# Patient Record
Sex: FEMALE | Race: WHITE | NOT HISPANIC OR LATINO | Employment: OTHER | URBAN - METROPOLITAN AREA
[De-identification: names, ages, dates, MRNs, and addresses within clinical notes are randomized per-mention and may not be internally consistent; named-entity substitution may affect disease eponyms.]

---

## 2017-08-31 ASSESSMENT — MIFFLIN-ST. JEOR: SCORE: 1018.34

## 2017-09-07 ENCOUNTER — ANESTHESIA - HEALTHEAST (OUTPATIENT)
Dept: SURGERY | Facility: CLINIC | Age: 77
End: 2017-09-07

## 2017-09-07 ENCOUNTER — SURGERY - HEALTHEAST (OUTPATIENT)
Dept: SURGERY | Facility: CLINIC | Age: 77
End: 2017-09-07

## 2017-09-07 ASSESSMENT — MIFFLIN-ST. JEOR: SCORE: 1018.34

## 2018-03-22 ENCOUNTER — TRANSFERRED RECORDS (OUTPATIENT)
Dept: HEALTH INFORMATION MANAGEMENT | Facility: CLINIC | Age: 78
End: 2018-03-22

## 2018-03-28 ASSESSMENT — ENCOUNTER SYMPTOMS
SEIZURES: 0
NECK PAIN: 0
HEARTBURN: 1
TREMORS: 1
STIFFNESS: 1
JOINT SWELLING: 0
CONSTIPATION: 0
MEMORY LOSS: 0
DIZZINESS: 0
MUSCLE CRAMPS: 0
BLOATING: 0
BOWEL INCONTINENCE: 0
PARALYSIS: 0
NUMBNESS: 1
DIARRHEA: 1
MYALGIAS: 1
WEAKNESS: 1
BACK PAIN: 1
SPEECH CHANGE: 0
MUSCLE WEAKNESS: 0
ABDOMINAL PAIN: 0
BLOOD IN STOOL: 0
HEADACHES: 0
LOSS OF CONSCIOUSNESS: 0
DISTURBANCES IN COORDINATION: 0
JAUNDICE: 0
RECTAL PAIN: 0
TINGLING: 1
VOMITING: 0
NAUSEA: 0
ARTHRALGIAS: 1

## 2018-03-30 ENCOUNTER — CARE COORDINATION (OUTPATIENT)
Dept: NEUROSURGERY | Facility: CLINIC | Age: 78
End: 2018-03-30

## 2018-03-30 ENCOUNTER — OFFICE VISIT (OUTPATIENT)
Dept: NEUROLOGY | Facility: CLINIC | Age: 78
End: 2018-03-30
Payer: MEDICARE

## 2018-03-30 VITALS — SYSTOLIC BLOOD PRESSURE: 151 MMHG | HEIGHT: 63 IN | HEART RATE: 73 BPM | DIASTOLIC BLOOD PRESSURE: 79 MMHG

## 2018-03-30 DIAGNOSIS — I10 HYPERTENSION, UNSPECIFIED TYPE: ICD-10-CM

## 2018-03-30 DIAGNOSIS — M47.812 CERVICAL SPONDYLOSIS WITHOUT MYELOPATHY: ICD-10-CM

## 2018-03-30 DIAGNOSIS — G25.9 EXTRAPYRAMIDAL DISEASE: Primary | ICD-10-CM

## 2018-03-30 DIAGNOSIS — E78.2 MIXED HYPERLIPIDEMIA: ICD-10-CM

## 2018-03-30 DIAGNOSIS — M54.16 LUMBAR RADICULOPATHY: ICD-10-CM

## 2018-03-30 RX ORDER — PREDNISONE 10 MG/1
TABLET ORAL
Qty: 13 TABLET | Refills: 0 | Status: SHIPPED | OUTPATIENT
Start: 2018-03-30 | End: 2018-06-20

## 2018-03-30 ASSESSMENT — PAIN SCALES - GENERAL: PAINLEVEL: SEVERE PAIN (7)

## 2018-03-30 NOTE — MR AVS SNAPSHOT
After Visit Summary   3/30/2018    Catherine Mccollum    MRN: [id number]           Patient Information     Date Of Birth          1940        Visit Information        Provider Department      3/30/2018 11:00 AM Yonathan Ferguson MD [facility] Neurology        Today's Diagnoses     Extrapyramidal disease    -  1    Cervical spondylosis without myelopathy        Lumbar radiculopathy        Hypertension, unspecified type        Mixed hyperlipidemia          Care Instructions    Appointment on Monday April 2nd at 10:15 am.   [address]    Phone [phone]          Follow-ups after your visit        Additional Services     INTERNAL MEDICINE REFERRAL       Your provider has referred you to: [facility]: [facility] - [facility] [phone]   [email or web address]    Please be aware that coverage of these services is subject to the terms and limitations of your health insurance plan.  Call member services at your health plan with any benefit or coverage questions.      Please bring the following to your appointment:  >>   Any x-rays, CTs or MRIs which have been performed.  Contact the facility where they were done to arrange for  prior to your scheduled appointment.   >>   List of current medications   >>   This referral request   >>   Any documents/labs given to you for this referral            NEUROSURGERY REFERRAL       Your provider has referred you to: [facility]: [facility] - [place] [phone]   [email or web address]    Please be aware that coverage of these services is subject to the terms and limitations of your health insurance plan.  Call member services at your health plan with any benefit or coverage questions.      Please bring the following with you to your appointment:    (1) Any  "X-Rays, CTs or MRIs which have been performed.  Contact the facility where they were done to arrange for  prior to your scheduled appointment.   (2) List of current medications  (3) This referral request   (4) Any documents/labs given to you for this referral                  Follow-up notes from your care team     Return in 3 days (on 4/2/2018).      Your next 10 appointments already scheduled     Apr 02, 2018 10:15 AM CDT   (Arrive by 10:00 AM)   New Patient Visit with Rosalind Lewis PA-C   [facility] ([facility] [place])    [address] [place] [address]  [address] [facility] [address] [phone]              Who to contact     Please call your clinic at [phone] to:    Ask questions about your health    Make or cancel appointments    Discuss your medicines    Learn about your test results    Speak to your doctor            Additional Information About Your Visit        [employer] Information     [employer] gives you secure access to your electronic health record. If you see a primary care provider, you can also send messages to your care team and make appointments. If you have questions, please call your primary care clinic.  If you do not have a primary care provider, please call [phone] and they will assist you.      [employer] is an electronic gateway that provides easy, online access to your medical records. With [employer], you can request a clinic appointment, read your test results, renew a prescription or communicate with your care team.     To access your existing account, please contact your [facility] or call [phone] for assistance.        Care EveryWhere ID     This is your Care EveryWhere ID. This could be used by other organizations to access your [place] medical records  [id number]        Your Vitals Were     Pulse Height                73 1.6 m (5' 3\")           Blood Pressure from Last 3 Encounters:   03/30/18 151/79 "   10/18/16 130/67   07/06/16 128/81    Weight from Last 3 Encounters:   07/06/16 62.6 kg (138 lb)   05/11/16 61.8 kg (136 lb 3.2 oz)   12/08/15 61 kg (134 lb 6.4 oz)              We Performed the Following     INTERNAL MEDICINE REFERRAL     NEUROSURGERY REFERRAL          Today's Medication Changes          These changes are accurate as of 3/30/18  1:10 PM.  If you have any questions, ask your nurse or doctor.               Start taking these medicines.        Dose/Directions    predniSONE 10 MG tablet   Commonly known as:  DELTASONE   Used for:  Lumbar radiculopathy   Started by:  Yonathan Ferguson MD        Take 4 tablets (40 mg) once daily for 2 days, then 2 tablets once daily for 2 days, then 1 tablet once daily for 1 day.   Quantity:  13 tablet   Refills:  0            Where to get your medicines      These medications were sent to [id number] - [place], [place] - [address] AT [facility] [address]  [facility] [address], [place] [address]    Hours:  24-hours Phone:  [phone]     predniSONE 10 MG tablet                Primary Care Provider Office Phone # Fax #    Ayanna Muniz [phone]       [address] [address]        Equal Access to Services     HUMPHREY ANDERSON AH: Hadii charley ku hadasho Sokervin, waaxda luqadaha, qaybta kaalmada adeegyada, niya boyle. So [facility] [phone].    ATENCIÓN: Si habla español, tiene a franco disposición servicios gratuitos de asistencia lingüística. Miguelito al [phone].    We comply with applicable federal civil rights laws and [place] laws. We do not discriminate on the basis of race, color, national origin, age, disability, sex, sexual orientation, or gender identity.            Thank you!     Thank you for choosing [facility] NEUROLOGY  for your care. Our goal is always to provide you with excellent care. Hearing back from our patients is one way we can  continue to improve our services. Please take a few minutes to complete the written survey that you may receive in the mail after your visit with us. Thank you!             Your Updated Medication List - Protect others around you: Learn how to safely use, store and throw away your medicines at www.disposemymeds.org.          This list is accurate as of 3/30/18  1:10 PM.  Always use your most recent med list.                   Brand Name Dispense Instructions for use Diagnosis    amantadine 100 MG capsule    SYMMETREL    180 capsule    Take 1 capsule (100 mg) by mouth 2 times daily    Tremor       * carbidopa-levodopa  MG per tablet    SINEMET    180 tablet    Take 1 tablet by mouth 3 times daily    Paralysis agitans (H)       * carbidopa-levodopa  MG per tablet    SINEMET    21 tablet    Take 1 tablet by mouth 3 times daily    Paralysis agitans (H)       chlorthalidone 25 MG tablet    HYGROTON    45 tablet    Take 1/2 tab daily    Essential hypertension       ciprofloxacin 250 MG tablet    CIPRO    6 tablet    Take 1 tablet (250 mg) by mouth 2 times daily    Dysuria       CORAL CALCIUM      1,000 mg daily.        desoximetasone 0.25 % Oint ointment    TOPICORT     Apply topically as needed        diazepam 5 MG tablet    VALIUM    25 tablet    Take 1 tablet (5 mg) by mouth nightly as needed for sleep    Other insomnia       irbesartan 150 MG tablet    AVAPRO    90 tablet    Take 1 tablet (150 mg) by mouth daily    Essential hypertension       pravastatin 20 MG tablet    PRAVACHOL    90 tablet    Take 1 tablet (20 mg) by mouth daily    Hyperlipidemia, unspecified hyperlipidemia       predniSONE 10 MG tablet    DELTASONE    13 tablet    Take 4 tablets (40 mg) once daily for 2 days, then 2 tablets once daily for 2 days, then 1 tablet once daily for 1 day.    Lumbar radiculopathy       TUMS 500 MG chewable tablet   Generic drug:  calcium carbonate      Take 2 tablets by mouth daily as needed.        TYLENOL  EXTRA STRENGTH PO      Take 1 tablet by mouth as needed Muscle and joint pain        typhoid CR capsule    VIVOTIF    4 capsule    Take 1 capsule by mouth every other day    Encounter for immunization       vitamin D 1000 UNITS capsule      Take 1 capsule by mouth daily.        * Notice:  This list has 2 medication(s) that are the same as other medications prescribed for you. Read the directions carefully, and ask your doctor or other care provider to review them with you.

## 2018-03-30 NOTE — PATIENT INSTRUCTIONS
Appointment on Monday April 2nd at 10:15 am.   [facility]   [address], [place]    Phone [phone]

## 2018-03-30 NOTE — LETTER
3/30/2018       RE: Catherine Mccollum  [address]    [place] [address]     Dear Colleague,    Thank you for referring your patient, Catherine Mccollum, to the [facility]UROLOGY at [facility]. Please see a copy of my visit note below.    Service Date: 2018      RE: Catherine Mccollum   MRN: [id number]   : 1940      Dear Dr. Muniz:      Thank you for referring Catherine Mccollum for neurologic consultation on 2018.  The patient comes with a chief complaint of left leg radiculopathy as well as Parkinson disease.      The patient developed pain down the side and back of the left leg about 4-5 weeks ago.  She said she is miserable.  This goes into the foot and particularly is bad in the left instep.  She said that the pain is less when she is seated, but it is worse when she is at bed rest and also when she ambulates.  She had prior right leg symptoms.  I saw her for that a number of months ago.  Her last visit with me was 16 months ago.  She failed conservative care and ended up having lumbar laminectomy on the right at the L4-5 level through Dr. Cannon at [facility].  She said that she first had attempts at further conservative care, including an injection.  She said after the surgery she was much better but has had some continued pain at times down the right side of her leg.  This is nothing compared to the pain she had before surgery.  She has had no trouble passing her urine or stool.  She feels that since I last saw her tremor is worse.  She has reduced her use of Sinemet to 125/100 tablet most days.  She said that she thinks she has further acid reflux if she takes a higher dose medication.  She is off amantadine.  She continues on low dose lorazepam for sleep on a p.r.n. basis but continues on pravastatin, chlorthalidone, Avapro, calcium and vitamin D.  She has been on gabapentin 600 mg b.i.d., which helps some pain but has not given her much relief recently.   "She denied pain higher in her spine.  She did describe pins and needle sensation in the foot.  She has noted that if she bends her head forward that her left foot \"tingles.\"  She did note it is uncomfortable to place weight on the left foot.  She finds using a walker helps.  She also has now switched her shoe wear to flat shoes as walking on heels makes it worse.  She described a heavy sensation in her foot and she described a tingling and Novocain sensation in her sole.  The patient denied any new family history since I saw her or other medical problems.  I did review with the patient her known cervical stenosis.  Fortunately, she is not suffering from any neck pain.  She has not had radicular arm symptoms.        The patient did have MRI scan done.  Unfortunately, I was not able to get the images, but I was able to speak to the neuroradiologist concerning the report from 03/22/2018.  This was Dr. Oshea.  He said that at the L5-S1 level she had a new paracentral disk with extension to the left.  This had a lobulated appearance and did extend into the subarticular recess.  She had pressure with mass effect on the left S1 nerve root and the dorsal root of L5.  At L3-L4, she had a new foraminal disk herniation.  This showed some moderate narrowing in the left foramina and this did compress far laterally the left L3 dorsal root.  The patient did have a laminectomy on the right at L4-5 and she appear to have no residual disk there nor significant foraminal narrowing.  At L5-S1, she did have a previously found and then current shell right foraminal disk herniation.  She does have a spondylolisthesis L4-5 of 2-3 mm and at L5-S1 of 4 mm which has not changed.      Neurologic examination revealed a pleasant woman.  She did walk with an antalgic gait.  She had rest tremor, especially of the left arm compared to the right.  The tremor increased as she ambulated.  There is a mild postural component, but it was mainly rest " tremor.  There was just a hint of reduced rapid alternating motion rate of the left hand and slightly reduced movement of the left arm.  She did have intense paraspinal muscle spasm.  She had marked limitation in extension and flexion of her back and she had a list to the right.  Otherwise, she had normal voice with no significant dysarthria.  She had a positive glabellar tap sign and mildly reduced upward gaze.  The patient had a symmetric face.  Tongue and uvula are midline.  Strength testing was all normal.  Muscle stretch reflexes were quite brisk in the arms and at the knees as it had been in the past.  It was also present in internal hamstrings and reflexes were slightly reduced on the right internal hamstrings.  Ankle jerks were present.  Her toes were downward going to plantar stimulation.  The patient did have positive straight leg raising on the left at 90 degrees.  I could not auscultate cervical bruits.  She had a regular cardiac rhythm without gallops or murmurs.  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  This evidently has been evaluated and she was told to probably just leave it alone.      IMPRESSION:   1.  New left L5-S1 radiculopathy.   2.  Prior history of right-sided lumbar radiculopathy with fairly successful back surgery.   3.  Parkinson disease which is tremor predominant with prior history of essential tremor.      The patient unfortunately is quite miserable.  She would like to try further treatment before considering surgery.  She knows this is a possible option.  She is going to try a short course of oral prednisone.  I went over potential side effects with her including the rare possibility of bone infarction and pancreatitis and more common problems with glucose intolerance and diabetes.  She may be a candidate for an epidural steroid injection.  She would like referral to a [facility] neurosurgeon.  I will make arrangements for that today.   I went over with her restrictions.  I spent 45 minutes with the patient today.  Over 50% of the time involved counseling and coordination of care.         D: 2018   T: 2018   MT: herbert      Name:     GÉNESIS CA   MRN:      [id number]        Account:      [id number]   :      1940           Service Date: 2018      Document: [id number]       Again, thank you for allowing me to participate in the care of your patient.      Sincerely,    Yonathan Ferguson MD    CC:  Ayanna Muniz MD   [facility]    [address] [place]  [address]

## 2018-04-01 NOTE — PROGRESS NOTES
"Service Date: 2018      Ayanna Muniz MD   [facility]    [address] [place]  [address]      RE: Catherine Mccollum   MRN: [id number]   : 1940      Dear Dr. Muniz:      Thank you for referring Catherine Mccollum for neurologic consultation on 2018.  The patient comes with a chief complaint of left leg radiculopathy as well as Parkinson disease.      The patient developed pain down the side and back of the left leg about 4-5 weeks ago.  She said she is miserable.  This goes into the foot and particularly is bad in the left instep.  She said that the pain is less when she is seated, but it is worse when she is at bed rest and also when she ambulates.  She had prior right leg symptoms.  I saw her for that a number of months ago.  Her last visit with me was 16 months ago.  She failed conservative care and ended up having lumbar laminectomy on the right at the L4-5 level through Dr. Cannon at [facility].  She said that she first had attempts at further conservative care, including an injection.  She said after the surgery she was much better but has had some continued pain at times down the right side of her leg.  This is nothing compared to the pain she had before surgery.  She has had no trouble passing her urine or stool.  She feels that since I last saw her tremor is worse.  She has reduced her use of Sinemet to 125/100 tablet most days.  She said that she thinks she has further acid reflux if she takes a higher dose medication.  She is off amantadine.  She continues on low dose lorazepam for sleep on a p.r.n. basis but continues on pravastatin, chlorthalidone, Avapro, calcium and vitamin D.  She has been on gabapentin 600 mg b.i.d., which helps some pain but has not given her much relief recently.  She denied pain higher in her spine.  She did describe pins and needle sensation in the foot.  She has noted that if she bends her head forward that her left foot \"tingles.\"  She did note it is " uncomfortable to place weight on the left foot.  She finds using a walker helps.  She also has now switched her shoe wear to flat shoes as walking on heels makes it worse.  She described a heavy sensation in her foot and she described a tingling and Novocain sensation in her sole.  The patient denied any new family history since I saw her or other medical problems.  I did review with the patient her known cervical stenosis.  Fortunately, she is not suffering from any neck pain.  She has not had radicular arm symptoms.        The patient did have MRI scan done.  Unfortunately, I was not able to get the images, but I was able to speak to the neuroradiologist concerning the report from 03/22/2018.  This was Dr. Oshea.  He said that at the L5-S1 level she had a new paracentral disk with extension to the left.  This had a lobulated appearance and did extend into the subarticular recess.  She had pressure with mass effect on the left S1 nerve root and the dorsal root of L5.  At L3-L4, she had a new foraminal disk herniation.  This showed some moderate narrowing in the left foramina and this did compress far laterally the left L3 dorsal root.  The patient did have a laminectomy on the right at L4-5 and she appear to have no residual disk there nor significant foraminal narrowing.  At L5-S1, she did have a previously found and then current shell right foraminal disk herniation.  She does have a spondylolisthesis L4-5 of 2-3 mm and at L5-S1 of 4 mm which has not changed.      Neurologic examination revealed a pleasant woman.  She did walk with an antalgic gait.  She had rest tremor, especially of the left arm compared to the right.  The tremor increased as she ambulated.  There is a mild postural component, but it was mainly rest tremor.  There was just a hint of reduced rapid alternating motion rate of the left hand and slightly reduced movement of the left arm.  She did have intense paraspinal muscle spasm.  She had  marked limitation in extension and flexion of her back and she had a list to the right.  Otherwise, she had normal voice with no significant dysarthria.  She had a positive glabellar tap sign and mildly reduced upward gaze.  The patient had a symmetric face.  Tongue and uvula are midline.  Strength testing was all normal.  Muscle stretch reflexes were quite brisk in the arms and at the knees as it had been in the past.  It was also present in internal hamstrings and reflexes were slightly reduced on the right internal hamstrings.  Ankle jerks were present.  Her toes were downward going to plantar stimulation.  The patient did have positive straight leg raising on the left at 90 degrees.  I could not auscultate cervical bruits.  She had a regular cardiac rhythm without gallops or murmurs.  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  This evidently has been evaluated and she was told to probably just leave it alone.      IMPRESSION:   1.  New left L5-S1 radiculopathy.   2.  Prior history of right-sided lumbar radiculopathy with fairly successful back surgery.   3.  Parkinson disease which is tremor predominant with prior history of essential tremor.      The patient unfortunately is quite miserable.  She would like to try further treatment before considering surgery.  She knows this is a possible option.  She is going to try a short course of oral prednisone.  I went over potential side effects with her including the rare possibility of bone infarction and pancreatitis and more common problems with glucose intolerance and diabetes.  She may be a candidate for an epidural steroid injection.  She would like referral to a [facility] neurosurgeon.  I will make arrangements for that today.  I went over with her restrictions.  I spent 45 minutes with the patient today.  Over 50% of the time involved counseling and coordination of care.      Sincerely yours,      Yonathan HAMMOND  MD AXEL Ferguson MD             D: 2018   T: 2018   MT: herbert      Name:     GÉNESIS CA   MRN:      [id number]        Account:      [id number]   :      1940           Service Date: 2018      Document: [id number]

## 2018-04-02 ENCOUNTER — TELEPHONE (OUTPATIENT)
Dept: NEUROLOGY | Facility: CLINIC | Age: 78
End: 2018-04-02

## 2018-04-02 NOTE — TELEPHONE ENCOUNTER
[facility] Call Center    Phone Message    May a detailed message be left on voicemail: no    Reason for Call: Other: Courtesy Call on Pt's condition and talk about possible prednisone prescription     Action Taken: Message routed to:  [facility] ([facility] NEUROLOGY ADULT [facility]

## 2018-04-04 ENCOUNTER — TELEPHONE (OUTPATIENT)
Dept: NEUROLOGY | Facility: CLINIC | Age: 78
End: 2018-04-04

## 2018-04-04 DIAGNOSIS — M54.16 LUMBAR RADICULOPATHY: Primary | ICD-10-CM

## 2018-04-04 RX ORDER — PREDNISONE 10 MG/1
TABLET ORAL
Qty: 13 TABLET | Refills: 0 | Status: SHIPPED | OUTPATIENT
Start: 2018-04-04 | End: 2018-06-20

## 2018-04-04 NOTE — TELEPHONE ENCOUNTER
Patient had some relief from oral steroids; no side effects; reviewed again with her risks. She wants another course before seeing neurosurgeon

## 2018-04-08 ASSESSMENT — ENCOUNTER SYMPTOMS
STIFFNESS: 1
MYALGIAS: 1
BACK PAIN: 1
ARTHRALGIAS: 1

## 2018-04-11 ENCOUNTER — TELEPHONE (OUTPATIENT)
Dept: ANESTHESIOLOGY | Facility: CLINIC | Age: 78
End: 2018-04-11

## 2018-04-11 ENCOUNTER — TELEPHONE (OUTPATIENT)
Dept: NEUROSURGERY | Facility: CLINIC | Age: 78
End: 2018-04-11

## 2018-04-11 ENCOUNTER — OFFICE VISIT (OUTPATIENT)
Dept: NEUROSURGERY | Facility: CLINIC | Age: 78
End: 2018-04-11
Payer: MEDICARE

## 2018-04-11 VITALS
BODY MASS INDEX: 27.23 KG/M2 | WEIGHT: 144.2 LBS | SYSTOLIC BLOOD PRESSURE: 154 MMHG | DIASTOLIC BLOOD PRESSURE: 75 MMHG | HEIGHT: 61 IN | HEART RATE: 69 BPM

## 2018-04-11 DIAGNOSIS — M47.816 SPONDYLOSIS OF LUMBAR SPINE: Primary | ICD-10-CM

## 2018-04-11 DIAGNOSIS — M51.26 LUMBAR HERNIATED DISC: ICD-10-CM

## 2018-04-11 ASSESSMENT — PAIN SCALES - GENERAL: PAINLEVEL: SEVERE PAIN (7)

## 2018-04-11 NOTE — LETTER
4/11/2018       RE: Catherine Mccollum  [address]    [place] [address]     Dear Colleague,    Thank you for referring your patient, Catherine Mccollum, to the [facility] NEUROSURGERY at [facility]. Please see a copy of my visit note below.      Neurosurgery Clinic Consult  Date of Visit: 4/11/2018    Referring Provider:  Yonathan Ferguson MD  Dear Dr. Ferguson    We were happy to see Catherine Mccollum, a pleasant 78 year old year old female for left leg pain.    HPI:   As you recall this pleasant 78-year-old female has an underlying diagnosis of Parkinson's disease, and a long history of fairly well-controlled low back pain. She takes Tylenol for it and does well.  September of last year she developed right leg pain and ultimately had a right L4/5 lateral recess decompression and right L5-S1 synovial cyst removal at the hands of Dr. Jackson at [facility]. She did pretty well after that, had to have one more injection for pain, spine injection. And then has done well. 3 weeks ago she developed a new left leg pain.. It is worse than the right had been. It radiates down the back and side of the left leg, in an S1 distribution to the bottom of the foot with numbness and tingling in the calf and the bottom of the foot. It is worse when she is standing or walking, or if she lies down particularly on the right side.. She has no pain when she is sitting. She denies specific weakness but says it has always felt a little weak because of the Parkinson's. She has no recent bowel or bladder changes. Leg pain is rated 90% of her pain, back pain 10% of her pain.   She has had physical therapy for the right leg, but stopped when the left became painful.. She has had a couple of courses of oral steroids which were helpful while she is on them. She would like another. She has had an MRI finding a new disc herniation at left L5-S1 and she is referred for surgical  consideration. She did not return to Dr. Oakes's office because she was irritated with them as they would not give her MRI report over the phone.    Current Symptom List:   [facility] -  Patient Entered Questionnaire/Answers 4/8/2018   How would you describe the pain? burning, numbness, throbbing, pressure   Which of the following worsen your pain? lying down, standing, walking, exercise, bowel movements   Which of the following improve or reduce your pain?  sitting, relaxation   What number best describes your average pain for the past week:  0 = No pain  to  10 = Worst pain imaginable 6   What number best describes your LOWEST pain in past 24 hours:  0 = No pain  to  10 = Worst pain imaginable 3   What number best describes your WORST pain in past 24 hours:  0 = No pain  to  10 = Worst pain imaginable 8   When is your pain worst? AM, PM, Constant   What non-medicine treatments have you already had for your pain? physical therapy, spine injections (shots), surgery   Have you tried treating your pain with medication?  Yes   Are you currently taking medications for your pain? Yes       Current Outpatient Prescriptions:      GABAPENTIN PO, Take 300 mg by mouth, Disp: , Rfl:      LORazepam (ATIVAN PO), Take 0.5 mg by mouth nightly as needed for anxiety, Disp: , Rfl:      RaNITidine HCl (ZANTAC PO), Take 150 mg by mouth daily as needed for heartburn, Disp: , Rfl:      carbidopa-levodopa (SINEMET)  MG per tablet, Take 1 tablet by mouth 3 times daily (Patient taking differently: Take 1 tablet by mouth daily ), Disp: 180 tablet, Rfl: 3     carbidopa-levodopa (SINEMET)  MG per tablet, Take 1 tablet by mouth 3 times daily (Patient taking differently: Take 1 tablet by mouth daily ), Disp: 21 tablet, Rfl: 1     irbesartan (AVAPRO) 150 MG tablet, Take 1 tablet (150 mg) by mouth daily, Disp: 90 tablet, Rfl: 3     pravastatin (PRAVACHOL) 20 MG tablet, Take 1 tablet (20 mg) by mouth daily, Disp: 90 tablet, Rfl: 3      chlorthalidone (HYGROTON) 25 MG tablet, Take 1/2 tab daily, Disp: 45 tablet, Rfl: 3     Acetaminophen (TYLENOL EXTRA STRENGTH PO), Take 1 tablet by mouth as needed Muscle and joint pain, Disp: , Rfl:      CORAL CALCIUM, 1,000 mg daily. , Disp: , Rfl:      calcium carbonate (TUMS) 500 MG chewable tablet, Take 2 tablets by mouth daily as needed., Disp: , Rfl:      Cholecalciferol (VITAMIN D) 1000 UNITS capsule, Take 1 capsule by mouth daily., Disp: , Rfl:      predniSONE (DELTASONE) 10 MG tablet, Take 4 tablets (40 mg) once daily for 2 days, then 2 tablets once daily for 2 days, then 1 tablet once daily for 1 day. (Patient not taking: Reported on 4/11/2018), Disp: 13 tablet, Rfl: 0     predniSONE (DELTASONE) 10 MG tablet, Take 4 tablets (40 mg) once daily for 2 days, then 2 tablets once daily for 2 days, then 1 tablet once daily for 1 day. (Patient not taking: Reported on 4/11/2018), Disp: 13 tablet, Rfl: 0     typhoid (VIVOTIF) DR capsule, Take 1 capsule by mouth every other day (Patient not taking: Reported on 4/11/2018), Disp: 4 capsule, Rfl: 0     ciprofloxacin (CIPRO) 250 MG tablet, Take 1 tablet (250 mg) by mouth 2 times daily (Patient not taking: Reported on 4/11/2018), Disp: 6 tablet, Rfl: 0     diazepam (VALIUM) 5 MG tablet, Take 1 tablet (5 mg) by mouth nightly as needed for sleep (Patient not taking: Reported on 4/11/2018), Disp: 25 tablet, Rfl: 0     amantadine (SYMMETREL) 100 MG capsule, Take 1 capsule (100 mg) by mouth 2 times daily (Patient not taking: Reported on 4/11/2018), Disp: 180 capsule, Rfl: 4     desoximetasone (TOPICORT) 0.25 % OINT, Apply topically as needed, Disp: , Rfl:     Allergies   Allergen Reactions     Fosamax Nausea and GI Disturbance     Lisinopril Other (See Comments)     cough     Sulfa Drugs Hives       History reviewed. No pertinent past medical history.    Past Surgical History:   Procedure Laterality Date     BREAST SURGERY  Jan 1987    biopsy     CATARACT IOL, RT/LT       GYN  "SURGERY  1965;1967    [facility]     ORTHOPEDIC SURGERY      ankle surgery for trimalleolar frx       Family History   Problem Relation Age of Onset     C.A.D. Other      Hypertension Other      granddaughter      CANCER Other      DIABETES No family hx of        Social History     Social History     Marital status: Unknown     Spouse name: N/A     Number of children: N/A     Years of education: N/A     Occupational History     Not on file.     Social History Main Topics     Smoking status: Never Smoker     Smokeless tobacco: Never Used     Alcohol use No     Drug use: No     Sexual activity: Not Currently     Other Topics Concern     Not on file     Social History Narrative     Problem list and 13 point review of systems: is reviewed in Epic and is negative with the exception of those symptoms associated with HPI and PMH.      OBJECTIVE:   /75 (BP Location: Left arm, Patient Position: Chair, Cuff Size: Adult Regular)  Pulse 69  Ht 1.549 m (5' 1\")  Wt 65.4 kg (144 lb 3.2 oz)  BMI 27.25 kg/m2    Imaging:  These are the pertinent radiologist's findings from:  MRI lumbar WO @ [facility].   left L5-S1 lobular disc herniation, free fragment.  See the full report in EPIC/Media tab.  I personally reviewed the images with the patient.      Exam:  *   Well developed, well nourished female found seated comfortably in exam room chair.  Is able to sit and rise independently.    Unaccompanied.    *  Mental Status  A&O X3.  Bright, alert, affable, interactive. Language fluid, fund of knowledge intact. Good historian.  Mood and affect congruent and WNL, mildly irritable.  *  Cranial Nerves  II: Able to read printed forms, VF full to gross confrontation.  III: IV, VI:  PERRLA, EOMI, No nystagmus, no ptosis.  V: Sensation intact in bilateral V1, V2, and V3. Jaw clench symmetric.    VII:  Intact to voice bilaterally.  IX:  Pushes tongue against bilateral cheeks.  X:  Palate elevates, uvula midline, " phonation intact.  XI: Elevates shoulders, head turn intact.  XII: Tongue midline. No fasciculations.  *  Cervical Spine:  DTR's at Biceps, Triceps, and Brachioradialis 2/4 and symmetric.  Sensation intact.    No Garcia's. No Phalen's.  No Tinel's. No fasciculations.   Muscle bulk and tone WNL.  Upper Extremity Strength.              RIGHT                LEFT     Deltoid              5/5                   5/5       Biceps              5/5                   5/5        Triceps              5/5                   5/5       Wrist Extensor              5/5                   5/5                     5/5                    5/5       Interossei              5/5                   5/5       EPL              5/5                    5/5       Pinch              5/5                  5/5          *  Lumbar Spine:     DTR's Patellar and Achilles 1/4 and symmetric.   No fasciculations.  Muscle bulk and tone WNL.  No Clonus.  Sensation intact.    Lower Extremity Strength                   RIGHT                   LEFT     Iliopsoas                    5/5                      5/5       Quad                    5/5                        5/5       Hamstring                      5/5                        5/5         Gastrocs                    5/5                        5/5       Tib. Anterior                     5/5                        5/5       EHL                      5/5                        5/5       Babinski                 Down                                      Down                   *  Structural Exam  Inspection of the spine reveals no scoliosis, has increased thoracic kyphosis. Prior surgical scars are well healed. Lumbar ROM full.  . No spasming, no tenderness, no step offs. No SLR.  No SI tenderness. Mild trochanteric bursal tenderness right. Negative Sarah's.    Gait narrow, smooth, no scissoring. No antalgia. Tandem gait intact.  Negative Station and Romberg.   *  Sensory level intact.   *  Nel's 5/5 are  negative.   (Nel's are:   non anatomic tenderness;   pain with simulated exam, trunk rotation or axial load;    positive distraction tests e.g.. SLR;    regional disturbances, non anatomic pain, numbness, weakness;  overreaction to testing.)    ASSESSMENT/PLAN:  1. Spondylosis of lumbar spine    2. Lumbar herniated disc      This pleasant lady has developed new left leg pain in the past 3 weeks, in the setting of ongoing chronic but mild low back pain. She has a new left L5-S1 disc herniation and I believe is the cause of her trouble. She responded nicely to steroid orally, so I think will do very well with injection. I proposed a left L5-S1 transforaminal DENISSE, physical therapy focused on abdominal and back musculature. I've written both of those orders and I've asked her to return in 6 weeks to evaluate her progress. I will not recommend another pack of oral steroids since I don't want her to double up on those in the event she can get her injection fairly soon. I reassured her that in the absence of neurologic decline, or failure to respond to nonoperative care, surgery is generally not indicated.    I answered all of her questions, which indicated good understanding of the situation.  She is willing to move forward with the recommendations. I supplied her with business cards and telephone numbers for ease of contact.   It's been a pleasure participating in the care of this nice patient. We thank you for your confidence in the [facility], Department of Neurosurgery.      Total time: 60 minutes with more than 30 minutes spent in direct face to face contact reviewing films, providing education, counseling, non-operative therapies,and indications for surgery as well as further follow up.    This note was generated using voice recognition software. While edited for content some inaccurate phrasing may be found.    Again, thank you for allowing me to participate in the care of your patient.       Sincerely,    Rosalind Lewis PA-C

## 2018-04-11 NOTE — MR AVS SNAPSHOT
After Visit Summary   4/11/2018    Catherine Mccollum    MRN: [id number]           Patient Information     Date Of Birth          1940        Visit Information        Provider Department      4/11/2018 12:30 PM Rosalind Lewis PA-C [facility] Neuro[facility]        Today's Diagnoses     Spondylosis of lumbar spine    -  1    Lumbar herniated disc           Follow-ups after your visit        Additional Services     [facility]       Please call [phone] to make an appointment. Clinic is located: [facility] [address] [place] [address] [id number] [place] [address]      Please complete the following questions:    Procedure: procedure: left L5/S1 TF DENISSE   Pt not on blood thinners.    What is your diagnosis for the patient's pain? Disc herniation    What are your specific questions for the pain specialist? NA    Are there any red flags that may impact the assessment or management of the patient? No            PT Evaluation and Treatment (External Referral) [6022]       Your provider has referred you to: PHYSICAL THERAPY @ [facility]  external referral for back pain and left radiculopathy    Please be aware that coverage of these services is subject to the terms and limitations of your health insurance plan.  Call member services at your health plan with any benefit or coverage questions.      Treatment: Evaluation & Treatment EVAL AND TREAT  back pain and left radiculopathy   Special Instructions: None  Special Programs: None  Modalities: As indicated  Equipment: AS INDICATED  Duration and Frequency: Per Therapist Evaluation    Please bring the following to your appointment:  >>   Any x-rays, CTs or MRIs which have been performed.  Contact the facility where they were done to arrange for  prior to your scheduled appointment.  Any new CT, MRI or other procedures ordered by your specialist must be performed at a [facility] facility or coordinated by your  clinic's referral office.    >>   List of current medications   >>   This referral request   >>   Any documents/labs given to you for this referral      **Note to Provider** To refer patients to therapy outside of the Location list, change the order class to External Referral in the General section.                  Your next 10 appointments already scheduled     Apr 30, 2018 10:05 AM CDT   (Arrive by 9:50 AM)   New Patient Visit with Kar Alvarez MD   [facility] ([facility])    [address] [place] [id number] [phone]            May 23, 2018  1:30 PM CDT   (Arrive by 1:15 PM)   Return Visit with Rosalind Lewis PA-C   [facility] ([facility])    [address] [place] [id number] [phone]              Who to contact     Please call your clinic at [phone] to:    Ask questions about your health    Make or cancel appointments    Discuss your medicines    Learn about your test results    Speak to your doctor            Additional Information About Your Visit        [employer] Information     [facility] gives you secure access to your electronic health record. If you see a primary care provider, you can also send messages to your care team and make appointments. If you have questions, please call your primary care clinic.  If you do not have a primary care provider, please call [phone] and they will assist you.      [facility] is an electronic gateway that provides easy, online access to your medical records. With [facility] [facility], you can request a clinic appointment, read your test results, renew a prescription or communicate with your care team.     To access your existing account, please contact your [facility] or call [phone] for assistance.        Care EveryWhere ID     This is your Care EveryWhere ID. This could be used by other  "organizations to access your [facility] medical records  [id number]        Your Vitals Were     Pulse Height BMI (Body Mass Index)             69 1.549 m (5' 1\") 27.25 kg/m2          Blood Pressure from Last 3 Encounters:   04/11/18 154/75   03/30/18 151/79   10/18/16 130/67    Weight from Last 3 Encounters:   04/11/18 65.4 kg (144 lb 3.2 oz)   07/06/16 62.6 kg (138 lb)   05/11/16 61.8 kg (136 lb 3.2 oz)              We Performed the Following     [facility]     PT Evaluation and Treatment (External Referral) [9032]          Today's Medication Changes          These changes are accurate as of 4/11/18  1:33 PM.  If you have any questions, ask your nurse or doctor.               These medicines have changed or have updated prescriptions.        Dose/Directions    * carbidopa-levodopa  MG per tablet   Commonly known as:  SINEMET   This may have changed:  when to take this   Used for:  Paralysis agitans (H)        Dose:  1 tablet   Take 1 tablet by mouth 3 times daily   Quantity:  180 tablet   Refills:  3       * carbidopa-levodopa  MG per tablet   Commonly known as:  SINEMET   This may have changed:  when to take this   Used for:  Paralysis agitans (H)        Dose:  1 tablet   Take 1 tablet by mouth 3 times daily   Quantity:  21 tablet   Refills:  1       * Notice:  This list has 2 medication(s) that are the same as other medications prescribed for you. Read the directions carefully, and ask your doctor or other care provider to review them with you.             Primary Care Provider Office Phone # Fax #    Kar Alvarez [phone] [address] [address]        Equal Access to Services     WALLY ANDERSON : Nadia Black, bharati aceves, kelly quickalniya castillo. So [facility] [phone].    ATENCIÓN: Si habla español, tiene a franco disposición servicios gratuitos de asistencia " lingüística. Miguelito al [phone].    We comply with applicable federal civil rights laws and [place] laws. We do not discriminate on the basis of race, color, national origin, age, disability, sex, sexual orientation, or gender identity.            Thank you!     Thank you for choosing [facility]  for your care. Our goal is always to provide you with excellent care. Hearing back from our patients is one way we can continue to improve our services. Please take a few minutes to complete the written survey that you may receive in the mail after your visit with us. Thank you!             Your Updated Medication List - Protect others around you: Learn how to safely use, store and throw away your medicines at www.disposemymeds.org.          This list is accurate as of 4/11/18  1:33 PM.  Always use your most recent med list.                   Brand Name Dispense Instructions for use Diagnosis    amantadine 100 MG capsule    SYMMETREL    180 capsule    Take 1 capsule (100 mg) by mouth 2 times daily    Tremor       ATIVAN PO      Take 0.5 mg by mouth nightly as needed for anxiety        * carbidopa-levodopa  MG per tablet    SINEMET    180 tablet    Take 1 tablet by mouth 3 times daily    Paralysis agitans (H)       * carbidopa-levodopa  MG per tablet    SINEMET    21 tablet    Take 1 tablet by mouth 3 times daily    Paralysis agitans (H)       chlorthalidone 25 MG tablet    HYGROTON    45 tablet    Take 1/2 tab daily    Essential hypertension       ciprofloxacin 250 MG tablet    CIPRO    6 tablet    Take 1 tablet (250 mg) by mouth 2 times daily    Dysuria       CORAL CALCIUM      1,000 mg daily.        desoximetasone 0.25 % Oint ointment    TOPICORT     Apply topically as needed        diazepam 5 MG tablet    VALIUM    25 tablet    Take 1 tablet (5 mg) by mouth nightly as needed for sleep    Other insomnia       GABAPENTIN PO      Take 300 mg by mouth        irbesartan 150 MG tablet    AVAPRO     90 tablet    Take 1 tablet (150 mg) by mouth daily    Essential hypertension       pravastatin 20 MG tablet    PRAVACHOL    90 tablet    Take 1 tablet (20 mg) by mouth daily    Hyperlipidemia, unspecified hyperlipidemia       * predniSONE 10 MG tablet    DELTASONE    13 tablet    Take 4 tablets (40 mg) once daily for 2 days, then 2 tablets once daily for 2 days, then 1 tablet once daily for 1 day.    Lumbar radiculopathy       * predniSONE 10 MG tablet    DELTASONE    13 tablet    Take 4 tablets (40 mg) once daily for 2 days, then 2 tablets once daily for 2 days, then 1 tablet once daily for 1 day.    Lumbar radiculopathy       TUMS 500 MG chewable tablet   Generic drug:  calcium carbonate      Take 2 tablets by mouth daily as needed.        TYLENOL EXTRA STRENGTH PO      Take 1 tablet by mouth as needed Muscle and joint pain        typhoid CR capsule    VIVOTIF    4 capsule    Take 1 capsule by mouth every other day    Encounter for immunization       vitamin D 1000 units capsule      Take 1 capsule by mouth daily.        ZANTAC PO      Take 150 mg by mouth daily as needed for heartburn        * Notice:  This list has 4 medication(s) that are the same as other medications prescribed for you. Read the directions carefully, and ask your doctor or other care provider to review them with you.

## 2018-04-11 NOTE — TELEPHONE ENCOUNTER
----- Message from Terri Cates, RN sent at 4/11/2018  2:05 PM CDT -----  Regarding: Procedure only referral   Hi April,     Please call this pt to assist with scheduling with any provider:    Left lumbar TFESI    Let us know which provider for orders.     Thanks,  Terri

## 2018-04-11 NOTE — TELEPHONE ENCOUNTER
I called Ctaherine today to get her procedure scheduled. The next available date was 5/18/2018. She has been added to the wait list.     In the meantime, she is scheduled for 5/18/18. I let her know that she would get a call from a nurse to go over the details of the procedure.

## 2018-04-11 NOTE — TELEPHONE ENCOUNTER
Harsha calling from neurosurgery with a patient they are referring for an injection, patient can be reached at the cell number for scheduling.

## 2018-04-11 NOTE — PROGRESS NOTES
Neurosurgery Clinic Consult  Date of Visit: 4/11/2018    Referring Provider:  Yonathan Ferguson MD  Dear Dr. Ferguson    We were happy to see Catherine Mccollum, a pleasant 78 year old year old female for left leg pain.    HPI:   As you recall this pleasant 78-year-old female has an underlying diagnosis of Parkinson's disease, and a long history of fairly well-controlled low back pain. She takes Tylenol for it and does well.  September of last year she developed right leg pain and ultimately had a right L4/5 lateral recess decompression and right L5-S1 synovial cyst removal at the hands of Dr. Jackson at [facility]. She did pretty well after that, had to have one more injection for pain, spine injection. And then has done well. 3 weeks ago she developed a new left leg pain.. It is worse than the right had been. It radiates down the back and side of the left leg, in an S1 distribution to the bottom of the foot with numbness and tingling in the calf and the bottom of the foot. It is worse when she is standing or walking, or if she lies down particularly on the right side.. She has no pain when she is sitting. She denies specific weakness but says it has always felt a little weak because of the Parkinson's. She has no recent bowel or bladder changes. Leg pain is rated 90% of her pain, back pain 10% of her pain.   She has had physical therapy for the right leg, but stopped when the left became painful.. She has had a couple of courses of oral steroids which were helpful while she is on them. She would like another. She has had an MRI finding a new disc herniation at left L5-S1 and she is referred for surgical consideration. She did not return to Dr. Oakes's office because she was irritated with them as they would not give her MRI report over the phone.    Current Symptom List:   [facility] -  Patient Entered Questionnaire/Answers 4/8/2018   How would you describe the pain? burning, numbness,  throbbing, pressure   Which of the following worsen your pain? lying down, standing, walking, exercise, bowel movements   Which of the following improve or reduce your pain?  sitting, relaxation   What number best describes your average pain for the past week:  0 = No pain  to  10 = Worst pain imaginable 6   What number best describes your LOWEST pain in past 24 hours:  0 = No pain  to  10 = Worst pain imaginable 3   What number best describes your WORST pain in past 24 hours:  0 = No pain  to  10 = Worst pain imaginable 8   When is your pain worst? AM, PM, Constant   What non-medicine treatments have you already had for your pain? physical therapy, spine injections (shots), surgery   Have you tried treating your pain with medication?  Yes   Are you currently taking medications for your pain? Yes       Current Outpatient Prescriptions:      GABAPENTIN PO, Take 300 mg by mouth, Disp: , Rfl:      LORazepam (ATIVAN PO), Take 0.5 mg by mouth nightly as needed for anxiety, Disp: , Rfl:      RaNITidine HCl (ZANTAC PO), Take 150 mg by mouth daily as needed for heartburn, Disp: , Rfl:      carbidopa-levodopa (SINEMET)  MG per tablet, Take 1 tablet by mouth 3 times daily (Patient taking differently: Take 1 tablet by mouth daily ), Disp: 180 tablet, Rfl: 3     carbidopa-levodopa (SINEMET)  MG per tablet, Take 1 tablet by mouth 3 times daily (Patient taking differently: Take 1 tablet by mouth daily ), Disp: 21 tablet, Rfl: 1     irbesartan (AVAPRO) 150 MG tablet, Take 1 tablet (150 mg) by mouth daily, Disp: 90 tablet, Rfl: 3     pravastatin (PRAVACHOL) 20 MG tablet, Take 1 tablet (20 mg) by mouth daily, Disp: 90 tablet, Rfl: 3     chlorthalidone (HYGROTON) 25 MG tablet, Take 1/2 tab daily, Disp: 45 tablet, Rfl: 3     Acetaminophen (TYLENOL EXTRA STRENGTH PO), Take 1 tablet by mouth as needed Muscle and joint pain, Disp: , Rfl:      CORAL CALCIUM, 1,000 mg daily. , Disp: , Rfl:      calcium carbonate (TUMS) 500 MG  chewable tablet, Take 2 tablets by mouth daily as needed., Disp: , Rfl:      Cholecalciferol (VITAMIN D) 1000 UNITS capsule, Take 1 capsule by mouth daily., Disp: , Rfl:      predniSONE (DELTASONE) 10 MG tablet, Take 4 tablets (40 mg) once daily for 2 days, then 2 tablets once daily for 2 days, then 1 tablet once daily for 1 day. (Patient not taking: Reported on 4/11/2018), Disp: 13 tablet, Rfl: 0     predniSONE (DELTASONE) 10 MG tablet, Take 4 tablets (40 mg) once daily for 2 days, then 2 tablets once daily for 2 days, then 1 tablet once daily for 1 day. (Patient not taking: Reported on 4/11/2018), Disp: 13 tablet, Rfl: 0     typhoid (VIVOTIF) DR capsule, Take 1 capsule by mouth every other day (Patient not taking: Reported on 4/11/2018), Disp: 4 capsule, Rfl: 0     ciprofloxacin (CIPRO) 250 MG tablet, Take 1 tablet (250 mg) by mouth 2 times daily (Patient not taking: Reported on 4/11/2018), Disp: 6 tablet, Rfl: 0     diazepam (VALIUM) 5 MG tablet, Take 1 tablet (5 mg) by mouth nightly as needed for sleep (Patient not taking: Reported on 4/11/2018), Disp: 25 tablet, Rfl: 0     amantadine (SYMMETREL) 100 MG capsule, Take 1 capsule (100 mg) by mouth 2 times daily (Patient not taking: Reported on 4/11/2018), Disp: 180 capsule, Rfl: 4     desoximetasone (TOPICORT) 0.25 % OINT, Apply topically as needed, Disp: , Rfl:     Allergies   Allergen Reactions     Fosamax Nausea and GI Disturbance     Lisinopril Other (See Comments)     cough     Sulfa Drugs Hives       History reviewed. No pertinent past medical history.    Past Surgical History:   Procedure Laterality Date     BREAST SURGERY  Jan 1987    biopsy     CATARACT IOL, RT/LT       [facility]     ORTHOPEDIC SURGERY      ankle surgery for trimalleolar frx       Family History   Problem Relation Age of Onset     C.A.D. Other      Hypertension Other      granddaughter      CANCER Other      DIABETES No family hx of        Social  "History     Social History     Marital status: Unknown     Spouse name: N/A     Number of children: N/A     Years of education: N/A     Occupational History     Not on file.     Social History Main Topics     Smoking status: Never Smoker     Smokeless tobacco: Never Used     Alcohol use No     Drug use: No     Sexual activity: Not Currently     Other Topics Concern     Not on file     Social History Narrative     Problem list and 13 point review of systems: is reviewed in Epic and is negative with the exception of those symptoms associated with HPI and PMH.      OBJECTIVE:   /75 (BP Location: Left arm, Patient Position: Chair, Cuff Size: Adult Regular)  Pulse 69  Ht 1.549 m (5' 1\")  Wt 65.4 kg (144 lb 3.2 oz)  BMI 27.25 kg/m2    Imaging:  These are the pertinent radiologist's findings from:  MRI lumbar WO @ [facility].   left L5-S1 lobular disc herniation, free fragment.  See the full report in [facility]/Media tab.  I personally reviewed the images with the patient.      Exam:  *   Well developed, well nourished female found seated comfortably in exam room chair.  Is able to sit and rise independently.    Unaccompanied.    *  Mental Status  A&O X3.  Bright, alert, affable, interactive. Language fluid, fund of knowledge intact. Good historian.  Mood and affect congruent and WNL, mildly irritable.  *  Cranial Nerves  II: Able to read printed forms, VF full to gross confrontation.  III: IV, VI:  PERRLA, EOMI, No nystagmus, no ptosis.  V: Sensation intact in bilateral V1, V2, and V3. Jaw clench symmetric.    VII:  Intact to voice bilaterally.  IX:  Pushes tongue against bilateral cheeks.  X:  Palate elevates, uvula midline, phonation intact.  XI: Elevates shoulders, head turn intact.  XII: Tongue midline. No fasciculations.  *  Cervical Spine:  DTR's at Biceps, Triceps, and Brachioradialis 2/4 and symmetric.  Sensation intact.    No Garcia's. No Phalen's.  No Tinel's. No fasciculations.   Muscle bulk and " tone WNL.  Upper Extremity Strength.              RIGHT                LEFT     Deltoid              5/5                   5/5       Biceps              5/5                   5/5        Triceps              5/5                   5/5       Wrist Extensor              5/5                   5/5                     5/5                    5/5       Interossei              5/5                   5/5       EPL              5/5                    5/5       Pinch              5/5                  5/5          *  Lumbar Spine:     DTR's Patellar and Achilles 1/4 and symmetric.   No fasciculations.  Muscle bulk and tone WNL.  No Clonus.  Sensation intact.    Lower Extremity Strength                   RIGHT                   LEFT     Iliopsoas                    5/5                      5/5       Quad                    5/5                        5/5       Hamstring                      5/5                        5/5         Gastrocs                    5/5                        5/5       Tib. Anterior                     5/5                        5/5       EHL                      5/5                        5/5       Babinski                 Down                                      Down                   *  Structural Exam  Inspection of the spine reveals no scoliosis, has increased thoracic kyphosis. Prior surgical scars are well healed. Lumbar ROM full.  . No spasming, no tenderness, no step offs. No SLR.  No SI tenderness. Mild trochanteric bursal tenderness right. Negative Sarah's.    Gait narrow, smooth, no scissoring. No antalgia. Tandem gait intact.  Negative Station and Romberg.   *  Sensory level intact.   *  Nel's 5/5 are negative.   (Nel's are:   non anatomic tenderness;   pain with simulated exam, trunk rotation or axial load;    positive distraction tests e.g.. SLR;    regional disturbances, non anatomic pain, numbness, weakness;  overreaction to testing.)    ASSESSMENT/PLAN:  1. Spondylosis of lumbar  spine    2. Lumbar herniated disc      This pleasant lady has developed new left leg pain in the past 3 weeks, in the setting of ongoing chronic but mild low back pain. She has a new left L5-S1 disc herniation and I believe is the cause of her trouble. She responded nicely to steroid orally, so I think will do very well with injection. I proposed a left L5-S1 transforaminal DENISSE, physical therapy focused on abdominal and back musculature. I've written both of those orders and I've asked her to return in 6 weeks to evaluate her progress. I will not recommend another pack of oral steroids since I don't want her to double up on those in the event she can get her injection fairly soon. I reassured her that in the absence of neurologic decline, or failure to respond to nonoperative care, surgery is generally not indicated.    I answered all of her questions, which indicated good understanding of the situation.  She is willing to move forward with the recommendations. I supplied her with business cards and telephone numbers for ease of contact.   It's been a pleasure participating in the care of this nice patient. We thank you for your confidence in the [facility], Department of Neurosurgery.      Best Regards,    Rosalind Lewis PA-C  [facility]  Department of Neurosurgery  Phone: [phone]  Fax: [phone]        Total time: 60 minutes with more than 30 minutes spent in direct face to face contact reviewing films, providing education, counseling, non-operative therapies,and indications for surgery as well as further follow up.    This note was generated using voice recognition software. While edited for content some inaccurate phrasing may be found.

## 2018-04-12 DIAGNOSIS — M47.816 SPONDYLOSIS OF LUMBAR SPINE: Primary | ICD-10-CM

## 2018-04-12 DIAGNOSIS — M51.26 LUMBAR HERNIATED DISC: ICD-10-CM

## 2018-04-17 ENCOUNTER — TRANSFERRED RECORDS (OUTPATIENT)
Dept: HEALTH INFORMATION MANAGEMENT | Facility: CLINIC | Age: 78
End: 2018-04-17

## 2018-04-18 ENCOUNTER — TELEPHONE (OUTPATIENT)
Dept: ANESTHESIOLOGY | Facility: CLINIC | Age: 78
End: 2018-04-18

## 2018-04-18 NOTE — TELEPHONE ENCOUNTER
I called Catherine and offered her a sooner procedure appointment on 5/4/2018, that date works for her.     She has been moved to 5/4.

## 2018-04-23 ASSESSMENT — ACTIVITIES OF DAILY LIVING (ADL)
IN_THE_PAST_7_DAYS,_DID_YOU_NEED_HELP_FROM_OTHERS_TO_TAKE_CARE_OF_THINGS_SUCH_AS_LAUNDRY_AND_HOUSEKEEPING,_BANKING,_SHOPPING,_USING_THE_TELEPHONE,_FOOD_PREPARATION,_TRANSPORTATION,_OR_TAKING_YOUR_OWN_MEDICATIONS?: Y
IN_THE_PAST_7_DAYS,_DID_YOU_NEED_HELP_FROM_OTHERS_TO_PERFORM_EVERYDAY_ACTIVITIES_SUCH_AS_EATING,_GETTING_DRESSED,_GROOMING,_BATHING,_WALKING,_OR_USING_THE_TOILET: N

## 2018-04-23 ASSESSMENT — ENCOUNTER SYMPTOMS
CONSTIPATION: 1
ARTHRALGIAS: 1
ABDOMINAL PAIN: 0
NAUSEA: 0
BLOATING: 0
BOWEL INCONTINENCE: 0
STIFFNESS: 1
MUSCLE CRAMPS: 1
VOMITING: 0
DIARRHEA: 0
MUSCLE WEAKNESS: 0
JOINT SWELLING: 0
BLOOD IN STOOL: 0
JAUNDICE: 0
HEARTBURN: 1
NECK PAIN: 1
MYALGIAS: 0
BACK PAIN: 1
RECTAL PAIN: 0

## 2018-04-30 ENCOUNTER — OFFICE VISIT (OUTPATIENT)
Dept: INTERNAL MEDICINE | Facility: CLINIC | Age: 78
End: 2018-04-30
Payer: MEDICARE

## 2018-04-30 VITALS
BODY MASS INDEX: 27.21 KG/M2 | OXYGEN SATURATION: 97 % | WEIGHT: 144 LBS | RESPIRATION RATE: 16 BRPM | DIASTOLIC BLOOD PRESSURE: 97 MMHG | SYSTOLIC BLOOD PRESSURE: 169 MMHG | HEART RATE: 66 BPM

## 2018-04-30 DIAGNOSIS — I10 BENIGN ESSENTIAL HYPERTENSION: ICD-10-CM

## 2018-04-30 DIAGNOSIS — M54.89 OTHER ACUTE BACK PAIN: Primary | ICD-10-CM

## 2018-04-30 ASSESSMENT — PAIN SCALES - GENERAL: PAINLEVEL: SEVERE PAIN (6)

## 2018-04-30 NOTE — MR AVS SNAPSHOT
After Visit Summary   4/30/2018    Catherine Mccollum    MRN: [id number]           Patient Information     Date Of Birth          1940        Visit Information        Provider Department      4/30/2018 10:05 AM Kar Alvarez MD [facility]        Today's Diagnoses     Other acute back pain    -  1    Benign essential hypertension           Follow-ups after your visit        Your next 10 appointments already scheduled     May 04, 2018   Procedure with Mary Calabrese MD   [facility] ([facility])    [address] [place] [address]  5th [facility] [id number] [phone]           Located in the Clinics and Surgery Center at [address] [place] [address].   parking is very convenient and highly recommended.  is a $6 flat rate fee.  Both  and self parkers should enter the main arrival plaza from [facility]; parking attendants will direct you based on your parking preference.            May 23, 2018  8:35 AM CDT   (Arrive by 8:20 AM)   Return Visit with Kar Alvarez MD   [facility] ([facility])    [address] [place] [address]  4th [facility] [id number] [phone]            May 23, 2018  1:30 PM CDT   (Arrive by 1:15 PM)   Return Visit with Rosalind Leiws PA-C   [facility] ([facility])    [address] [place] [address]  3rd Floor  [facility] [id number] [phone]              Future tests that were ordered for you today     Open Future Orders        Priority Expected Expires Ordered    Comprehensive metabolic panel Routine 4/30/2018 5/14/2018 4/30/2018    CBC with platelets differential Routine 4/30/2018 5/14/2018 4/30/2018    CK total Routine 4/30/2018 4/30/2019 4/30/2018            Who to contact     Please call your clinic at [phone] to:    Ask questions about your health    Make or cancel  appointments    Discuss your medicines    Learn about your test results    Speak to your doctor            Additional Information About Your Visit        [employer] Information     [employer] gives you secure access to your electronic health record. If you see a primary care provider, you can also send messages to your care team and make appointments. If you have questions, please call your primary care clinic.  If you do not have a primary care provider, please call [phone] and they will assist you.      [employer] is an electronic gateway that provides easy, online access to your medical records. With [employer], you can request a clinic appointment, read your test results, renew a prescription or communicate with your care team.     To access your existing account, please contact your [facility] or call [phone] for assistance.        Care EveryWhere ID     This is your Care EveryWhere ID. This could be used by other organizations to access your [place] medical records  [id number]        Your Vitals Were     Pulse Respirations Pulse Oximetry Breastfeeding? BMI (Body Mass Index)       66 16 97% No 27.21 kg/m2        Blood Pressure from Last 3 Encounters:   04/30/18 (!) 169/97   04/11/18 154/75   03/30/18 151/79    Weight from Last 3 Encounters:   04/30/18 65.3 kg (144 lb)   04/11/18 65.4 kg (144 lb 3.2 oz)   07/06/16 62.6 kg (138 lb)                 Today's Medication Changes          These changes are accurate as of 4/30/18 10:51 AM.  If you have any questions, ask your nurse or doctor.               These medicines have changed or have updated prescriptions.        Dose/Directions    * carbidopa-levodopa  MG per tablet   Commonly known as:  SINEMET   This may have changed:  when to take this   Used for:  Paralysis agitans (H)        Dose:  1 tablet   Take 1 tablet by mouth 3 times daily   Quantity:  180 tablet   Refills:  3       * carbidopa-levodopa  MG per tablet    Commonly known as:  SINEMET   This may have changed:  when to take this   Used for:  Paralysis agitans (H)        Dose:  1 tablet   Take 1 tablet by mouth 3 times daily   Quantity:  21 tablet   Refills:  1       * Notice:  This list has 2 medication(s) that are the same as other medications prescribed for you. Read the directions carefully, and ask your doctor or other care provider to review them with you.      Stop taking these medicines if you haven't already. Please contact your care team if you have questions.     amantadine 100 MG capsule   Commonly known as:  SYMMETREL   Stopped by:  Kar Alvarez MD           ciprofloxacin 250 MG tablet   Commonly known as:  CIPRO   Stopped by:  Kar Alvarez MD           diazepam 5 MG tablet   Commonly known as:  VALIUM   Stopped by:  Kar Alvarez MD           typhoid CR capsule   Commonly known as:  VIVOTIF   Stopped by:  Kar Alvarez MD                    Primary Care Provider Office Phone # Fax #    Kar Alvarez [phone]       [address] [address]        Equal Access to Services     [facility]: Hadii aad ku hadasho Soomaali, waaxda luqadaha, qaybta kaalmada adeegyada, waxay idiin hayaan susan martin . So [facility] [phone].    ATENCIÓN: Si habla español, tiene a franco disposición servicios gratuitos de asistencia lingüística. Llame al [phone].    We comply with applicable federal civil rights laws and [place] laws. We do not discriminate on the basis of race, color, national origin, age, disability, sex, sexual orientation, or gender identity.            Thank you!     Thank you for choosing [facility]  for your care. Our goal is always to provide you with excellent care. Hearing back from our patients is one way we can continue to improve our services. Please take a few minutes to complete the written survey that you may receive in the mail after your visit with  us. Thank you!             Your Updated Medication List - Protect others around you: Learn how to safely use, store and throw away your medicines at www.disposemymeds.org.          This list is accurate as of 4/30/18 10:51 AM.  Always use your most recent med list.                   Brand Name Dispense Instructions for use Diagnosis    ATIVAN PO      Take 0.5 mg by mouth nightly as needed for anxiety        * carbidopa-levodopa  MG per tablet    SINEMET    180 tablet    Take 1 tablet by mouth 3 times daily    Paralysis agitans (H)       * carbidopa-levodopa  MG per tablet    SINEMET    21 tablet    Take 1 tablet by mouth 3 times daily    Paralysis agitans (H)       chlorthalidone 25 MG tablet    HYGROTON    45 tablet    Take 1/2 tab daily    Essential hypertension       CORAL CALCIUM      1,000 mg daily.        desoximetasone 0.25 % Oint ointment    TOPICORT     Apply topically as needed        GABAPENTIN PO      Take 300 mg by mouth        irbesartan 150 MG tablet    AVAPRO    90 tablet    Take 1 tablet (150 mg) by mouth daily    Essential hypertension       pravastatin 20 MG tablet    PRAVACHOL    90 tablet    Take 1 tablet (20 mg) by mouth daily    Hyperlipidemia, unspecified hyperlipidemia       * predniSONE 10 MG tablet    DELTASONE    13 tablet    Take 4 tablets (40 mg) once daily for 2 days, then 2 tablets once daily for 2 days, then 1 tablet once daily for 1 day.    Lumbar radiculopathy       * predniSONE 10 MG tablet    DELTASONE    13 tablet    Take 4 tablets (40 mg) once daily for 2 days, then 2 tablets once daily for 2 days, then 1 tablet once daily for 1 day.    Lumbar radiculopathy       TUMS 500 MG chewable tablet   Generic drug:  calcium carbonate      Take 2 tablets by mouth daily as needed.        TYLENOL EXTRA STRENGTH PO      Take 1 tablet by mouth as needed Muscle and joint pain        vitamin D 1000 units capsule      Take 1 capsule by mouth daily.        ZANTAC PO      Take 150 mg  by mouth daily as needed for heartburn        * Notice:  This list has 4 medication(s) that are the same as other medications prescribed for you. Read the directions carefully, and ask your doctor or other care provider to review them with you.

## 2018-04-30 NOTE — NURSING NOTE
Chief Complaint   Patient presents with     Establish Care     Patient is here to establish a new PCP     Claire Doyle CMA 10:23 AM on 4/30/2018.

## 2018-05-04 ENCOUNTER — SURGERY (OUTPATIENT)
Age: 78
End: 2018-05-04

## 2018-05-04 ENCOUNTER — HOSPITAL ENCOUNTER (OUTPATIENT)
Facility: AMBULATORY SURGERY CENTER | Age: 78
End: 2018-05-04
Payer: MEDICARE

## 2018-05-04 VITALS
RESPIRATION RATE: 14 BRPM | HEIGHT: 61 IN | BODY MASS INDEX: 27.19 KG/M2 | SYSTOLIC BLOOD PRESSURE: 203 MMHG | TEMPERATURE: 97.5 F | WEIGHT: 144 LBS | OXYGEN SATURATION: 98 % | HEART RATE: 70 BPM | DIASTOLIC BLOOD PRESSURE: 98 MMHG

## 2018-05-07 ENCOUNTER — MYC MEDICAL ADVICE (OUTPATIENT)
Dept: INTERNAL MEDICINE | Facility: CLINIC | Age: 78
End: 2018-05-07

## 2018-05-08 ENCOUNTER — TELEPHONE (OUTPATIENT)
Dept: NEUROSURGERY | Facility: CLINIC | Age: 78
End: 2018-05-08

## 2018-05-08 NOTE — OP NOTE
Procedure cancelled secondary to hypertensive urgency with SBP in 220s when patient was brought into the procedure room.  Patient was monitored for approximately 30 minutes after being brought back to recovery area where her BP returned to the 160s systolic.  She denied any signs of end organ damage including headache, blurry vision, chest pain, shortness of breath, and was A&O x 4 with normal personality per her daughter who accompanied her.  She was instructed to see her PCP ASAP or go to the ED if she developed any of the above signs or anything concerning to her or her daughter.      Mary Calabrese MD

## 2018-05-08 NOTE — TELEPHONE ENCOUNTER
Writer Faxed to [facility] Puentes Plan of care for Catherine Mccollum, singed by provider per [facility] request.

## 2018-05-08 NOTE — TELEPHONE ENCOUNTER
Can Catherine wait until 5/23/2018 when she sees me as well as neurosurgery on the same day?    JUAN MANUEL Alvarez    Pt requesting to be seen in pain clinic until her appt with neurology and Dr Alvarez. Pt can be seen in PCP regarding her pain.  Appt with Dr Hodges at 11:25am on 05/10/2018.  Haven White RN 9:49 AM on 5/9/2018.

## 2018-05-09 ENCOUNTER — TELEPHONE (OUTPATIENT)
Dept: NEUROLOGY | Facility: CLINIC | Age: 78
End: 2018-05-09

## 2018-05-10 ENCOUNTER — OFFICE VISIT (OUTPATIENT)
Dept: INTERNAL MEDICINE | Facility: CLINIC | Age: 78
End: 2018-05-10
Payer: MEDICARE

## 2018-05-10 ENCOUNTER — PRE VISIT (OUTPATIENT)
Dept: NEUROSURGERY | Facility: CLINIC | Age: 78
End: 2018-05-10

## 2018-05-10 VITALS
HEART RATE: 73 BPM | BODY MASS INDEX: 26.98 KG/M2 | SYSTOLIC BLOOD PRESSURE: 134 MMHG | DIASTOLIC BLOOD PRESSURE: 84 MMHG | WEIGHT: 142.8 LBS

## 2018-05-10 DIAGNOSIS — G47.00 INSOMNIA, UNSPECIFIED TYPE: Primary | ICD-10-CM

## 2018-05-10 DIAGNOSIS — M54.16 LUMBAR RADICULOPATHY: ICD-10-CM

## 2018-05-10 DIAGNOSIS — I10 BENIGN ESSENTIAL HYPERTENSION: ICD-10-CM

## 2018-05-10 RX ORDER — LORAZEPAM 0.5 MG/1
0.5 TABLET ORAL
Qty: 5 TABLET | Refills: 0 | Status: ON HOLD | OUTPATIENT
Start: 2018-05-10 | End: 2018-06-26

## 2018-05-10 ASSESSMENT — PAIN SCALES - GENERAL: PAINLEVEL: MODERATE PAIN (5)

## 2018-05-10 NOTE — PATIENT INSTRUCTIONS
[facility]: [phone]     [facility] Medication Refill Request Information:  * Please contact your pharmacy regarding ANY request for medication refills.  ** [facility] Prescription Fax = [phone]  * Please allow 3 business days for routine medication refills.  * Please allow 5 business days for controlled substance medication refills.     [facility] Test Result notification information:  *You will be notified with in 7-10 days of your appointment day regarding the results of your test.  If you are on MyChart you will be notified as soon as the provider has reviewed the results and signed off on them.

## 2018-05-10 NOTE — PROGRESS NOTES
[facility]  Primary Care Center   Coty Hodges MD  05/10/2018      Chief Complaint:   Hypertension and Back Pain    History of Present Illness:   Catherine Mccollum is a 78 year old female with a history of hypertension, hyperlipidemia, and cervical spondylosis who presents alone for evaluation of hypertension and back pain. The patient was supposed to have an epidural transforaminal lumbar injection six days ago, but had intense pain when laying on her stomach and reports she became very hypertensive. She notes that the pain radiates down her left leg and is the worst in her calf and foot, with associated numbness and tingling. She reports that the pain in her lower back is minor compared to her leg. She experiences mild pain when sitting, but it worsens when she is walking and she has to sit down after making breakfast. For her procedure, she tried using Tylenol, but it had worn off by the time she was ready. If she tried this again, she would like medication to control her pain so she does not become hypertensive. Instead of surgery, she is looking for alternative options and is scheduled in the pain clinic. At home, she uses Tylenol and gabapentin, two in the morning and three at night. At times, she uses lorazepam to aid in sleep and would like a refill.    Review of Systems:   Pertinent items are noted in HPI, remainder of complete ROS is negative.      Active Medications:      Acetaminophen (TYLENOL EXTRA STRENGTH PO), Take 1 tablet by mouth as needed Muscle and joint pain, Disp: , Rfl:      calcium carbonate (TUMS) 500 MG chewable tablet, Take 2 tablets by mouth daily as needed., Disp: , Rfl:      carbidopa-levodopa (SINEMET)  MG per tablet, Take 1 tablet by mouth 3 times daily (Patient taking differently: Take 1 tablet by mouth daily ), Disp: 180 tablet, Rfl: 3     carbidopa-levodopa (SINEMET)  MG per tablet, Take 1 tablet by mouth 3 times daily (Patient taking differently: Take 1 tablet by mouth  daily ), Disp: 21 tablet, Rfl: 1     chlorthalidone (HYGROTON) 25 MG tablet, Take 1/2 tab daily, Disp: 45 tablet, Rfl: 3     Cholecalciferol (VITAMIN D) 1000 UNITS capsule, Take 1 capsule by mouth daily., Disp: , Rfl:      CORAL CALCIUM, 1,000 mg daily. , Disp: , Rfl:      desoximetasone (TOPICORT) 0.25 % OINT, Apply topically as needed, Disp: , Rfl:      GABAPENTIN PO, Take 300 mg by mouth, Disp: , Rfl:      irbesartan (AVAPRO) 150 MG tablet, Take 1 tablet (150 mg) by mouth daily, Disp: 90 tablet, Rfl: 3     LORazepam (ATIVAN PO), Take 0.5 mg by mouth nightly as needed for anxiety, Disp: , Rfl:      pravastatin (PRAVACHOL) 20 MG tablet, Take 1 tablet (20 mg) by mouth daily, Disp: 90 tablet, Rfl: 3     predniSONE (DELTASONE) 10 MG tablet, Take 4 tablets (40 mg) once daily for 2 days, then 2 tablets once daily for 2 days, then 1 tablet once daily for 1 day., Disp: 13 tablet, Rfl: 0     predniSONE (DELTASONE) 10 MG tablet, Take 4 tablets (40 mg) once daily for 2 days, then 2 tablets once daily for 2 days, then 1 tablet once daily for 1 day., Disp: 13 tablet, Rfl: 0     Ranitidine HCl (ZANTAC PO), Take 150 mg by mouth daily as needed for heartburn, Disp: , Rfl:       Allergies:   Fosamax; Lisinopril; and Sulfa drugs      Past Medical History:  Essential hypertension, benign  Cervical spondylosis without myelopathy  Glossodynia  Hyperlipidemia  Urinary frequency  Essential and other specified forms of tremor  Health care home  Insomnia - infrequent  Ankle fracture, left, trimalleolar, 2002  Diverticulitis of colon  Extrapyramidal disease     Past Surgical History:  Breast biopsy  Cataract IOL, RT/LT   section x2  Ankle surgery    Family History:   CAD  Hypertension  Cancer       Social History:   The patient is a nonsmoker and does not consume alcohol.     Physical Exam:   /84  Pulse 73  Wt 64.8 kg (142 lb 12.8 oz)  BMI 26.98 kg/m2   Constitutional: Alert, oriented, pleasant, no acute distress  Head:  Normocephalic, atraumatic  Eyes: Extra-ocular movements intact, no scleral icterus  Cardiovascular: Regular rate and rhythm, no murmurs, rubs or gallops, peripheral pulses full/symmetric  Musculoskeletal: No edema, normal muscle tone, antalgic gait, mild tenderness to palpation over the left paraspinal lumbar muscles. Straight leg raise test was positive on the left.  Neurologic: Alert and oriented, cranial nerves 2-12 intact.  Psychiatric: normal mentation, affect and mood    Assessment and Plan:  Insomnia, unspecified type  - LORazepam (ATIVAN) 0.5 MG tablet  Dispense: 5 tablet; Refill: 0    Lumbar radiculopathy  She has MRI evidence of L3-L4 and L5-S1 disc herniation causing a left lumbar radiculopathy. She attempted epidural injection, but this was aborted due to pain. We discussed further options for management including rescheduling the injection with some sort of pre medication. She is also interested in discussing alternative options with her neurologist. I told her that another course of steroids might be helpful if she is not planning on doing the injection soon. She has follow-up with neurology and the pain clinic within the next week.  - LORazepam (ATIVAN) 0.5 MG tablet  Dispense: 5 tablet; Refill: 0    Benign essential hypertension  She has had some elevated blood pressures, but these are likely related to pain. I advise that I think the best strategy would be to better control her pain prior to increasing her blood pressure medications due to risk of adverse effects.     Follow-up: Follow-up with neurology and pain clinic within the next week.     Scribe Disclosure:   I, Sena Webb, am serving as a scribe to document services personally performed by Coty Hodges MD at this visit, based upon the provider's statements to me. All documentation has been reviewed by the aforementioned provider prior to being entered into the official medical record.     Portions of this medical record were  completed by a scribe. UPON MY REVIEW AND AUTHENTICATION BY ELECTRONIC SIGNATURE, this confirms (a) I performed the applicable clinical services, and (b) the record is accurate.   Coty Hodges MD  Internal Medicine    25 min spent face to face, of which >50% time spent on counseling/coordinating care exclusive of any procedure time

## 2018-05-10 NOTE — MR AVS SNAPSHOT
After Visit Summary   5/10/2018    Catherine Mccollum    MRN: [id number]           Patient Information     Date Of Birth          1940        Visit Information        Provider Department      5/10/2018 11:25 AM Coty Hodges MD [facility]        Today's Diagnoses     Insomnia, unspecified type    -  1    Lumbar radiculopathy        Benign essential hypertension          Care Instructions    Primary Care Center: [phone]     Primary Care Center Medication Refill Request Information:  * Please contact your pharmacy regarding ANY request for medication refills.  ** [facility] Prescription Fax = [phone]  * Please allow 3 business days for routine medication refills.  * Please allow 5 business days for controlled substance medication refills.     Primary Care Center Test Result notification information:  *You will be notified with in 7-10 days of your appointment day regarding the results of your test.  If you are on MyChart you will be notified as soon as the provider has reviewed the results and signed off on them.            Follow-ups after your visit        Your next 10 appointments already scheduled     May 11, 2018 10:30 AM CDT   (Arrive by 10:15 AM)   Return Visit with Yonathan Ferguson MD   [facility] ([facility])    [address] [place] [id number] [phone]            May 15, 2018  8:00 AM CDT   (Arrive by 7:45 AM)   New Patient Visit with Mary Calabrese MD   [facility] ([facility])    [address] [place] [address]  4th [facility] [id number] [phone]            May 23, 2018  8:35 AM CDT   (Arrive by 8:20 AM)   Return Visit with Kar Alvarez MD   [facility] ([facility])    [address] [place] [id number] [phone]            May 23, 2018  1:30 PM  CDT   (Arrive by 1:15 PM)   Return Visit with Rosalind Lewis PA-C   [facility] ([facility])    [address] [facility]  3rd [facility] [address] [phone]              Who to contact     Please call your clinic at [phone] to:    Ask questions about your health    Make or cancel appointments    Discuss your medicines    Learn about your test results    Speak to your doctor            Additional Information About Your Visit        [employer] Information     [employer] gives you secure access to your electronic health record. If you see a primary care provider, you can also send messages to your care team and make appointments. If you have questions, please call your primary care clinic.  If you do not have a primary care provider, please call [phone] and they will assist you.      [employer] is an electronic gateway that provides easy, online access to your medical records. With [employer], you can request a clinic appointment, read your test results, renew a prescription or communicate with your care team.     To access your existing account, please contact your [facility] or call [phone] for assistance.        Care EveryWhere ID     This is your Care EveryWhere ID. This could be used by other organizations to access your [place] medical records  [id number]        Your Vitals Were     Pulse BMI (Body Mass Index)                73 26.98 kg/m2           Blood Pressure from Last 3 Encounters:   05/10/18 134/84   05/04/18 (!) 203/98   04/30/18 (!) 169/97    Weight from Last 3 Encounters:   05/10/18 64.8 kg (142 lb 12.8 oz)   05/04/18 65.3 kg (144 lb)   04/30/18 65.3 kg (144 lb)              Today, you had the following     No orders found for display         Today's Medication Changes          These changes are accurate as of 5/10/18 12:00 PM.  If you have any questions, ask your nurse or doctor.               These medicines have  changed or have updated prescriptions.        Dose/Directions    * carbidopa-levodopa  MG per tablet   Commonly known as:  SINEMET   This may have changed:  when to take this   Used for:  Paralysis agitans (H)        Dose:  1 tablet   Take 1 tablet by mouth 3 times daily   Quantity:  180 tablet   Refills:  3       * carbidopa-levodopa  MG per tablet   Commonly known as:  SINEMET   This may have changed:  when to take this   Used for:  Paralysis agitans (H)        Dose:  1 tablet   Take 1 tablet by mouth 3 times daily   Quantity:  21 tablet   Refills:  1       LORazepam 0.5 MG tablet   Commonly known as:  ATIVAN   This may have changed:    - medication strength  - reasons to take this   Used for:  Insomnia, unspecified type, Lumbar radiculopathy   Changed by:  Coty Hodges MD        Dose:  0.5 mg   Take 1 tablet (0.5 mg) by mouth nightly as needed for anxiety or other (sleep)   Quantity:  5 tablet   Refills:  0       * Notice:  This list has 2 medication(s) that are the same as other medications prescribed for you. Read the directions carefully, and ask your doctor or other care provider to review them with you.         Where to get your medicines      Some of these will need a paper prescription and others can be bought over the counter.  Ask your nurse if you have questions.     Bring a paper prescription for each of these medications     LORazepam 0.5 MG tablet                Primary Care Provider Office Phone # Fax #    Kar Alvarez [phone] [address] [address]        Equal Access to Services     WALLY ANDERSON : Nadia nietoo Sok[place]in, waaxda luqadaha, qaybta kaalmada niya brian. So [facility] [phone].    ATENCIÓN: Si habla español, tiene a franco disposición servicios gratuitos de asistencia lingüística. Llame al [phone].    We comply with applicable federal civil rights laws and  [place] laws. We do not discriminate on the basis of race, color, national origin, age, disability, sex, sexual orientation, or gender identity.            Thank you!     Thank you for choosing [facility]  for your care. Our goal is always to provide you with excellent care. Hearing back from our patients is one way we can continue to improve our services. Please take a few minutes to complete the written survey that you may receive in the mail after your visit with us. Thank you!             Your Updated Medication List - Protect others around you: Learn how to safely use, store and throw away your medicines at www.disposemymeds.org.          This list is accurate as of 5/10/18 12:00 PM.  Always use your most recent med list.                   Brand Name Dispense Instructions for use Diagnosis    * carbidopa-levodopa  MG per tablet    SINEMET    180 tablet    Take 1 tablet by mouth 3 times daily    Paralysis agitans (H)       * carbidopa-levodopa  MG per tablet    SINEMET    21 tablet    Take 1 tablet by mouth 3 times daily    Paralysis agitans (H)       chlorthalidone 25 MG tablet    HYGROTON    45 tablet    Take 1/2 tab daily    Essential hypertension       CORAL CALCIUM      1,000 mg daily.        desoximetasone 0.25 % Oint ointment    TOPICORT     Apply topically as needed        GABAPENTIN PO      Take 300 mg by mouth        irbesartan 150 MG tablet    AVAPRO    90 tablet    Take 1 tablet (150 mg) by mouth daily    Essential hypertension       LORazepam 0.5 MG tablet    ATIVAN    5 tablet    Take 1 tablet (0.5 mg) by mouth nightly as needed for anxiety or other (sleep)    Insomnia, unspecified type, Lumbar radiculopathy       pravastatin 20 MG tablet    PRAVACHOL    90 tablet    Take 1 tablet (20 mg) by mouth daily    Hyperlipidemia, unspecified hyperlipidemia       * predniSONE 10 MG tablet    DELTASONE    13 tablet    Take 4 tablets (40 mg) once daily for 2 days, then 2  tablets once daily for 2 days, then 1 tablet once daily for 1 day.    Lumbar radiculopathy       * predniSONE 10 MG tablet    DELTASONE    13 tablet    Take 4 tablets (40 mg) once daily for 2 days, then 2 tablets once daily for 2 days, then 1 tablet once daily for 1 day.    Lumbar radiculopathy       TUMS 500 MG chewable tablet   Generic drug:  calcium carbonate      Take 2 tablets by mouth daily as needed.        TYLENOL EXTRA STRENGTH PO      Take 1 tablet by mouth as needed Muscle and joint pain        vitamin D 1000 units capsule      Take 1 capsule by mouth daily.        ZANTAC PO      Take 150 mg by mouth daily as needed for heartburn        * Notice:  This list has 4 medication(s) that are the same as other medications prescribed for you. Read the directions carefully, and ask your doctor or other care provider to review them with you.

## 2018-05-10 NOTE — TELEPHONE ENCOUNTER
FUTURE VISIT INFORMATION      FUTURE VISIT INFORMATION:    Date: 5/15/18    Time:     [facility]  REFERRAL INFORMATION:    Referring provider:  Rosalind Lewis    Referring providers clinic:   Neurosurgery    Reason for visit/diagnosis  Spondylosis of lumbar spine  Lumbar herniated disc    RECORDS REQUESTED FROM:       Clinic name Comments Records Status Imaging Status    Rosalind Lewis referring internal internal                                   RECORDS STATUS

## 2018-05-10 NOTE — PROGRESS NOTES
Rooming Note  Health Maintenance   Health Maintenance Due   Topic Date Due     ADVANCE DIRECTIVE PLANNING Q5 YRS  04/04/1993     BMP Q1 YR  05/11/2017    All health maintenance items UP TO DATE  Blood Pressure   BP Readings from Last 1 Encounters:   05/10/18 143/83    Single BP recheck started, 11:23 AM (4 minutes)

## 2018-05-10 NOTE — NURSING NOTE
Chief Complaint   Patient presents with     Hypertension     pt here for high blood pressure     Back Pain     pt states she is having back pain     Stormy Bravo CMA at 11:21 AM on 5/10/2018.

## 2018-05-11 ENCOUNTER — OFFICE VISIT (OUTPATIENT)
Dept: NEUROLOGY | Facility: CLINIC | Age: 78
End: 2018-05-11
Payer: MEDICARE

## 2018-05-11 ENCOUNTER — CARE COORDINATION (OUTPATIENT)
Dept: NEUROLOGY | Facility: CLINIC | Age: 78
End: 2018-05-11

## 2018-05-11 VITALS
BODY MASS INDEX: 26.81 KG/M2 | DIASTOLIC BLOOD PRESSURE: 76 MMHG | HEART RATE: 66 BPM | HEIGHT: 61 IN | SYSTOLIC BLOOD PRESSURE: 140 MMHG | WEIGHT: 142 LBS

## 2018-05-11 DIAGNOSIS — M51.16 LUMBAR DISC DISEASE WITH RADICULOPATHY: Primary | ICD-10-CM

## 2018-05-11 ASSESSMENT — ENCOUNTER SYMPTOMS
NECK PAIN: 0
MYALGIAS: 0
STIFFNESS: 1
BACK PAIN: 1
MUSCLE CRAMPS: 1
STIFFNESS: 1
NECK PAIN: 0
MUSCLE WEAKNESS: 0
BACK PAIN: 1
ARTHRALGIAS: 1
MUSCLE CRAMPS: 1
ARTHRALGIAS: 1
MUSCLE WEAKNESS: 0
MYALGIAS: 0

## 2018-05-11 ASSESSMENT — PAIN SCALES - GENERAL: PAINLEVEL: MODERATE PAIN (5)

## 2018-05-11 ASSESSMENT — ANXIETY QUESTIONNAIRES
4. TROUBLE RELAXING: SEVERAL DAYS
7. FEELING AFRAID AS IF SOMETHING AWFUL MIGHT HAPPEN: NOT AT ALL
6. BECOMING EASILY ANNOYED OR IRRITABLE: NOT AT ALL
2. NOT BEING ABLE TO STOP OR CONTROL WORRYING: NOT AT ALL
7. FEELING AFRAID AS IF SOMETHING AWFUL MIGHT HAPPEN: NOT AT ALL
GAD7 TOTAL SCORE: 3
GAD7 TOTAL SCORE: 3
1. FEELING NERVOUS, ANXIOUS, OR ON EDGE: SEVERAL DAYS
5. BEING SO RESTLESS THAT IT IS HARD TO SIT STILL: SEVERAL DAYS
3. WORRYING TOO MUCH ABOUT DIFFERENT THINGS: NOT AT ALL

## 2018-05-11 NOTE — PROGRESS NOTES
"Glenda Bashir Angela, RN; Charley Conway LPN                   Hi Dr. Phyllis Mosquera placed an external referral for this patient to schedule an injection at [facility]. Can you fax orders over?     Per Dr. Ferguson: \"[facility] Please do epidural steroid injection left L5 S1\"     Glenda Guzman         5/11/18: Orders faxed to [facility] at [phone]  "

## 2018-05-11 NOTE — LETTER
2018       RE: Catherine Mccollum  [address]    [place] [address]     Dear Colleague,    Thank you for referring your patient, Catherine Mccollum, to the [facility] NEUROLOGY at [facility]. Please see a copy of my visit note below.    Service Date: 2018      Coty Hodges MD   [facility]    [address]   [place] [address]      RE: Catherine Mccollum   MRN: [id number]   : 1940      Dear Dr. Hodges:      This is in regard to followup on Catherine Mccollum.  The patient returned today on 2018.  She was seen urgently today with a chief complaint of continued left leg radiculopathy.      Unfortunately, the patient had to wait a few weeks to get an epidural steroid injection.  She had to wait for a long time and she was in an uncomfortable position.  Her blood pressure was up and the epidural was canceled.  She wondered if I could help her in anyway today.  She does not have a neurosurgical appointment now for a number of weeks.  She did tell me the pain has been so severe that oxycodone has not been effective.  She has just been treating it with Tylenol as it seems to give her about as much relief.  She has severe pain down the side and back of her left leg.  The patient has not noticed that her Parkinsonian syndrome is worse.  She continues to have tremor but although it is left-sided it does not seem to interfere with anything.  Prior to the onset of Parkinson disease, she did have a longstanding essential tremor.  She tried some physical therapy, but it actually made her worse.  The patient did tell oral steroids I gave her before she was seen in the Neurosurgical Department a few weeks ago did help her.  She had no side effects from it.  She is aware she cannot take repeated doses of oral prednisone because of potential steroid side effect including the possibility of bone loss and pancreatitis.  I reviewed with her again her prior MRI scan  that was done on 04/02/2018 and it did show at the L5-S1 level there was a new left paracentral disk extrusion extending along the posterior margin of L5.  There was evidence of associated mass effect on the traversing left S1 nerve roots and potential mass effect on the left L5 dorsal root ganglion.  At L3-L4, she had a new small left foraminal disk protrusion superimposed and more generalized disk bulging.  This contributes to moderate left foraminal stenosis resulting in some mass effect on the left L3 dorsal root ganglion.  I earlier had reviewed this study with the [facility] radiologist, Dr. Lugo, on her prior visit.  Her children did have questions about the possibility of trying other treatment including acupuncture.  I told them I was not opposed to this.  The patient has been off Sinemet for some time and she really does not notice any real difference.  She said she had only minimal change in her tremor when she took the medication.      Neurologic examination revealed a pleasant woman.  Her blood pressure is 140/76 with a pulse of 66.  She had a left hand rest tremor and did have mild essential tremor of both hands that was postural.  She had mild reduced rapid alternating motion of the left hand and left foot.  The patient had mild rigidity of the left arm.  She had a minimal glabellar tap sign today but otherwise good facial expression and good extraocular movements.  Strength testing was normal.  The patient did have intense paraspinal muscle spasm, the right side greater than the left, and marked reduction in extension of her back.  She had brisk arm reflexes again as well as at the knees.  This hyperreflexia has been there for as long as I have known her.  She had an absent right internal hamstring reflex as well as ankle jerks.  Her toes are downgoing to plantar stimulation.  The patient had a regular cardiac rhythm without gallops or murmurs.      IMPRESSION:   1.  New left L5-S1 radiculopathy.    2.  Prior surgical treatment of a right L5 radiculopathy.   3.  Parkinson disease in the setting of longstanding essential tremor.      The patient would like to consider now an epidural steroid injection, but through a different location.  She had good luck in the past at [facility].  I am going to refer her to that facility in [place].  I went over side effects of the treatment with her.  She is going to keep in touch with me and if not better, I have talked with her about considering neurosurgical treatment.  I told them I was not opposed to a trial of acupuncture.      Thank you for allowing me to see this patient.      Sincerely yours,      Yonathan Ferguson MD      I spent 30 minutes with the patient today.  Over 50% of the time this involved counseling and coordination of care.     D: 2018   T: 2018   MT: AKA      Name:     GÉNESIS CA   MRN:      [id number]        Account:      [id number]   :      1940           Service Date: 2018      Document: [id number]

## 2018-05-11 NOTE — MR AVS SNAPSHOT
After Visit Summary   5/11/2018    Catherine Mccollum    MRN: [id number]           Patient Information     Date Of Birth          1940        Visit Information        Provider Department      5/11/2018 10:30 AM Yonathan Ferguson MD [facility] Neurology        Today's Diagnoses     Lumbar disc disease with radiculopathy    -  1       Follow-ups after your visit        Additional Services     Other Specialty Referral       [facility] Please do epidural steroid injection left L5 S1                  Your next 10 appointments already scheduled     May 15, 2018  8:00 AM CDT   (Arrive by 7:45 AM)   New Patient Visit with Mary Calabrese MD   [facility] ([facility])    [address] [place] [id number] [phone]            May 23, 2018  8:35 AM CDT   (Arrive by 8:20 AM)   Return Visit with Kar Alvarez MD   [facility] ([facility])    [address] [place] [id number] [phone]              Who to contact     Please call your clinic at [phone] to:    Ask questions about your health    Make or cancel appointments    Discuss your medicines    Learn about your test results    Speak to your doctor            Additional Information About Your Visit        [employer] Information     Curse gives you secure access to your electronic health record. If you see a primary care provider, you can also send messages to your care team and make appointments. If you have questions, please call your primary care clinic.  If you do not have a primary care provider, please call [phone] and they will assist you.      Curse is an electronic gateway that provides easy, online access to your medical records. With Curse, you can request a clinic appointment, read your test results, renew a prescription or communicate with your care  "team.     To access your existing account, please contact your [facility] or call [phone] for assistance.        Care EveryWhere ID     This is your Care EveryWhere ID. This could be used by other organizations to access your [place] medical records  [id number]        Your Vitals Were     Pulse Height BMI (Body Mass Index)             66 1.549 m (5' 1\") 26.83 kg/m2          Blood Pressure from Last 3 Encounters:   05/11/18 140/76   05/10/18 134/84   05/04/18 (!) 203/98    Weight from Last 3 Encounters:   05/11/18 64.4 kg (142 lb)   05/10/18 64.8 kg (142 lb 12.8 oz)   05/04/18 65.3 kg (144 lb)              We Performed the Following     Other Specialty Referral          Today's Medication Changes          These changes are accurate as of 5/11/18 11:41 AM.  If you have any questions, ask your nurse or doctor.               These medicines have changed or have updated prescriptions.        Dose/Directions    * carbidopa-levodopa  MG per tablet   Commonly known as:  SINEMET   This may have changed:  when to take this   Used for:  Paralysis agitans (H)        Dose:  1 tablet   Take 1 tablet by mouth 3 times daily   Quantity:  180 tablet   Refills:  3       * carbidopa-levodopa  MG per tablet   Commonly known as:  SINEMET   This may have changed:  when to take this   Used for:  Paralysis agitans (H)        Dose:  1 tablet   Take 1 tablet by mouth 3 times daily   Quantity:  21 tablet   Refills:  1       * Notice:  This list has 2 medication(s) that are the same as other medications prescribed for you. Read the directions carefully, and ask your doctor or other care provider to review them with you.             Primary Care Provider Office Phone # Fax #    Kar Alvarez [phone]       [address] [address]        Equal Access to Services     HUMPHREY ANDERSON AH: Nadia Black, bharati aceves, kelly schneider " niya briansana efrennandini smithaan ah. So [facility] [phone].    ATENCIÓN: Si dion kolb, tiene a franco disposición servicios gratuitos de asistencia lingüística. Miguelito al [phone].    We comply with applicable federal civil rights laws and [place] laws. We do not discriminate on the basis of race, color, national origin, age, disability, sex, sexual orientation, or gender identity.            Thank you!     Thank you for choosing [facility]  for your care. Our goal is always to provide you with excellent care. Hearing back from our patients is one way we can continue to improve our services. Please take a few minutes to complete the written survey that you may receive in the mail after your visit with us. Thank you!             Your Updated Medication List - Protect others around you: Learn how to safely use, store and throw away your medicines at www.disposemymeds.org.          This list is accurate as of 5/11/18 11:41 AM.  Always use your most recent med list.                   Brand Name Dispense Instructions for use Diagnosis    * carbidopa-levodopa  MG per tablet    SINEMET    180 tablet    Take 1 tablet by mouth 3 times daily    Paralysis agitans (H)       * carbidopa-levodopa  MG per tablet    SINEMET    21 tablet    Take 1 tablet by mouth 3 times daily    Paralysis agitans (H)       chlorthalidone 25 MG tablet    HYGROTON    45 tablet    Take 1/2 tab daily    Essential hypertension       CORAL CALCIUM      1,000 mg daily.        desoximetasone 0.25 % Oint ointment    TOPICORT     Apply topically as needed        GABAPENTIN PO      Take 300 mg by mouth        irbesartan 150 MG tablet    AVAPRO    90 tablet    Take 1 tablet (150 mg) by mouth daily    Essential hypertension       LORazepam 0.5 MG tablet    ATIVAN    5 tablet    Take 1 tablet (0.5 mg) by mouth nightly as needed for anxiety or other (sleep)    Insomnia, unspecified type, Lumbar radiculopathy        pravastatin 20 MG tablet    PRAVACHOL    90 tablet    Take 1 tablet (20 mg) by mouth daily    Hyperlipidemia, unspecified hyperlipidemia       * predniSONE 10 MG tablet    DELTASONE    13 tablet    Take 4 tablets (40 mg) once daily for 2 days, then 2 tablets once daily for 2 days, then 1 tablet once daily for 1 day.    Lumbar radiculopathy       * predniSONE 10 MG tablet    DELTASONE    13 tablet    Take 4 tablets (40 mg) once daily for 2 days, then 2 tablets once daily for 2 days, then 1 tablet once daily for 1 day.    Lumbar radiculopathy       TUMS 500 MG chewable tablet   Generic drug:  calcium carbonate      Take 2 tablets by mouth daily as needed.        TYLENOL EXTRA STRENGTH PO      Take 1 tablet by mouth as needed Muscle and joint pain        vitamin D 1000 units capsule      Take 1 capsule by mouth daily.        ZANTAC PO      Take 150 mg by mouth daily as needed for heartburn        * Notice:  This list has 4 medication(s) that are the same as other medications prescribed for you. Read the directions carefully, and ask your doctor or other care provider to review them with you.

## 2018-05-12 ASSESSMENT — ANXIETY QUESTIONNAIRES: GAD7 TOTAL SCORE: 3

## 2018-05-15 ENCOUNTER — PRE VISIT (OUTPATIENT)
Dept: ANESTHESIOLOGY | Facility: CLINIC | Age: 78
End: 2018-05-15

## 2018-05-15 ENCOUNTER — OFFICE VISIT (OUTPATIENT)
Dept: ANESTHESIOLOGY | Facility: CLINIC | Age: 78
End: 2018-05-15
Payer: MEDICARE

## 2018-05-15 VITALS
SYSTOLIC BLOOD PRESSURE: 155 MMHG | RESPIRATION RATE: 16 BRPM | BODY MASS INDEX: 26.81 KG/M2 | HEART RATE: 68 BPM | HEIGHT: 61 IN | DIASTOLIC BLOOD PRESSURE: 80 MMHG | WEIGHT: 142 LBS

## 2018-05-15 DIAGNOSIS — M47.27 OSTEOARTHRITIS OF SPINE WITH RADICULOPATHY, LUMBOSACRAL REGION: Primary | ICD-10-CM

## 2018-05-15 ASSESSMENT — PAIN SCALES - GENERAL: PAINLEVEL: MODERATE PAIN (4)

## 2018-05-15 NOTE — LETTER
5/15/2018       RE: Catherine Mccollum  [address]    [place] [address]     Dear Colleague,    Thank you for referring your patient, Catherine Mccollum, to the [facility] at [facility]. Please see a copy of my visit note below.    [facility] Adult Chronic Pain Service Outpatient Consultation    REASON FOR PAIN CONSULTATION:  Catherine Mccollum is a 78 year old female I am seeing in consultation at the request of GUILLE Lewis in [facility] for evaluation and recommendations for her chronic back pain with left radicular symptoms.     PAIN HISTORY:   Ms. Mccollum is a pleasant 78 year-old female who was originally referred for a procedure only visit last week, but upon start of the procedure her blood pressure was found to be in the range of hypertensive urgency at 220s systolic.  After repositioning and multiple re- measurements she continued to have SBP in 220s, therefore it was not responsible to continue with the procedure and risk further increasing BP secondary to pain with the injection.  She denied any end-organ dysfunction symptoms at that time, so she was sent home and followed up with her PCP on Monday, at which time her BP was normal and the decision was made not to start an antihypertensive given the normal BP at baseline.  It probably is likely an increase secondary to pain, and given that she has scheduled a repeat injection (lumbar epidural steroid injection) elsewhere.    She comes in today interested in alternative, non-medication and non-procedural recommendations.   Her pain is in the low back (10%) and in the left leg (90%).  It radiates from her low back down her left leg in an S1 pattern. It is shooting, numb, and tingling.  She denies overt weakness and denies bowel and/or bladder dysfunction.    She previously had a L4/5 lateral recess decompression and right L5-S1 synovial cyst removal at [facility].  The right leg pain resolved after this  but about 2 months ago the aforementioned left leg pain started.   A recent MRI showed a new disc herniation at left L5-S1.  She was evaluated by [facility] since this and it was determined that in the absence of neurological symptoms, surgery was not indicated.     CURRENT MEDICATIONS:   Current Outpatient Prescriptions Ordered in [facility]   Medication Sig Dispense Refill     Acetaminophen (TYLENOL EXTRA STRENGTH PO) Take 1 tablet by mouth as needed Muscle and joint pain       calcium carbonate (TUMS) 500 MG chewable tablet Take 2 tablets by mouth daily as needed.       carbidopa-levodopa (SINEMET)  MG per tablet Take 1 tablet by mouth 3 times daily (Patient taking differently: Take 1 tablet by mouth daily ) 180 tablet 3     carbidopa-levodopa (SINEMET)  MG per tablet Take 1 tablet by mouth 3 times daily (Patient taking differently: Take 1 tablet by mouth daily ) 21 tablet 1     chlorthalidone (HYGROTON) 25 MG tablet Take 1/2 tab daily 45 tablet 3     Cholecalciferol (VITAMIN D) 1000 UNITS capsule Take 1 capsule by mouth daily.       CORAL CALCIUM 1,000 mg daily.        desoximetasone (TOPICORT) 0.25 % OINT Apply topically as needed       GABAPENTIN PO Take 300 mg by mouth       irbesartan (AVAPRO) 150 MG tablet Take 1 tablet (150 mg) by mouth daily 90 tablet 3     LORazepam (ATIVAN) 0.5 MG tablet Take 1 tablet (0.5 mg) by mouth nightly as needed for anxiety or other (sleep) 5 tablet 0     order for DME Equipment being ordered: rolling walker 1 Device 0     pravastatin (PRAVACHOL) 20 MG tablet Take 1 tablet (20 mg) by mouth daily 90 tablet 3     predniSONE (DELTASONE) 10 MG tablet Take 4 tablets (40 mg) once daily for 2 days, then 2 tablets once daily for 2 days, then 1 tablet once daily for 1 day. 13 tablet 0     predniSONE (DELTASONE) 10 MG tablet Take 4 tablets (40 mg) once daily for 2 days, then 2 tablets once daily for 2 days, then 1 tablet once daily for 1 day. 13 tablet 0     RaNITidine HCl (ZANTAC PO) Take  "150 mg by mouth daily as needed for heartburn       No current Epic-ordered facility-administered medications on file.      ALLERGIES:   Allergies   Allergen Reactions     Fosamax Nausea and GI Disturbance     Lisinopril Other (See Comments)     cough     Sulfa Drugs Hives     PAST MEDICAL AND PSYCHIATRIC HISTORY:  has no past medical history on file.    PAST SURGICAL HISTORY:  has a past surgical history that includes Breast surgery (Jan 1987); orthopedic surgery; cataract iol, rt/lt; and GYN surgery (1965;1967).    SOCIAL HISTORY:Please see the separate psychosocial evaluation by the health psychologist for additional information.    REVIEW OF SYSTEMS: Please refer to the patient's health questionnaire which I reviewed in detail with her.  This review covered 13 bodily systems.     PHYSICAL EXAMINATION:  VITAL SIGNS:  Blood pressure 155/80, pulse 68, resp. rate 16, height 1.549 m (5' 1\"), weight 64.4 kg (142 lb), not currently breastfeeding.  CONSTITUTIONAL/GENERAL APPEARANCE/:    In no apparent distress   No pain behaviors  EYES:   Extra ocular movements intact   No scleral icterus  ENT/NECK:   Neck range of motion full and normal for extension, flexion, bilateral rotation, and bilateral tilt. Some increase in perception of tightness with retraction and sub-occipital motion  Neck supple  RESPIRATORY:   No respiratory distress  CARDIOVASCULAR:   Regular rate, rhythm  MUSCULOSKELETAL/BACK/SPINE/EXTREMITIES:   Neck range of motion full and normal for extension, flexion, bilateral rotation, and bilateral tilt.   Lumbar range of motion full and normal for extension, flexion, bilateral rotation, and bilateral tilt.   Some tenderness to palpation in bilateral thoracic paraspinal muscles  Non-tender in lumbar back  Positive SLR on left, negative on right  Negative Sarah's maneuver bilaterally   Full range of motion of all 4 extremities, no swelling or erythema of joints   NEURO:    AAOx3  Cranial nerves II-XII grossly " intact   Strength 5/5 for bilateral grasp, finger abduction, wrist extension, elbow flexion, elbow extension, shoulder abduction.   Strength 5/5 for bilateral dorsiflexion, plantarflexion, great toe extension, knee extension, hip flexion  SKIN/VASCULAR/AUTONOMIC EXAM:   1. Rashes: none noted  2. Lesions: none  3. Skin temperature asymmetry: none  4. Skin color asymmetry: none  PSYCHIATRIC/BEHAVIORAL/OBSERVATIONS:    Judgment/Insight good   Orientation good   Memory intact, and is a decent historian     Mood and affect appear appropriate    ASSESSMENT:   L5-S1 disc bulge with left radicular symptoms  H/O L4/5 lateral recess decompression and synovial cyst removal  Episodic hypertensive urgency     TREATMENT RECOMMENDATIONS/PLAN:  1. Medications: She is not interested in medication changes today.  She currently takes APAP and gabapentin.   2. Procedures: She has scheduled a lumbar DENISSE at another facility   3. Images/tests: I have reviewed pertinent MRI imaging with her today  4. Referrals: referral to acupuncture at [facility]  5. PT/OT, exercise program: She is currently seeing a PT and has a TENS unit but not using it.  I suggested she takes the TENS unit to the PT for instructions on use and begins using it daily PRN.  I have also given an order for DME rolling walker to be obtained at a medical supply store to allow for walks outside  5. Follow up: PRN    Again, thank you for allowing me to participate in the care of your patient.      Sincerely,    Mary Calabrese MD

## 2018-05-15 NOTE — PATIENT INSTRUCTIONS
1. Referral for acupuncture    2. Take your TENS Unit to your physical therapy location and they can show you how to use the device    3. Prescription for rolling walker      Follow up:    3 months, we will call you to make this appointment    To speak with a nurse, schedule/reschedule/cancel a clinic appointment, or request a medication refill call: [phone]     You can also reach us by SocialGuide: [email or web address]    For refills, please call on Monday, 1 week before your medication runs out. The doctors are not always in clinic, so this gives us time to get your prescriptions ready.  Please let us know the name of the medication you are requesting a refill of.

## 2018-05-15 NOTE — PROGRESS NOTES
[facility] Adult Chronic Pain Service Outpatient Consultation    REASON FOR PAIN CONSULTATION:  Catherine Mccollum is a 78 year old female I am seeing in consultation at the request of GUILLE Lewis in [facility] for evaluation and recommendations for her chronic back pain with left radicular symptoms.     PAIN HISTORY:   Ms. Mccollum is a pleasant 78 year-old female who was originally referred for a procedure only visit last week, but upon start of the procedure her blood pressure was found to be in the range of hypertensive urgency at 220s systolic.  After repositioning and multiple re- measurements she continued to have SBP in 220s, therefore it was not responsible to continue with the procedure and risk further increasing BP secondary to pain with the injection.  She denied any end-organ dysfunction symptoms at that time, so she was sent home and followed up with her PCP on Monday, at which time her BP was normal and the decision was made not to start an antihypertensive given the normal BP at baseline.  It probably is likely an increase secondary to pain, and given that she has scheduled a repeat injection (lumbar epidural steroid injection) elsewhere.    She comes in today interested in alternative, non-medication and non-procedural recommendations.   Her pain is in the low back (10%) and in the left leg (90%).  It radiates from her low back down her left leg in an S1 pattern. It is shooting, numb, and tingling.  She denies overt weakness and denies bowel and/or bladder dysfunction.    She previously had a L4/5 lateral recess decompression and right L5-S1 synovial cyst removal at [facility].  The right leg pain resolved after this but about 2 months ago the aforementioned left leg pain started.   A recent MRI showed a new disc herniation at left L5-S1.  She was evaluated by [facility] since this and it was determined that in the absence of neurological symptoms, surgery was not indicated.     CURRENT MEDICATIONS:   Current Outpatient  Prescriptions Ordered in Epic   Medication Sig Dispense Refill     Acetaminophen (TYLENOL EXTRA STRENGTH PO) Take 1 tablet by mouth as needed Muscle and joint pain       calcium carbonate (TUMS) 500 MG chewable tablet Take 2 tablets by mouth daily as needed.       carbidopa-levodopa (SINEMET)  MG per tablet Take 1 tablet by mouth 3 times daily (Patient taking differently: Take 1 tablet by mouth daily ) 180 tablet 3     carbidopa-levodopa (SINEMET)  MG per tablet Take 1 tablet by mouth 3 times daily (Patient taking differently: Take 1 tablet by mouth daily ) 21 tablet 1     chlorthalidone (HYGROTON) 25 MG tablet Take 1/2 tab daily 45 tablet 3     Cholecalciferol (VITAMIN D) 1000 UNITS capsule Take 1 capsule by mouth daily.       CORAL CALCIUM 1,000 mg daily.        desoximetasone (TOPICORT) 0.25 % OINT Apply topically as needed       GABAPENTIN PO Take 300 mg by mouth       irbesartan (AVAPRO) 150 MG tablet Take 1 tablet (150 mg) by mouth daily 90 tablet 3     LORazepam (ATIVAN) 0.5 MG tablet Take 1 tablet (0.5 mg) by mouth nightly as needed for anxiety or other (sleep) 5 tablet 0     order for DME Equipment being ordered: rolling walker 1 Device 0     pravastatin (PRAVACHOL) 20 MG tablet Take 1 tablet (20 mg) by mouth daily 90 tablet 3     predniSONE (DELTASONE) 10 MG tablet Take 4 tablets (40 mg) once daily for 2 days, then 2 tablets once daily for 2 days, then 1 tablet once daily for 1 day. 13 tablet 0     predniSONE (DELTASONE) 10 MG tablet Take 4 tablets (40 mg) once daily for 2 days, then 2 tablets once daily for 2 days, then 1 tablet once daily for 1 day. 13 tablet 0     RaNITidine HCl (ZANTAC PO) Take 150 mg by mouth daily as needed for heartburn       No current [facility]-ordered facility-administered medications on file.        ALLERGIES:   Allergies   Allergen Reactions     Fosamax Nausea and GI Disturbance     Lisinopril Other (See Comments)     cough     Sulfa Drugs Hives         PAST MEDICAL AND  "PSYCHIATRIC HISTORY:  has no past medical history on file.    PAST SURGICAL HISTORY:  has a past surgical history that includes Breast surgery (Jan 1987); orthopedic surgery; cataract iol, rt/lt; and GYN surgery (1965;1967).      SOCIAL HISTORY:Please see the separate psychosocial evaluation by the health psychologist for additional information.    REVIEW OF SYSTEMS: Please refer to the patient's health questionnaire which I reviewed in detail with her.  This review covered 13 bodily systems.     PHYSICAL EXAMINATION:  VITAL SIGNS:  Blood pressure 155/80, pulse 68, resp. rate 16, height 1.549 m (5' 1\"), weight 64.4 kg (142 lb), not currently breastfeeding.  CONSTITUTIONAL/GENERAL APPEARANCE/:    In no apparent distress   No pain behaviors  EYES:   Extra ocular movements intact   No scleral icterus  ENT/NECK:   Neck range of motion full and normal for extension, flexion, bilateral rotation, and bilateral tilt. Some increase in perception of tightness with retraction and sub-occipital motion  Neck supple  RESPIRATORY:   No respiratory distress  CARDIOVASCULAR:   Regular rate, rhythm  MUSCULOSKELETAL/BACK/SPINE/EXTREMITIES:   Neck range of motion full and normal for extension, flexion, bilateral rotation, and bilateral tilt.   Lumbar range of motion full and normal for extension, flexion, bilateral rotation, and bilateral tilt.   Some tenderness to palpation in bilateral thoracic paraspinal muscles  Non-tender in lumbar back  Positive SLR on left, negative on right  Negative Sarah's maneuver bilaterally   Full range of motion of all 4 extremities, no swelling or erythema of joints   NEURO:    AAOx3  Cranial nerves II-XII grossly intact   Strength 5/5 for bilateral grasp, finger abduction, wrist extension, elbow flexion, elbow extension, shoulder abduction.   Strength 5/5 for bilateral dorsiflexion, plantarflexion, great toe extension, knee extension, hip flexion  SKIN/VASCULAR/AUTONOMIC EXAM:   1. Rashes: none " noted  2. Lesions: none  3. Skin temperature asymmetry: none  4. Skin color asymmetry: none  PSYCHIATRIC/BEHAVIORAL/OBSERVATIONS:    Judgment/Insight good   Orientation good   Memory intact, and is a decent historian     Mood and affect appear appropriate    ASSESSMENT:   L5-S1 disc bulge with left radicular symptoms  H/O L4/5 lateral recess decompression and synovial cyst removal  Episodic hypertensive urgency       TREATMENT RECOMMENDATIONS/PLAN:  1. Medications: She is not interested in medication changes today.  She currently takes APAP and gabapentin.   2. Procedures: She has scheduled a lumbar DENISSE at another facility   3. Images/tests: I have reviewed pertinent MRI imaging with her today  4. Referrals: referral to acupuncture at [facility]  5. PT/OT, exercise program: She is currently seeing a PT and has a TENS unit but not using it.  I suggested she takes the TENS unit to the PT for instructions on use and begins using it daily PRN.  I have also given an order for DME rolling walker to be obtained at a medical supply store to allow for walks outside  5. Follow up: PRN    Thank you for the opportunity to care for this patient today,    Mary Calabrese MD    Answers for HPI/ROS submitted by the patient on 5/11/2018   ALBARO 7 TOTAL SCORE: 3  PHQ-2 Score: 1  General Symptoms: No  Skin Symptoms: No  HENT Symptoms: No  EYE SYMPTOMS: No  HEART SYMPTOMS: No  LUNG SYMPTOMS: No  INTESTINAL SYMPTOMS: No  URINARY SYMPTOMS: No  GYNECOLOGIC SYMPTOMS: No  BREAST SYMPTOMS: No  SKELETAL SYMPTOMS: Yes  BLOOD SYMPTOMS: No  NERVOUS SYSTEM SYMPTOMS: No  MENTAL HEALTH SYMPTOMS: No  Back pain: Yes  Muscle aches: No  Neck pain: No  Joint pain: Yes  Bone pain: No  Muscle cramps: Yes  Muscle weakness: No  Joint stiffness: Yes  Bone fracture: No

## 2018-05-15 NOTE — MR AVS SNAPSHOT
After Visit Summary   5/15/2018    Catherine Mccollum    MRN: [id number]           Patient Information     Date Of Birth          1940        Visit Information        Provider Department      5/15/2018 8:00 AM Mary Calabrese MD [facility]        Today's Diagnoses     Osteoarthritis of spine with radiculopathy, lumbosacral region    -  1      Care Instructions    1. Referral for acupuncture    2. Take your TENS Unit to your physical therapy location and they can show you how to use the device    3. Prescription for rolling walker      Follow up:    3 months, we will call you to make this appointment    To speak with a nurse, schedule/reschedule/cancel a clinic appointment, or request a medication refill call: [phone]     You can also reach us by DecoSnap: [email or web address]    For refills, please call on Monday, 1 week before your medication runs out. The doctors are not always in clinic, so this gives us time to get your prescriptions ready.  Please let us know the name of the medication you are requesting a refill of.                                     Follow-ups after your visit        Additional Services     ACUPUNCTURE REFERRAL       Your provider [facility] referred you [facility] [phone]    Please be aware that coverage of these services is subject to the terms and limitations of your health insurance plan.  Call member services at your health plan with any benefit or coverage questions.      Please bring the following with you to your appointment:    (1) Any X-Rays, CTs or MRIs which have been performed.  Contact the facility where they were done to arrange for  prior to your scheduled appointment.  (2) List of current medications   (3) This referral request   (4) Any documents/labs given to you for this referral  Services Requested: INTERVENTIONAL: Evaluations for  "Blocks/Injections    Please answer the following questions:    1. How long have you been treating this patient for this pain issue?      2 week(s)    2. Have there been any of the following problematic behaviors?      Medication Management Issues: NO      Chemical Dependency: NO      Psychiatric History or Current Psych/Social Issues: NO    3. Has the patient been to any other pain clinics and/or programs?      NO                  Who to contact     Please call your clinic at [phone] to:    Ask questions about your health    Make or cancel appointments    Discuss your medicines    Learn about your test results    Speak to your doctor            Additional Information About Your Visit        [employer] Community Information     [email or web address] gives you secure access to your electronic health record. If you see a primary care provider, you can also send messages to your care team and make appointments. If you have questions, please call your primary care clinic.  If you do not have a primary care provider, please call [phone] and they will assist you.      [email or web address] is an electronic gateway that provides easy, online access to your medical records. With [email or web address], you can request a clinic appointment, read your test results, renew a prescription or communicate with your care team.     To access your existing account, please contact your [facility] or call [phone] for assistance.        Care EveryWhere ID     This is your Care EveryWhere ID. This could be used by other organizations to access your [place] medical records  [id number]        Your Vitals Were     Pulse Respirations Height BMI (Body Mass Index)          68 16 1.549 m (5' 1\") 26.83 kg/m2         Blood Pressure from Last 3 Encounters:   05/15/18 155/80   05/11/18 140/76   05/10/18 134/84    Weight from Last 3 Encounters:   05/15/18 64.4 kg (142 lb)   05/11/18 64.4 kg (142 lb)   05/10/18 64.8 kg (142 lb 12.8 oz)              We " Performed the Following     ACUPUNCTURE REFERRAL          Today's Medication Changes          These changes are accurate as of 5/15/18  8:25 AM.  If you have any questions, ask your nurse or doctor.               Start taking these medicines.        Dose/Directions    order for DME   Used for:  Osteoarthritis of spine with radiculopathy, lumbosacral region   Started by:  Mary Calabrese MD        Equipment being ordered: rolling walker   Quantity:  1 Device   Refills:  0         These medicines have changed or have updated prescriptions.        Dose/Directions    * carbidopa-levodopa  MG per tablet   Commonly known as:  SINEMET   This may have changed:  when to take this   Used for:  Paralysis agitans (H)        Dose:  1 tablet   Take 1 tablet by mouth 3 times daily   Quantity:  180 tablet   Refills:  3       * carbidopa-levodopa  MG per tablet   Commonly known as:  SINEMET   This may have changed:  when to take this   Used for:  Paralysis agitans (H)        Dose:  1 tablet   Take 1 tablet by mouth 3 times daily   Quantity:  21 tablet   Refills:  1       * Notice:  This list has 2 medication(s) that are the same as other medications prescribed for you. Read the directions carefully, and ask your doctor or other care provider to review them with you.         Where to get your medicines      Some of these will need a paper prescription and others can be bought over the counter.  Ask your nurse if you have questions.     Bring a paper prescription for each of these medications     order for DME                Primary Care Provider Office Phone # Fax #    Kar Alvarez [phone]       [address] [address]        Equal Access to Services     [facility] AH: Nadia carmona Sokervin, waaxda lulinda, qaybta kaalmaniya parkinson . So [facility] [phone].    ATENCIÓN: Si quinton berger a franco disposición  servicios gratuitos de asistencia lingüística. Miguelito sylvester [phone].    We comply with applicable federal civil rights laws and [place] laws. We do not discriminate on the basis of race, color, national origin, age, disability, sex, sexual orientation, or gender identity.            Thank you!     Thank you for choosing [facility]  for your care. Our goal is always to provide you with excellent care. Hearing back from our patients is one way we can continue to improve our services. Please take a few minutes to complete the written survey that you may receive in the mail after your visit with us. Thank you!             Your Updated Medication List - Protect others around you: Learn how to safely use, store and throw away your medicines at www.disposemymeds.org.          This list is accurate as of 5/15/18  8:25 AM.  Always use your most recent med list.                   Brand Name Dispense Instructions for use Diagnosis    * carbidopa-levodopa  MG per tablet    SINEMET    180 tablet    Take 1 tablet by mouth 3 times daily    Paralysis agitans (H)       * carbidopa-levodopa  MG per tablet    SINEMET    21 tablet    Take 1 tablet by mouth 3 times daily    Paralysis agitans (H)       chlorthalidone 25 MG tablet    HYGROTON    45 tablet    Take 1/2 tab daily    Essential hypertension       CORAL CALCIUM      1,000 mg daily.        desoximetasone 0.25 % Oint ointment    TOPICORT     Apply topically as needed        GABAPENTIN PO      Take 300 mg by mouth        irbesartan 150 MG tablet    AVAPRO    90 tablet    Take 1 tablet (150 mg) by mouth daily    Essential hypertension       LORazepam 0.5 MG tablet    ATIVAN    5 tablet    Take 1 tablet (0.5 mg) by mouth nightly as needed for anxiety or other (sleep)    Insomnia, unspecified type, Lumbar radiculopathy       order for DME     1 Device    Equipment being ordered: rolling walker    Osteoarthritis of spine with  radiculopathy, lumbosacral region       pravastatin 20 MG tablet    PRAVACHOL    90 tablet    Take 1 tablet (20 mg) by mouth daily    Hyperlipidemia, unspecified hyperlipidemia       * predniSONE 10 MG tablet    DELTASONE    13 tablet    Take 4 tablets (40 mg) once daily for 2 days, then 2 tablets once daily for 2 days, then 1 tablet once daily for 1 day.    Lumbar radiculopathy       * predniSONE 10 MG tablet    DELTASONE    13 tablet    Take 4 tablets (40 mg) once daily for 2 days, then 2 tablets once daily for 2 days, then 1 tablet once daily for 1 day.    Lumbar radiculopathy       TUMS 500 MG chewable tablet   Generic drug:  calcium carbonate      Take 2 tablets by mouth daily as needed.        TYLENOL EXTRA STRENGTH PO      Take 1 tablet by mouth as needed Muscle and joint pain        vitamin D 1000 units capsule      Take 1 capsule by mouth daily.        ZANTAC PO      Take 150 mg by mouth daily as needed for heartburn        * Notice:  This list has 4 medication(s) that are the same as other medications prescribed for you. Read the directions carefully, and ask your doctor or other care provider to review them with you.

## 2018-05-17 ENCOUNTER — CARE COORDINATION (OUTPATIENT)
Dept: NEUROLOGY | Facility: CLINIC | Age: 78
End: 2018-05-17

## 2018-05-17 ENCOUNTER — TRANSFERRED RECORDS (OUTPATIENT)
Dept: HEALTH INFORMATION MANAGEMENT | Facility: CLINIC | Age: 78
End: 2018-05-17

## 2018-05-17 NOTE — PROGRESS NOTES
"Yonathan Ferguson MD McAllister, Angela, SACHI                   Please ask if her leg and back are better; did she get epidural at [facility]       5/17/18: called patient. She is currently at [facility] waiting for her injection. Her back/leg pain is still \"bad\". Update sent to Dr Ferguson.   "

## 2018-05-17 NOTE — PROGRESS NOTES
Service Date: 2018      Coty Hodges MD   [address]   [place] [address]      RE: Catherine Mccollum   MRN: [id number]   : 1940      Dear Dr. Hodges:      This is in regard to followup on Catherine Mccollum.  The patient returned today on 2018.  She was seen urgently today with a chief complaint of continued left leg radiculopathy.      Unfortunately, the patient had to wait a few weeks to get an epidural steroid injection.  She had to wait for a long time and she was in an uncomfortable position.  Her blood pressure was up and the epidural was canceled.  She wondered if I could help her in anyway today.  She does not have a neurosurgical appointment now for a number of weeks.  She did tell me the pain has been so severe that oxycodone has not been effective.  She has just been treating it with Tylenol as it seems to give her about as much relief.  She has severe pain down the side and back of her left leg.  The patient has not noticed that her Parkinsonian syndrome is worse.  She continues to have tremor but although it is left-sided it does not seem to interfere with anything.  Prior to the onset of Parkinson disease, she did have a longstanding essential tremor.  She tried some physical therapy, but it actually made her worse.  The patient did tell oral steroids I gave her before she was seen in the Neurosurgical Department a few weeks ago did help her.  She had no side effects from it.  She is aware she cannot take repeated doses of oral prednisone because of potential steroid side effect including the possibility of bone loss and pancreatitis.  I reviewed with her again her prior MRI scan that was done on 2018 and it did show at the L5-S1 level there was a new left paracentral disk extrusion extending along the posterior margin of L5.  There was evidence of associated mass effect on the traversing left S1 nerve roots and potential mass effect on the left  L5 dorsal root ganglion.  At L3-L4, she had a new small left foraminal disk protrusion superimposed and more generalized disk bulging.  This contributes to moderate left foraminal stenosis resulting in some mass effect on the left L3 dorsal root ganglion.  I earlier had reviewed this study with the [facility] radiologist, Dr. Lugo, on her prior visit.  Her children did have questions about the possibility of trying other treatment including acupuncture.  I told them I was not opposed to this.  The patient has been off Sinemet for some time and she really does not notice any real difference.  She said she had only minimal change in her tremor when she took the medication.      Neurologic examination revealed a pleasant woman.  Her blood pressure is 140/76 with a pulse of 66.  She had a left hand rest tremor and did have mild essential tremor of both hands that was postural.  She had mild reduced rapid alternating motion of the left hand and left foot.  The patient had mild rigidity of the left arm.  She had a minimal glabellar tap sign today but otherwise good facial expression and good extraocular movements.  Strength testing was normal.  The patient did have intense paraspinal muscle spasm, the right side greater than the left, and marked reduction in extension of her back.  She had brisk arm reflexes again as well as at the knees.  This hyperreflexia has been there for as long as I have known her.  She had an absent right internal hamstring reflex as well as ankle jerks.  Her toes are downgoing to plantar stimulation.  The patient had a regular cardiac rhythm without gallops or murmurs.      IMPRESSION:   1.  New left L5-S1 radiculopathy.   2.  Prior surgical treatment of a right L5 radiculopathy.   3.  Parkinson disease in the setting of longstanding essential tremor.      The patient would like to consider now an epidural steroid injection, but through a different location.  She had good luck in the past at  [facility].  I am going to refer her to that facility in [place].  I went over side effects of the treatment with her.  She is going to keep in touch with me and if not better, I have talked with her about considering neurosurgical treatment.  I told them I was not opposed to a trial of acupuncture.      Thank you for allowing me to see this patient.      Sincerely yours,      Axel Ferguson MD      I spent 30 minutes with the patient today.  Over 50% of the time this involved counseling and coordination of care.         AXEL FERGUSON MD             D: 2018   T: 2018   MT: AKA      Name:     GÉNESIS CA   MRN:      [id number]        Account:      [id number]   :      1940           Service Date: 2018      Document: [id number]

## 2018-05-22 ENCOUNTER — TELEPHONE (OUTPATIENT)
Dept: OTHER | Facility: CLINIC | Age: 78
End: 2018-05-22

## 2018-05-22 NOTE — TELEPHONE ENCOUNTER
Acupuncture benefits- MEDICARE DOES NOT COVER ACPUNCTURE AT ALL- HER SUPPLEMENT PLAN IS AARP- PER AARP ACUPUNCTURE IS NOT COVERED BY THEM EITHER. I SPOKE TO THE PATIENT AND ADVISED HER OF THE LAC K OF COVERAGE. SHE STATED SHE WOULD LIKE ME TO CANCEL THE APPT. SPOKE TO [employer] AND CANCELLED.

## 2018-05-24 ENCOUNTER — TELEPHONE (OUTPATIENT)
Dept: NEUROSURGERY | Facility: CLINIC | Age: 78
End: 2018-05-24

## 2018-05-24 DIAGNOSIS — M50.20 DISPLACEMENT OF CERVICAL INTERVERTEBRAL DISC WITHOUT MYELOPATHY: Primary | ICD-10-CM

## 2018-05-24 NOTE — TELEPHONE ENCOUNTER
Writer called patient and offered her an appointment today at noon.  Pt declined and accepted an appointment for next Thursday May 31st at 7am.  She will need to have x-rays prior to the appointment.

## 2018-05-30 ASSESSMENT — ENCOUNTER SYMPTOMS
MUSCLE WEAKNESS: 0
MUSCLE CRAMPS: 1
MYALGIAS: 1
STIFFNESS: 1
JOINT SWELLING: 0
BACK PAIN: 1
ARTHRALGIAS: 0
NECK PAIN: 0

## 2018-05-31 ENCOUNTER — OFFICE VISIT (OUTPATIENT)
Dept: NEUROSURGERY | Facility: CLINIC | Age: 78
End: 2018-05-31
Payer: MEDICARE

## 2018-05-31 ENCOUNTER — RADIANT APPOINTMENT (OUTPATIENT)
Dept: GENERAL RADIOLOGY | Facility: CLINIC | Age: 78
End: 2018-05-31
Attending: NEUROLOGICAL SURGERY
Payer: MEDICARE

## 2018-05-31 VITALS
BODY MASS INDEX: 27.23 KG/M2 | DIASTOLIC BLOOD PRESSURE: 76 MMHG | HEART RATE: 69 BPM | SYSTOLIC BLOOD PRESSURE: 151 MMHG | WEIGHT: 144.2 LBS | HEIGHT: 61 IN

## 2018-05-31 DIAGNOSIS — M50.20 DISPLACEMENT OF CERVICAL INTERVERTEBRAL DISC WITHOUT MYELOPATHY: ICD-10-CM

## 2018-05-31 DIAGNOSIS — M54.16 LEFT LUMBAR RADICULOPATHY: Primary | ICD-10-CM

## 2018-05-31 DIAGNOSIS — M43.16 SPONDYLOLISTHESIS OF LUMBAR REGION: ICD-10-CM

## 2018-05-31 ASSESSMENT — PAIN SCALES - GENERAL: PAINLEVEL: SEVERE PAIN (6)

## 2018-05-31 NOTE — LETTER
5/31/2018     RE: Catherine Mccollum  [address]  [address]  [place] [address]     Dear Colleague,    Thank you for referring your patient, Catherine Mccollum, to the [facility] NEUROSURGERY at [facility]. Please see a copy of my visit note below.        Neurosurgery Clinic Note    Chief Complaint: Leg pain    History of Present Illness:  It was a pleasure to evaluate Catherine Mccollum in clinic today at the kind referral of Yonathan Ferguson MD  [address]  [place] [address].    Catherine Mccollum is a 78 year old female with history of Parkinson's disease. She has long standing history of well controlled low back pain and takes Tylenol for it. In September 2017, she developed right leg pain and was evaluated by Dr. Jackson and underwent right L4-5 lateral recess decompression and right L5-S1 synovial cyst removal. She did well following the surgery. Three weeks ago, she developed a new left leg pain. The pain radiates along the lateral and posterior aspect of her thigh, calf, and bottom of the foot. It is associated with numbness and tingling. Pain is worse in standing or walking, and is relieved by sitting. No change in bowel or bladder function. She was seen by Dr. Ferguson and had an MRI of the lumbar spine. She was then referred to neurosurgery for further evaluation. She previously had physical therapy for her right leg but stopped doing that when her left leg pain worsened. She was also tried on several courses of oral prednisone with some relief. She was scheduled for an injection but this was canceled because of elevated blood pressure.    Review of Systems   Answers for HPI/ROS submitted by the patient on 5/30/2018   General Symptoms: No  Skin Symptoms: No  HENT Symptoms: No  EYE SYMPTOMS: No  HEART SYMPTOMS: No  LUNG SYMPTOMS: No  INTESTINAL SYMPTOMS: No  URINARY SYMPTOMS: Yes  GYNECOLOGIC SYMPTOMS: No  BREAST SYMPTOMS: No  SKELETAL SYMPTOMS: Yes  BLOOD SYMPTOMS:  "No  NERVOUS SYSTEM SYMPTOMS: No  MENTAL HEALTH SYMPTOMS: No  Back pain: Yes  Muscle aches: Yes  Neck pain: No  Swollen joints: No  Joint pain: No  Bone pain: No  Muscle cramps: Yes  Muscle weakness: No  Joint stiffness: Yes  Bone fracture: No      Past Medical History:  Parkinson's disease  Hypertension    Past Surgical History:  C sections  Right ankle surgery    Social History     Social History     Marital status: Unknown     Spouse name: N/A     Number of children: N/A     Years of education: N/A     Social History Main Topics     Smoking status: Never Smoker     Smokeless tobacco: Never Used     Alcohol use No     Drug use: No     Sexual activity: Not Currently     Other Topics Concern     Not on file     Social History Narrative       Family History:  Hypertension, cancer      IMAGING per my own measurement and interpretation:  MRI lumbar spine (3/22/18) and flex-ext lumbar xrays reveals grade 1 spondylolisthesis at L45 and L5-S1 without evidence of pars defects. No instability from flexion to extension. There is significant facet hypertrophy and disk herniation at L5-S1 on the left with left lateral recess stenosis affecting left S1 traversing nerve root.  Pelvic incidence 60, lumbar lordosis 40 degrees; 20 degrees PI-LL mismatch, no scoliosis        Nutritional Status:  Estimated body mass index is 26.83 kg/(m^2) as calculated from the following:    Height as of 5/15/18: 1.549 m (5' 1\").    Weight as of 5/15/18: 64.4 kg (142 lb).    Lab Results   Component Value Date    ALBUMIN 4.2 11/25/2014       Diabetes Screening:  Lab Results   Component Value Date    A1C 4.9 11/14/2013       Nicotine Usage:  No, never      Physical Exam   /76  Pulse 69  Ht 1.549 m (5' 1\")  Wt 65.4 kg (144 lb 3.2 oz)  BMI 27.25 kg/m2  Constitutional: Oriented to person, place, and time. Appears well-developed and well-nourished. Cooperative. No distress.   HENT:   Head: Normocephalic and atraumatic.   Eyes: Conjunctivae are " normal.  Neck: Normal range of motion. Neck supple. No spinous process tenderness and no muscular tenderness present. No tracheal deviation present.  Cardiovascular: Normal rate and regular rhythm.    Pulmonary/Chest: Effort normal and breath sounds normal.  Abdominal: Soft. Bowel sounds are normal. Exhibits no distension. There is no tenderness.   Neurological: alert and oriented to person, place, and time.   No cranial nerve deficit   No sensory deficit  Gait helped with walker    Reflex Scores:       Tricep reflexes are 2+ on the right side and 2+ on the left side.       Bicep reflexes are 2+ on the right side and 2+ on the left side.       Brachioradialis reflexes are 2+ on the right side and 2+ on the left side.       Patellar reflexes are 2+ on the right side and 2+ on the left side.       Achilles reflexes are 2+ on the right side and 2+ on the left side.    STRENGTH LEFT RIGHT   Deltoid 5 5   Bicep 5 5   Wrist Extensor 5 5   Tricep 5 5   Finger flexion 5 5   Finger abduction 5 5    5 5       Hip Flexion     5     5   Knee Extension 5 5   Ankle Dorsiflexion 5 5   Extensor Hallucis Longus 5 5   Plantar Flexion 4+ 5   Foot eversion 5 5   Foot inversion 5 5     No Ender's   No ankle clonus  Could not perform left calf raise due to pain  Skin: Skin is warm, dry and intact.   Psychiatric: Normal mood and affect. Speech is normal and behavior is normal.    ASSESSMENT:  Catherine Mccollum is a 78 year old female with left S1 radiculopathy; L4-5, L5-S1 spondylolisthesis, prior midline L4-5 and L5-S1 approach for decompression for contralateral right leg symptoms by Dr. Jackson in September 2017.    PLAN:  - Discussed surgical options including left L5-S1 microdiskectomy which is lower risk than L5-S1 TLIF but has potential for symptom recurrence or worsening instability. Patient states that she does not want to undergo fusion surgery. I think it is reasonable to proceed with left L5-S1 microdiskectomy, but symptoms  may recur due to spondylolisthesis and PI-LL mismatch, in which case her only surgical option would be a fusion.  She also has some lateral recess narrowing at L4-5, but no L5 radicular symptoms and therefore her left S1 radiculopathy is likely coming from the lateral recess stenosis at L5-S1 with severe left S1 nerve root compression visualized.    I discussed surgical risk for microdiskectomy including bleeding, infection, nerve root damage, failure to heal, CSF leak, weakness/paralysis, numbness, worsening pain or failure to improve including neuropathic pain, recurrence of problem, and potential for development of instability and need for further procedures or surgeries.         - Patient would like to talk with her children about her options and will get back to us with her decision. She will let us know if she requests Dr. Young to call her son Sascha to discuss surgery, he lives out of town.    -She has a recent midline scar from her surgery with Dr. Jackson, therefore if she wants to proceed with surgery, it would need to be midline approach for left open L5-S1 hemilaminectomy and microdiskectomy; would be performed at hospital because needs overnight observation for medical frailty/Parkinsons, not a candidate for ASC    -----------------------------------  Daniel Foster MD, MS  Neurosurgery PGY-1      I saw, evaluated, and examined the patient with the resident and personally reviewed and interpreted all imaging and labs and formulated the plan.      Rosemarie Young MD    [facility] Department of Neurosurgery  Complex Spinal Deformity, Scoliosis, and Minimally Invasive Spine Surgery Specialist  Office: [phone]      I spent 45 minutes in patient care with greater than 50% spent in counseling and/or coordination of care.      ADDENDUM-  Patient called and stated she wanted to proceed with surgery; I attempted to call her son Sascha at [phone]; there was no answer; I  left voicemail.  Transitional segment anatomy noted on xray, however, to keep with convention of prior surgery that patient had at outside facility, we will call the lowest mobile segment of her spine L5-S1 instead of what the xray states as L4-5.    Again, thank you for allowing me to participate in the care of your patient.      Sincerely,    Rosemarie Young MD

## 2018-05-31 NOTE — PROGRESS NOTES
Neurosurgery Clinic Note    Chief Complaint: Leg pain    History of Present Illness:  It was a pleasure to evaluate Catherine Mccollum in clinic today at the kind referral of Yonathan Ferguson MD  [address] [address].    Catherine Mccollum is a 78 year old female with history of Parkinson's disease. She has long standing history of well controlled low back pain and takes Tylenol for it. In September 2017, she developed right leg pain and was evaluated by Dr. Jackson and underwent right L4-5 lateral recess decompression and right L5-S1 synovial cyst removal. She did well following the surgery. Three weeks ago, she developed a new left leg pain. The pain radiates along the lateral and posterior aspect of her thigh, calf, and bottom of the foot. It is associated with numbness and tingling. Pain is worse in standing or walking, and is relieved by sitting. No change in bowel or bladder function. She was seen by Dr. Ferguson and had an MRI of the lumbar spine. She was then referred to neurosurgery for further evaluation. She previously had physical therapy for her right leg but stopped doing that when her left leg pain worsened. She was also tried on several courses of oral prednisone with some relief. She was scheduled for an injection but this was canceled because of elevated blood pressure.    Review of Systems   Answers for HPI/ROS submitted by the patient on 5/30/2018   General Symptoms: No  Skin Symptoms: No  HENT Symptoms: No  EYE SYMPTOMS: No  HEART SYMPTOMS: No  LUNG SYMPTOMS: No  INTESTINAL SYMPTOMS: No  URINARY SYMPTOMS: Yes  GYNECOLOGIC SYMPTOMS: No  BREAST SYMPTOMS: No  SKELETAL SYMPTOMS: Yes  BLOOD SYMPTOMS: No  NERVOUS SYSTEM SYMPTOMS: No  MENTAL HEALTH SYMPTOMS: No  Back pain: Yes  Muscle aches: Yes  Neck pain: No  Swollen joints: No  Joint pain: No  Bone pain: No  Muscle cramps: Yes  Muscle weakness: No  Joint stiffness: Yes  Bone fracture: No      Past Medical History:  Parkinson's  "disease  Hypertension    Past Surgical History:  C sections  Right ankle surgery    Social History     Social History     Marital status: Unknown     Spouse name: N/A     Number of children: N/A     Years of education: N/A     Social History Main Topics     Smoking status: Never Smoker     Smokeless tobacco: Never Used     Alcohol use No     Drug use: No     Sexual activity: Not Currently     Other Topics Concern     Not on file     Social History Narrative       Family History:  Hypertension, cancer      IMAGING per my own measurement and interpretation:  MRI lumbar spine (3/22/18) and flex-ext lumbar xrays reveals grade 1 spondylolisthesis at L45 and L5-S1 without evidence of pars defects. No instability from flexion to extension. There is significant facet hypertrophy and disk herniation at L5-S1 on the left with left lateral recess stenosis affecting left S1 traversing nerve root.  Pelvic incidence 60, lumbar lordosis 40 degrees; 20 degrees PI-LL mismatch, no scoliosis        Nutritional Status:  Estimated body mass index is 26.83 kg/(m^2) as calculated from the following:    Height as of 5/15/18: 1.549 m (5' 1\").    Weight as of 5/15/18: 64.4 kg (142 lb).    Lab Results   Component Value Date    ALBUMIN 4.2 11/25/2014       Diabetes Screening:  Lab Results   Component Value Date    A1C 4.9 11/14/2013       Nicotine Usage:  No, never      Physical Exam   /76  Pulse 69  Ht 1.549 m (5' 1\")  Wt 65.4 kg (144 lb 3.2 oz)  BMI 27.25 kg/m2  Constitutional: Oriented to person, place, and time. Appears well-developed and well-nourished. Cooperative. No distress.   HENT:   Head: Normocephalic and atraumatic.   Eyes: Conjunctivae are normal.  Neck: Normal range of motion. Neck supple. No spinous process tenderness and no muscular tenderness present. No tracheal deviation present.  Cardiovascular: Normal rate and regular rhythm.    Pulmonary/Chest: Effort normal and breath sounds normal.  Abdominal: Soft. Bowel " sounds are normal. Exhibits no distension. There is no tenderness.   Neurological: alert and oriented to person, place, and time.   No cranial nerve deficit   No sensory deficit  Gait helped with walker    Reflex Scores:       Tricep reflexes are 2+ on the right side and 2+ on the left side.       Bicep reflexes are 2+ on the right side and 2+ on the left side.       Brachioradialis reflexes are 2+ on the right side and 2+ on the left side.       Patellar reflexes are 2+ on the right side and 2+ on the left side.       Achilles reflexes are 2+ on the right side and 2+ on the left side.    STRENGTH LEFT RIGHT   Deltoid 5 5   Bicep 5 5   Wrist Extensor 5 5   Tricep 5 5   Finger flexion 5 5   Finger abduction 5 5    5 5       Hip Flexion     5     5   Knee Extension 5 5   Ankle Dorsiflexion 5 5   Extensor Hallucis Longus 5 5   Plantar Flexion 4+ 5   Foot eversion 5 5   Foot inversion 5 5     No Ender's   No ankle clonus  Could not perform left calf raise due to pain  Skin: Skin is warm, dry and intact.   Psychiatric: Normal mood and affect. Speech is normal and behavior is normal.    ASSESSMENT:  Catherine Mccollum is a 78 year old female with left S1 radiculopathy; L4-5, L5-S1 spondylolisthesis, prior midline L4-5 and L5-S1 approach for decompression for contralateral right leg symptoms by Dr. Jackson in September 2017.    PLAN:  - Discussed surgical options including left L5-S1 microdiskectomy which is lower risk than L5-S1 TLIF but has potential for symptom recurrence or worsening instability. Patient states that she does not want to undergo fusion surgery. I think it is reasonable to proceed with left L5-S1 microdiskectomy, but symptoms may recur due to spondylolisthesis and PI-LL mismatch, in which case her only surgical option would be a fusion.  She also has some lateral recess narrowing at L4-5, but no L5 radicular symptoms and therefore her left S1 radiculopathy is likely coming from the lateral recess stenosis  at L5-S1 with severe left S1 nerve root compression visualized.    I discussed surgical risk for microdiskectomy including bleeding, infection, nerve root damage, failure to heal, CSF leak, weakness/paralysis, numbness, worsening pain or failure to improve including neuropathic pain, recurrence of problem, and potential for development of instability and need for further procedures or surgeries.         - Patient would like to talk with her children about her options and will get back to us with her decision. She will let us know if she requests Dr. Young to call her son Sascha to discuss surgery, he lives out of town.    -She has a recent midline scar from her surgery with Dr. Jackson, therefore if she wants to proceed with surgery, it would need to be midline approach for left open L5-S1 hemilaminectomy and microdiskectomy; would be performed at hospital because needs overnight observation for medical frailty/Parkinsons, not a candidate for ASC    -----------------------------------  Daniel Foster MD, MS  Neurosurgery PGY-1      I saw, evaluated, and examined the patient with the resident and personally reviewed and interpreted all imaging and labs and formulated the plan.      Rosemarie Young MD    [facility] Department of Neurosurgery  Complex Spinal Deformity, Scoliosis, and Minimally Invasive Spine Surgery Specialist  Office: [phone]      I spent 45 minutes in patient care with greater than 50% spent in counseling and/or coordination of care.      ADDENDUM-  Patient called and stated she wanted to proceed with surgery; I attempted to call her son Sascha at [phone]; there was no answer; I left voicemail.  Transitional segment anatomy noted on xray, however, to keep with convention of prior surgery that patient had at outside facility, we will call the lowest mobile segment of her spine L5-S1 instead of what the xray states as L4-5.

## 2018-05-31 NOTE — PATIENT INSTRUCTIONS
Call Dr. Young' nurse of you would like to move forward with surgery.  Her name is Stewart and can be reached at [phone].

## 2018-05-31 NOTE — NURSING NOTE
Chief Complaint   Patient presents with     Consult     New UMP-Displacement of Cervical Intervertebral disc     Ofe Dexter

## 2018-05-31 NOTE — MR AVS SNAPSHOT
After Visit Summary   5/31/2018    Catherine Mccollum    MRN: [id number]           Patient Information     Date Of Birth          1940        Visit Information        Provider Department      5/31/2018 8:45 AM Rosemarie Young MD [facility] Neurosurgery        Care Instructions    Call Dr. Young' nurse of you would like to move forward with surgery.  Her name is Stewart and can be reached at [phone].          Follow-ups after your visit        Your next 10 appointments already scheduled     Jun 29, 2018  8:05 AM CDT   (Arrive by 7:50 AM)   Return Visit with Kar Alvarez MD   [facility] ([facility])    [address]  [address] [facility] [address] [phone]              Who to contact     Please call your clinic at [phone] to:    Ask questions about your health    Make or cancel appointments    Discuss your medicines    Learn about your test results    Speak to your doctor            Additional Information About Your Visit        My[employer]Doblet Information     Wishdates gives you secure access to your electronic health record. If you see a primary care provider, you can also send messages to your care team and make appointments. If you have questions, please call your primary care clinic.  If you do not have a primary care provider, please call [phone] and they will assist you.      Wishdates is an electronic gateway that provides easy, online access to your medical records. With Wishdates, you can request a clinic appointment, read your test results, renew a prescription or communicate with your care team.     To access your existing account, please contact your [facility] or call [phone] for assistance.        Care EveryWhere ID     This is your Care EveryWhere ID. This could be used by other organizations to access your [place] medical records  [id number]        Your Vitals Were     Pulse  "Height BMI (Body Mass Index)             69 1.549 m (5' 1\") 27.25 kg/m2          Blood Pressure from Last 3 Encounters:   05/31/18 151/76   05/15/18 155/80   05/11/18 140/76    Weight from Last 3 Encounters:   05/31/18 65.4 kg (144 lb 3.2 oz)   05/15/18 64.4 kg (142 lb)   05/11/18 64.4 kg (142 lb)              Today, you had the following     No orders found for display         Today's Medication Changes          These changes are accurate as of 5/31/18  9:25 AM.  If you have any questions, ask your nurse or doctor.               These medicines have changed or have updated prescriptions.        Dose/Directions    * carbidopa-levodopa  MG per tablet   Commonly known as:  SINEMET   This may have changed:  when to take this   Used for:  Paralysis agitans (H)        Dose:  1 tablet   Take 1 tablet by mouth 3 times daily   Quantity:  180 tablet   Refills:  3       * carbidopa-levodopa  MG per tablet   Commonly known as:  SINEMET   This may have changed:  when to take this   Used for:  Paralysis agitans (H)        Dose:  1 tablet   Take 1 tablet by mouth 3 times daily   Quantity:  21 tablet   Refills:  1       * Notice:  This list has 2 medication(s) that are the same as other medications prescribed for you. Read the directions carefully, and ask your doctor or other care provider to review them with you.             Primary Care Provider Office Phone # Fax #    Kar Alvarez [phone]       [address] [address]        Equal Access to Services     WALLY ANDERSON : Hadsudha nietoo Sokervin, waaxda luqadaha, qaybta kaalmada roc, waxay idiarian boyle. So [facility] [phone].    ATENCIÓN: Si habla español, tiene a franco disposición servicios gratuitos de asistencia lingüística. Llame al [phone].    We comply with applicable federal civil rights laws and [place] laws. We do not discriminate on the basis of race, color, national origin, " age, disability, sex, sexual orientation, or gender identity.            Thank you!     Thank you for choosing [facility]  for your care. Our goal is always to provide you with excellent care. Hearing back from our patients is one way we can continue to improve our services. Please take a few minutes to complete the written survey that you may receive in the mail after your visit with us. Thank you!             Your Updated Medication List - Protect others around you: Learn how to safely use, store and throw away your medicines at www.disposemymeds.org.          This list is accurate as of 5/31/18  9:25 AM.  Always use your most recent med list.                   Brand Name Dispense Instructions for use Diagnosis    * carbidopa-levodopa  MG per tablet    SINEMET    180 tablet    Take 1 tablet by mouth 3 times daily    Paralysis agitans (H)       * carbidopa-levodopa  MG per tablet    SINEMET    21 tablet    Take 1 tablet by mouth 3 times daily    Paralysis agitans (H)       chlorthalidone 25 MG tablet    HYGROTON    45 tablet    Take 1/2 tab daily    Essential hypertension       CORAL CALCIUM      1,000 mg daily.        desoximetasone 0.25 % Oint ointment    TOPICORT     Apply topically as needed        GABAPENTIN PO      Take 300 mg by mouth        irbesartan 150 MG tablet    AVAPRO    90 tablet    Take 1 tablet (150 mg) by mouth daily    Essential hypertension       LORazepam 0.5 MG tablet    ATIVAN    5 tablet    Take 1 tablet (0.5 mg) by mouth nightly as needed for anxiety or other (sleep)    Insomnia, unspecified type, Lumbar radiculopathy       order for DME     1 Device    Equipment being ordered: rolling walker    Osteoarthritis of spine with radiculopathy, lumbosacral region       pravastatin 20 MG tablet    PRAVACHOL    90 tablet    Take 1 tablet (20 mg) by mouth daily    Hyperlipidemia, unspecified hyperlipidemia       * predniSONE 10 MG tablet    DELTASONE    13 tablet    Take 4  tablets (40 mg) once daily for 2 days, then 2 tablets once daily for 2 days, then 1 tablet once daily for 1 day.    Lumbar radiculopathy       * predniSONE 10 MG tablet    DELTASONE    13 tablet    Take 4 tablets (40 mg) once daily for 2 days, then 2 tablets once daily for 2 days, then 1 tablet once daily for 1 day.    Lumbar radiculopathy       TUMS 500 MG chewable tablet   Generic drug:  calcium carbonate      Take 2 tablets by mouth daily as needed.        TYLENOL EXTRA STRENGTH PO      Take 1 tablet by mouth as needed Muscle and joint pain        vitamin D 1000 units capsule      Take 1 capsule by mouth daily.        ZANTAC PO      Take 150 mg by mouth daily as needed for heartburn        * Notice:  This list has 4 medication(s) that are the same as other medications prescribed for you. Read the directions carefully, and ask your doctor or other care provider to review them with you.

## 2018-06-05 DIAGNOSIS — M54.50 LOWER BACK PAIN: Primary | ICD-10-CM

## 2018-06-05 DIAGNOSIS — R79.1 COAGULATION TEST ABNORMALITY: ICD-10-CM

## 2018-06-14 ENCOUNTER — TELEPHONE (OUTPATIENT)
Dept: CARE COORDINATION | Facility: CLINIC | Age: 78
End: 2018-06-14

## 2018-06-14 NOTE — TELEPHONE ENCOUNTER
"Social Work Intervention  [facility]    Data/Intervention:    Patient Name:  Catherine Mccollum  /Age:  1940 (78 year old)    Visit Type: telephone  Referral Source: Stewart Gonzales  Reason for Referral:  Contact Pt's son Josiah re post op care resources    Collaborated With:    -Catherine    Patient Concerns/Issues:   Contacted Pt request to contact her son Josiah as we don't have an MARY on file. We discussed her current situations and plans for care after her upcoming surgery. She also indicated that I could contact her son to discuss also.  Pt lives in independent living at [facility]. There are assisted living services available in the [facility]. She does eat dinner in the restaurant every day and makes her own lunch and breakfast. Meals can be delivered to her apt if needed. Housekeeping services are provided. Pt used [employer], [employer] and [employer] for transportation. There is an emergency response button in her apt to call a nurse.  She has a meeting planned with the \"head nurse\" of the assisted living to discuss care planning options. She may hire assistance at home and has the funds to do so.      Intervention/Education/Resources Provided:  Pt is independent is her own cares and managing her affairs. She indicated she expects a 1 night hospital LOS. Her goal is to return home with some hired help if needed but also is aware that if things don't go as expected, she is aware that TCU is an option and would want to go to [facility]. She is also aware that one of the medicare criteria for coverage is a 3 nite hospital stay. We discussed PT/OT/Skilled RN/HHA visits are also available under Medicare if needed and the inpt staff will help get that set up.     Assessment/Plan:  Anticipate that Pt will be able to DC home with services in her bldg and home care if needed. I have left a message with her son Josiah and discuss what is available with him. Inpt SW/CC staff will f/u as needed.     Provided " patient/family with contact information and availability.    CUBA Barker, [facility]    [facility]  [phone]

## 2018-06-18 ENCOUNTER — TELEPHONE (OUTPATIENT)
Dept: NEUROSURGERY | Facility: CLINIC | Age: 78
End: 2018-06-18

## 2018-06-18 DIAGNOSIS — M54.16 LUMBAR RADICULOPATHY: Primary | ICD-10-CM

## 2018-06-18 NOTE — TELEPHONE ENCOUNTER
Writer got a message that the pt has diarrhea and wonder if her surgery should be moved up.    Writer left a message that there wasn't a need to change her surgery date as of now.  Writer would like to speak with the pt regarding her symptoms and go over the surgery booklet.

## 2018-06-19 ENCOUNTER — TELEPHONE (OUTPATIENT)
Dept: NEUROSURGERY | Facility: CLINIC | Age: 78
End: 2018-06-19

## 2018-06-20 ENCOUNTER — OFFICE VISIT (OUTPATIENT)
Dept: SURGERY | Facility: CLINIC | Age: 78
End: 2018-06-20
Payer: MEDICARE

## 2018-06-20 ENCOUNTER — ANESTHESIA EVENT (OUTPATIENT)
Dept: SURGERY | Facility: CLINIC | Age: 78
End: 2018-06-20
Payer: MEDICARE

## 2018-06-20 ENCOUNTER — DOCUMENTATION ONLY (OUTPATIENT)
Dept: NEUROSURGERY | Facility: CLINIC | Age: 78
End: 2018-06-20

## 2018-06-20 ENCOUNTER — RADIANT APPOINTMENT (OUTPATIENT)
Dept: GENERAL RADIOLOGY | Facility: CLINIC | Age: 78
End: 2018-06-20
Attending: NEUROLOGICAL SURGERY
Payer: MEDICARE

## 2018-06-20 ENCOUNTER — ALLIED HEALTH/NURSE VISIT (OUTPATIENT)
Dept: SURGERY | Facility: CLINIC | Age: 78
End: 2018-06-20
Payer: MEDICARE

## 2018-06-20 VITALS
DIASTOLIC BLOOD PRESSURE: 73 MMHG | SYSTOLIC BLOOD PRESSURE: 146 MMHG | RESPIRATION RATE: 16 BRPM | HEART RATE: 66 BPM | HEIGHT: 61 IN | WEIGHT: 144.5 LBS | OXYGEN SATURATION: 97 % | TEMPERATURE: 97.5 F | BODY MASS INDEX: 27.28 KG/M2

## 2018-06-20 DIAGNOSIS — R79.1 COAGULATION TEST ABNORMALITY: ICD-10-CM

## 2018-06-20 DIAGNOSIS — M54.50 LOWER BACK PAIN: ICD-10-CM

## 2018-06-20 DIAGNOSIS — Z01.818 PREOP GENERAL PHYSICAL EXAM: Primary | ICD-10-CM

## 2018-06-20 LAB
ALBUMIN UR-MCNC: NEGATIVE MG/DL
ANION GAP SERPL CALCULATED.3IONS-SCNC: 8 MMOL/L (ref 3–14)
APPEARANCE UR: CLEAR
APTT PPP: 29 SEC (ref 22–37)
BACTERIA #/AREA URNS HPF: ABNORMAL /HPF
BASOPHILS # BLD AUTO: 0.1 10E9/L (ref 0–0.2)
BASOPHILS NFR BLD AUTO: 0.5 %
BILIRUB UR QL STRIP: NEGATIVE
BUN SERPL-MCNC: 11 MG/DL (ref 7–30)
CALCIUM SERPL-MCNC: 9 MG/DL (ref 8.5–10.1)
CHLORIDE SERPL-SCNC: 96 MMOL/L (ref 94–109)
CO2 SERPL-SCNC: 29 MMOL/L (ref 20–32)
COLOR UR AUTO: YELLOW
CREAT SERPL-MCNC: 0.72 MG/DL (ref 0.52–1.04)
DIFFERENTIAL METHOD BLD: ABNORMAL
EOSINOPHIL # BLD AUTO: 0.2 10E9/L (ref 0–0.7)
EOSINOPHIL NFR BLD AUTO: 1.5 %
ERYTHROCYTE [DISTWIDTH] IN BLOOD BY AUTOMATED COUNT: 12.6 % (ref 10–15)
GFR SERPL CREATININE-BSD FRML MDRD: 78 ML/MIN/1.7M2
GLUCOSE SERPL-MCNC: 103 MG/DL (ref 70–99)
GLUCOSE UR STRIP-MCNC: NEGATIVE MG/DL
HCT VFR BLD AUTO: 42.1 % (ref 35–47)
HGB BLD-MCNC: 15.1 G/DL (ref 11.7–15.7)
HGB UR QL STRIP: NEGATIVE
HYALINE CASTS #/AREA URNS LPF: 7 /LPF (ref 0–2)
IMM GRANULOCYTES # BLD: 0 10E9/L (ref 0–0.4)
IMM GRANULOCYTES NFR BLD: 0.3 %
INR PPP: 0.95 (ref 0.86–1.14)
KETONES UR STRIP-MCNC: NEGATIVE MG/DL
LEUKOCYTE ESTERASE UR QL STRIP: ABNORMAL
LYMPHOCYTES # BLD AUTO: 3.2 10E9/L (ref 0.8–5.3)
LYMPHOCYTES NFR BLD AUTO: 29.6 %
MCH RBC QN AUTO: 30.9 PG (ref 26.5–33)
MCHC RBC AUTO-ENTMCNC: 35.9 G/DL (ref 31.5–36.5)
MCV RBC AUTO: 86 FL (ref 78–100)
MONOCYTES # BLD AUTO: 0.6 10E9/L (ref 0–1.3)
MONOCYTES NFR BLD AUTO: 5.7 %
MUCOUS THREADS #/AREA URNS LPF: PRESENT /LPF
NEUTROPHILS # BLD AUTO: 6.6 10E9/L (ref 1.6–8.3)
NEUTROPHILS NFR BLD AUTO: 62.4 %
NITRATE UR QL: NEGATIVE
NRBC # BLD AUTO: 0 10*3/UL
NRBC BLD AUTO-RTO: 0 /100
PH UR STRIP: 7 PH (ref 5–7)
PLATELET # BLD AUTO: 476 10E9/L (ref 150–450)
POTASSIUM SERPL-SCNC: 3.3 MMOL/L (ref 3.4–5.3)
RBC # BLD AUTO: 4.89 10E12/L (ref 3.8–5.2)
RBC #/AREA URNS AUTO: 1 /HPF (ref 0–2)
SODIUM SERPL-SCNC: 134 MMOL/L (ref 133–144)
SOURCE: ABNORMAL
SP GR UR STRIP: 1.01 (ref 1–1.03)
SQUAMOUS #/AREA URNS AUTO: <1 /HPF (ref 0–1)
UROBILINOGEN UR STRIP-MCNC: 0 MG/DL (ref 0–2)
WBC # BLD AUTO: 10.6 10E9/L (ref 4–11)
WBC #/AREA URNS AUTO: 48 /HPF (ref 0–5)

## 2018-06-20 NOTE — MR AVS SNAPSHOT
After Visit Summary   2018    Catherine Mccollum    MRN: [id number]           Patient Information     Date Of Birth          1940        Visit Information        Provider Department      2018 12:00 PM Rn, [facility]        Care Instructions    Preparing for Your Surgery      Name:  Catherine Mccollum   MRN:  [id number]   :  1940   Today's Date:  2018     Arriving for surgery:  Surgery date:  18  Arrival time:  10:10 am    Please come to:   [facility] [address]  [address] [place]  [address]    -   parking is available in front of the hospital from 5:15 am to 8:00 pm    -  Stop at the Information Desk in the lobby    -   Inform the information person that you are here for surgery. An escort to 3C will be provided. If you would not like an escort, please proceed to 3C on the 3rd floor. [phone]     -  Bring your ID and insurance card.    What can I eat or drink?  -  You may have solid food or milk products until 8 hours prior to your surgery. (Until 4:10 am )  -  You may have water, apple juice, clear BLACK coffee (NO creamer or nondairy creamer), or 7up/Sprite until 2 hours prior to your surgery. (Until 10:10 am )    Which medicines can I take?       -  Do not bring your own medications to the hospital.        -  Follow Neurosurgery Clinic instructions regarding Ibuprofen. If no instructions given, NO Ibuprofen the day prior to surgery.     -  Do NOT take these medications in the morning, the day of surgery:  Chlorthalidone (Hygroton), Irbesartan (Avapro), Calcium, Vitamin D, Probiotic    -  Please take these medications the morning of surgery:  Gabapentin, Acetaminophen (Tylenol)      Ranitidine if needed    How do I prepare myself?  -  Take two showers: one the night before surgery; and one the morning of surgery.         Use Scrubcare or Hibiclens to wash from neck down.  You may use your own  shampoo and conditioner. No other hair products.   -  Do NOT use lotion, powder, colognes, deodorant, or antiperspirant the day of your surgery.  -  Do NOT wear any makeup, fingernail polish or jewelry.    Questions or Concerns:  If you have questions or concerns prior to your surgery, call [phone]. (Mon - Fri   8 am- 5:30 pm)  Questions after surgery, contact your Surgeons office.      AFTER YOUR SURGERY  Breathing exercises   Breathing exercises help you recover faster. Take deep breaths and let the air out slowly. This will:     Help you wake up after surgery.    Help prevent complications like pneumonia.  Preventing complications will help you go home sooner.   We may give you a breathing device (incentive spirometer) to encourage you to breathe deeply.   Nausea and vomiting   You may feel sick to your stomach after surgery; if so, let your nurse know.    Pain control:  After surgery, you may have pain. Our goal is to help you manage your pain. Pain medicine will help you feel comfortable enough to do activities that will help you heal.  These activities may include breathing exercises, walking and physical therapy.   To help your health care team treat your pain we will ask: 1) If you have pain  2) where it is located 3) describe your pain in your words  Methods of pain control include medications given by mouth, vein or by nerve block for some surgeries.  Sequential Compression Device (SCD) or Pneumo Boots:  You may need to wear SCD S on your legs or feet. These are wraps connected to a machine that pumps in air and releases it. The repeated pumping helps prevent blood clots from forming.                     Follow-ups after your visit        Your next 10 appointments already scheduled     Jun 20, 2018 12:30 PM CDT   (Arrive by 12:15 PM)   PAC Anesthesia Consult with  Pac Anesthesiologist   [facility] ([facility])    [address] [place] [address]  [facility] [address] [phone]            Jun 25, 2018   Procedure with Rosemarie Young MD   [facility] [place], Same Day Surgery (--)    [address]  [facility] [address]   [phone]            Jun 29, 2018  8:05 AM CDT   (Arrive by 7:50 AM)   Return Visit with Kar Alvarez MD   [facility] ([facility] [place])    [address] [place] [address]  [address] [facility] [address] [phone]            Jul 09, 2018  1:00 PM CDT   (Arrive by 12:45 PM)   Return Visit with Rosalind Lewis PA-C   [facility] Neurosurgery ([facility])    [address] [place] [address]  [address]rd [facility] [address] [phone]              Future tests that were ordered for you today     Open Future Orders        Priority Expected Expires Ordered    EKG 12-lead complete w/read - Clinics Routine 6/20/2018 7/20/2018 6/20/2018            Who to contact     Please call your clinic at [phone] to:    Ask questions about your health    Make or cancel appointments    Discuss your medicines    Learn about your test results    Speak to your doctor            Additional Information About Your Visit        [employer] Information     [employer] gives you secure access to your electronic health record. If you see a primary care provider, you can also send messages to your care team and make appointments. If you have questions, please call your primary care clinic.  If you do not have a primary care provider, please call [phone] and they will assist you.      [employer] is an electronic gateway that provides easy, online access to your medical records. With [employer], you can request a clinic appointment, read your test results, renew a prescription or communicate with your care team.     To access your existing account, please contact your [facility] or call [phone] for assistance.        Care EveryWhere ID     This is your Care  EveryWhere ID. This could be used by other organizations to access your [place] medical records  [id number]         Blood Pressure from Last 3 Encounters:   06/20/18 146/73   05/31/18 151/76   05/15/18 155/80    Weight from Last 3 Encounters:   06/20/18 65.5 kg (144 lb 8 oz)   05/31/18 65.4 kg (144 lb 3.2 oz)   05/15/18 64.4 kg (142 lb)              Today, you had the following     No orders found for display         Today's Medication Changes          These changes are accurate as of 6/20/18 12:14 PM.  If you have any questions, ask your nurse or doctor.               These medicines have changed or have updated prescriptions.        Dose/Directions    chlorthalidone 25 MG tablet   Commonly known as:  HYGROTON   This may have changed:    - how much to take  - how to take this  - when to take this  - additional instructions   Used for:  Essential hypertension        Take 1/2 tab daily   Quantity:  45 tablet   Refills:  3       irbesartan 150 MG tablet   Commonly known as:  AVAPRO   This may have changed:  when to take this   Used for:  Essential hypertension        Dose:  150 mg   Take 1 tablet (150 mg) by mouth daily   Quantity:  90 tablet   Refills:  3       pravastatin 20 MG tablet   Commonly known as:  PRAVACHOL   This may have changed:  when to take this   Used for:  Hyperlipidemia, unspecified hyperlipidemia        Dose:  20 mg   Take 1 tablet (20 mg) by mouth daily   Quantity:  90 tablet   Refills:  3                Primary Care Provider Office Phone # Fax #    Kar Alvarez [phone]       [address] [address]        Equal Access to Services     WALLY ANDERSON AH: Nadia Black, waaxda luqadaha, qaybta kaalmada niya brian. So [facility] [phone].    ATENCIÓN: Si habla español, tiene a franco disposición servicios gratuitos de asistencia lingüística. Llame al [phone].    We comply with applicable federal  civil rights laws and [place] laws. We do not discriminate on the basis of race, color, national origin, age, disability, sex, sexual orientation, or gender identity.            Thank you!     Thank you for choosing [facility]  for your care. Our goal is always to provide you with excellent care. Hearing back from our patients is one way we can continue to improve our services. Please take a few minutes to complete the written survey that you may receive in the mail after your visit with us. Thank you!             Your Updated Medication List - Protect others around you: Learn how to safely use, store and throw away your medicines at www.disposemymeds.org.          This list is accurate as of 6/20/18 12:14 PM.  Always use your most recent med list.                   Brand Name Dispense Instructions for use Diagnosis    CALCIUM PO      Take 1 tablet by mouth every morning        chlorthalidone 25 MG tablet    HYGROTON    45 tablet    Take 1/2 tab daily    Essential hypertension       GABAPENTIN PO      Take 600 mg by mouth 2 times daily        irbesartan 150 MG tablet    AVAPRO    90 tablet    Take 1 tablet (150 mg) by mouth daily    Essential hypertension       LORazepam 0.5 MG tablet    ATIVAN    5 tablet    Take 1 tablet (0.5 mg) by mouth nightly as needed for anxiety or other (sleep)    Insomnia, unspecified type, Lumbar radiculopathy       order for DME     1 Device    Equipment being ordered: rolling walker    Osteoarthritis of spine with radiculopathy, lumbosacral region       pravastatin 20 MG tablet    PRAVACHOL    90 tablet    Take 1 tablet (20 mg) by mouth daily    Hyperlipidemia, unspecified hyperlipidemia       PROBIOTIC PO      Take by mouth twice a week        TYLENOL EXTRA STRENGTH PO      Take 1,000 mg by mouth 3 times daily Muscle and joint pain        vitamin D 1000 units capsule      Take 2,000 Units by mouth every morning        ZANTAC PO      Take 150 mg by mouth  daily as needed for heartburn

## 2018-06-20 NOTE — ANESTHESIA PREPROCEDURE EVALUATION
Anesthesia Evaluation     . Pt has had prior anesthetic. Type: General and MAC           ROS/MED HX    ENT/Pulmonary:  - neg pulmonary ROS     Neurologic:     (+)other neuro Parkinsonian features with left side tremor    Cardiovascular:     (+) hypertension----. : . . . :. . Previous cardiac testing date:results:date: results:ECG reviewed date:6/20/2018 results:NSR, left axis deviation date: results:          METS/Exercise Tolerance:  3 - Able to walk 1-2 blocks without stopping   Hematologic:  - neg hematologic  ROS       Musculoskeletal:   (+) , , other musculoskeletal- Left leg pain      GI/Hepatic:     (+) GERD Asymptomatic on medication,       Renal/Genitourinary:  - ROS Renal section negative       Endo:     (+) Obesity, .      Psychiatric:  - neg psychiatric ROS       Infectious Disease:  - neg infectious disease ROS       Malignancy:      - no malignancy   Other:    (+) No chance of pregnancy C-spine cleared: N/A, no H/O Chronic Pain,no other significant disability   - neg other ROS                 Physical Exam  Normal systems: cardiovascular, pulmonary and dental    Airway   Mallampati: II  TM distance: >3 FB  Neck ROM: full    Dental     Cardiovascular   Rhythm and rate: regular and normal      Pulmonary    breath sounds clear to auscultation    Other findings:   Results for GÉNESIS CA (MRN [id number]) as of 6/20/2018 14:16    6/20/2018 09:59  Sodium: 134  Potassium: 3.3 (L)  Chloride: 96  Carbon Dioxide: 29  Urea Nitrogen: 11  Creatinine: 0.72  GFR Estimate: 78  GFR Estimate If Black: >90  Calcium: 9.0  Anion Gap: 8  Glucose: 103 (H)  WBC: 10.6  Hemoglobin: 15.1  Hematocrit: 42.1  Platelet Count: 476 (H)  RBC Count: 4.89  MCV: 86  MCH: 30.9  MCHC: 35.9  RDW: 12.6  Diff Method: Automated Method  % Neutrophils: 62.4  % Lymphocytes: 29.6  % Monocytes: 5.7  % Eosinophils: 1.5  % Basophils: 0.5  % Immature Granulocytes: 0.3  Nucleated RBCs: 0  Absolute Neutrophil: 6.6  Absolute Lymphocytes: 3.2  Absolute  Monocytes: 0.6  Absolute Eosinophils: 0.2  Absolute Basophils: 0.1  Abs Immature Granulocytes: 0.0  Absolute Nucleated RBC: 0.0  INR: 0.95  PTT: 29  ABO: O  RH(D): Pos  Antibody Screen: Neg  Test Valid Only At: [facility]  Specimen Expires: 06/23/2018  Blood Bank Comment: Preadmit [facility] rec...    6/20/2018 10:16  Color Urine: Yellow  Appearance Urine: Clear  Glucose Urine: Negative  Bilirubin Urine: Negative  Ketones Urine: Negative  Specific Gravity Urine: 1.012  pH Urine: 7.0  Protein Albumin Urine: Negative  Urobilinogen mg/dL: 0.0  Nitrite Urine: Negative  Blood Urine: Negative  Leukocyte Esterase Urine: Moderate (A)  Source: Midstream Urine  WBC Urine: 48 (H)  RBC Urine: 1  Bacteria Urine: Few (A)  Squamous Epithelial /HPF Urine: <1  Mucous Urine: Present (A)  Hyaline Casts: 7 (H)           PAC Discussion and Assessment    ASA Classification: 3  Case is suitable for: [place]  Anesthetic techniques and relevant risks discussed: GA  Invasive monitoring and risk discussed:   Types:   Possibility and Risk of blood transfusion discussed:   NPO instructions given:   Additional anesthetic preparation and risks discussed:   Needs early admission to pre-op area:   Other:     PAC Resident/NP Anesthesia Assessment:  Catherine Mccollum is a 77 yo female scheduled for Open Left Lumbar 5-Sacral 1 Hemilaminectomy, Medial Facetectomy, And Microdiscectomy Attention Transitional Segment Anatomy on 6/25/2018 by Dr. Young in treatment of left leg pain      Previous anesthesia without complications    1) Cardiac: HTN, well managed. EKG today SR with left axis deviation. Denies chest pain, PND and orthopnea.  2) Pulmonary: Never smoked, denies pulmonary symptoms  3) GI: GERD, well managed with zantac  4) Neuro: Parkinsonian syndrome, she has left upper extremity tremors. She was trialed on sinemet and could not tolerate it.  5) Endo: BMI 28  6) low back pain, which is manageable with tylenol. However, she has  persistent left leg pain and medication has not been helpful with pain relief. Pain has affected her her activity and her sleep                Reviewed and Signed by PAC Mid-Level Provider/Resident  Mid-Level Provider/Resident: KAY Chau  Date: 6/20/2018  Time: 1150    Attending Anesthesiologist Anesthesia Assessment:      Reviewed and Signed by PAC Anesthesiologist  Anesthesiologist: abe  Date: 6.25.18  Time:   Pass/Fail:   Disposition:     PAC Pharmacist Assessment:        Pharmacist:   Date:   Time:      Anesthesia Plan      History & Physical Review      ASA Status:  3 .    NPO Status:  > 8 hours    Plan for General and ETT with Intravenous induction. Maintenance will be Balanced.    PONV prophylaxis:  Ondansetron (or other 5HT-3) and Dexamethasone or Solumedrol       Postoperative Care  Postoperative pain management:  IV analgesics, Oral pain medications and Multi-modal analgesia.      Consents  Anesthetic plan, risks, benefits and alternatives discussed with:  Patient..                          .

## 2018-06-20 NOTE — H&P
Pre-Operative H & P     CC:  Preoperative exam to assess for increased cardiopulmonary risk while undergoing surgery and anesthesia.    Date of Encounter: 6/20/2018  Primary Care Physician:  Kar Alvarez    Reason for visit:  Preop general physical exam  Left leg pain      HPI  Catherine Mccollum is a 78 year old female who presents for pre-operative H & P in preparation for Open Left Lumbar 5-Sacral 1 Hemilaminectomy, Medial Facetectomy, And Microdiscectomy Attention Transitional Segment Anatomy on 6/25/2018 by Dr. Young in treatment of left leg pain  at [facility].     History is obtained from the patient.   Low back pain, which is manageable with tylenol. However, she has persistent left leg pain and medication has not been helpful with pain relief. Pain has affected her her activity and her sleep.      Past Medical History  Past Medical History:   Diagnosis Date     GERD (gastroesophageal reflux disease)      HTN (hypertension)      Parkinsonian syndrome (H)     left side tremor       Past Surgical History  Past Surgical History:   Procedure Laterality Date     BREAST SURGERY  Jan 1987    biopsy     CATARACT IOL, RT/LT       GYN SURGERY  1965;1967    2 ceasarean sections     ORTHOPEDIC SURGERY      ankle surgery for trimalleolar frx       Hx of Blood transfusions/reactions: none     Hx of abnormal bleeding or anti-platelet use: none    Menstrual history: No LMP recorded. Patient is postmenopausal.:     Steroid use in the last year: Medrol pack 3/2018    Personal or FH with difficulty with Anesthesia:  None        Prior to Admission Medications  Current Outpatient Prescriptions   Medication Sig Dispense Refill     Acetaminophen (TYLENOL EXTRA STRENGTH PO) Take 1,000 mg by mouth 3 times daily Muscle and joint pain       CALCIUM PO Take 1 tablet by mouth every morning       chlorthalidone (HYGROTON) 25 MG tablet Take 1/2 tab daily (Patient taking differently: Take 12.5 mg  by mouth every morning ) 45 tablet 3     Cholecalciferol (VITAMIN D) 1000 UNITS capsule Take 2,000 Units by mouth every morning        GABAPENTIN PO Take 600 mg by mouth 2 times daily        irbesartan (AVAPRO) 150 MG tablet Take 1 tablet (150 mg) by mouth daily (Patient taking differently: Take 150 mg by mouth every morning ) 90 tablet 3     LORazepam (ATIVAN) 0.5 MG tablet Take 1 tablet (0.5 mg) by mouth nightly as needed for anxiety or other (sleep) 5 tablet 0     pravastatin (PRAVACHOL) 20 MG tablet Take 1 tablet (20 mg) by mouth daily (Patient taking differently: Take 20 mg by mouth At Bedtime ) 90 tablet 3     Probiotic Product (PROBIOTIC PO) Take by mouth twice a week       RaNITidine HCl (ZANTAC PO) Take 150 mg by mouth daily as needed for heartburn       order for DME Equipment being ordered: rolling walker 1 Device 0       Allergies  Allergies   Allergen Reactions     Fosamax Nausea and GI Disturbance     Lisinopril Other (See Comments)     cough     Sulfa Drugs Hives       Social History  Social History     Social History     Marital status: Unknown     Spouse name: N/A     Number of children: N/A     Years of education: N/A     Occupational History     Not on file.     Social History Main Topics     Smoking status: Never Smoker     Smokeless tobacco: Never Used     Alcohol use No     Drug use: No     Sexual activity: Not Currently     Other Topics Concern     Not on file     Social History Narrative       Family History  Family History   Problem Relation Age of Onset     C.A.D. Other      Hypertension Other      granddaughter      Cancer Other      Diabetes No family hx of        Anesthesia Evaluation     . Pt has had prior anesthetic. Type: General and MAC           ROS/MED HX    ENT/Pulmonary:  - neg pulmonary ROS     Neurologic:     (+)other neuro Parkinsonian features with left side tremor    Cardiovascular:     (+) hypertension----. : . . . :. . Previous cardiac testing date:results:date:  "results:ECG reviewed date:6/20/2018 results: date: results:          METS/Exercise Tolerance:  3 - Able to walk 1-2 blocks without stopping   Hematologic:  - neg hematologic  ROS       Musculoskeletal:   (+) , , other musculoskeletal- Left leg pain      GI/Hepatic:     (+) GERD       Renal/Genitourinary:  - ROS Renal section negative       Endo:     (+) Obesity, .      Psychiatric:  - neg psychiatric ROS       Infectious Disease:  - neg infectious disease ROS       Malignancy:      - no malignancy   Other:    (+) No chance of pregnancy C-spine cleared: N/A, no H/O Chronic Pain,no other significant disability   - neg other ROS           Physical Exam  Normal systems: cardiovascular, pulmonary and dental    Airway   Mallampati: II  TM distance: >3 FB  Neck ROM: full    Dental   Normal    Cardiovascular   Rhythm and rate: regular and normal      Pulmonary    breath sounds clear to auscultation            The complete review of systems is negative other than noted in the HPI or here.   Temp: 97.5  F (36.4  C) Temp src: Oral BP: 146/73 Pulse: 66   Resp: 16 SpO2: 97 %         144 lbs 8 oz  5' 1\"   Body mass index is 27.3 kg/(m^2).       Physical Exam  Constitutional: Awake, alert, cooperative, no apparent distress, and appears stated age.  Eyes: Pupils equal, round and reactive to light, extra ocular muscles intact, sclera clear, conjunctiva normal.  HENT: Normocephalic, oral pharynx with moist mucus membranes, good dentition. No goiter appreciated.   Respiratory: Clear to auscultation bilaterally, no crackles or wheezing.  Cardiovascular: Regular rate and rhythm, normal S1 and S2, and no murmur noted.  Carotids +2, no bruits. No edema. Palpable pulses to radial  DP and PT arteries.   GI: Normal bowel sounds, soft, non-distended, non-tender, no masses palpated, no hepatosplenomegaly.    Lymph/Hematologic: No cervical lymphadenopathy and no supraclavicular lymphadenopathy.  Genitourinary:  deferred  Skin: Warm and dry.  No " rashes at anticipated surgical site.   Musculoskeletal: Full ROM of neck. There is no redness, warmth, or swelling of the joints. Left leg pain, uses rolling walker. Tremor left arm  Neurologic: Awake, alert, oriented to name, place and time. Cranial nerves II-XII are grossly intact.  Neuropsychiatric: Calm, cooperative. Normal affect.     Labs: (personally reviewed)  Results for GÉNESIS CA (MRN [id number]) as of 6/20/2018 14:16   Ref. Range 6/20/2018 09:59 6/20/2018 10:16   Sodium Latest Ref Range: 133 - 144 mmol/L 134    Potassium Latest Ref Range: 3.4 - 5.3 mmol/L 3.3 (L)    Chloride Latest Ref Range: 94 - 109 mmol/L 96    Carbon Dioxide Latest Ref Range: 20 - 32 mmol/L 29    Urea Nitrogen Latest Ref Range: 7 - 30 mg/dL 11    Creatinine Latest Ref Range: 0.52 - 1.04 mg/dL 0.72    GFR Estimate Latest Ref Range: >60 mL/min/1.7m2 78    GFR Estimate If Black Latest Ref Range: >60 mL/min/1.7m2 >90    Calcium Latest Ref Range: 8.5 - 10.1 mg/dL 9.0    Anion Gap Latest Ref Range: 3 - 14 mmol/L 8    Glucose Latest Ref Range: 70 - 99 mg/dL 103 (H)    WBC Latest Ref Range: 4.0 - 11.0 10e9/L 10.6    Hemoglobin Latest Ref Range: 11.7 - 15.7 g/dL 15.1    Hematocrit Latest Ref Range: 35.0 - 47.0 % 42.1    Platelet Count Latest Ref Range: 150 - 450 10e9/L 476 (H)    RBC Count Latest Ref Range: 3.8 - 5.2 10e12/L 4.89    MCV Latest Ref Range: 78 - 100 fl 86    MCH Latest Ref Range: 26.5 - 33.0 pg 30.9    MCHC Latest Ref Range: 31.5 - 36.5 g/dL 35.9    RDW Latest Ref Range: 10.0 - 15.0 % 12.6    Diff Method Unknown Automated Method    % Neutrophils Latest Units: % 62.4    % Lymphocytes Latest Units: % 29.6    % Monocytes Latest Units: % 5.7    % Eosinophils Latest Units: % 1.5    % Basophils Latest Units: % 0.5    % Immature Granulocytes Latest Units: % 0.3    Nucleated RBCs Latest Ref Range: 0 /100 0    Absolute Neutrophil Latest Ref Range: 1.6 - 8.3 10e9/L 6.6    Absolute Lymphocytes Latest Ref Range: 0.8 - 5.3 10e9/L 3.2     Absolute Monocytes Latest Ref Range: 0.0 - 1.3 10e9/L 0.6    Absolute Eosinophils Latest Ref Range: 0.0 - 0.7 10e9/L 0.2    Absolute Basophils Latest Ref Range: 0.0 - 0.2 10e9/L 0.1    Abs Immature Granulocytes Latest Ref Range: 0 - 0.4 10e9/L 0.0    Absolute Nucleated RBC Unknown 0.0    INR Latest Ref Range: 0.86 - 1.14  0.95    PTT Latest Ref Range: 22 - 37 sec 29    ABO Unknown O    RH(D) Unknown Pos    Antibody Screen Unknown Neg    Test Valid Only At Latest Units:     [facility]..    Specimen Expires Unknown 2018    Blood Bank Comment Unknown [facility]readmit [facility] rec...    Color Urine Unknown  Yellow   Appearance Urine Unknown  Clear   Glucose Urine Latest Ref Range: NEG^Negative mg/dL  Negative   Bilirubin Urine Latest Ref Range: NEG^Negative   Negative   Ketones Urine Latest Ref Range: NEG^Negative mg/dL  Negative   Specific Gravity Urine Latest Ref Range: 1.003 - 1.035   1.012   pH Urine Latest Ref Range: 5.0 - 7.0 pH  7.0   Protein Albumin Urine Latest Ref Range: NEG^Negative mg/dL  Negative   Urobilinogen mg/dL Latest Ref Range: 0.0 - 2.0 mg/dL  0.0   Nitrite Urine Latest Ref Range: NEG^Negative   Negative   Blood Urine Latest Ref Range: NEG^Negative   Negative   Leukocyte Esterase Urine Latest Ref Range: NEG^Negative   Moderate (A)   Source Unknown  Midstream Urine   WBC Urine Latest Ref Range: 0 - 5 /HPF  48 (H)   RBC Urine Latest Ref Range: 0 - 2 /HPF  1   Bacteria Urine Latest Ref Range: NEG^Negative /HPF  Few (A)   Squamous Epithelial /HPF Urine Latest Ref Range: 0 - 1 /HPF  <1   Mucous Urine Latest Ref Range: NEG^Negative /LPF  Present (A)   Hyaline Casts Latest Ref Range: 0 - 2 /LPF  7 (H)      I have given her a few days of K supplement      EKG: Personally reviewed but formal cardiology read pendin2018        Outside records reviewed from:     ASSESSMENT and PLAN  Catherine Mccollum is a 78 year old female scheduled to undergo Open Left Lumbar 5-Sacral 1 Hemilaminectomy, Medial  Facetectomy, And Microdiscectomy Attention Transitional Segment Anatomy on 6/25/2018 by Dr. Young in treatment of left leg pain. She has the following specific operative considerations:   - RCRI : Low serious cardiac risks.  0.4% risk of major adverse cardiac event.   - Anesthesia considerations:  Refer to PAC assessment in anesthesia records  - VTE risk: 1.8 %  - WALLY # of risks 2/8 = low risk  - Post-op delirium risk: high risk due to age  - Risk of PONV score = 2.  If > 2, anti-emetic intervention recommended.       Previous anesthesia without complications  1) Cardiac: HTN, well managed. EKG today SR with left axis deviation. Denies chest pain, PND and orthopnea.  2) Pulmonary: Never smoked, denies pulmonary symptoms  3) GI: GERD, well managed with zantac  4) Neuro: Parkinsonian syndrome, she has left upper extremity tremors. She was trialed on sinemet and could not tolerate it.  5) Endo: BMI 28  6) MSK: low back pain, which is manageable with tylenol. However, she has persistent left leg pain and medication has not been helpful with pain relief. Pain has affected her her activity and her sleep.        I spent 30 minutes with patient, greater than 50% educating on preop meds, counseling on anesthesia and coordinating care for left leg pain  Pt optimized for surgery. AVS with information on surgery time/arrival time, meds and NPO status given by nursing staff       Patient was discussed with Dr Ivey.    KAY Phelan CNS  Preoperative Assessment Center  [facility]  [facility]  Phone: [phone]  Fax: [phone]

## 2018-06-20 NOTE — PROGRESS NOTES
HPI:    Pt. Comes in to establish care today. She was seen by Dr. Ferguson, Neurology 3/31/2018 with h/o Parkinson's and new L sided radicular sxs. She was subsequently seen by Ms. Lewis, Neurosurgery 4/11/2018 for radicular sxs.  She was seen [facility] with MRI scan 3/22/2018 with some minor findings. She was given Steroid dose pack 3/20/2018 and had another one as well (from Dr. Ferguson). These did reduce her sxs. She was given Tramdol from Dr. Jackson as well and this does reduce her sxs. She still has some left. She did have low back surgery right L4/5 decompression and right L5/S1 synovial cysts with Dr. Jackson, [facility] 9/2017. She has h/o HTN on Avapro and chlorthalidone. She has h/o increased cholesterol on Pravastatin. No other HEENT, cardiopulmonary, abdominal, , GYN, neurological, systemic, psychiatric complaints. She has two sons (one in [place], and one in [place]). She does not smoke nor abuse EtOH.       Past Surgical History:   Procedure Laterality Date     BREAST SURGERY  Jan 1987    biopsy     CATARACT IOL, RT/LT       GYN SURGERY  1965;1967    2 ceasarean sections     ORTHOPEDIC SURGERY      ankle surgery for trimalleolar frx     PE:    Vitals noted gen nad cooperative alert, HEENT, oropharynx clear no exudate, neck supple nl rom no, LCTA, B good air movement, RRR, S1, S2, no MRG, abdomen, nd, nt, resting arm and leg tremors, some mild tenderness L lower back    A/P:    1. HTN; high today she remains on same medication; ordered future labs  2. Parkinson's; she follows with Dr. Ferguson, Neurology  3. Back pain and L sided sxs. She has injection scheduled with Dr. Calabrese this week 5/4/2018. She has follow up 5/23/2018 with Ms. Lewis, Neurosurgery  4. She follows with outside Dermatology  5. She will get future mammograms  6. Immunizations; future new Zoster Vaccination  7. Increased cholesterol on Pravastatin; will check future labs.     Total time spent 30  minutes.   More than 50% of the time spent with Ms. Mccollum on counseling / coordinating her care           Inadequate oral intake

## 2018-06-20 NOTE — PATIENT INSTRUCTIONS
Preparing for Your Surgery      Name:  Catherine Mccollum   MRN:  [id number]   :  1940   Today's Date:  2018     Arriving for surgery:  Surgery date:  18  Arrival time:  10:10 am    Please come to:   [facility] [address]  [address] [place]  [address]    -   parking is available in front of the hospital from 5:15 am to 8:00 pm    -  Stop at the Information Desk in the lobby    -   Inform the information person that you are here for surgery. An escort to 3C will be provided. If you would not like an escort, please proceed to 3C on the 3rd floor. [phone]     -  Bring your ID and insurance card.    What can I eat or drink?  -  You may have solid food or milk products until 8 hours prior to your surgery. (Until 4:10 am )  -  You may have water, apple juice, clear BLACK coffee (NO creamer or nondairy creamer), or 7up/Sprite until 2 hours prior to your surgery. (Until 10:10 am )    Which medicines can I take?       -  Do not bring your own medications to the hospital.        -  Follow Neurosurgery Clinic instructions regarding Ibuprofen. If no instructions given, NO Ibuprofen the day prior to surgery.     -  Do NOT take these medications in the morning, the day of surgery:  Chlorthalidone (Hygroton), Irbesartan (Avapro), Calcium, Vitamin D, Probiotic    -  Please take these medications the morning of surgery:  Gabapentin, Acetaminophen (Tylenol)      Ranitidine if needed    How do I prepare myself?  -  Take two showers: one the night before surgery; and one the morning of surgery.         Use Scrubcare or Hibiclens to wash from neck down.  You may use your own shampoo and conditioner. No other hair products.   -  Do NOT use lotion, powder, colognes, deodorant, or antiperspirant the day of your surgery.  -  Do NOT wear any makeup, fingernail polish or jewelry.    Questions or Concerns:  If you have questions or concerns prior to your surgery, call [phone]. (Mon - Fri   8 am- 5:30 pm)  Questions after surgery, contact your Surgeons office.      AFTER YOUR SURGERY  Breathing exercises   Breathing exercises help you recover faster. Take deep breaths and let the air out slowly. This will:     Help you wake up after surgery.    Help prevent complications like pneumonia.  Preventing complications will help you go home sooner.   We may give you a breathing device (incentive spirometer) to encourage you to breathe deeply.   Nausea and vomiting   You may feel sick to your stomach after surgery; if so, let your nurse know.    Pain control:  After surgery, you may have pain. Our goal is to help you manage your pain. Pain medicine will help you feel comfortable enough to do activities that will help you heal.  These activities may include breathing exercises, walking and physical therapy.   To help your health care team treat your pain we will ask: 1) If you have pain  2) where it is located 3) describe your pain in your words  Methods of pain control include medications given by mouth, vein or by nerve block for some surgeries.  Sequential Compression Device (SCD) or Pneumo Boots:  You may need to wear SCD S on your legs or feet. These are wraps connected to a machine that pumps in air and releases it. The repeated pumping helps prevent blood clots from forming.

## 2018-06-22 ENCOUNTER — TELEPHONE (OUTPATIENT)
Dept: NEUROSURGERY | Facility: CLINIC | Age: 78
End: 2018-06-22

## 2018-06-22 ENCOUNTER — TELEPHONE (OUTPATIENT)
Dept: INTERNAL MEDICINE | Facility: CLINIC | Age: 78
End: 2018-06-22

## 2018-06-22 DIAGNOSIS — N39.0 UTI (URINARY TRACT INFECTION): Primary | ICD-10-CM

## 2018-06-22 DIAGNOSIS — N39.0 UTI (URINARY TRACT INFECTION): ICD-10-CM

## 2018-06-22 DIAGNOSIS — R30.0 DYSURIA: Primary | ICD-10-CM

## 2018-06-22 LAB
ALBUMIN UR-MCNC: NEGATIVE MG/DL
APPEARANCE UR: CLEAR
BILIRUB UR QL STRIP: NEGATIVE
COLOR UR AUTO: ABNORMAL
GLUCOSE UR STRIP-MCNC: NEGATIVE MG/DL
HGB UR QL STRIP: ABNORMAL
KETONES UR STRIP-MCNC: NEGATIVE MG/DL
LEUKOCYTE ESTERASE UR QL STRIP: ABNORMAL
NITRATE UR QL: NEGATIVE
PH UR STRIP: 6 PH (ref 5–7)
RBC #/AREA URNS AUTO: 1 /HPF (ref 0–2)
SOURCE: ABNORMAL
SP GR UR STRIP: 1.01 (ref 1–1.03)
UROBILINOGEN UR STRIP-MCNC: 0 MG/DL (ref 0–2)
WBC #/AREA URNS AUTO: 5 /HPF (ref 0–5)

## 2018-06-22 RX ORDER — CIPROFLOXACIN 250 MG/1
250 TABLET, FILM COATED ORAL 2 TIMES DAILY
Qty: 6 TABLET | Refills: 0 | Status: ON HOLD | OUTPATIENT
Start: 2018-06-22 | End: 2018-06-26

## 2018-06-22 NOTE — TELEPHONE ENCOUNTER
Pt stated that during her PAC appointment she had bacteria in her urine and it would be cultured.  She has not heard back from anyone and wondered if she should be taking antibiotics.    Writer reviewed labs and sent a note to the PAC staff member who did the H&P.  Writer will follow up with pt.

## 2018-06-22 NOTE — OR NURSING
Pt has spine surgery on 6/25. She has a possible UTI  Received: Today       Loretta Garcia RN Macomber, David W, MD; Haven White RN       Cc: Rosemarie Young MD                     Hi Dr. Alvarez,     Pt called the Preadmissions office this am.This pt was seen in PAC on 6/20. She had an abnormal UA. I don't see a UC. She is having Spine surgery on Monday and she is concerned that she may have a UTI. She has pressure after she urinates. She denies fevers, chills, burning with urination or back pain.   She wants to know if she should be on an antibiotic. She is very worried about the potential of her surgery being cancelled because she paid $1000 to fly her son here to help her. Please F/U with pt ASAP today and respond back to me as well.     Thanks so much,     Loretta Garcia RN, BSN   [facility] Preadmissions   [phone]              The above inbasket was sent. I also left phone message for Haven White RN in P[facility] to F/U with pt  ASAP and call me too regarding pt's concerns about a UTI and surgery on Monday.

## 2018-06-22 NOTE — TELEPHONE ENCOUNTER
Stewart (RN (neurosurgery?)) came to [facility] requesting abx for patient based on UA from 6/20, ordered by surgeon (not PAC as was my previous understanding).  Stewart reported that provider requests PCP to prescribe antibiotics, though UA not ordered by PCP. PCP is not in clinic. Covering provider reviewed and agreed to antibiotic.    Patient was updated regarding antibiotic. Requested she does not take calcium while receiving antibiotic. She voiced understanding.    Aysha Shay RN

## 2018-06-22 NOTE — TELEPHONE ENCOUNTER
MD covering for PCP wrote an order for antibiotics and sent the prescription to the pt's pharmacy.    Writer spoke with pt.  She has picked up the prescription and has started to take the antibiotics.

## 2018-06-22 NOTE — OR NURSING
Left phone message for Roderick Velazquez, PCC Manager, asking if he can help with pt's concerns about a possible UTI and surgery on Monday. I asked him to consult with Dr Alvarez or another PCC provider ASAP( if Dr Gonsalez is not there) .

## 2018-06-22 NOTE — TELEPHONE ENCOUNTER
[facility] Call Center    Phone Message    May a detailed message be left on voicemail: no    Reason for Call: Order(s): Other:   Reason for requested: Pt needs to be put on Antibiotics today  Date needed: ASAP/Today  Provider name: Dr. Alvarez      Action Taken: Message routed to:  [facility] ([facility]): Primary

## 2018-06-22 NOTE — OR NURSING
Pt called [facility] to say she has been in and had a UC and that they are calling her in a prescription.

## 2018-06-25 ENCOUNTER — ANESTHESIA (OUTPATIENT)
Dept: SURGERY | Facility: CLINIC | Age: 78
End: 2018-06-25
Payer: MEDICARE

## 2018-06-25 ENCOUNTER — HOSPITAL ENCOUNTER (OUTPATIENT)
Facility: CLINIC | Age: 78
Setting detail: OBSERVATION
Discharge: HOME | End: 2018-06-26
Attending: NEUROLOGICAL SURGERY | Admitting: NEUROLOGICAL SURGERY
Payer: MEDICARE

## 2018-06-25 ENCOUNTER — APPOINTMENT (OUTPATIENT)
Dept: GENERAL RADIOLOGY | Facility: CLINIC | Age: 78
End: 2018-06-25
Attending: NEUROLOGICAL SURGERY
Payer: MEDICARE

## 2018-06-25 DIAGNOSIS — M54.17 LUMBOSACRAL RADICULOPATHY AT S1: Primary | ICD-10-CM

## 2018-06-25 LAB
ABO + RH BLD: NORMAL
ABO + RH BLD: NORMAL
BLD GP AB SCN SERPL QL: NORMAL
BLOOD BANK CMNT PATIENT-IMP: NORMAL
BLOOD BANK CMNT PATIENT-IMP: NORMAL
GLUCOSE BLDC GLUCOMTR-MCNC: 99 MG/DL (ref 70–99)
POTASSIUM SERPL-SCNC: 3.6 MMOL/L (ref 3.4–5.3)
SPECIMEN EXP DATE BLD: NORMAL

## 2018-06-25 PROCEDURE — 25000125 ZZHC RX 250: Performed by: NEUROLOGICAL SURGERY

## 2018-06-25 PROCEDURE — 25000128 H RX IP 250 OP 636: Performed by: ANESTHESIOLOGY

## 2018-06-25 PROCEDURE — 27210995 ZZH RX 272: Performed by: NEUROLOGICAL SURGERY

## 2018-06-25 PROCEDURE — 25000128 H RX IP 250 OP 636: Performed by: NEUROLOGICAL SURGERY

## 2018-06-25 PROCEDURE — 37000009 ZZH ANESTHESIA TECHNICAL FEE, EACH ADDTL 15 MIN: Performed by: NEUROLOGICAL SURGERY

## 2018-06-25 PROCEDURE — 71000014 ZZH RECOVERY PHASE 1 LEVEL 2 FIRST HR: Performed by: NEUROLOGICAL SURGERY

## 2018-06-25 PROCEDURE — 96374 THER/PROPH/DIAG INJ IV PUSH: CPT

## 2018-06-25 PROCEDURE — C9399 UNCLASSIFIED DRUGS OR BIOLOG: HCPCS | Performed by: NURSE ANESTHETIST, CERTIFIED REGISTERED

## 2018-06-25 PROCEDURE — 36415 COLL VENOUS BLD VENIPUNCTURE: CPT | Performed by: ANESTHESIOLOGY

## 2018-06-25 PROCEDURE — 36000064 ZZH SURGERY LEVEL 4 EA 15 ADDTL MIN - UMMC: Performed by: NEUROLOGICAL SURGERY

## 2018-06-25 PROCEDURE — 25000125 ZZHC RX 250: Performed by: NURSE ANESTHETIST, CERTIFIED REGISTERED

## 2018-06-25 PROCEDURE — G0378 HOSPITAL OBSERVATION PER HR: HCPCS

## 2018-06-25 PROCEDURE — 84132 ASSAY OF SERUM POTASSIUM: CPT | Performed by: ANESTHESIOLOGY

## 2018-06-25 PROCEDURE — 40000277 XR SURGERY CARM FLUORO LESS THAN 5 MIN W STILLS: Mod: TC

## 2018-06-25 PROCEDURE — 36000066 ZZH SURGERY LEVEL 4 W FLUORO 1ST 30 MIN - UMMC: Performed by: NEUROLOGICAL SURGERY

## 2018-06-25 PROCEDURE — 25000566 ZZH SEVOFLURANE, EA 15 MIN: Performed by: NEUROLOGICAL SURGERY

## 2018-06-25 PROCEDURE — 27210794 ZZH OR GENERAL SUPPLY STERILE: Performed by: NEUROLOGICAL SURGERY

## 2018-06-25 PROCEDURE — 25000128 H RX IP 250 OP 636: Performed by: NURSE ANESTHETIST, CERTIFIED REGISTERED

## 2018-06-25 PROCEDURE — 00000146 ZZHCL STATISTIC GLUCOSE BY METER IP

## 2018-06-25 PROCEDURE — 71000015 ZZH RECOVERY PHASE 1 LEVEL 2 EA ADDTL HR: Performed by: NEUROLOGICAL SURGERY

## 2018-06-25 PROCEDURE — 25000128 H RX IP 250 OP 636

## 2018-06-25 PROCEDURE — 37000008 ZZH ANESTHESIA TECHNICAL FEE, 1ST 30 MIN: Performed by: NEUROLOGICAL SURGERY

## 2018-06-25 PROCEDURE — 40000170 ZZH STATISTIC PRE-PROCEDURE ASSESSMENT II: Performed by: NEUROLOGICAL SURGERY

## 2018-06-25 RX ORDER — SODIUM CHLORIDE 9 MG/ML
INJECTION, SOLUTION INTRAVENOUS CONTINUOUS
Status: DISCONTINUED | OUTPATIENT
Start: 2018-06-25 | End: 2018-06-26

## 2018-06-25 RX ORDER — LIDOCAINE HYDROCHLORIDE 20 MG/ML
INJECTION, SOLUTION INFILTRATION; PERINEURAL PRN
Status: DISCONTINUED | OUTPATIENT
Start: 2018-06-25 | End: 2018-06-25

## 2018-06-25 RX ORDER — PRAVASTATIN SODIUM 20 MG
20 TABLET ORAL AT BEDTIME
Status: DISCONTINUED | OUTPATIENT
Start: 2018-06-26 | End: 2018-06-26 | Stop reason: HOSPADM

## 2018-06-25 RX ORDER — ACETAMINOPHEN 325 MG/1
975 TABLET ORAL ONCE
Status: DISCONTINUED | OUTPATIENT
Start: 2018-06-25 | End: 2018-06-25 | Stop reason: HOSPADM

## 2018-06-25 RX ORDER — ACETAMINOPHEN 325 MG/1
650 TABLET ORAL EVERY 6 HOURS PRN
Status: DISCONTINUED | OUTPATIENT
Start: 2018-06-25 | End: 2018-06-26 | Stop reason: HOSPADM

## 2018-06-25 RX ORDER — HYDROMORPHONE HYDROCHLORIDE 1 MG/ML
.3-.5 INJECTION, SOLUTION INTRAMUSCULAR; INTRAVENOUS; SUBCUTANEOUS
Status: DISCONTINUED | OUTPATIENT
Start: 2018-06-25 | End: 2018-06-25 | Stop reason: HOSPADM

## 2018-06-25 RX ORDER — HYDRALAZINE HYDROCHLORIDE 20 MG/ML
10 INJECTION INTRAMUSCULAR; INTRAVENOUS EVERY 30 MIN PRN
Status: DISCONTINUED | OUTPATIENT
Start: 2018-06-25 | End: 2018-06-26

## 2018-06-25 RX ORDER — FENTANYL CITRATE 50 UG/ML
25-50 INJECTION, SOLUTION INTRAMUSCULAR; INTRAVENOUS EVERY 5 MIN PRN
Status: DISCONTINUED | OUTPATIENT
Start: 2018-06-25 | End: 2018-06-25 | Stop reason: HOSPADM

## 2018-06-25 RX ORDER — EPHEDRINE SULFATE 50 MG/ML
INJECTION, SOLUTION INTRAMUSCULAR; INTRAVENOUS; SUBCUTANEOUS PRN
Status: DISCONTINUED | OUTPATIENT
Start: 2018-06-25 | End: 2018-06-25

## 2018-06-25 RX ORDER — ONDANSETRON 2 MG/ML
INJECTION INTRAMUSCULAR; INTRAVENOUS PRN
Status: DISCONTINUED | OUTPATIENT
Start: 2018-06-25 | End: 2018-06-25

## 2018-06-25 RX ORDER — GABAPENTIN 600 MG/1
600 TABLET ORAL 2 TIMES DAILY
Status: DISCONTINUED | OUTPATIENT
Start: 2018-06-26 | End: 2018-06-26 | Stop reason: HOSPADM

## 2018-06-25 RX ORDER — GLYCOPYRROLATE 0.2 MG/ML
INJECTION, SOLUTION INTRAMUSCULAR; INTRAVENOUS PRN
Status: DISCONTINUED | OUTPATIENT
Start: 2018-06-25 | End: 2018-06-25

## 2018-06-25 RX ORDER — ONDANSETRON 2 MG/ML
4-8 INJECTION INTRAMUSCULAR; INTRAVENOUS EVERY 6 HOURS PRN
Status: DISCONTINUED | OUTPATIENT
Start: 2018-06-25 | End: 2018-06-26 | Stop reason: HOSPADM

## 2018-06-25 RX ORDER — PROPOFOL 10 MG/ML
INJECTION, EMULSION INTRAVENOUS PRN
Status: DISCONTINUED | OUTPATIENT
Start: 2018-06-25 | End: 2018-06-25

## 2018-06-25 RX ORDER — SODIUM CHLORIDE, SODIUM LACTATE, POTASSIUM CHLORIDE, CALCIUM CHLORIDE 600; 310; 30; 20 MG/100ML; MG/100ML; MG/100ML; MG/100ML
INJECTION, SOLUTION INTRAVENOUS CONTINUOUS
Status: DISCONTINUED | OUTPATIENT
Start: 2018-06-25 | End: 2018-06-25 | Stop reason: HOSPADM

## 2018-06-25 RX ORDER — HYDRALAZINE HYDROCHLORIDE 20 MG/ML
2.5-5 INJECTION INTRAMUSCULAR; INTRAVENOUS EVERY 10 MIN PRN
Status: DISCONTINUED | OUTPATIENT
Start: 2018-06-25 | End: 2018-06-25 | Stop reason: HOSPADM

## 2018-06-25 RX ORDER — OXYCODONE HYDROCHLORIDE 5 MG/1
5 TABLET ORAL EVERY 4 HOURS PRN
Status: DISCONTINUED | OUTPATIENT
Start: 2018-06-25 | End: 2018-06-26 | Stop reason: HOSPADM

## 2018-06-25 RX ORDER — ESMOLOL HYDROCHLORIDE 10 MG/ML
INJECTION INTRAVENOUS PRN
Status: DISCONTINUED | OUTPATIENT
Start: 2018-06-25 | End: 2018-06-25

## 2018-06-25 RX ORDER — CEFAZOLIN SODIUM 2 G/100ML
2 INJECTION, SOLUTION INTRAVENOUS
Status: COMPLETED | OUTPATIENT
Start: 2018-06-25 | End: 2018-06-25

## 2018-06-25 RX ORDER — VANCOMYCIN HYDROCHLORIDE 1 G/20ML
INJECTION, POWDER, LYOPHILIZED, FOR SOLUTION INTRAVENOUS PRN
Status: DISCONTINUED | OUTPATIENT
Start: 2018-06-25 | End: 2018-06-25 | Stop reason: HOSPADM

## 2018-06-25 RX ORDER — ONDANSETRON 2 MG/ML
4 INJECTION INTRAMUSCULAR; INTRAVENOUS EVERY 30 MIN PRN
Status: DISCONTINUED | OUTPATIENT
Start: 2018-06-25 | End: 2018-06-25 | Stop reason: HOSPADM

## 2018-06-25 RX ORDER — LIDOCAINE 40 MG/G
CREAM TOPICAL
Status: DISCONTINUED | OUTPATIENT
Start: 2018-06-25 | End: 2018-06-25 | Stop reason: HOSPADM

## 2018-06-25 RX ORDER — LIDOCAINE 40 MG/G
CREAM TOPICAL
Status: DISCONTINUED | OUTPATIENT
Start: 2018-06-25 | End: 2018-06-26 | Stop reason: HOSPADM

## 2018-06-25 RX ORDER — DEXAMETHASONE SODIUM PHOSPHATE 4 MG/ML
INJECTION, SOLUTION INTRA-ARTICULAR; INTRALESIONAL; INTRAMUSCULAR; INTRAVENOUS; SOFT TISSUE PRN
Status: DISCONTINUED | OUTPATIENT
Start: 2018-06-25 | End: 2018-06-25

## 2018-06-25 RX ORDER — NALOXONE HYDROCHLORIDE 0.4 MG/ML
.1-.4 INJECTION, SOLUTION INTRAMUSCULAR; INTRAVENOUS; SUBCUTANEOUS
Status: DISCONTINUED | OUTPATIENT
Start: 2018-06-25 | End: 2018-06-26

## 2018-06-25 RX ORDER — ONDANSETRON 4 MG/1
4 TABLET, ORALLY DISINTEGRATING ORAL EVERY 6 HOURS PRN
Status: DISCONTINUED | OUTPATIENT
Start: 2018-06-25 | End: 2018-06-26 | Stop reason: HOSPADM

## 2018-06-25 RX ORDER — NALOXONE HYDROCHLORIDE 0.4 MG/ML
.1-.4 INJECTION, SOLUTION INTRAMUSCULAR; INTRAVENOUS; SUBCUTANEOUS
Status: DISCONTINUED | OUTPATIENT
Start: 2018-06-25 | End: 2018-06-26 | Stop reason: HOSPADM

## 2018-06-25 RX ORDER — TRAMADOL HYDROCHLORIDE 50 MG/1
50-100 TABLET ORAL EVERY 6 HOURS PRN
Status: CANCELLED | OUTPATIENT
Start: 2018-06-25

## 2018-06-25 RX ORDER — BUPIVACAINE HYDROCHLORIDE AND EPINEPHRINE 5; 5 MG/ML; UG/ML
INJECTION, SOLUTION PERINEURAL PRN
Status: DISCONTINUED | OUTPATIENT
Start: 2018-06-25 | End: 2018-06-25 | Stop reason: HOSPADM

## 2018-06-25 RX ORDER — FENTANYL CITRATE 50 UG/ML
INJECTION, SOLUTION INTRAMUSCULAR; INTRAVENOUS PRN
Status: DISCONTINUED | OUTPATIENT
Start: 2018-06-25 | End: 2018-06-25

## 2018-06-25 RX ORDER — ONDANSETRON 4 MG/1
4 TABLET, ORALLY DISINTEGRATING ORAL EVERY 30 MIN PRN
Status: DISCONTINUED | OUTPATIENT
Start: 2018-06-25 | End: 2018-06-25 | Stop reason: HOSPADM

## 2018-06-25 RX ORDER — IRBESARTAN 150 MG/1
150 TABLET ORAL EVERY MORNING
Status: DISCONTINUED | OUTPATIENT
Start: 2018-06-26 | End: 2018-06-26 | Stop reason: HOSPADM

## 2018-06-25 RX ORDER — LABETALOL HYDROCHLORIDE 5 MG/ML
5 INJECTION, SOLUTION INTRAVENOUS
Status: DISCONTINUED | OUTPATIENT
Start: 2018-06-25 | End: 2018-06-25 | Stop reason: HOSPADM

## 2018-06-25 RX ORDER — CEFAZOLIN SODIUM 1 G/3ML
1 INJECTION, POWDER, FOR SOLUTION INTRAMUSCULAR; INTRAVENOUS SEE ADMIN INSTRUCTIONS
Status: DISCONTINUED | OUTPATIENT
Start: 2018-06-25 | End: 2018-06-25 | Stop reason: HOSPADM

## 2018-06-25 RX ORDER — HYDROMORPHONE HCL/0.9% NACL/PF 0.2MG/0.2
.2-.4 SYRINGE (ML) INTRAVENOUS
Status: DISCONTINUED | OUTPATIENT
Start: 2018-06-25 | End: 2018-06-26

## 2018-06-25 RX ORDER — HYDROMORPHONE HYDROCHLORIDE 1 MG/ML
INJECTION, SOLUTION INTRAMUSCULAR; INTRAVENOUS; SUBCUTANEOUS
Status: COMPLETED
Start: 2018-06-25 | End: 2018-06-25

## 2018-06-25 RX ORDER — SODIUM CHLORIDE, SODIUM LACTATE, POTASSIUM CHLORIDE, CALCIUM CHLORIDE 600; 310; 30; 20 MG/100ML; MG/100ML; MG/100ML; MG/100ML
INJECTION, SOLUTION INTRAVENOUS CONTINUOUS
Status: DISCONTINUED | OUTPATIENT
Start: 2018-06-25 | End: 2018-06-25

## 2018-06-25 RX ADMIN — ONDANSETRON 4 MG: 2 INJECTION INTRAMUSCULAR; INTRAVENOUS at 17:43

## 2018-06-25 RX ADMIN — FENTANYL CITRATE 200 MCG: 50 INJECTION, SOLUTION INTRAMUSCULAR; INTRAVENOUS at 16:13

## 2018-06-25 RX ADMIN — SODIUM CHLORIDE, POTASSIUM CHLORIDE, SODIUM LACTATE AND CALCIUM CHLORIDE: 600; 310; 30; 20 INJECTION, SOLUTION INTRAVENOUS at 15:59

## 2018-06-25 RX ADMIN — GLYCOPYRROLATE 0.1 MG: 0.2 INJECTION, SOLUTION INTRAMUSCULAR; INTRAVENOUS at 16:58

## 2018-06-25 RX ADMIN — FENTANYL CITRATE 25 MCG: 50 INJECTION INTRAMUSCULAR; INTRAVENOUS at 18:55

## 2018-06-25 RX ADMIN — Medication 5 MG: at 17:11

## 2018-06-25 RX ADMIN — SUGAMMADEX 120 MG: 100 INJECTION, SOLUTION INTRAVENOUS at 17:52

## 2018-06-25 RX ADMIN — FENTANYL CITRATE 25 MCG: 50 INJECTION INTRAMUSCULAR; INTRAVENOUS at 18:45

## 2018-06-25 RX ADMIN — SODIUM CHLORIDE: 9 INJECTION, SOLUTION INTRAVENOUS at 19:49

## 2018-06-25 RX ADMIN — ROCURONIUM BROMIDE 50 MG: 10 INJECTION INTRAVENOUS at 16:18

## 2018-06-25 RX ADMIN — LIDOCAINE HYDROCHLORIDE 100 MG: 20 INJECTION, SOLUTION INFILTRATION; PERINEURAL at 16:15

## 2018-06-25 RX ADMIN — DEXAMETHASONE SODIUM PHOSPHATE 4 MG: 4 INJECTION, SOLUTION INTRA-ARTICULAR; INTRALESIONAL; INTRAMUSCULAR; INTRAVENOUS; SOFT TISSUE at 17:00

## 2018-06-25 RX ADMIN — CEFAZOLIN SODIUM 2 G: 2 INJECTION, SOLUTION INTRAVENOUS at 16:24

## 2018-06-25 RX ADMIN — PROPOFOL 60 MG: 10 INJECTION, EMULSION INTRAVENOUS at 16:16

## 2018-06-25 RX ADMIN — ESMOLOL HYDROCHLORIDE 10 MG: 10 INJECTION, SOLUTION INTRAVENOUS at 17:14

## 2018-06-25 RX ADMIN — GLYCOPYRROLATE 0.1 MG: 0.2 INJECTION, SOLUTION INTRAMUSCULAR; INTRAVENOUS at 16:17

## 2018-06-25 RX ADMIN — PHENYLEPHRINE HYDROCHLORIDE 50 MCG: 10 INJECTION, SOLUTION INTRAMUSCULAR; INTRAVENOUS; SUBCUTANEOUS at 17:30

## 2018-06-25 RX ADMIN — Medication 0.5 MG: at 23:32

## 2018-06-25 NOTE — OR NURSING
Dr Young notified that patient does not have ride, Dr Young at bedside, will make patient observation post op

## 2018-06-25 NOTE — IP AVS SNAPSHOT
MRN:[id number]                      After Visit Summary   6/25/2018    Catherine Mccollum    MRN: [id number]           Thank you!     Thank you for choosing [facility] for your care. Our goal is always to provide you with excellent care. Hearing back from our patients is one way we can continue to improve our services. Please take a few minutes to complete the written survey that you may receive in the mail after you visit with us. Thank you!        Patient Information     Date Of Birth          1940        Designated Caregiver       Most Recent Value    Caregiver    Will someone help with your care after discharge? no      About your hospital stay     You were admitted on:  June 25, 2018 You last received care in the:  Unit 6D Observation [facility]    You were discharged on:  June 26, 2018        Reason for your hospital stay       You underwent surgery on your  lumbar spine for decompression by Dr. Young.     - You will have follow up with your Primary Care Provider in 2 weeks for a wound check.  If your sutures are not absorbable they will removed at this date.     - You will see your surgeon or their nurse practitioner at 6 weeks and your surgeon at 3 months.     - You may take medications like Advil, Motrin, Ibuprofen, Celebrex, Aleve or aspirin to help with your pain control if needed.     - If you were given a brace, please wear it as directed until you are seen in follow up in the Neurosurgery Clinic.  Often times these need to be worn for up to 3 months.     -If you have not heard from our clinic about your follow up visit by 3-4 days following your discharge, please call our clinic at [phone] to schedule an appointment with Dr. Young      After discharge, your activity restrictions are:   - We encourage short frequent walks, increasing as tolerated.  - Avoid housework, vacuuming, laundry, leaf raking, lawn mowing and snow removal.   - No driving until you are seen in clinic and  cleared by your neurosurgeon. You should not drive while on narcotic pain medication.   -If you have a brace, please wear it as directed until you are seen in follow up.  Often times these need to be worn for up to 3 months.    - No strenuous activity.  - No lifting more than 10 pounds until you are seen in clinic (a gallon of milk weighs approximately 8 pounds)  - You are ok to shower, but do not soak your incisions. Pat them dry if they get damp.   - Avoid coloring your hair or permanent styling until cleared by your surgeon  - No baths, hot tubs or pools for 4-6 weeks after surgery.     Wound cares after surgery  - You are ok to shower, but do not soak your incisions. Pat them dry if they get damp.   - Avoid coloring your hair or permanent styling until cleared by your surgeon  - No baths, hot tubs or pools for 4-6 weeks after surgery.       Call if you have any of the followin. Temperature greater than 101.5 F.   2. Any redness, swelling or discharge from the wound.   3. Any new weakness, numbness or altered mental status.  4. Worsening pain that is not improving with the pain medications you were prescribed.     Call [phone] or after 5:00 pm or on weekends call [phone] and ask for the neurosurgery resident on call. Thank You.                  Who to Call     For medical emergencies, please call [phone].  For non-urgent questions about your medical care, please call your primary care provider or clinic, [phone]  For questions related to your surgery, please call your surgery clinic        Attending Provider     Provider Specialty    Rosemarie Young MD Neurosurgery       Primary Care Provider Office Phone # Fax #    Kar Alvarez [phone]      After Care Instructions     Activity       Your activity upon discharge: activity as tolerated, no lifting more than 10 pounds until you are seen in clinic (a gallon of milk weighs approximately 8 pounds            Diet     "   Follow this diet upon discharge: Orders Placed This Encounter      Regular Diet Adult            Discharge Instructions       Please see section \"Reason for your hospital stay\" section for further discharge instructions                  Follow-up Appointments     Adult [facility] Follow-up and recommended labs and tests       See \"Reason for your Hospital Stay\" section for further follow up instructions    Appointments on [place] and/or [facility] (with [facility] or [facility] provider or service). Call [phone] if you haven't heard regarding these appointments within 7 days of discharge.                  Your next 10 appointments already scheduled     Jul 09, 2018  1:00 PM CDT   (Arrive by 12:45 PM)   Return Visit with oRsalind Lewis PA-C   [facility] ([facility])    [address] [place] [address]  3rd [facility] [address]   [phone]            Jul 23, 2018  8:05 AM CDT   (Arrive by 7:50 AM)   Return Visit with Kar Alvarez MD   [facility] ([facility])    [address] [place] [address]  4th [facility] [address]   [phone]            Aug 15, 2018 10:00 AM CDT   (Arrive by 9:45 AM)   Return Visit with Mary Calabrese MD   [facility] ([facility])    [address] [place] [id number] [phone]              Pending Results     No orders found for last 3 day(s).            Statement of Approval     Ordered          06/26/18 1011  I have reviewed and agree with all the recommendations and orders detailed in this document.  EFFECTIVE NOW     Approved and electronically signed by:  Riana Purvis APRN CNP             Admission Information     Date & Time Provider Department Dept. Phone    6/25/2018 Rosemarie Young MD Unit 6D Observation [facility] [place] [phone]      Your Vitals Were     Blood " "Pressure Pulse Temperature Respirations Height Weight    134/70 (BP Location: Right arm) 69 97.8  F (36.6  C) (Oral) 16 1.549 m (5' 1\") 64.5 kg (142 lb 3.2 oz)    Pulse Oximetry BMI (Body Mass Index)                92% 26.87 kg/m2          HouseTab Information     HouseTab gives you secure access to your electronic health record. If you see a primary care provider, you can also send messages to your care team and make appointments. If you have questions, please call your primary care clinic.  If you do not have a primary care provider, please call [phone] and they will assist you.        Care EveryWhere ID     This is your Care EveryWhere ID. This could be used by other organizations to access your [facility] medical records  [id number]        Equal Access to Services     HUMPHREY ANDERSON : Nadia Black, bharati aceves, kelly brian, niya boyle. So [facility] [phone].    ATENCIÓN: Si habla español, tiene a franco disposición servicios gratuitos de asistencia lingüística. Llame al [phone].    We comply with applicable federal civil rights laws and [place] laws. We do not discriminate on the basis of race, color, national origin, age, disability, sex, sexual orientation, or gender identity.               Review of your medicines      START taking        Dose / Directions    diazepam 2 MG tablet   Commonly known as:  VALIUM        Dose:  2.5 mg   Take 1.5 tablets (3 mg) by mouth every 6 hours as needed for muscle spasms (spasm)   Quantity:  100 tablet   Refills:  0       oxyCODONE IR 5 MG tablet   Commonly known as:  ROXICODONE        Dose:  5 mg   Take 1 tablet (5 mg) by mouth every 4 hours as needed for moderate to severe pain   Quantity:  100 tablet   Refills:  0         CONTINUE these medicines which may have CHANGED, or have new prescriptions. If we are uncertain of the size of tablets/capsules you have at home, strength may be listed as something that " might have changed.        Dose / Directions    chlorthalidone 25 MG tablet   Commonly known as:  HYGROTON   This may have changed:    - how much to take  - how to take this  - when to take this  - additional instructions   Used for:  Essential hypertension        Take 1/2 tab daily   Quantity:  45 tablet   Refills:  3       irbesartan 150 MG tablet   Commonly known as:  AVAPRO   This may have changed:  when to take this   Used for:  Essential hypertension        Dose:  150 mg   Take 1 tablet (150 mg) by mouth daily   Quantity:  90 tablet   Refills:  3       pravastatin 20 MG tablet   Commonly known as:  PRAVACHOL   This may have changed:  when to take this   Used for:  Hyperlipidemia, unspecified hyperlipidemia        Dose:  20 mg   Take 1 tablet (20 mg) by mouth daily   Quantity:  90 tablet   Refills:  3         CONTINUE these medicines which have NOT CHANGED        Dose / Directions    CALCIUM PO        Dose:  1 tablet   Take 1 tablet by mouth every morning   Refills:  0       GABAPENTIN PO   Indication:  taking 2qam and 3 qhs        Dose:  600 mg   Take 600 mg by mouth 2 times daily   Refills:  0       order for DME   Used for:  Osteoarthritis of spine with radiculopathy, lumbosacral region        Equipment being ordered: rolling walker   Quantity:  1 Device   Refills:  0       PROBIOTIC PO        Take by mouth twice a week   Refills:  0       TYLENOL EXTRA STRENGTH PO        Dose:  1000 mg   Take 1,000 mg by mouth 3 times daily Muscle and joint pain   Refills:  0       vitamin D 1000 units capsule        Dose:  2000 Units   Take 2,000 Units by mouth every morning   Refills:  0       ZANTAC PO        Dose:  150 mg   Take 150 mg by mouth daily as needed for heartburn   Refills:  0         STOP taking     ciprofloxacin 250 MG tablet   Commonly known as:  CIPRO           LORazepam 0.5 MG tablet   Commonly known as:  ATIVAN                Where to get your medicines      Some of these will need a paper prescription  and others can be bought over the counter. Ask your nurse if you have questions.     Bring a paper prescription for each of these medications     diazepam 2 MG tablet    oxyCODONE IR 5 MG tablet                Protect others around you: Learn how to safely use, store and throw away your medicines at www.disposemymeds.org.        Information about OPIOIDS     PRESCRIPTION OPIOIDS: WHAT YOU NEED TO KNOW   We gave you an opioid (narcotic) pain medicine. It is important to manage your pain, but opioids are not always the best choice. You should first try all the other options your care team gave you. Take this medicine for as short a time (and as few doses) as possible.     These medicines have risks:    DO NOT drive when on new or higher doses of pain medicine. These medicines can affect your alertness and reaction times, and you could be arrested for driving under the influence (DUI). If you need to use opioids long-term, talk to your care team about driving.    DO NOT operate heave machinery    DO NOT do any other dangerous activities while taking these medicines.     DO NOT drink any alcohol while taking these medicines.      If the opioid prescribed includes acetaminophen, DO NOT take with any other medicines that contain acetaminophen. Read all labels carefully. Look for the word  acetaminophen  or  Tylenol.  Ask your pharmacist if you have questions or are unsure.    You can get addicted to pain medicines, especially if you have a history of addiction (chemical, alcohol or substance dependence). Talk to your care team about ways to reduce this risk.    Store your pills in a secure place, locked if possible. We will not replace any lost or stolen medicine. If you don t finish your medicine, please throw away (dispose) as directed by your pharmacist. The [place] [employer]ution [employer] has more information about safe disposal: https://www.pca.state.[place]/living-green/managing-unwanted-medications.     All  opioids tend to cause constipation. Drink plenty of water and eat foods that have a lot of fiber, such as fruits, vegetables, prune juice, apple juice and high-fiber cereal. Take a laxative (Miralax, milk of magnesia, Colace, Senna) if you don t move your bowels at least every other day.              Medication List: This is a list of all your medications and when to take them. Check marks below indicate your daily home schedule. Keep this list as a reference.      Medications           Morning Afternoon Evening Bedtime As Needed    CALCIUM PO   Take 1 tablet by mouth every morning                                chlorthalidone 25 MG tablet   Commonly known as:  HYGROTON   Take 1/2 tab daily   Last time this was given:  12.5 mg on 6/26/2018  9:13 AM                                diazepam 2 MG tablet   Commonly known as:  VALIUM   Take 1.5 tablets (3 mg) by mouth every 6 hours as needed for muscle spasms (spasm)   Last time this was given:  5 mg on 6/26/2018  1:29 AM                                GABAPENTIN PO   Take 600 mg by mouth 2 times daily   Last time this was given:  600 mg on 6/26/2018  9:13 AM                                irbesartan 150 MG tablet   Commonly known as:  AVAPRO   Take 1 tablet (150 mg) by mouth daily   Last time this was given:  150 mg on 6/26/2018  9:13 AM                                order for DME   Equipment being ordered: rolling walker                                oxyCODONE IR 5 MG tablet   Commonly known as:  ROXICODONE   Take 1 tablet (5 mg) by mouth every 4 hours as needed for moderate to severe pain   Last time this was given:  5 mg on 6/26/2018  5:47 AM                                pravastatin 20 MG tablet   Commonly known as:  PRAVACHOL   Take 1 tablet (20 mg) by mouth daily   Last time this was given:  20 mg on 6/26/2018  1:30 AM                                PROBIOTIC PO   Take by mouth twice a week                                TYLENOL EXTRA STRENGTH PO   Take 1,000 mg  by mouth 3 times daily Muscle and joint pain                                vitamin D 1000 units capsule   Take 2,000 Units by mouth every morning                                ZANTAC PO   Take 150 mg by mouth daily as needed for heartburn

## 2018-06-25 NOTE — IP AVS SNAPSHOT
Unit 6D Observation [address] [address]    Phone:  [phone]    Fax:  [phone]                                       After Visit Summary   6/25/2018    Catherine Mccollum    MRN: [id number]           After Visit Summary Signature Page     I have received my discharge instructions, and my questions have been answered. I have discussed any challenges I see with this plan with the nurse or doctor.    ..........................................................................................................................................  Patient/Patient Representative Signature      ..........................................................................................................................................  Patient Representative Print Name and Relationship to Patient    ..................................................               ................................................  Date                                            Time    ..........................................................................................................................................  Reviewed by Signature/Title    ...................................................              ..............................................  Date                                                            Time

## 2018-06-25 NOTE — ANESTHESIA CARE TRANSFER NOTE
Patient: Catherine Mccollum    Procedure(s):  Open Left Lumbar 5-Sacral 1 Hemilaminectomy, Medial Facetectomy, And Microdiscectomy Attention Transitional Segment Anatomy - Wound Class: I-Clean    Diagnosis: Left Lumbar Radiculopathy  Diagnosis Additional Information: No value filed.    Anesthesia Type:   No value filed.     Note:  Airway :Face Mask  Patient transferred to:PACU  Comments:   Spontaneous respirations, oral suctioned, bilateral eye opening and hand grasps.  Extubated to FM O2 6lpm.  VSS to PACU.      Vitals: (Last set prior to Anesthesia Care Transfer)    CRNA VITALS  6/25/2018 1734 - 6/25/2018 1808      6/25/2018             Pulse: 98    Ht Rate: 100    SpO2: 100 %    Resp Rate (observed): 14                Electronically Signed By: KAY Llamas CRNA  June 25, 2018  6:08 PM

## 2018-06-25 NOTE — ANESTHESIA CARE TRANSFER NOTE
Patient: Catherine Mccollum    Procedure(s):  Open Left Lumbar 5-Sacral 1 Hemilaminectomy, Medial Facetectomy, And Microdiscectomy Attention Transitional Segment Anatomy - Wound Class: I-Clean    Diagnosis: Left Lumbar Radiculopathy  Diagnosis Additional Information: No value filed.    Anesthesia Type:   No value filed.     Note:  Airway :Face Mask  Patient transferred to:PACU  Comments: Patient awake and denies painHandoff Report: Identifed the Patient, Identified the Reponsible Provider, Reviewed the pertinent medical history, Discussed the surgical course, Reviewed Intra-OP anesthesia mangement and issues during anesthesia, Set expectations for post-procedure period and Allowed opportunity for questions and acknowledgement of understanding      Vitals: (Last set prior to Anesthesia Care Transfer)    CRNA VITALS  6/25/2018 1734 - 6/25/2018 1820      6/25/2018             Resp Rate (observed): 16    EKG: Sinus rhythm                Electronically Signed By: KAY Llamas CRNA  June 25, 2018  6:20 PM

## 2018-06-26 VITALS
BODY MASS INDEX: 26.85 KG/M2 | HEART RATE: 69 BPM | RESPIRATION RATE: 16 BRPM | TEMPERATURE: 97.8 F | HEIGHT: 61 IN | WEIGHT: 142.2 LBS | DIASTOLIC BLOOD PRESSURE: 70 MMHG | OXYGEN SATURATION: 92 % | SYSTOLIC BLOOD PRESSURE: 134 MMHG

## 2018-06-26 PROCEDURE — 25000132 ZZH RX MED GY IP 250 OP 250 PS 637: Mod: GY | Performed by: NEUROLOGICAL SURGERY

## 2018-06-26 PROCEDURE — G0378 HOSPITAL OBSERVATION PER HR: HCPCS

## 2018-06-26 PROCEDURE — A9270 NON-COVERED ITEM OR SERVICE: HCPCS | Mod: GY | Performed by: NEUROLOGICAL SURGERY

## 2018-06-26 PROCEDURE — 25000132 ZZH RX MED GY IP 250 OP 250 PS 637: Mod: GY | Performed by: STUDENT IN AN ORGANIZED HEALTH CARE EDUCATION/TRAINING PROGRAM

## 2018-06-26 PROCEDURE — 25000128 H RX IP 250 OP 636: Performed by: STUDENT IN AN ORGANIZED HEALTH CARE EDUCATION/TRAINING PROGRAM

## 2018-06-26 PROCEDURE — A9270 NON-COVERED ITEM OR SERVICE: HCPCS | Mod: GY | Performed by: STUDENT IN AN ORGANIZED HEALTH CARE EDUCATION/TRAINING PROGRAM

## 2018-06-26 RX ORDER — SODIUM CHLORIDE 9 MG/ML
INJECTION, SOLUTION INTRAVENOUS CONTINUOUS
Status: DISCONTINUED | OUTPATIENT
Start: 2018-06-26 | End: 2018-06-26

## 2018-06-26 RX ORDER — DIAZEPAM 2 MG
2.5 TABLET ORAL EVERY 6 HOURS PRN
Qty: 100 TABLET | Refills: 0 | Status: SHIPPED | OUTPATIENT
Start: 2018-06-26 | End: 2019-07-23

## 2018-06-26 RX ORDER — OXYCODONE HYDROCHLORIDE 5 MG/1
5 TABLET ORAL EVERY 4 HOURS PRN
Qty: 100 TABLET | Refills: 0 | Status: SHIPPED | OUTPATIENT
Start: 2018-06-26 | End: 2018-07-23

## 2018-06-26 RX ADMIN — CHLORTHALIDONE 12.5 MG: 25 TABLET ORAL at 09:13

## 2018-06-26 RX ADMIN — DIAZEPAM 5 MG: 5 TABLET ORAL at 01:29

## 2018-06-26 RX ADMIN — PRAVASTATIN SODIUM 20 MG: 20 TABLET ORAL at 01:30

## 2018-06-26 RX ADMIN — OXYCODONE HYDROCHLORIDE 5 MG: 5 TABLET ORAL at 05:47

## 2018-06-26 RX ADMIN — OXYCODONE HYDROCHLORIDE 5 MG: 5 TABLET ORAL at 10:33

## 2018-06-26 RX ADMIN — OXYCODONE HYDROCHLORIDE 5 MG: 5 TABLET ORAL at 01:30

## 2018-06-26 RX ADMIN — IRBESARTAN 150 MG: 150 TABLET ORAL at 09:13

## 2018-06-26 RX ADMIN — SODIUM CHLORIDE: 9 INJECTION, SOLUTION INTRAVENOUS at 06:03

## 2018-06-26 RX ADMIN — GABAPENTIN 600 MG: 600 TABLET, FILM COATED ORAL at 01:29

## 2018-06-26 RX ADMIN — GABAPENTIN 600 MG: 600 TABLET, FILM COATED ORAL at 09:13

## 2018-06-26 NOTE — OP NOTE
Procedure Date: 06/26/2018      DATE OF SERVICE: 06/26/2018.      PREOPERATIVE DIAGNOSES:     1.  Left S1 radiculopathy.   2.  L5-S1 spondylolisthesis.      POSTOPERATIVE DIAGNOSES:     1.  Left S1 radiculopathy.   2.  L5-S1 spondylolisthesis.      PROCEDURE PERFORMED:     1.  Left L5-S1 hemilaminectomy, microdiskectomy, partial medial facetectomy.   2.  Intraoperative use of microscope.      STAFF SURGEON:  Rosemarie Young MD      ASSISTANT:  Sam Vasquez MD      ESTIMATED BLOOD LOSS:  Minimal, less than 10ml.      INDICATION FOR PROCEDURE:  Mrs. Mccollum is an 78-year-old female with history of Parkinson's disease, who developed right leg symptoms in 2017, and underwent a right L4-L5 lateral recess decompression as well as a L5-S1 synovial cyst removal on the right at an outside hospital.  She did well after the surgery; however, approximately a month ago she started developing left S1 radiculopathy.  MRI was performed and there was some left facet hypertrophy as well as a small disk herniation at L5-S1.  Thus conservative measures had failed to improve her symptoms.  Therefore, we had recommended a hemilaminectomy at that level, see Dr. Young' extensive preop notes for more detail.  The risks and benefits of the procedure were explained to the patient and she wished to proceed.      DESCRIPTION OF OPERATION:  After consent was obtained, the patient was brought to the operating room.  General endotracheal anesthesia was induced.  A Soler was placed. The patient was placed prone on the Pablo table with a Arturo frame.  Pressure points were properly padded.  She was prepped and draped in normal sterile fashion.  We then obtained a preincision x-ray confirming L5-S1 to allow us to make the smallest possible skin incision.      After proper timeout, a 10 blade was used to make the skin incision in a portion of the patient's prior midline skin incision. We used about half of the incision length.  We used monopolar  cautery to dissect down on the left side at the L5 spinous process.   We placed Santoro retractors and performed an intraoperative x-ray  spinal timeout confirming the left L5-S1 disk space.  We brought the microscope into the field in sterile fashion. We then proceeded with drilling the left L5 lamina to perform a left hemilaminectomy and approaching the ligamentum flavum.  We used upgoing curets and Kerrisons to remove the ligament exposing the thecal sac. We gently retracted the thecal sac medially using a D'Errico retractor.  At that point, we visualized the disk bulge for which we used a #15 blade to make an incision within the disk.  We used micro pituitaries and Harriet curettes to remove the disk fragment.  We used nerve hooks to establish that the thecal sac and the left traversing S1 nerve root were decompressed well.   We did perform another intraoperative x-ray to ensure the diskectomy corresponded to the preoperative MRI.  We were confirmed to be in the exact location.  At this point, we irrigated copiously, placed 0.5 grams of vancomycin powder and proceeded with closure using 0 Vicryl interrupteds to close the fascia along with the UR 6-0 Vicryl.  Then closed the dermal layer with 2-0 Vicryl and skin with 4-0 Monocryl.  The incision was cleaned with wet dry ChloraPrep and Dermabond was placed. The patient woke up from general anesthesia without any complications.  She was transported to PACU.  Dr. Young was present from the start of the case to skin closure, for which she was immediately available.  Sponge and needle counts were correct x2 at the end the procedure.         MANJEET YOUNG MD       As dictated by MARY ANN LUDWIG MD       Attestation statement:   I was scrubbed for the entire procedure from start of procedure to closure of the skin layer, for which I was immediately available. I performed all critical portions of the case.  Manjeet Young MD    [place]   [place] [facility]         D: 2018   T: 2018   MT: MARLENA      Name:     GÉNESIS CA   MRN:      [id number]        Account:        [id number]   :      1940           Procedure Date: 2018      Document: [id number]

## 2018-06-26 NOTE — DISCHARGE SUMMARY
[facility] Discharge Summary and Instructions    Catherine Mccollum MRN# [id number]   Age: 78 year old YOB: 1940     Date of Admission:  6/25/2018  Date of Discharge::  6/26/2018  Admitting Physician:  Rosemarie Young MD  Discharge Physician:  Rosemarie Young MD          Admission Diagnoses:   Left Lumbar Radiculopathy  Lumbosacral radiculopathy at S1          Discharge Diagnosis:   Left Lumbar Radiculopathy  Lumbosacral radiculopathy at S1          Procedures:   Open Left Lumbar 5-Sacral 1 Hemilaminectomy, Medial Facetectomy, And Microdiscectomy Attention Transitional Segment Anatomy (6/25/18)           Brief History of Illness:   Ms. Mccollum is an 78 year old female with hx Parkinson's disease who presented to clinic with a long history of well-controlled low back pain. In September 2017, she developed right leg pain and ultimately underwent right L4-5 lateral recess decompression and right L5-S1 synovial cyst removal. She did well after this surgery. Recently, she began developing new left leg pain which radiated along the lateral and posterior aspect of her thigh, calf and bottom of her foot with associated numbness and tingling. Imaging did demonstrated significant facet hypertrophy and disk herniation at L5-S1 on the left with left lateral recess stenosis affected left S1 traversing nerve root. After risks, benefits, and alternatives were discussed, patient elected to proceed with above named surgery.            Hospital Course:   Patient underwent above-mentioned procedure on 6/26/18. The operation was uncomplicated and she was sent to the floor for routine post operative cares. On post operative day 1 she was doing well and transferred to the floor. Patient stated that pre operative leg pain had improved, noted mild incisional pain.  On post operative day 1, she was ambulating, voiding without a sue, eating a regular diet, pain was well controlled and therefore she was  discharged home in a stable condition.   Patient will follow up in 2 weeks in Neurosurgery clinic.  Discharge instructions discussed with patient who stated understanding.           Discharge Medications:     Current Discharge Medication List      START taking these medications    Details   diazepam (VALIUM) 2 MG tablet Take 1.5 tablets (3 mg) by mouth every 6 hours as needed for muscle spasms (spasm)  Qty: 100 tablet, Refills: 0    Associated Diagnoses: Lumbosacral radiculopathy at S1      oxyCODONE IR (ROXICODONE) 5 MG tablet Take 1 tablet (5 mg) by mouth every 4 hours as needed for moderate to severe pain  Qty: 100 tablet, Refills: 0    Associated Diagnoses: Lumbosacral radiculopathy at S1         CONTINUE these medications which have NOT CHANGED    Details   Acetaminophen (TYLENOL EXTRA STRENGTH PO) Take 1,000 mg by mouth 3 times daily Muscle and joint pain      CALCIUM PO Take 1 tablet by mouth every morning      chlorthalidone (HYGROTON) 25 MG tablet Take 1/2 tab daily  Qty: 45 tablet, Refills: 3    Associated Diagnoses: Essential hypertension      Cholecalciferol (VITAMIN D) 1000 UNITS capsule Take 2,000 Units by mouth every morning       GABAPENTIN PO Take 600 mg by mouth 2 times daily       irbesartan (AVAPRO) 150 MG tablet Take 1 tablet (150 mg) by mouth daily  Qty: 90 tablet, Refills: 3    Associated Diagnoses: Essential hypertension      pravastatin (PRAVACHOL) 20 MG tablet Take 1 tablet (20 mg) by mouth daily  Qty: 90 tablet, Refills: 3    Associated Diagnoses: Hyperlipidemia, unspecified hyperlipidemia      Probiotic Product (PROBIOTIC PO) Take by mouth twice a week      RaNITidine HCl (ZANTAC PO) Take 150 mg by mouth daily as needed for heartburn      order for DME Equipment being ordered: rolling walker  Qty: 1 Device, Refills: 0    Associated Diagnoses: Osteoarthritis of spine with radiculopathy, lumbosacral region         STOP taking these medications       ciprofloxacin (CIPRO) 250 MG tablet  "Comments:   Reason for Stopping:         LORazepam (ATIVAN) 0.5 MG tablet Comments:   Reason for Stopping:              Exam:   Physical Exam  /70 (BP Location: Right arm)  Pulse 69  Temp 97.8  F (36.6  C) (Oral)  Resp 16  Ht 1.549 m (5' 1\")  Wt 64.5 kg (142 lb 3.2 oz)  SpO2 92%  BMI 26.87 kg/m2  General: Appears comfortable, NAD  Wound: Incision, clean, dry, intact without strikethrough  Neurologic Exam:  - AOx3.  - Follows commands.  - Speech fluent, spontaneous. No aphasia or dysarthria.  - No gaze preference. No apparent hemineglect.  - PERRL, EOMI.  - Face symmetric with sensation intact to light touch.  - Palate elevates symmetrically, uvula midline, tongue protrudes midline.  - Trapezii and sternocleidomastoid muscles 5/5 bilaterally.  - No pronator drift.  Motor: Normal bulk/tone; no tremor, rigidity, or bradykinesia.   Right:  Deltoid 5/5, tricep 5/5, bicep 5/5, wrist flexor 5/5, wrist extensor 5/5, finger intrinsic 5/5  Left:  Deltoid 5/5, tricep 5/5, bicep 5/5, wrist flexor 5/5, wrist extensor 5/5, finger intrinsic 5/5  Right: Iliopsoas 5/5, quadricep 5/5, hamstring 5/5, tibialis anterior 5/5, gastrocnemius 5/5, EHL 5/5  Left:  Iliopsoas 5/5, quadricep 5/5, hamstring 5/5, tibialis anterior 5/5, gastrocnemius 5/5, EHL 5/5    Sensation intact in bilateral L4-S1 dermatones                  Discharge Instructions and Follow-Up:   Discharge diet: Regular   Discharge activity: You may advance activity as tolerated. No strenuous exercise or heay lifting greater than 10 lbs for 4 weeks or until seen and cleared in clinic.   Discharge follow-up: Follow-up with Rosalind Lewis PA-C in 2 weeks   Wound care: Ok to shower,however no scrubbing of the wound and no soaking of the wound, meaning no bathtubs or swimming pools. Pat dry only. Leave wound open to air.  Sutures are not absorbable and need to be removed in 2 weeks. If patient still at rehab by this time, the sutures may be removed by the rehab " physician if he or she considers that the wound has healed completely.     Please call if you have:  1. increased pain, redness, drainage, swelling at your incision  2. fevers > 101.5 F degrees  3. with any questions or concerns.  You may reach the Neurosurgery clinic at [phone] during regular work hours. ER at [phone].    and ask for the Neurosurgery Resident on call at [phone], during off hours or weekends.         Discharge Disposition:   Discharged to home

## 2018-06-26 NOTE — PROGRESS NOTES
"Discharge instructions reviewed.  Patient verbalized understanding. PIV removed, patient ambulated to the main lobby accompanied by son. Patient's son picked up medications from Pharmacy. Patient given PRN oxycodone for pain per her request right before discharge. Patient discharged in a stable condition with all her belongings. Blood pressure 134/70, pulse 69, temperature 97.8  F (36.6  C), temperature source Oral, resp. rate 16, height 1.549 m (5' 1\"), weight 64.5 kg (142 lb 3.2 oz), SpO2 92 %, not currently breastfeeding.      "

## 2018-06-26 NOTE — PLAN OF CARE
Problem: Patient Care Overview  Goal: Plan of Care/Patient Progress Review  - Pain controlled: Yes   - Eating food: Yes   - Ambulating: Yes   - Voiding: Yes

## 2018-06-26 NOTE — PLAN OF CARE
Problem: Patient Care Overview  Goal: Plan of Care/Patient Progress Review  - Pain controlled: No   - Eating food: Yes   - Ambulating: Yes   - Voiding: Yes

## 2018-06-26 NOTE — PROGRESS NOTES
S: mild incisional pain and generalized aches and pains. Slept poorly overnight.  No paresthesias or pain in left lower extremity.    O:  Exam:  General: Awake;  Alert, In No Acute Distress  Pulm: Breathing Comfortably on room air   Mental status: Oriented x 4  Cranial Nerves: Cranial Nerves II-XII Intact Bilaterally  Strength:      Del Tr Bi WE WF Gr  R 5 5 5 5 5 5  L 5 5 5 5 5 5     HF KE KF DF PF EHL  R 5 5 5 5 5 5  L 5 5 5 5 5 5    Pronator Drift: Absent  Sensory: Intact to Light Touch, paresthesias in left lower extremity resolved.  INCISION: clean, dry, intact     A: Doing well post-operatively.     P:  Operation: Status post open L5-S1 laminectomy, medial facetectomy and microdiscectomy; Sutures out: absorbable   Pain: pain controlled on oral medication   Blood pressure goals: SBP < 160   Diet: advance diet as tolerated   Hematological goals: Platelets > 100k, INR < 1.5, Hemoglobin > 7   PT/OT: not indicated due to outpatient procedure   DVT prophylaxis: Sequential compression devices  Ulcer prophylaxis: none indicated  DISPO:  Anticipate D/C Home 6/26  Barriers: none      Contact the neurosurgery resident on call with questions.    Dial * * * [phone]: Enter job code [id number] when prompted.    Maurice Sharma MD  Neurosurgery PGY2

## 2018-06-26 NOTE — ANESTHESIA POSTPROCEDURE EVALUATION
Patient: Catherine Mccollum    Procedure(s):  Open Left Lumbar 5-Sacral 1 Hemilaminectomy, Medial Facetectomy, And Microdiscectomy Attention Transitional Segment Anatomy - Wound Class: I-Clean    Diagnosis:Left Lumbar Radiculopathy  Diagnosis Additional Information: No value filed.    Anesthesia Type:  No value filed.    Note:  Anesthesia Post Evaluation    Patient location during evaluation: PACU  Patient participation: Able to fully participate in evaluation  Level of consciousness: awake and alert  Pain management: satisfactory to patient  Airway patency: patent  Cardiovascular status: acceptable and stable  Respiratory status: acceptable and spontaneous ventilation  Hydration status: euvolemic  PONV: controlled     Anesthetic complications: None          Last vitals:  Vitals:    06/25/18 2058 06/25/18 2100 06/25/18 2130   BP:  134/66    Pulse:      Resp:  14    Temp:  37.6  C (99.7  F)    SpO2: 93% 96% 96%         Electronically Signed By: Celio Nelson MD  June 25, 2018  10:10 PM

## 2018-06-26 NOTE — OR NURSING
Report given but bed is not ready on 6D.  Will report off to charge RN and floor to call PACU when room ready.  Son at bedside.  Patient reports pain is tolerable.

## 2018-06-26 NOTE — OR NURSING
Patient comfortable sips of ginger ale.  Called for bed on 6D but none available at this time.  Will attempt to call report later.  Patient states comfortable.

## 2018-06-26 NOTE — OR NURSING
Dr Vasquez to bedside.  Updated him on low grade fever.  Patient feeling warm and noted flushed.  Recent UTI has cleared per .  Patient reports Tramadol doesn't work for her pain so Dr Vasquez to change order.  Up to bathroom with assist of one and walker.

## 2018-07-02 ENCOUNTER — TELEPHONE (OUTPATIENT)
Dept: NEUROSURGERY | Facility: CLINIC | Age: 78
End: 2018-07-02

## 2018-07-02 NOTE — TELEPHONE ENCOUNTER
Neurosurgery Discharge Follow-Up      Responsible Attending Physician:   Date of Discharge:    Discharge to:  Home    Current Status:  Pt is a 77 y/o female s/p Open Left Lumbar 5-Sacral 1 Hemilaminectomy, Medial Facetectomy, And Microdiscectomy Attention Transitional Segment Anatomy on 6/25.  Reports pre-op symptoms improved.   Operative  pain is decreasing.  Ambulating without assistance.  Pain well controlled with current meds, ample supply.  Reports incision CDI without signs of infection.  Denies redness, swelling, increased tenderness, or elevated temp.  Denies current bowel or bladder issues.  No visible sutures/staples in place.      Discharge instructions and medication use were reviewed.  RN triage/on call number given:  [phone] or after hours and w/e - [phone].  Patient verbalized understanding and agreement with current plan.    Follow up appointments/imaging/tests needed:

## 2018-07-09 ENCOUNTER — OFFICE VISIT (OUTPATIENT)
Dept: NEUROSURGERY | Facility: CLINIC | Age: 78
End: 2018-07-09
Payer: MEDICARE

## 2018-07-09 VITALS
TEMPERATURE: 96.4 F | OXYGEN SATURATION: 99 % | HEIGHT: 62 IN | RESPIRATION RATE: 17 BRPM | BODY MASS INDEX: 26.7 KG/M2 | DIASTOLIC BLOOD PRESSURE: 77 MMHG | HEART RATE: 65 BPM | SYSTOLIC BLOOD PRESSURE: 150 MMHG | WEIGHT: 145.1 LBS

## 2018-07-09 DIAGNOSIS — M54.16 LUMBAR RADICULOPATHY: Primary | ICD-10-CM

## 2018-07-09 DIAGNOSIS — Z98.890 POST-OPERATIVE STATE: ICD-10-CM

## 2018-07-09 PROBLEM — M48.061 SPINAL STENOSIS, LUMBAR: Status: ACTIVE | Noted: 2017-09-07

## 2018-07-09 PROBLEM — M77.40 METATARSALGIA: Status: ACTIVE | Noted: 2018-07-09

## 2018-07-09 PROBLEM — Z79.899 CONTROLLED SUBSTANCE AGREEMENT SIGNED: Status: ACTIVE | Noted: 2018-03-09

## 2018-07-09 PROBLEM — M48.00 SPINAL STENOSIS: Status: ACTIVE | Noted: 2017-03-29

## 2018-07-09 PROBLEM — M79.673 FOOT PAIN: Status: ACTIVE | Noted: 2018-07-09

## 2018-07-09 ASSESSMENT — PAIN SCALES - GENERAL: PAINLEVEL: MILD PAIN (2)

## 2018-07-09 NOTE — PROGRESS NOTES
"  NEUROSURGERY WOUND CHECK  July 9, 2018    PROCEDURE:  06/26/2018. Pedro Young   DIAGNOSES:     1.  Left S1 radiculopathy.   2.  L5-S1 spondylolisthesis.       PROCEDURE PERFORMED:     1.  Left L5-S1 hemilaminectomy, microdiskectomy, partial medial facetectomy.     INDICATION FOR PROCEDURE:  Mrs. Mccollum is an 78-year-old female with history of Parkinson's disease, who developed right leg symptoms in 2017, and underwent a right L4-L5 lateral recess decompression as well as a L5-S1 synovial cyst removal on the right at an outside hospital.  She did well after the surgery; however, approximately a month ago she started developing left S1 radiculopathy.  MRI was performed and there was some left facet hypertrophy as well as a small disk herniation at L5-S1.  Thus conservative measures had failed to improve her symptoms.  Therefore, we had recommended a hemilaminectomy at that level, see Dr. Young' extensive preop notes for more detail.  The risks and benefits of the procedure were explained to the patient and she wished to proceed.   HPI:  Catherine Mccollum is a pleasant 78 year old  handed female now 2 weeks status post the above procedure.  The procedure itself was without incident.  She recovered well and was discharged home in good condition.  Since then she is doing very well, her leg pain is completely resolved.  She still has chronic low back pain which she has had for years.  She takes Tylenol, gabapentin, but would like to come off of the gabapentin.  She presents for routine post op visit    EXAM:  /77  Pulse 65  Temp 96.4  F (35.8  C) (Oral)  Resp 17  Ht 1.572 m (5' 1.9\")  Wt 65.8 kg (145 lb 1.6 oz)  SpO2 99%  Breastfeeding? No  BMI 26.62 kg/m2  Well developed well nourished female found seated comfortably in exam chair.  No apparent distress. She is unaccompanied.  A&O X3.  Mood and affect WNL. Language and fund of knowledge intact.  Is able to sit and rise independently.   She has a nicely healing " incision.    Right: Iliopsoas 5/5, quadricep 5/5, hamstring 5/5, tibialis anterior 5/5, gastrocnemius 5/5, EHL 5/5  Left:  Iliopsoas 5/5, quadricep 5/5, hamstring 5/5, tibialis anterior 5/5, gastrocnemius 5/5, EHL 5/5    ASSESSMENT/PLAN  1. Lumbar radiculopathy    2. Post-operative state      Doing well now 2 weeks sp lumbar decompression.    Wound looks great.  May swim or immerse wound when all scabbing has disappeared.  Continue activity restrictions until 6 weeks post op.  Follow up NS IN 4 weeks with me no imaging needed.  OK to wean the gabapentin.   Discussed and instructed how to do that.  Offered written instructions, pt declines written instructions.  It has been a pleasure looking after this very nice patient. We appreciate your confidence in the [facility], Department of Neurosurgery.    Rosalind Lewis PA-C  [facility]  Department of Neurosurgery  Phone: [phone]  Fax: [phone]      This note was generated using voice recognition software. While edited for content some inaccurate phrasing may be found.

## 2018-07-09 NOTE — NURSING NOTE
Chief Complaint   Patient presents with     RECHECK     [facility] RETURN 2 WEEK POST OP     Dusty Bliss, EMT

## 2018-07-09 NOTE — LETTER
"7/9/2018       RE: Catherine Mccollum  [address]  [place] [address]     Dear Colleague,    Thank you for referring your patient, Catherine Mccollum, to the [facility] NEUROSURGERY at [facility]. Please see a copy of my visit note below.      NEUROSURGERY WOUND CHECK  July 9, 2018    PROCEDURE:  06/26/2018. Pedro Young   DIAGNOSES:     1.  Left S1 radiculopathy.   2.  L5-S1 spondylolisthesis.       PROCEDURE PERFORMED:     1.  Left L5-S1 hemilaminectomy, microdiskectomy, partial medial facetectomy.     INDICATION FOR PROCEDURE:  Mrs. Mccollum is an 78-year-old female with history of Parkinson's disease, who developed right leg symptoms in 2017, and underwent a right L4-L5 lateral recess decompression as well as a L5-S1 synovial cyst removal on the right at an outside hospital.  She did well after the surgery; however, approximately a month ago she started developing left S1 radiculopathy.  MRI was performed and there was some left facet hypertrophy as well as a small disk herniation at L5-S1.  Thus conservative measures had failed to improve her symptoms.  Therefore, we had recommended a hemilaminectomy at that level, see Dr. Young' extensive preop notes for more detail.  The risks and benefits of the procedure were explained to the patient and she wished to proceed.   HPI:  Catherine Mccollum is a pleasant 78 year old  handed female now 2 weeks status post the above procedure.  The procedure itself was without incident.  She recovered well and was discharged home in good condition.  Since then she is doing very well, her leg pain is completely resolved.  She still has chronic low back pain which she has had for years.  She takes Tylenol, gabapentin, but would like to come off of the gabapentin.  She presents for routine post op visit    EXAM:  /77  Pulse 65  Temp 96.4  F (35.8  C) (Oral)  Resp 17  Ht 1.572 m (5' 1.9\")  Wt 65.8 kg (145 lb 1.6 oz)  SpO2 99%  " Breastfeeding? No  BMI 26.62 kg/m2  Well developed well nourished female found seated comfortably in exam chair.  No apparent distress. She is unaccompanied.  A&O X3.  Mood and affect WNL. Language and fund of knowledge intact.  Is able to sit and rise independently.   She has a nicely healing incision.    Right: Iliopsoas 5/5, quadricep 5/5, hamstring 5/5, tibialis anterior 5/5, gastrocnemius 5/5, EHL 5/5  Left:  Iliopsoas 5/5, quadricep 5/5, hamstring 5/5, tibialis anterior 5/5, gastrocnemius 5/5, EHL 5/5    ASSESSMENT/PLAN  1. Lumbar radiculopathy    2. Post-operative state      Doing well now 2 weeks sp lumbar decompression.    Wound looks great.  May swim or immerse wound when all scabbing has disappeared.  Continue activity restrictions until 6 weeks post op.  Follow up NS IN 4 weeks with me no imaging needed.  OK to wean the gabapentin.   Discussed and instructed how to do that.  Offered written instructions, pt declines written instructions.  It has been a pleasure looking after this very nice patient. We appreciate your confidence in the [facility], Department of Neurosurgery.    Rosalind Lewis PA-C  [facility]  Department of Neurosurgery  Phone: [phone]  Fax: [phone]      This note was generated using voice recognition software. While edited for content some inaccurate phrasing may be found.

## 2018-07-09 NOTE — MR AVS SNAPSHOT
After Visit Summary   7/9/2018    Catherine Mccollum    MRN: [id number]           Patient Information     Date Of Birth          1940        Visit Information        Provider Department      7/9/2018 10:00 AM Rosalind Lewis PA-C [facility]        Today's Diagnoses     Lumbar radiculopathy    -  1    Post-operative state           Follow-ups after your visit        Additional Services     PT Evaluation and Treatment (External Referral) [5502]       Your provider has referred you to: PHYSICAL THERAPY [place]    Please be aware that coverage of these services is subject to the terms and limitations of your health insurance plan.  Call member services at your health plan with any benefit or coverage questions.      Treatment: Evaluation & Treatment    Lumbar radiculopathy, now post op.  Special Instructions: Special Instructions: No lifting over 15 pounds  Special Programs: None  Modalities: As indicated  Equipment: as needed  Duration and Frequency: Per Therapist Evaluation    Please bring the following to your appointment:  >>   Any x-rays, CTs or MRIs which have been performed.  Contact the facility where they were done to arrange for  prior to your scheduled appointment.  Any new CT, MRI or other procedures ordered by your specialist must be performed at a [facility] facility or coordinated by your clinic's referral office.    >>   List of current medications   >>   This referral request   >>   Any documents/labs given to you for this referral      **Note to Provider** To refer patients to therapy outside of the Location list, change the order class to External Referral in the General section.                  Your next 10 appointments already scheduled     Jul 23, 2018  8:05 AM CDT   (Arrive by 7:50 AM)   Return Visit with Kar Alvarez MD   [facility] ([facility])    [address] [place] [address] [phone]  "           Aug 06, 2018  1:30 PM CDT   (Arrive by 1:15 PM)   Return Visit with Rosalind Lewis PA-C   [facility] ([facility])    [address]  3rd Floor  [facility] [address] [phone]            Aug 15, 2018 10:00 AM CDT   (Arrive by 9:45 AM)   Return Visit with Mary Calabrese MD   [facility] ([facility])    [address]  [address]th [facility] [id number] [phone]              Who to contact     Please call your clinic at [phone] to:    Ask questions about your health    Make or cancel appointments    Discuss your medicines    Learn about your test results    Speak to your doctor            Additional Information About Your Visit        [email or web address] Information     [facility] gives you secure access to your electronic health record. If you see a primary care provider, you can also send messages to your care team and make appointments. If you have questions, please call your primary care clinic.  If you do not have a primary care provider, please call [phone] and they will assist you.      [facility] is an electronic gateway that provides easy, online access to your medical records. With [facility], you can request a clinic appointment, read your test results, renew a prescription or communicate with your care team.     To access your existing account, please contact your [facility] or call [phone] for assistance.        Care EveryWhere ID     This is your Care EveryWhere ID. This could be used by other organizations to access your [place] medical records  [id number]        Your Vitals Were     Pulse Temperature Respirations Height Pulse Oximetry Breastfeeding?    65 96.4  F (35.8  C) (Oral) 17 1.572 m (5' 1.9\") 99% No    BMI (Body Mass Index)                   26.62 kg/m2            Blood Pressure from Last 3 Encounters:   07/09/18 150/77 "   06/26/18 134/70   06/20/18 146/73    Weight from Last 3 Encounters:   07/09/18 65.8 kg (145 lb 1.6 oz)   06/25/18 64.5 kg (142 lb 3.2 oz)   06/20/18 65.5 kg (144 lb 8 oz)              We Performed the Following     PT Evaluation and Treatment (External Referral) [9032]          Today's Medication Changes          These changes are accurate as of 7/9/18 12:42 PM.  If you have any questions, ask your nurse or doctor.               These medicines have changed or have updated prescriptions.        Dose/Directions    chlorthalidone 25 MG tablet   Commonly known as:  HYGROTON   This may have changed:    - how much to take  - how to take this  - when to take this  - additional instructions   Used for:  Essential hypertension        Take 1/2 tab daily   Quantity:  45 tablet   Refills:  3       irbesartan 150 MG tablet   Commonly known as:  AVAPRO   This may have changed:  when to take this   Used for:  Essential hypertension        Dose:  150 mg   Take 1 tablet (150 mg) by mouth daily   Quantity:  90 tablet   Refills:  3       pravastatin 20 MG tablet   Commonly known as:  PRAVACHOL   This may have changed:  when to take this   Used for:  Hyperlipidemia, unspecified hyperlipidemia        Dose:  20 mg   Take 1 tablet (20 mg) by mouth daily   Quantity:  90 tablet   Refills:  3                Primary Care Provider Office Phone # Fax #    Kar Alvarez [phone] [address] [address]        Equal Access to Services     WALLY ANDERSON AH: Hadii charley nietoo Sokervin, waaxda luqadaha, qaybta kaalmada adeegyada, waxay yann boyle. So [facility] [phone].    ATENCIÓN: Si habla español, tiene a franco disposición servicios gratuitos de asistencia lingüística. Llame al [phone].    We comply with applicable federal civil rights laws and [place] laws. We do not discriminate on the basis of race, color, national origin, age, disability, sex, sexual  orientation, or gender identity.            Thank you!     Thank you for choosing [facility]UROS[facility]  for your care. Our goal is always to provide you with excellent care. Hearing back from our patients is one way we can continue to improve our services. Please take a few minutes to complete the written survey that you may receive in the mail after your visit with us. Thank you!             Your Updated Medication List - Protect others around you: Learn how to safely use, store and throw away your medicines at www.disposemymeds.org.          This list is accurate as of 7/9/18 12:42 PM.  Always use your most recent med list.                   Brand Name Dispense Instructions for use Diagnosis    CALCIUM PO      Take 1 tablet by mouth every morning        chlorthalidone 25 MG tablet    HYGROTON    45 tablet    Take 1/2 tab daily    Essential hypertension       diazepam 2 MG tablet    VALIUM    100 tablet    Take 1.5 tablets (3 mg) by mouth every 6 hours as needed for muscle spasms (spasm)    Lumbosacral radiculopathy at S1       GABAPENTIN PO      Take 600 mg by mouth 2 times daily        irbesartan 150 MG tablet    AVAPRO    90 tablet    Take 1 tablet (150 mg) by mouth daily    Essential hypertension       order for DME     1 Device    Equipment being ordered: rolling walker    Osteoarthritis of spine with radiculopathy, lumbosacral region       oxyCODONE IR 5 MG tablet    ROXICODONE    100 tablet    Take 1 tablet (5 mg) by mouth every 4 hours as needed for moderate to severe pain    Lumbosacral radiculopathy at S1       pravastatin 20 MG tablet    PRAVACHOL    90 tablet    Take 1 tablet (20 mg) by mouth daily    Hyperlipidemia, unspecified hyperlipidemia       PROBIOTIC PO      Take by mouth twice a week        TYLENOL EXTRA STRENGTH PO      Take 1,000 mg by mouth 3 times daily Muscle and joint pain        vitamin D 1000 units capsule      Take 2,000 Units by mouth every morning        ZANTAC PO      Take 150 mg  by mouth daily as needed for heartburn

## 2018-07-20 ENCOUNTER — TRANSFERRED RECORDS (OUTPATIENT)
Dept: HEALTH INFORMATION MANAGEMENT | Facility: CLINIC | Age: 78
End: 2018-07-20

## 2018-07-23 ENCOUNTER — RADIANT APPOINTMENT (OUTPATIENT)
Dept: GENERAL RADIOLOGY | Facility: CLINIC | Age: 78
End: 2018-07-23
Payer: MEDICARE

## 2018-07-23 ENCOUNTER — OFFICE VISIT (OUTPATIENT)
Dept: INTERNAL MEDICINE | Facility: CLINIC | Age: 78
End: 2018-07-23
Payer: MEDICARE

## 2018-07-23 VITALS
WEIGHT: 142 LBS | HEART RATE: 72 BPM | DIASTOLIC BLOOD PRESSURE: 84 MMHG | RESPIRATION RATE: 16 BRPM | BODY MASS INDEX: 26.06 KG/M2 | SYSTOLIC BLOOD PRESSURE: 138 MMHG

## 2018-07-23 DIAGNOSIS — I10 BENIGN ESSENTIAL HYPERTENSION: ICD-10-CM

## 2018-07-23 DIAGNOSIS — R93.89 CHEST X-RAY ABNORMALITY: ICD-10-CM

## 2018-07-23 DIAGNOSIS — E78.00 HIGH CHOLESTEROL: Primary | ICD-10-CM

## 2018-07-23 DIAGNOSIS — E78.00 HIGH CHOLESTEROL: ICD-10-CM

## 2018-07-23 DIAGNOSIS — R30.0 DYSURIA: ICD-10-CM

## 2018-07-23 DIAGNOSIS — Z12.31 ENCOUNTER FOR SCREENING MAMMOGRAM FOR BREAST CANCER: ICD-10-CM

## 2018-07-23 LAB
ALBUMIN SERPL-MCNC: 4 G/DL (ref 3.4–5)
ALBUMIN UR-MCNC: NEGATIVE MG/DL
ALP SERPL-CCNC: 61 U/L (ref 40–150)
ALT SERPL W P-5'-P-CCNC: 30 U/L (ref 0–50)
ANION GAP SERPL CALCULATED.3IONS-SCNC: 6 MMOL/L (ref 3–14)
APPEARANCE UR: CLEAR
AST SERPL W P-5'-P-CCNC: 24 U/L (ref 0–45)
BASOPHILS # BLD AUTO: 0 10E9/L (ref 0–0.2)
BASOPHILS NFR BLD AUTO: 0.4 %
BILIRUB SERPL-MCNC: 0.8 MG/DL (ref 0.2–1.3)
BILIRUB UR QL STRIP: NEGATIVE
BUN SERPL-MCNC: 13 MG/DL (ref 7–30)
CALCIUM SERPL-MCNC: 9.5 MG/DL (ref 8.5–10.1)
CHLORIDE SERPL-SCNC: 101 MMOL/L (ref 94–109)
CHOLEST SERPL-MCNC: 177 MG/DL
CO2 SERPL-SCNC: 30 MMOL/L (ref 20–32)
COLOR UR AUTO: YELLOW
CREAT SERPL-MCNC: 0.67 MG/DL (ref 0.52–1.04)
DIFFERENTIAL METHOD BLD: ABNORMAL
EOSINOPHIL # BLD AUTO: 0.2 10E9/L (ref 0–0.7)
EOSINOPHIL NFR BLD AUTO: 2.4 %
ERYTHROCYTE [DISTWIDTH] IN BLOOD BY AUTOMATED COUNT: 12.8 % (ref 10–15)
GFR SERPL CREATININE-BSD FRML MDRD: 85 ML/MIN/1.7M2
GLUCOSE SERPL-MCNC: 119 MG/DL (ref 70–99)
GLUCOSE UR STRIP-MCNC: NEGATIVE MG/DL
HCT VFR BLD AUTO: 41.5 % (ref 35–47)
HDLC SERPL-MCNC: 40 MG/DL
HGB BLD-MCNC: 14.6 G/DL (ref 11.7–15.7)
HGB UR QL STRIP: NEGATIVE
IMM GRANULOCYTES # BLD: 0 10E9/L (ref 0–0.4)
IMM GRANULOCYTES NFR BLD: 0.3 %
KETONES UR STRIP-MCNC: NEGATIVE MG/DL
LDLC SERPL CALC-MCNC: 97 MG/DL
LEUKOCYTE ESTERASE UR QL STRIP: NEGATIVE
LYMPHOCYTES # BLD AUTO: 2.2 10E9/L (ref 0.8–5.3)
LYMPHOCYTES NFR BLD AUTO: 24.4 %
MCH RBC QN AUTO: 30.6 PG (ref 26.5–33)
MCHC RBC AUTO-ENTMCNC: 35.2 G/DL (ref 31.5–36.5)
MCV RBC AUTO: 87 FL (ref 78–100)
MONOCYTES # BLD AUTO: 0.6 10E9/L (ref 0–1.3)
MONOCYTES NFR BLD AUTO: 6.7 %
MUCOUS THREADS #/AREA URNS LPF: PRESENT /LPF
NEUTROPHILS # BLD AUTO: 5.9 10E9/L (ref 1.6–8.3)
NEUTROPHILS NFR BLD AUTO: 65.8 %
NITRATE UR QL: NEGATIVE
NONHDLC SERPL-MCNC: 137 MG/DL
NRBC # BLD AUTO: 0 10*3/UL
NRBC BLD AUTO-RTO: 0 /100
PH UR STRIP: 6 PH (ref 5–7)
PLATELET # BLD AUTO: 451 10E9/L (ref 150–450)
POTASSIUM SERPL-SCNC: 3.4 MMOL/L (ref 3.4–5.3)
PROT SERPL-MCNC: 7.2 G/DL (ref 6.8–8.8)
RBC # BLD AUTO: 4.77 10E12/L (ref 3.8–5.2)
RBC #/AREA URNS AUTO: 1 /HPF (ref 0–2)
SODIUM SERPL-SCNC: 137 MMOL/L (ref 133–144)
SOURCE: ABNORMAL
SP GR UR STRIP: 1.01 (ref 1–1.03)
SQUAMOUS #/AREA URNS AUTO: <1 /HPF (ref 0–1)
TRIGL SERPL-MCNC: 200 MG/DL
UROBILINOGEN UR STRIP-MCNC: 0 MG/DL (ref 0–2)
WBC # BLD AUTO: 9.1 10E9/L (ref 4–11)
WBC #/AREA URNS AUTO: 1 /HPF (ref 0–5)

## 2018-07-23 ASSESSMENT — PAIN SCALES - GENERAL: PAINLEVEL: MILD PAIN (2)

## 2018-07-23 NOTE — NURSING NOTE
Chief Complaint   Patient presents with     Surgical Followup     Patient is here to follow up from surgery     Claire Doyle CMA 7:46 AM on 7/23/2018.

## 2018-07-23 NOTE — MR AVS SNAPSHOT
After Visit Summary   7/23/2018    Catherine Mccollum    MRN: [id number]           Patient Information     Date Of Birth          1940        Visit Information        Provider Department      7/23/2018 8:05 AM Kar Alvarez MD [facility]        Today's Diagnoses     High cholesterol    -  1    Benign essential hypertension        Encounter for screening mammogram for breast cancer        Dysuria           Follow-ups after your visit        Your next 10 appointments already scheduled     Aug 06, 2018  1:30 PM CDT   (Arrive by 1:15 PM)   Return Visit with Rosalind Lewis PA-C   [facility] ([facility])    [address]  3rd Floor  [facility] [id number] [phone]            Aug 15, 2018 10:00 AM CDT   (Arrive by 9:45 AM)   Return Visit with Mary Calabrese MD   [facility] ([facility])    [address] [place] [address]  4th Floor  [facility] [id number] [phone]              Future tests that were ordered for you today     Open Future Orders        Priority Expected Expires Ordered    UA with Micro reflex to Culture Routine 7/23/2018 7/23/2019 7/23/2018    Mammogram, routine screening Routine  7/23/2019 7/23/2018    CBC with platelets differential Routine 7/23/2018 8/6/2018 7/23/2018    Comprehensive metabolic panel Routine 7/23/2018 8/6/2018 7/23/2018    Lipid panel reflex to direct LDL Fasting Routine  7/23/2019 7/23/2018            Who to contact     Please call your clinic at [phone] to:    Ask questions about your health    Make or cancel appointments    Discuss your medicines    Learn about your test results    Speak to your doctor            Additional Information About Your Visit        MyChart Information     [employer] gives you secure access to your electronic health record. If you see a primary care provider, you can also send messages to your care  team and make appointments. If you have questions, please call your primary care clinic.  If you do not have a primary care provider, please call [phone] and they will assist you.      [employer] is an electronic gateway that provides easy, online access to your medical records. With [employer], you can request a clinic appointment, read your test results, renew a prescription or communicate with your care team.     To access your existing account, please contact your [facility] or call [phone] for assistance.        Care EveryWhere ID     This is your Care EveryWhere ID. This could be used by other organizations to access your [place] medical records  [id number]        Your Vitals Were     Pulse Respirations Breastfeeding? BMI (Body Mass Index)          72 16 No 26.06 kg/m2         Blood Pressure from Last 3 Encounters:   07/23/18 138/84   07/09/18 150/77   06/26/18 134/70    Weight from Last 3 Encounters:   07/23/18 64.4 kg (142 lb)   07/09/18 65.8 kg (145 lb 1.6 oz)   06/25/18 64.5 kg (142 lb 3.2 oz)                 Today's Medication Changes          These changes are accurate as of 7/23/18  8:05 AM.  If you have any questions, ask your nurse or doctor.               These medicines have changed or have updated prescriptions.        Dose/Directions    chlorthalidone 25 MG tablet   Commonly known as:  HYGROTON   This may have changed:    - how much to take  - how to take this  - when to take this  - additional instructions   Used for:  Essential hypertension        Take 1/2 tab daily   Quantity:  45 tablet   Refills:  3       irbesartan 150 MG tablet   Commonly known as:  AVAPRO   This may have changed:  when to take this   Used for:  Essential hypertension        Dose:  150 mg   Take 1 tablet (150 mg) by mouth daily   Quantity:  90 tablet   Refills:  3       pravastatin 20 MG tablet   Commonly known as:  PRAVACHOL   This may have changed:  when to take this   Used for:   Hyperlipidemia, unspecified hyperlipidemia        Dose:  20 mg   Take 1 tablet (20 mg) by mouth daily   Quantity:  90 tablet   Refills:  3         Stop taking these medicines if you haven't already. Please contact your care team if you have questions.     oxyCODONE IR 5 MG tablet   Commonly known as:  ROXICODONE   Stopped by:  Kar Alvarez MD                    Primary Care Provider Office Phone # Fax #    Kar Alvarez [phone] [address] [address]        Equal Access to Services     [facility]: Hadii aad ku hadasho Soomaali, waaxda luqadaha, qaybta kaalmada adeegyada, waxay mindiin hayildan susan martin . So [facility] [phone].    ATENCIÓN: Si habla español, tiene a franco disposición servicios gratuitos de asistencia lingüística. LlRegency [facility] [phone].    We comply with applicable federal civil rights laws and [place] laws. We do not discriminate on the basis of race, color, national origin, age, disability, sex, sexual orientation, or gender identity.            Thank you!     Thank you for choosing [facility]  for your care. Our goal is always to provide you with excellent care. Hearing back from our patients is one way we can continue to improve our services. Please take a few minutes to complete the written survey that you may receive in the mail after your visit with us. Thank you!             Your Updated Medication List - Protect others around you: Learn how to safely use, store and throw away your medicines at www.disposemymeds.org.          This list is accurate as of 7/23/18  8:05 AM.  Always use your most recent med list.                   Brand Name Dispense Instructions for use Diagnosis    CALCIUM PO      Take 1 tablet by mouth every morning        chlorthalidone 25 MG tablet    HYGROTON    45 tablet    Take 1/2 tab daily    Essential hypertension       diazepam 2 MG tablet    VALIUM    100 tablet    Take 1.5 tablets  (3 mg) by mouth every 6 hours as needed for muscle spasms (spasm)    Lumbosacral radiculopathy at S1       GABAPENTIN PO      Take 200 mg by mouth 2 times daily        irbesartan 150 MG tablet    AVAPRO    90 tablet    Take 1 tablet (150 mg) by mouth daily    Essential hypertension       order for DME     1 Device    Equipment being ordered: rolling walker    Osteoarthritis of spine with radiculopathy, lumbosacral region       pravastatin 20 MG tablet    PRAVACHOL    90 tablet    Take 1 tablet (20 mg) by mouth daily    Hyperlipidemia, unspecified hyperlipidemia       PROBIOTIC PO      Take by mouth twice a week        TYLENOL EXTRA STRENGTH PO      Take 1,000 mg by mouth 3 times daily Muscle and joint pain        vitamin D 1000 units capsule      Take 2,000 Units by mouth every morning        ZANTAC PO      Take 150 mg by mouth daily as needed for heartburn

## 2018-07-23 NOTE — PROGRESS NOTES
HPI:    Pt. Comes in to establish care today. She was seen by Dr. Ferguson, Neurology 3/31/2018 with h/o Parkinson's and  L sided radicular sxs. She was subsequently seen by Ms. Lewis, Neurosurgery 4/11/2018 for radicular sxs.  She was seen [facility] with MRI scan 3/22/2018 with some minor findings. She was given Steroid dose pack 3/20/2018 and had another one as well (from Dr. Ferguson). These did reduce her sxs. She was given Tramdol from Dr. Jackson as well and this does reduce her sxs. She had L-sided  L5/S1 surgery 6/25/2018 and she is doing better. She has h/o HTN on Avapro and chlorthalidone. She has h/o increased cholesterol on Pravastatin. No other HEENT, cardiopulmonary, abdominal, , GYN, neurological, systemic, psychiatric complaints. She has two sons (one in [place], and one in [place]). She does not smoke nor abuse EtOH.       Past Surgical History:   Procedure Laterality Date     BREAST SURGERY  Jan 1987    biopsy     CATARACT IOL, RT/LT       GYN SURGERY  1965;1967    2 ceasarean sections     HEMILAMINECTOMY, DISCECTOMY LUMBAR ONE LEVEL, COMBINED Left 6/25/2018    Procedure: COMBINED HEMILAMINECTOMY, DISCECTOMY LUMBAR ONE LEVEL;  Open Left Lumbar 5-Sacral 1 Hemilaminectomy, Medial Facetectomy, And Microdiscectomy Attention Transitional Segment Anatomy;  Surgeon: Rosemarie Young MD;  Location: [facility] OR     ORTHOPEDIC SURGERY      ankle surgery for trimalleolar frx     PE:    Vitals noted gen nad cooperative alert, HEENT, oropharynx clear no exudate, neck supple nl rom no, LCTA, B good air movement, RRR, S1, S2, no MRG, abdomen, nd, nt, resting arm and leg tremors.     A/P:    1. HTN;  she remains on same medication; ordered future labs  2. Parkinson's; she follows with Dr. Ferguson, Neurology  3. Back pain and L sided sxs. She has injection scheduled with Dr. Calabrese  5/4/2018. She has follow up 5/23/2018 with Ms. Lewis, Neurosurgery. She had surgery 6/25/2018 L5/S1 and saw Ms.  Debbie Neurosurgery 7/9/2018. She has Pain Clinic follow up with Dr. Calabrese 8/15/2018.  4. She follows with outside Dermatology  5. She will get future mammograms. Placed order today  6. Immunizations; future new Zoster Vaccination and check with insurance   7. Increased cholesterol on Pravastatin; will check future labs.   8. Check [facility] today  9. CXR today for subtle L sided finding on 6/20/2018    I will see her back in about 3 months    Total time spent 25  minutes.  More than 50% of the time spent with Ms. Mccollum on counseling / coordinating her care

## 2018-07-26 DIAGNOSIS — I10 ESSENTIAL HYPERTENSION: ICD-10-CM

## 2018-07-26 DIAGNOSIS — E78.00 HIGH CHOLESTEROL: Primary | ICD-10-CM

## 2018-07-26 RX ORDER — IRBESARTAN 150 MG/1
150 TABLET ORAL EVERY MORNING
Qty: 90 TABLET | Refills: 3 | Status: SHIPPED | OUTPATIENT
Start: 2018-07-26 | End: 2019-07-23

## 2018-07-26 RX ORDER — PRAVASTATIN SODIUM 20 MG
20 TABLET ORAL AT BEDTIME
Qty: 90 TABLET | Refills: 3 | Status: SHIPPED | OUTPATIENT
Start: 2018-07-26 | End: 2019-07-23

## 2018-08-06 ENCOUNTER — OFFICE VISIT (OUTPATIENT)
Dept: NEUROSURGERY | Facility: CLINIC | Age: 78
End: 2018-08-06
Payer: MEDICARE

## 2018-08-06 VITALS
DIASTOLIC BLOOD PRESSURE: 60 MMHG | HEART RATE: 73 BPM | SYSTOLIC BLOOD PRESSURE: 166 MMHG | WEIGHT: 142 LBS | BODY MASS INDEX: 26.13 KG/M2 | HEIGHT: 62 IN

## 2018-08-06 DIAGNOSIS — M54.16 LUMBAR RADICULOPATHY: Primary | ICD-10-CM

## 2018-08-06 DIAGNOSIS — Z98.890 POST-OPERATIVE STATE: ICD-10-CM

## 2018-08-06 ASSESSMENT — PAIN SCALES - GENERAL: PAINLEVEL: MILD PAIN (2)

## 2018-08-06 NOTE — PROGRESS NOTES
"  NEUROSURGERY   AUG 6, 2018    PROCEDURE:  06/26/2018. Pedro Young   DIAGNOSES:     1.  Left S1 radiculopathy.   2.  L5-S1 spondylolisthesis.       PROCEDURE PERFORMED:     1.  Left L5-S1 hemilaminectomy, microdiskectomy, partial medial facetectomy.     INDICATION FOR PROCEDURE:  Mrs. Mccollum is an 78-year-old female with history of Parkinson's disease, who developed right leg symptoms in 2017, and underwent a right L4-L5 lateral recess decompression as well as a L5-S1 synovial cyst removal on the right at an outside hospital.  She did well after the surgery; however, approximately a month ago she started developing left S1 radiculopathy.  MRI was performed and there was some left facet hypertrophy as well as a small disk herniation at L5-S1.  Thus conservative measures had failed to improve her symptoms.  Therefore, we had recommended a hemilaminectomy at that level, see Dr. Young' extensive preop notes for more detail.  The risks and benefits of the procedure were explained to the patient and she wished to proceed.   HPI:  Catherine Mccollum is a pleasant 78 year old  handed female now 5 weeks status post the above procedure.  The procedure itself was without incident.  She recovered well and was discharged home in good condition.  Since then she is doing very well, her leg pain is completely resolved unless she walks more than 1 mile.  She still has chronic low back pain which she has had for years.  She takes Tylenol, is thinking of weaning herself off the gabapentin, we discussed this at last visit.  She reports she is still taking it.  She is in physical therapy but feels she has plateaued and would like to consider stopping and just doing her home exercise program.  She presents for routine post op visit    EXAM:  /60 (BP Location: Right arm, Patient Position: Sitting)  Pulse 73  Ht 1.572 m (5' 1.9\")  Wt 64.4 kg (142 lb)  BMI 26.06 kg/m2  Well developed well nourished female found seated comfortably in " exam chair.  No apparent distress. She is unaccompanied.  A&O X3.  Mood and affect WNL. Language and fund of knowledge intact.  Is able to sit and rise independently.   She has a nicely healed incision.    Right: Iliopsoas 5/5, quadricep 5/5, hamstring 5/5, tibialis anterior 5/5, gastrocnemius 5/5, EHL 5/5  Left:  Iliopsoas 5/5, quadricep 5/5, hamstring 5/5, tibialis anterior 5/5, gastrocnemius 5/5, EHL 5/5    ASSESSMENT/PLAN  1. Lumbar radiculopathy    2. Post-operative state      Doing well now 6 weeks sp lumbar decompression.  Her leg pain is resolved, it only bothers her if she walks for more than 1 mile.  She has some ongoing back pain, this is chronic and she is aware she will probably never be rid of it.    Wound is well-healed, she may swim or immerse at any time.      She may loosen activity restrictions and resume normal activities.  She continues in physical therapy but finds that her progress has plateaued, she is thinking of basically just stopping now and continuing on with her home exercise program.  That is certainly a reasonable option.    She also is feeling well enough she would like to cancel her appointment with Dr. Calabrese next week, we can help her with that.      And finally she will need to see Dr. Young, her surgeon, in about 6 weeks for a final check and visit.  No imaging will be required for that visit.  It has been a pleasure looking after this very nice patient. We appreciate your confidence in the [facility], Department of Neurosurgery.    Rosalind Lewis PA-C  [facility]  Department of Neurosurgery  Phone: [phone]  Fax: [phone]      This note was generated using voice recognition software. While edited for content some inaccurate phrasing may be found.

## 2018-08-06 NOTE — LETTER
8/6/2018       RE: Catherine Mccollum  [address]  [place] [address]     Dear Colleague,    Thank you for referring your patient, Catherine Mccollum, to the [facility] NEUROSURGERY at [facility]. Please see a copy of my visit note below.      NEUROSURGERY   AUG 6, 2018    PROCEDURE:  06/26/2018. Pedro Young   DIAGNOSES:     1.  Left S1 radiculopathy.   2.  L5-S1 spondylolisthesis.       PROCEDURE PERFORMED:     1.  Left L5-S1 hemilaminectomy, microdiskectomy, partial medial facetectomy.     INDICATION FOR PROCEDURE:  Mrs. Mccollum is an 78-year-old female with history of Parkinson's disease, who developed right leg symptoms in 2017, and underwent a right L4-L5 lateral recess decompression as well as a L5-S1 synovial cyst removal on the right at an outside hospital.  She did well after the surgery; however, approximately a month ago she started developing left S1 radiculopathy.  MRI was performed and there was some left facet hypertrophy as well as a small disk herniation at L5-S1.  Thus conservative measures had failed to improve her symptoms.  Therefore, we had recommended a hemilaminectomy at that level, see Dr. Young' extensive preop notes for more detail.  The risks and benefits of the procedure were explained to the patient and she wished to proceed.   HPI:  Catherine Mccollum is a pleasant 78 year old  handed female now 5 weeks status post the above procedure.  The procedure itself was without incident.  She recovered well and was discharged home in good condition.  Since then she is doing very well, her leg pain is completely resolved unless she walks more than 1 mile.  She still has chronic low back pain which she has had for years.  She takes Tylenol, is thinking of weaning herself off the gabapentin, we discussed this at last visit.  She reports she is still taking it.  She is in physical therapy but feels she has plateaued and would like to consider stopping and just  "doing her home exercise program.  She presents for routine post op visit    EXAM:  /60 (BP Location: Right arm, Patient Position: Sitting)  Pulse 73  Ht 1.572 m (5' 1.9\")  Wt 64.4 kg (142 lb)  BMI 26.06 kg/m2  Well developed well nourished female found seated comfortably in exam chair.  No apparent distress. She is unaccompanied.  A&O X3.  Mood and affect WNL. Language and fund of knowledge intact.  Is able to sit and rise independently.   She has a nicely healed incision.    Right: Iliopsoas 5/5, quadricep 5/5, hamstring 5/5, tibialis anterior 5/5, gastrocnemius 5/5, EHL 5/5  Left:  Iliopsoas 5/5, quadricep 5/5, hamstring 5/5, tibialis anterior 5/5, gastrocnemius 5/5, EHL 5/5    ASSESSMENT/PLAN  1. Lumbar radiculopathy    2. Post-operative state      Doing well now 6 weeks sp lumbar decompression.  Her leg pain is resolved, it only bothers her if she walks for more than 1 mile.  She has some ongoing back pain, this is chronic and she is aware she will probably never be rid of it.    Wound is well-healed, she may swim or immerse at any time.      She may loosen activity restrictions and resume normal activities.  She continues in physical therapy but finds that her progress has plateaued, she is thinking of basically just stopping now and continuing on with her home exercise program.  That is certainly a reasonable option.    She also is feeling well enough she would like to cancel her appointment with Dr. Calabrese next week, we can help her with that.      And finally she will need to see Dr. Young, her surgeon, in about 6 weeks for a final check and visit.  No imaging will be required for that visit.  It has been a pleasure looking after this very nice patient. We appreciate your confidence in the [facility], Department of Neurosurgery.    Rosalind Lewis PA-C  [facility]  Department of Neurosurgery  Phone: [phone]  Fax: [phone]      This note was generated using voice " recognition software. While edited for content some inaccurate phrasing may be found.

## 2018-08-06 NOTE — MR AVS SNAPSHOT
After Visit Summary   8/6/2018    Catherine Mccollum    MRN: [id number]           Patient Information     Date Of Birth          1940        Visit Information        Provider Department      8/6/2018 1:30 PM Rosalind Lewis PA-C [facility] Neurosurgery        Today's Diagnoses     Lumbar radiculopathy    -  1    Post-operative state           Follow-ups after your visit        Your next 10 appointments already scheduled     Aug 15, 2018 10:00 AM CDT   (Arrive by 9:45 AM)   Return Visit with Mary aClabrese MD   [facility] ([facility])    [address] [place] [address]  4th [facility] [address]   [phone]            Sep 27, 2018  8:00 AM CDT   (Arrive by 7:45 AM)   Return Visit with Rosemarie Young MD   [facility] Neurosurgery ([facility])    [address] [place] [address]  3rd [facility] [id number] [phone]            Oct 23, 2018 11:05 AM CDT   (Arrive by 10:50 AM)   Return Visit with Kar Alvarez MD   [facility] ([facility])    [address] [place] [id number] [phone]              Who to contact     Please call your clinic at [phone] to:    Ask questions about your health    Make or cancel appointments    Discuss your medicines    Learn about your test results    Speak to your doctor            Additional Information About Your Visit        ViFl[employer] Information     [employer] [place] [facility] [employer] gives you secure access to your electronic health record. If you see a primary care provider, you can also send messages to your care team and make appointments. If you have questions, please call your primary care clinic.  If you do not have a primary care provider, please call [phone] and they will assist you.      [facility] [place] [facility] [employer] is an electronic gateway that provides easy, online access to your medical records. With [facility] [place] [facility], you can  "request a clinic appointment, read your test results, renew a prescription or communicate with your care team.     To access your existing account, please contact your [facility] or call [phone] for assistance.        Care EveryWhere ID     This is your Care EveryWhere ID. This could be used by other organizations to access your [place] medical records  [id number]        Your Vitals Were     Pulse Height BMI (Body Mass Index)             73 1.572 m (5' 1.9\") 26.06 kg/m2          Blood Pressure from Last 3 Encounters:   08/06/18 166/60   07/23/18 138/84   07/09/18 150/77    Weight from Last 3 Encounters:   08/06/18 64.4 kg (142 lb)   07/23/18 64.4 kg (142 lb)   07/09/18 65.8 kg (145 lb 1.6 oz)              Today, you had the following     No orders found for display         Today's Medication Changes          These changes are accurate as of 8/6/18  2:13 PM.  If you have any questions, ask your nurse or doctor.               These medicines have changed or have updated prescriptions.        Dose/Directions    chlorthalidone 25 MG tablet   Commonly known as:  HYGROTON   This may have changed:    - how much to take  - how to take this  - when to take this  - additional instructions   Used for:  Essential hypertension        Take 1/2 tab daily   Quantity:  45 tablet   Refills:  3                Primary Care Provider Office Phone # Fax #    Kar Alvarez [phone]       [address] [address]        Equal Access to Services     HUMPHRE[facility] AND[facility]: Hadii charley nietoo Socornelioali, waaxda luqadaha, qaybta kaalmada adeegyalashonda, waxay yann boyle. So [facility] [phone].    ATENCIÓN: Si habla español, tiene a franco disposición servicios gratuitos de asistencia lingüística. Llame al [phone].    We comply with applicable federal civil rights laws and [place] laws. We do not discriminate on the basis of race, color, national " origin, age, disability, sex, sexual orientation, or gender identity.            Thank you!     Thank you for choosing [facility] NEUROSURGERY  for your care. Our goal is always to provide you with excellent care. Hearing back from our patients is one way we can continue to improve our services. Please take a few minutes to complete the written survey that you may receive in the mail after your visit with us. Thank you!             Your Updated Medication List - Protect others around you: Learn how to safely use, store and throw away your medicines at www.disposemymeds.org.          This list is accurate as of 8/6/18  2:13 PM.  Always use your most recent med list.                   Brand Name Dispense Instructions for use Diagnosis    CALCIUM PO      Take 1 tablet by mouth every morning        chlorthalidone 25 MG tablet    HYGROTON    45 tablet    Take 1/2 tab daily    Essential hypertension       diazepam 2 MG tablet    VALIUM    100 tablet    Take 1.5 tablets (3 mg) by mouth every 6 hours as needed for muscle spasms (spasm)    Lumbosacral radiculopathy at S1       GABAPENTIN PO      Take 200 mg by mouth 2 times daily        irbesartan 150 MG tablet    AVAPRO    90 tablet    Take 1 tablet (150 mg) by mouth every morning    Essential hypertension       order for DME     1 Device    Equipment being ordered: rolling walker    Osteoarthritis of spine with radiculopathy, lumbosacral region       pravastatin 20 MG tablet    PRAVACHOL    90 tablet    Take 1 tablet (20 mg) by mouth At Bedtime    High cholesterol       PROBIOTIC PO      Take by mouth twice a week        TYLENOL EXTRA STRENGTH PO      Take 1,000 mg by mouth 3 times daily Muscle and joint pain        vitamin D 1000 units capsule      Take 2,000 Units by mouth every morning        ZANTAC PO      Take 150 mg by mouth daily as needed for heartburn

## 2018-09-23 ASSESSMENT — ENCOUNTER SYMPTOMS
MUSCLE WEAKNESS: 0
HEARTBURN: 1
HEADACHES: 0
DIZZINESS: 0
SEIZURES: 0
PARALYSIS: 0
VOMITING: 0
CONSTIPATION: 1
DIARRHEA: 1
ARTHRALGIAS: 1
STIFFNESS: 0
BACK PAIN: 1
SPEECH CHANGE: 0
JAUNDICE: 0
TREMORS: 1
RECTAL PAIN: 0
NAUSEA: 0
BLOATING: 1
BOWEL INCONTINENCE: 0
ABDOMINAL PAIN: 0
NUMBNESS: 0
WEAKNESS: 1
BLOOD IN STOOL: 0
LOSS OF CONSCIOUSNESS: 0
MEMORY LOSS: 0
JOINT SWELLING: 0
TINGLING: 0
NECK PAIN: 0
MUSCLE CRAMPS: 0
DISTURBANCES IN COORDINATION: 0
MYALGIAS: 1

## 2018-09-27 ENCOUNTER — OFFICE VISIT (OUTPATIENT)
Dept: NEUROSURGERY | Facility: CLINIC | Age: 78
End: 2018-09-27
Payer: MEDICARE

## 2018-09-27 VITALS
DIASTOLIC BLOOD PRESSURE: 74 MMHG | SYSTOLIC BLOOD PRESSURE: 157 MMHG | HEIGHT: 61 IN | WEIGHT: 142.6 LBS | HEART RATE: 71 BPM | BODY MASS INDEX: 26.92 KG/M2 | OXYGEN SATURATION: 95 %

## 2018-09-27 DIAGNOSIS — M54.17 LUMBOSACRAL RADICULOPATHY AT S1: Primary | ICD-10-CM

## 2018-09-27 ASSESSMENT — PAIN SCALES - GENERAL: PAINLEVEL: MILD PAIN (2)

## 2018-09-27 NOTE — NURSING NOTE
Chief Complaint   Patient presents with     RECHECK     [facility] RETURN - 3 MONTH POST OP/ DOS 6/25/18 LEFT L5-S1 HEMILAMINECTOMY, MICRODISKECTOMY, PARTIAL MEDIAL FACETECTOMY       Mykel Marks, EMT

## 2018-09-27 NOTE — PROGRESS NOTES
Catherine Mccollum presents in scheduled 3 month postop followup from surgery 6/25/18 for:  Open Left Lumbar 5-Sacral 1 Hemilaminectomy, Medial Facetectomy, And Microdiscectomy Attention Transitional Segment Anatomy     performed for left leg pain; preoperative symptoms were: left leg pain radiates along the lateral and posterior aspect of her thigh, calf, and bottom of the foot. It is associated with numbness and tingling. Pain is worse in standing or walking, and is relieved by sitting. Plantar flexion weakness preop of 4/5    Now patient states that shooting pain in left leg is completely resolved since surgery. She has dull ache sometimes, relieved with Tylenol. She has pain in lateral thigh on either leg if she lays on left in bed after about one hour of laying- she has TTP over trochanteric bursa bilaterally, we discussed position modification for sleep to relieve this.    On exam, strength 5/5 BLE, incision well-healed.    Doing well, return to clinic prn new symptoms      Rosemarie Young MD    [facility] Department of Neurosurgery  Office: [phone]    9/27/2018  8:35 AM            Answers for HPI/ROS submitted by the patient on 9/23/2018   General Symptoms: No  Skin Symptoms: No  HENT Symptoms: No  EYE SYMPTOMS: No  HEART SYMPTOMS: No  LUNG SYMPTOMS: No  INTESTINAL SYMPTOMS: Yes  URINARY SYMPTOMS: No  GYNECOLOGIC SYMPTOMS: No  BREAST SYMPTOMS: No  SKELETAL SYMPTOMS: Yes  BLOOD SYMPTOMS: No  NERVOUS SYSTEM SYMPTOMS: Yes  MENTAL HEALTH SYMPTOMS: No  Heart burn or indigestion: Yes  Nausea: No  Vomiting: No  Abdominal pain: No  Bloating: Yes  Constipation: Yes  Diarrhea: Yes  Blood in stool: No  Black stools: No  Rectal or Anal pain: No  Fecal incontinence: No  Yellowing of skin or eyes: No  Vomit with blood: No  Change in stools: No  Back pain: Yes  Muscle aches: Yes  Neck pain: No  Swollen joints: No  Joint pain: Yes  Bone pain: No  Muscle cramps: No  Muscle weakness: No  Joint  stiffness: No  Bone fracture: No  Trouble with coordination: No  Dizziness or trouble with balance: No  Fainting or black-out spells: No  Memory loss: No  Headache: No  Seizures: No  Speech problems: No  Tingling: No  Tremor: Yes  Weakness: Yes  Difficulty walking: No  Paralysis: No  Numbness: No

## 2018-09-27 NOTE — LETTER
9/27/2018       RE: Catherine Mccollum  [address]  [place] [address]     Dear Colleague,    Thank you for referring your patient, Catherine Mccollum, to the [facility] NEUROSURGERY at [facility]. Please see a copy of my visit note below.    Catherine Mccollum presents in scheduled 3 month postop followup from surgery 6/25/18 for:  Open Left Lumbar 5-Sacral 1 Hemilaminectomy, Medial Facetectomy, And Microdiscectomy Attention Transitional Segment Anatomy     performed for left leg pain; preoperative symptoms were: left leg pain radiates along the lateral and posterior aspect of her thigh, calf, and bottom of the foot. It is associated with numbness and tingling. Pain is worse in standing or walking, and is relieved by sitting. Plantar flexion weakness preop of 4/5    Now patient states that shooting pain in left leg is completely resolved since surgery. She has dull ache sometimes, relieved with Tylenol. She has pain in lateral thigh on either leg if she lays on left in bed after about one hour of laying- she has TTP over trochanteric bursa bilaterally, we discussed position modification for sleep to relieve this.    On exam, strength 5/5 BLE, incision well-healed.    Doing well, return to clinic prn new symptoms      Again, thank you for allowing me to participate in the care of your patient.      Sincerely,    Rosemarie Young MD

## 2018-09-27 NOTE — MR AVS SNAPSHOT
"              After Visit Summary   9/27/2018    Catherine Mccollum    MRN: [id number]           Patient Information     Date Of Birth          1940        Visit Information        Provider Department      9/27/2018 8:30 AM Rosemarie Young MD [facility] Neurosurgery        Today's Diagnoses     Lumbosacral radiculopathy at S1    -  1       Follow-ups after your visit        Your next 10 appointments already scheduled     Oct 23, 2018 11:05 AM CDT   (Arrive by 10:50 AM)   Return Visit with Kar Alvarez MD   [facility] ([facility])    [address] [place] [id number] [phone]              Who to contact     Please call your clinic at [phone] to:    Ask questions about your health    Make or cancel appointments    Discuss your medicines    Learn about your test results    Speak to your doctor            Additional Information About Your Visit        MyChart Information     [employer] gives you secure access to your electronic health record. If you see a primary care provider, you can also send messages to your care team and make appointments. If you have questions, please call your primary care clinic.  If you do not have a primary care provider, please call [phone] and they will assist you.      [employer] is an electronic gateway that provides easy, online access to your medical records. With [employer], you can request a clinic appointment, read your test results, renew a prescription or communicate with your care team.     To access your existing account, please contact your [facility] or call [phone] for assistance.        Care EveryWhere ID     This is your Care EveryWhere ID. This could be used by other organizations to access your [place] medical records  [id number]        Your Vitals Were     Pulse Height Pulse Oximetry BMI (Body Mass Index)          71 1.549 m (5' 1\") 95% 26.94 kg/m2   "       Blood Pressure from Last 3 Encounters:   09/27/18 157/74   08/06/18 166/60   07/23/18 138/84    Weight from Last 3 Encounters:   09/27/18 64.7 kg (142 lb 9.6 oz)   08/06/18 64.4 kg (142 lb)   07/23/18 64.4 kg (142 lb)              Today, you had the following     No orders found for display         Today's Medication Changes          These changes are accurate as of 9/27/18  8:35 AM.  If you have any questions, ask your nurse or doctor.               These medicines have changed or have updated prescriptions.        Dose/Directions    chlorthalidone 25 MG tablet   Commonly known as:  HYGROTON   This may have changed:    - how much to take  - how to take this  - when to take this  - additional instructions   Used for:  Essential hypertension        Take 1/2 tab daily   Quantity:  45 tablet   Refills:  3                Primary Care Provider Office Phone # Fax #    Kar Alvarez [phone]       [address] [address]        Equal Access to Services     [facility]: Hadii charley browning hadasho Soomaali, waaxda luqadaha, qaybta kaalmada adeegyalashonda, niya martin . So [facility] [phone].    ATENCIÓN: Si habla español, tiene a franco disposición servicios gratuitos de asistencia lingüística. Llame al [phone].    We comply with applicable federal civil rights laws and [place] laws. We do not discriminate on the basis of race, color, national origin, age, disability, sex, sexual orientation, or gender identity.            Thank you!     Thank you for choosing [facility]  for your care. Our goal is always to provide you with excellent care. Hearing back from our patients is one way we can continue to improve our services. Please take a few minutes to complete the written survey that you may receive in the mail after your visit with us. Thank you!             Your Updated Medication List - Protect others around you: Learn how to safely use,  store and throw away your medicines at www.disposemymeds.org.          This list is accurate as of 9/27/18  8:35 AM.  Always use your most recent med list.                   Brand Name Dispense Instructions for use Diagnosis    CALCIUM PO      Take 1 tablet by mouth every morning        chlorthalidone 25 MG tablet    HYGROTON    45 tablet    Take 1/2 tab daily    Essential hypertension       diazepam 2 MG tablet    VALIUM    100 tablet    Take 1.5 tablets (3 mg) by mouth every 6 hours as needed for muscle spasms (spasm)    Lumbosacral radiculopathy at S1       GABAPENTIN PO      Take 200 mg by mouth 2 times daily        irbesartan 150 MG tablet    AVAPRO    90 tablet    Take 1 tablet (150 mg) by mouth every morning    Essential hypertension       order for DME     1 Device    Equipment being ordered: rolling walker    Osteoarthritis of spine with radiculopathy, lumbosacral region       pravastatin 20 MG tablet    PRAVACHOL    90 tablet    Take 1 tablet (20 mg) by mouth At Bedtime    High cholesterol       PROBIOTIC PO      Take by mouth daily        TYLENOL EXTRA STRENGTH PO      Take 1,000 mg by mouth 2 times daily Muscle and joint pain        vitamin D 1000 units capsule      Take 2,000 Units by mouth every morning        ZANTAC PO      Take 150 mg by mouth daily as needed for heartburn

## 2018-10-13 ENCOUNTER — MEDICAL CORRESPONDENCE (OUTPATIENT)
Dept: HEALTH INFORMATION MANAGEMENT | Facility: CLINIC | Age: 78
End: 2018-10-13

## 2018-10-13 DIAGNOSIS — N30.00 ACUTE CYSTITIS WITHOUT HEMATURIA: ICD-10-CM

## 2018-10-16 ENCOUNTER — TRANSFERRED RECORDS (OUTPATIENT)
Dept: HEALTH INFORMATION MANAGEMENT | Facility: CLINIC | Age: 78
End: 2018-10-16

## 2018-10-23 ENCOUNTER — TELEPHONE (OUTPATIENT)
Dept: INTERNAL MEDICINE | Facility: CLINIC | Age: 78
End: 2018-10-23

## 2018-10-23 ENCOUNTER — OFFICE VISIT (OUTPATIENT)
Dept: INTERNAL MEDICINE | Facility: CLINIC | Age: 78
End: 2018-10-23
Payer: MEDICARE

## 2018-10-23 VITALS
TEMPERATURE: 97.4 F | SYSTOLIC BLOOD PRESSURE: 165 MMHG | BODY MASS INDEX: 26.64 KG/M2 | HEART RATE: 67 BPM | WEIGHT: 141 LBS | DIASTOLIC BLOOD PRESSURE: 79 MMHG

## 2018-10-23 DIAGNOSIS — N30.00 ACUTE CYSTITIS WITHOUT HEMATURIA: Primary | ICD-10-CM

## 2018-10-23 LAB
ALBUMIN UR-MCNC: NEGATIVE MG/DL
APPEARANCE UR: CLEAR
BILIRUB UR QL STRIP: NEGATIVE
COLOR UR AUTO: ABNORMAL
GLUCOSE UR STRIP-MCNC: NEGATIVE MG/DL
HGB UR QL STRIP: NEGATIVE
KETONES UR STRIP-MCNC: NEGATIVE MG/DL
LEUKOCYTE ESTERASE UR QL STRIP: ABNORMAL
NITRATE UR QL: NEGATIVE
PH UR STRIP: 7 PH (ref 5–7)
RBC #/AREA URNS AUTO: 2 /HPF (ref 0–2)
SOURCE: ABNORMAL
SP GR UR STRIP: 1.01 (ref 1–1.03)
SQUAMOUS #/AREA URNS AUTO: <1 /HPF (ref 0–1)
UROBILINOGEN UR STRIP-MCNC: 0 MG/DL (ref 0–2)
WBC #/AREA URNS AUTO: 74 /HPF (ref 0–5)
WBC CLUMPS #/AREA URNS HPF: PRESENT /HPF

## 2018-10-23 PROCEDURE — 87086 URINE CULTURE/COLONY COUNT: CPT | Performed by: INTERNAL MEDICINE

## 2018-10-23 RX ORDER — CIPROFLOXACIN 250 MG/1
250 TABLET, FILM COATED ORAL 2 TIMES DAILY
Qty: 6 TABLET | Refills: 0 | COMMUNITY
Start: 2018-10-23 | End: 2018-10-23

## 2018-10-23 RX ORDER — CIPROFLOXACIN 250 MG/1
250 TABLET, FILM COATED ORAL 2 TIMES DAILY
Qty: 6 TABLET | Refills: 0 | Status: SHIPPED | OUTPATIENT
Start: 2018-10-23 | End: 2019-07-23

## 2018-10-23 ASSESSMENT — PAIN SCALES - GENERAL: PAINLEVEL: MILD PAIN (2)

## 2018-10-23 NOTE — MR AVS SNAPSHOT
After Visit Summary   10/23/2018    Catherine Mccollum    MRN: [id number]           Patient Information     Date Of Birth          1940        Visit Information        Provider Department      10/23/2018 11:05 AM Kar Alvarez MD [facility]        Today's Diagnoses     Acute cystitis without hematuria    -  1       Follow-ups after your visit        Future tests that were ordered for you today     Open Future Orders        Priority Expected Expires Ordered    UA with Micro reflex to Culture Routine 10/23/2018 10/23/2019 10/23/2018            Who to contact     Please call your clinic at [phone] to:    Ask questions about your health    Make or cancel appointments    Discuss your medicines    Learn about your test results    Speak to your doctor            Additional Information About Your Visit        [employer] Information     [email or web address] gives you secure access to your electronic health record. If you see a primary care provider, you can also send messages to your care team and make appointments. If you have questions, please call your primary care clinic.  If you do not have a primary care provider, please call [phone] and they will assist you.      [email or web address] is an electronic gateway that provides easy, online access to your medical records. With [email or web address], you can request a clinic appointment, read your test results, renew a prescription or communicate with your care team.     To access your existing account, please contact your [facility] or call [phone] for assistance.        Care EveryWhere ID     This is your Care EveryWhere ID. This could be used by other organizations to access your [place] medical records  [id number]        Your Vitals Were     Pulse Temperature Breastfeeding? BMI (Body Mass Index)          67 97.4  F (36.3  C) (Oral) No 26.64 kg/m2         Blood Pressure from Last 3 Encounters:   10/23/18 165/79   09/27/18 157/74    08/06/18 166/60    Weight from Last 3 Encounters:   10/23/18 64 kg (141 lb)   09/27/18 64.7 kg (142 lb 9.6 oz)   08/06/18 64.4 kg (142 lb)                 Today's Medication Changes          These changes are accurate as of 10/23/18 11:29 AM.  If you have any questions, ask your nurse or doctor.               These medicines have changed or have updated prescriptions.        Dose/Directions    chlorthalidone 25 MG tablet   Commonly known as:  HYGROTON   This may have changed:    - how much to take  - how to take this  - when to take this  - additional instructions   Used for:  Essential hypertension        Take 1/2 tab daily   Quantity:  45 tablet   Refills:  3                Primary Care Provider Office Phone # Fax #    Kar Alvarez [phone]       [address] [address]        Equal Access to Services     WALLY Diamond Grove CenterAJITH : Hadii charley carmona Sokervin, waaxda luqadaha, qaybta kaalmada adesanayalashonda, niya martin . So [facility] [phone].    ATENCIÓN: Si habla español, tiene a franco disposición servicios gratuitos de asistencia lingüística. Llame al [phone].    We comply with applicable federal civil rights laws and [place] laws. We do not discriminate on the basis of race, color, national origin, age, disability, sex, sexual orientation, or gender identity.            Thank you!     Thank you for choosing [facility]  for your care. Our goal is always to provide you with excellent care. Hearing back from our patients is one way we can continue to improve our services. Please take a few minutes to complete the written survey that you may receive in the mail after your visit with us. Thank you!             Your Updated Medication List - Protect others around you: Learn how to safely use, store and throw away your medicines at www.disposemymeds.org.          This list is accurate as of 10/23/18 11:29 AM.  Always use your most  recent med list.                   Brand Name Dispense Instructions for use Diagnosis    CALCIUM PO      Take 1 tablet by mouth every morning        chlorthalidone 25 MG tablet    HYGROTON    45 tablet    Take 1/2 tab daily    Essential hypertension       diazepam 2 MG tablet    VALIUM    100 tablet    Take 1.5 tablets (3 mg) by mouth every 6 hours as needed for muscle spasms (spasm)    Lumbosacral radiculopathy at S1       GABAPENTIN PO      Take 200 mg by mouth 2 times daily        irbesartan 150 MG tablet    AVAPRO    90 tablet    Take 1 tablet (150 mg) by mouth every morning    Essential hypertension       order for DME     1 Device    Equipment being ordered: rolling walker    Osteoarthritis of spine with radiculopathy, lumbosacral region       pravastatin 20 MG tablet    PRAVACHOL    90 tablet    Take 1 tablet (20 mg) by mouth At Bedtime    High cholesterol       PROBIOTIC PO      Take by mouth daily        TYLENOL EXTRA STRENGTH PO      Take 1,000 mg by mouth 2 times daily Muscle and joint pain        vitamin D 1000 units capsule      Take 2,000 Units by mouth every morning        ZANTAC PO      Take 150 mg by mouth daily as needed for heartburn

## 2018-10-23 NOTE — TELEPHONE ENCOUNTER
Dear Haven;    See results note to Catherine. She has urinary sxs. Today and abnormal UA. UC is pending    (1) Placed historical order for Ciprofloxacin this encounter and please see where she wants this sent    (2) Please confirm no Fluoroquinolone allergy    ThanksJUAN MANUEL        Dear Catherine;    Your urine sample suggests an infection.   My nurse Haven will give you a follow up call to start antibiotics    JUAN MANUEL Alvarez MD      Pt called and plan of care discussed. RX sent to pt's [facility].  Increase fluids and will halina back for questions or concerns.  Haven White RN 2:25 PM on 10/23/2018.

## 2018-10-23 NOTE — NURSING NOTE
Chief Complaint   Patient presents with     UTI     Patient is here for possible UTI; bladder pressure symptom     Diarrhea     Also here for loose stool       Jamey Santos CMA ([facility]) at 11:16 AM on 10/23/2018

## 2018-10-23 NOTE — PROGRESS NOTES
HPI:    Pt. Comes in to establish care today. She was seen by Dr. Ferguson, Neurology 3/31/2018 with h/o Parkinson's and  L sided radicular sxs. She was subsequently seen by Ms. Lewis, Neurosurgery 4/11/2018 for radicular sxs.  She was seen [facility] with MRI scan 3/22/2018 with some minor findings. She was given Steroid dose pack 3/20/2018 and had another one as well (from Dr. Ferguson). These did reduce her sxs. She was given Tramdol from Dr. Jackson as well and this does reduce her sxs. She had L-sided  L5/S1 surgery 6/25/2018 and she is doing better. She has h/o HTN on Avapro and chlorthalidone. She has h/o increased cholesterol on Pravastatin. No other HEENT, cardiopulmonary, abdominal, , GYN, neurological, systemic, psychiatric complaints. She has two sons (one in [place], and one in [place]). She does not smoke nor abuse EtOH.       Past Surgical History:   Procedure Laterality Date     BREAST SURGERY  Jan 1987    biopsy     CATARACT IOL, RT/LT       GYN SURGERY  1965;1967    2 ceasarean sections     HEMILAMINECTOMY, DISCECTOMY LUMBAR ONE LEVEL, COMBINED Left 6/25/2018    Procedure: COMBINED HEMILAMINECTOMY, DISCECTOMY LUMBAR ONE LEVEL;  Open Left Lumbar 5-Sacral 1 Hemilaminectomy, Medial Facetectomy, And Microdiscectomy Attention Transitional Segment Anatomy;  Surgeon: Rosemarie Young MD;  Location: [facility] OR     ORTHOPEDIC SURGERY      ankle surgery for trimalleolar frx     PE:    Vitals noted gen nad cooperative alert, HEENT, oropharynx clear no exudate, neck supple nl rom no, LCTA, B good air movement, RRR, S1, S2, no MRG, abdomen, nd, nt, resting arm and leg tremors. No B CVA tenderness to palpation. No suprapubic tenderness to palpation.     A/P:    1. HTN;  she remains on same medication; labs done 7/23/2018  2. Parkinson's; she follows with Dr. Ferguson, Neurology  3. Back pain and L sided sxs. She has injection scheduled with Dr. Calabrese  5/4/2018. She has follow up 5/23/2018  with Ms. Lewis, Neurosurgery. She had surgery 6/25/2018 L5/S1 and saw Ms. Lewis Neurosurgery 7/9/2018 and again 9/27/2018  4. She follows with outside Dermatology  5. She will get future mammograms. Placed order  7/23/2018  6. Immunizations; future new Zoster Vaccination and check with insurance   7. Increased cholesterol on Pravastatin; labs checked 7/23/2018 with LDL 97 and HDL 40.   8. Checked UA today 7/23/3028 unremarkable. However, UA from today suggests UTI. She has sulfa allergy and will treat with Cipro and wait for UC.   9. CXR done 7/23/3028 stable and no change for subtle L sided finding on 6/20/2018        Total time spent 25  minutes.  More than 50% of the time spent with Ms. Mccollum on counseling / coordinating her care

## 2018-10-24 ENCOUNTER — TELEPHONE (OUTPATIENT)
Dept: NEUROLOGY | Facility: CLINIC | Age: 78
End: 2018-10-24

## 2018-10-24 LAB
BACTERIA SPEC CULT: NO GROWTH
Lab: NORMAL
SPECIMEN SOURCE: NORMAL

## 2018-10-24 NOTE — TELEPHONE ENCOUNTER
Writer received call from [facility] stating that a fax was sent 3 times for pat to have provider initial for Medicare payment for PT. Not fax had been given to provider or writer. Writer checked the medial tab and the fax was scanned to the media tab on [phone] WITHOUT a signature or being sent to provider or Neuro team. Team and provider unaware of the need for a signature s notion was presented. Writer gave to provider and form was faxed to Pricing Assistant [facility].

## 2018-10-26 ENCOUNTER — MYC MEDICAL ADVICE (OUTPATIENT)
Dept: INTERNAL MEDICINE | Facility: CLINIC | Age: 78
End: 2018-10-26

## 2018-10-26 DIAGNOSIS — R82.90 ABNORMAL URINALYSIS: Primary | ICD-10-CM

## 2018-10-30 ENCOUNTER — PRE VISIT (OUTPATIENT)
Dept: UROLOGY | Facility: CLINIC | Age: 78
End: 2018-10-30

## 2018-10-30 NOTE — TELEPHONE ENCOUNTER
MEDICAL RECORDS REQUEST   [facility]  Urology Clinic  [address] [place] [address]  PHONE: [phone]  Fax: [phone]        FUTURE VISIT INFORMATION                                                   Catherine Mccollum YOB: 1940 scheduled for future visit at [facility]    APPOINTMENT INFORMATION:    Date: 2018    Provider:  Aliyah gordillo    Reason for Visit/Diagnosis: Abnormal urine    REFERRAL INFORMATION:    Referring provider:  Kar Alvarez    Specialty: MD    Referring providers clinic:  [facility]    Clinic contact number:  [phone]    RECORDS REQUESTED FOR VISIT                                                     NOTES  STATUS/DETAILS   OFFICE NOTE from referring provider  yes   OFFICE NOTE from other specialist  no   DISCHARGE SUMMARY from hospital  no   DISCHARGE REPORT from the ER  no   OPERATIVE REPORT  no   MEDICATION LIST  yes       PRE-VISIT CHECKLIST      Record collection complete Yes   Appointment appropriately scheduled           (right time/right provider) Yes   MyChart activation Yes   Questionnaire complete If no, please explain in process     Completed by: Michelle Mendez

## 2018-11-26 ENCOUNTER — PRE VISIT (OUTPATIENT)
Dept: UROLOGY | Facility: CLINIC | Age: 78
End: 2018-11-26

## 2018-11-29 ASSESSMENT — ENCOUNTER SYMPTOMS
JAUNDICE: 0
CONSTIPATION: 0
RECTAL PAIN: 0
DIFFICULTY URINATING: 0
BLOATING: 0
BLOOD IN STOOL: 0
HEMATURIA: 0
HEARTBURN: 1
NAUSEA: 0
BOWEL INCONTINENCE: 0
VOMITING: 0
DYSURIA: 1
ABDOMINAL PAIN: 0
DIARRHEA: 0
FLANK PAIN: 0

## 2018-12-04 ENCOUNTER — OFFICE VISIT (OUTPATIENT)
Dept: UROLOGY | Facility: CLINIC | Age: 78
End: 2018-12-04
Payer: MEDICARE

## 2018-12-04 VITALS
HEART RATE: 74 BPM | SYSTOLIC BLOOD PRESSURE: 160 MMHG | BODY MASS INDEX: 27.08 KG/M2 | HEIGHT: 61 IN | WEIGHT: 143.4 LBS | DIASTOLIC BLOOD PRESSURE: 87 MMHG

## 2018-12-04 DIAGNOSIS — R39.15 URINARY URGENCY: Primary | ICD-10-CM

## 2018-12-04 DIAGNOSIS — N95.2 VAGINAL ATROPHY: ICD-10-CM

## 2018-12-04 LAB
ALBUMIN UR-MCNC: NEGATIVE MG/DL
APPEARANCE UR: CLEAR
BILIRUB UR QL STRIP: NEGATIVE
COLOR UR AUTO: YELLOW
GLUCOSE UR STRIP-MCNC: NEGATIVE MG/DL
HGB UR QL STRIP: NEGATIVE
HYALINE CASTS #/AREA URNS LPF: 1 /LPF (ref 0–2)
KETONES UR STRIP-MCNC: NEGATIVE MG/DL
LEUKOCYTE ESTERASE UR QL STRIP: NEGATIVE
NITRATE UR QL: NEGATIVE
PH UR STRIP: 6 PH (ref 5–7)
RBC #/AREA URNS AUTO: 1 /HPF (ref 0–2)
SOURCE: NORMAL
SP GR UR STRIP: 1.01 (ref 1–1.03)
SQUAMOUS #/AREA URNS AUTO: <1 /HPF (ref 0–1)
UROBILINOGEN UR STRIP-MCNC: 0 MG/DL (ref 0–2)
WBC #/AREA URNS AUTO: 1 /HPF (ref 0–5)

## 2018-12-04 PROCEDURE — 87086 URINE CULTURE/COLONY COUNT: CPT | Performed by: PHYSICIAN ASSISTANT

## 2018-12-04 ASSESSMENT — PAIN SCALES - GENERAL: PAINLEVEL: NO PAIN (0)

## 2018-12-04 NOTE — MR AVS SNAPSHOT
After Visit Summary   12/4/2018    Catherine Mccollum    MRN: [id number]           Patient Information     Date Of Birth          1940        Visit Information        Provider Department      12/4/2018 9:30 AM Aliyah Cohn PA-C [facility]        Today's Diagnoses     Urinary frequency    -  1      Care Instructions    UROLOGY CLINIC VISIT PATIENT INSTRUCTIONS    The catheterized urine specimen today was completely negative (no white blood cells or red blood cells). We will send a urine culture to make absolutely sure there is no infection.    We will write a prescription for vaginal estrogen cream for you which will be sent to [facility].   -The pharmacy will call you to confirm your shipping address and the cream will be sent to your home.  -When the cream arrives, squeeze a pea-size amount onto your finger and rub into the vaginal area.  -Do this every night for 2 weeks, then twice weekly thereafter as maintenance.     Follow up as needed.    If you have any issues, questions or concerns in the meantime, do not hesitate to contact us at [phone] or via [employer].     It was a pleasure meeting with you today.  Thank you for allowing me and my team the privilege of caring for you today.  YOU are the reason we are here, and I truly hope we provided you with the excellent service you deserve.  Please let us know if there is anything else we can do for you so that we can be sure you are leaving completely satisfied with your care experience.                Follow-ups after your visit        Your next 10 appointments already scheduled     Jan 23, 2019 10:35 AM CST   (Arrive by 10:20 AM)   Return Visit with Kar Alvarez MD   [facility] ([facility])    [address] [place] [id number] [phone]              Who to contact     Please call your clinic at  "[phone] to:    Ask questions about your health    Make or cancel appointments    Discuss your medicines    Learn about your test results    Speak to your doctor            Additional Information About Your Visit        [employer] Information     [employer] gives you secure access to your electronic health record. If you see a primary care provider, you can also send messages to your care team and make appointments. If you have questions, please call your primary care clinic.  If you do not have a primary care provider, please call [phone] and they will assist you.      [employer] is an electronic gateway that provides easy, online access to your medical records. With [employer], you can request a clinic appointment, read your test results, renew a prescription or communicate with your care team.     To access your existing account, please contact your [facility] or call [phone] for assistance.        Care EveryWhere ID     This is your Care EveryWhere ID. This could be used by other organizations to access your [place] medical records  [id number]        Your Vitals Were     Pulse Height BMI (Body Mass Index)             74 1.549 m (5' 1\") 27.1 kg/m2          Blood Pressure from Last 3 Encounters:   12/04/18 160/87   10/23/18 165/79   09/27/18 157/74    Weight from Last 3 Encounters:   12/04/18 65 kg (143 lb 6.4 oz)   10/23/18 64 kg (141 lb)   09/27/18 64.7 kg (142 lb 9.6 oz)              We Performed the Following     Routine UA with microscopic - No culture     Urine Culture Aerobic Bacterial          Today's Medication Changes          These changes are accurate as of 12/4/18  9:54 AM.  If you have any questions, ask your nurse or doctor.               These medicines have changed or have updated prescriptions.        Dose/Directions    chlorthalidone 25 MG tablet   Commonly known as:  HYGROTON   This may have changed:    - how much to take  - how to take this  - when to take " this  - additional instructions   Used for:  Essential hypertension        Take 1/2 tab daily   Quantity:  45 tablet   Refills:  3                Primary Care Provider Office Phone # Fax #    Kar lAvarez [phone] [address] [address]        Equal Access to Services     HUMPHREY ANDERSON : Hadii aad ku hadasho Soomaali, waaxda luqadaha, qaybta kaalmada adeegyada, waxay mindiin hayildan adesana wernercamilla laguillermina boyle. So [facility] [phone].    ATENCIÓN: Si habla español, tiene a franco disposición servicios gratuitos de asistencia lingüística. [facility] [phone].    We comply with applicable federal civil rights laws and [place] laws. We do not discriminate on the basis of race, color, national origin, age, disability, sex, sexual orientation, or gender identity.            Thank you!     Thank you for choosing [facility] UROLOG[facility] AND [facility]  for your care. Our goal is always to provide you with excellent care. Hearing back from our patients is one way we can continue to improve our services. Please take a few minutes to complete the written survey that you may receive in the mail after your visit with us. Thank you!             Your Updated Medication List - Protect others around you: Learn how to safely use, store and throw away your medicines at www.disposemymeds.org.          This list is accurate as of 12/4/18  9:54 AM.  Always use your most recent med list.                   Brand Name Dispense Instructions for use Diagnosis    CALCIUM PO      Take 1 tablet by mouth every morning        chlorthalidone 25 MG tablet    HYGROTON    45 tablet    Take 1/2 tab daily    Essential hypertension       ciprofloxacin 250 MG tablet    CIPRO    6 tablet    Take 1 tablet (250 mg) by mouth 2 times daily    Acute cystitis without hematuria       diazepam 2 MG tablet    VALIUM    100 tablet    Take 1.5 tablets (3 mg) by mouth every 6 hours as needed for muscle  spasms (spasm)    Lumbosacral radiculopathy at S1       GABAPENTIN PO      Take 200 mg by mouth 2 times daily        irbesartan 150 MG tablet    AVAPRO    90 tablet    Take 1 tablet (150 mg) by mouth every morning    Essential hypertension       order for DME     1 Device    Equipment being ordered: rolling walker    Osteoarthritis of spine with radiculopathy, lumbosacral region       pravastatin 20 MG tablet    PRAVACHOL    90 tablet    Take 1 tablet (20 mg) by mouth At Bedtime    High cholesterol       PROBIOTIC PO      Take by mouth daily        TYLENOL EXTRA STRENGTH PO      Take 1,000 mg by mouth 2 times daily Muscle and joint pain        vitamin D 1000 units capsule      Take 2,000 Units by mouth every morning        ZANTAC PO      Take 150 mg by mouth daily as needed for heartburn

## 2018-12-04 NOTE — PROGRESS NOTES
CC: abnormal urinalysis    HPI: It is a pleasure to see Ms. Catherine Mccollum, a very pleasant 78 year old female, asked to be seen in consultation by Dr. Alvarez for evaluation of pyuria. About a month ago, patient complained of some urinary urgency during a visit with her PCP. UA was checked which revealed moderate LE and 74 WBC. She was given 3 days of Cipro and had improvement in urgency symptoms. A urine culture returned no growth. She is referred to urology for evaluation of this pyuria in the setting of a negative urine culture.    She continues to complain of mild intermittent urgency. Denies frequency (voids q2-3 hours during the day), nocturia, significant urinary incontinence, hematuria, hesitancy, intermittency, feelings of incomplete emptying, or any recent history of urinary tract infections or stones. She does report an isolated episode of gross hematuria 10-20 years ago. Had a CT scan and cystoscopy at that time which were reportedly normal. No further hematuria since then.     She also reports some vaginal dryness and irritation. Previously used vaginal estrogen cream which worked very well for her but this was then discontinued ~10 years ago. She would like to resume vaginal estrogen cream if possible.     Review of Diagnostics:  10/23/18 - UA: moderate LE, 74 WBC, 2 RBC, WBC clumps --> UC: No growth  7/23/18 - UA: negative  6/22/18 - UA: small blood, trace LE, 5 WBC, 1 RBC  6/20/18 - UA: moderate LE, 48 WBC, 1 RBC, few bacteria      Past Medical History:   Diagnosis Date     GERD (gastroesophageal reflux disease)      HTN (hypertension)      Parkinsonian syndrome (H)     left side tremor     Past Surgical History:   Procedure Laterality Date     BREAST SURGERY  Jan 1987    biopsy     CATARACT IOL, RT/LT       GYN SURGERY  1965;1967    2 ceasarean sections     HEMILAMINECTOMY, DISCECTOMY LUMBAR ONE LEVEL, COMBINED Left 6/25/2018    Procedure: COMBINED HEMILAMINECTOMY, DISCECTOMY LUMBAR ONE LEVEL;  Open  Left Lumbar 5-Sacral 1 Hemilaminectomy, Medial Facetectomy, And Microdiscectomy Attention Transitional Segment Anatomy;  Surgeon: Rosemarie Young MD;  Location: [facility]     ORTHOPEDIC SURGERY      ankle surgery for trimalleolar frx     Current Outpatient Prescriptions   Medication Sig Dispense Refill     Acetaminophen (TYLENOL EXTRA STRENGTH PO) Take 1,000 mg by mouth 2 times daily Muscle and joint pain       CALCIUM PO Take 1 tablet by mouth every morning       chlorthalidone (HYGROTON) 25 MG tablet Take 1/2 tab daily (Patient taking differently: Take 12.5 mg by mouth every morning ) 45 tablet 3     Cholecalciferol (VITAMIN D) 1000 UNITS capsule Take 2,000 Units by mouth every morning        diazepam (VALIUM) 2 MG tablet Take 1.5 tablets (3 mg) by mouth every 6 hours as needed for muscle spasms (spasm) 100 tablet 0     GABAPENTIN PO Take 200 mg by mouth 2 times daily        irbesartan (AVAPRO) 150 MG tablet Take 1 tablet (150 mg) by mouth every morning 90 tablet 3     pravastatin (PRAVACHOL) 20 MG tablet Take 1 tablet (20 mg) by mouth At Bedtime 90 tablet 3     Probiotic Product (PROBIOTIC PO) Take by mouth daily        RaNITidine HCl (ZANTAC PO) Take 150 mg by mouth daily as needed for heartburn       ciprofloxacin (CIPRO) 250 MG tablet Take 1 tablet (250 mg) by mouth 2 times daily (Patient not taking: Reported on 12/4/2018) 6 tablet 0     order for DME Equipment being ordered: rolling walker (Patient not taking: Reported on 12/4/2018) 1 Device 0     Allergies   Allergen Reactions     Fosamax Nausea and GI Disturbance     Lisinopril Other (See Comments)     cough     Sulfa Drugs Hives     FAMILY HISTORY: There is no reported history of genitourinary carcinoma.  There is no history of urolithiasis.      SOCIAL HISTORY: The patient does not smoke cigarettes, no EtOH and no illicit drug use.    ROS:   Answers for HPI/ROS submitted by the patient on 11/29/2018   General Symptoms: No  Skin Symptoms: No  HENT  "Symptoms: No  EYE SYMPTOMS: No  HEART SYMPTOMS: No  LUNG SYMPTOMS: No  INTESTINAL SYMPTOMS: Yes  URINARY SYMPTOMS: Yes  GYNECOLOGIC SYMPTOMS: No  BREAST SYMPTOMS: No  SKELETAL SYMPTOMS: No  BLOOD SYMPTOMS: No  NERVOUS SYSTEM SYMPTOMS: No  MENTAL HEALTH SYMPTOMS: No  Heart burn or indigestion: Yes  Nausea: No  Vomiting: No  Abdominal pain: No  Bloating: No  Constipation: No  Diarrhea: No  Blood in stool: No  Black stools: No  Rectal or Anal pain: No  Fecal incontinence: No  Yellowing of skin or eyes: No  Vomit with blood: No  Change in stools: No  Trouble holding urine or incontinence: Yes  Pain or burning: Yes  Trouble starting or stopping: Yes  Increased frequency of urination: No  Blood in urine: No  Decreased frequency of urination: No  Frequent nighttime urination: No  Flank pain: No  Difficulty emptying bladder: No      PHYSICAL EXAM:   Vitals:    12/04/18 0916   BP: 160/87   Pulse: 74   Weight: 65 kg (143 lb 6.4 oz)   Height: 1.549 m (5' 1\")     GENERAL: Well groomed, well developed, well nourished female in NAD.  HEENT: AT, NC, EOMI bilaterally.  SKIN: Warm to touch, dry.  No visible rashes or lesions on examined areas.  RESP: No increased respiratory effort.  MS: Full ROM in extremities.  PELVIC: Normal female external genitalia. Severe atrophic changes. Urethral meatus is patent without discharge or bleeding. Internal exam deferred.  NEURO: Alert and oriented x 3.  PSYCH: Normal mood and affect, pleasant and agreeable during interview and exam.    IMAGING: no recent  imagnig    LAB:  CATHETERIZED urine dip today is completely negative without LE or pyuria.    ASSESSMENT and PLAN:    Ms. Catherine Mccollum is an 78 year old female with mild urinary urgency and vaginal mucosal atrophy.   -Catheterized urinalysis today is completely negative without LE or pyuria. Suspect that previous urinalyses showing pyuria was due to contamination.   -Will obtain urine culture to definitively rule out UTI.  -Discussed resuming " vaginal estrogen cream for the mucosal atrophy noted on exam and resulting symptoms. She would like to do this.  -Rx for vaginal estrogen cream sent to [facility]. Apply every night at bedtime x 2 weeks, then twice weekly thereafter.    Offered to schedule follow up in 2-3 months to recheck. Patient elects to follow up PRN.    Thank you for allowing me to participate in Ms. Mccollum's care. I will keep you updated of her progress, but please do not hesitate to contact me with any questions.    About 25 minutes were spent with the patient today, > 50% in counseling and coordination of care.    Aliyah Cohn PA-C  Department of Urology

## 2018-12-04 NOTE — LETTER
12/4/2018       RE: Catherine Mccollum  [address]  [place] [address]     Dear Colleague,    Thank you for referring your patient, Catherine Mccollum, to the [facility] UROLOGY AND [facility] FOR PROSTATE AND UROLOGIC CANCERS at [facility]. Please see a copy of my visit note below.    CC: abnormal urinalysis    HPI: It is a pleasure to see Ms. Catherine Mccollum, a very pleasant 78 year old female, asked to be seen in consultation by Dr. Alvarez for evaluation of pyuria. About a month ago, patient complained of some urinary urgency during a visit with her PCP. UA was checked which revealed moderate LE and 74 WBC. She was given 3 days of Cipro and had improvement in urgency symptoms. A urine culture returned no growth. She is referred to urology for evaluation of this pyuria in the setting of a negative urine culture.    She continues to complain of mild intermittent urgency. Denies frequency (voids q2-3 hours during the day), nocturia, significant urinary incontinence, hematuria, hesitancy, intermittency, feelings of incomplete emptying, or any recent history of urinary tract infections or stones. She does report an isolated episode of gross hematuria 10-20 years ago. Had a CT scan and cystoscopy at that time which were reportedly normal. No further hematuria since then.     She also reports some vaginal dryness and irritation. Previously used vaginal estrogen cream which worked very well for her but this was then discontinued ~10 years ago. She would like to resume vaginal estrogen cream if possible.     Review of Diagnostics:  10/23/18 - UA: moderate LE, 74 WBC, 2 RBC, WBC clumps --> UC: No growth  7/23/18 - UA: negative  6/22/18 - UA: small blood, trace LE, 5 WBC, 1 RBC  6/20/18 - UA: moderate LE, 48 WBC, 1 RBC, few bacteria      Past Medical History:   Diagnosis Date     GERD (gastroesophageal reflux disease)      HTN (hypertension)      Parkinsonian syndrome (H)     left side  tremor     Past Surgical History:   Procedure Laterality Date     BREAST SURGERY  Jan 1987    biopsy     CATARACT IOL, RT/LT       GYN SURGERY  1965;1967    2 ceasarean sections     HEMILAMINECTOMY, DISCECTOMY LUMBAR ONE LEVEL, COMBINED Left 6/25/2018    Procedure: COMBINED HEMILAMINECTOMY, DISCECTOMY LUMBAR ONE LEVEL;  Open Left Lumbar 5-Sacral 1 Hemilaminectomy, Medial Facetectomy, And Microdiscectomy Attention Transitional Segment Anatomy;  Surgeon: Rosemarie Young MD;  Location: [facility] OR     ORTHOPEDIC SURGERY      ankle surgery for trimalleolar frx     Current Outpatient Prescriptions   Medication Sig Dispense Refill     Acetaminophen (TYLENOL EXTRA STRENGTH PO) Take 1,000 mg by mouth 2 times daily Muscle and joint pain       CALCIUM PO Take 1 tablet by mouth every morning       chlorthalidone (HYGROTON) 25 MG tablet Take 1/2 tab daily (Patient taking differently: Take 12.5 mg by mouth every morning ) 45 tablet 3     Cholecalciferol (VITAMIN D) 1000 UNITS capsule Take 2,000 Units by mouth every morning        diazepam (VALIUM) 2 MG tablet Take 1.5 tablets (3 mg) by mouth every 6 hours as needed for muscle spasms (spasm) 100 tablet 0     GABAPENTIN PO Take 200 mg by mouth 2 times daily        irbesartan (AVAPRO) 150 MG tablet Take 1 tablet (150 mg) by mouth every morning 90 tablet 3     pravastatin (PRAVACHOL) 20 MG tablet Take 1 tablet (20 mg) by mouth At Bedtime 90 tablet 3     Probiotic Product (PROBIOTIC PO) Take by mouth daily        RaNITidine HCl (ZANTAC PO) Take 150 mg by mouth daily as needed for heartburn       ciprofloxacin (CIPRO) 250 MG tablet Take 1 tablet (250 mg) by mouth 2 times daily (Patient not taking: Reported on 12/4/2018) 6 tablet 0     order for DME Equipment being ordered: rolling walker (Patient not taking: Reported on 12/4/2018) 1 Device 0     Allergies   Allergen Reactions     Fosamax Nausea and GI Disturbance     Lisinopril Other (See Comments)     cough     Sulfa Drugs Hives  "    FAMILY HISTORY: There is no reported history of genitourinary carcinoma.  There is no history of urolithiasis.      SOCIAL HISTORY: The patient does not smoke cigarettes, no EtOH and no illicit drug use.    PHYSICAL EXAM:   Vitals:    12/04/18 0916   BP: 160/87   Pulse: 74   Weight: 65 kg (143 lb 6.4 oz)   Height: 1.549 m (5' 1\")     GENERAL: Well groomed, well developed, well nourished female in NAD.  HEENT: AT, NC, EOMI bilaterally.  SKIN: Warm to touch, dry.  No visible rashes or lesions on examined areas.  RESP: No increased respiratory effort.  MS: Full ROM in extremities.  PELVIC: Normal female external genitalia. Severe atrophic changes. Urethral meatus is patent without discharge or bleeding. Internal exam deferred.  NEURO: Alert and oriented x 3.  PSYCH: Normal mood and affect, pleasant and agreeable during interview and exam.    IMAGING: no recent  imagnig    LAB:  CATHETERIZED urine dip today is completely negative without LE or pyuria.    ASSESSMENT and PLAN:    Ms. Catherine Mccollum is an 78 year old female with mild urinary urgency and vaginal mucosal atrophy.   -Catheterized urinalysis today is completely negative without LE or pyuria. Suspect that previous urinalyses showing pyuria was due to contamination.   -Will obtain urine culture to definitively rule out UTI.  -Discussed resuming vaginal estrogen cream for the mucosal atrophy noted on exam and resulting symptoms. She would like to do this.  -Rx for vaginal estrogen cream sent to [facility]. Apply every night at bedtime x 2 weeks, then twice weekly thereafter.    Offered to schedule follow up in 2-3 months to recheck. Patient elects to follow up PRN.    Thank you for allowing me to participate in Ms. Mccollum's care. I will keep you updated of her progress, but please do not hesitate to contact me with any questions.    About 25 minutes were spent with the patient today, > 50% in counseling and coordination of care.    Aliyah Cohn, " MISHA  Department of Urology

## 2018-12-05 LAB
BACTERIA SPEC CULT: NO GROWTH
Lab: NORMAL
SPECIMEN SOURCE: NORMAL

## 2019-01-07 ENCOUNTER — TRANSFERRED RECORDS (OUTPATIENT)
Dept: HEALTH INFORMATION MANAGEMENT | Facility: CLINIC | Age: 79
End: 2019-01-07

## 2019-01-23 ENCOUNTER — OFFICE VISIT (OUTPATIENT)
Dept: INTERNAL MEDICINE | Facility: CLINIC | Age: 79
End: 2019-01-23
Payer: MEDICARE

## 2019-01-23 VITALS
SYSTOLIC BLOOD PRESSURE: 166 MMHG | HEART RATE: 58 BPM | DIASTOLIC BLOOD PRESSURE: 83 MMHG | BODY MASS INDEX: 26.77 KG/M2 | OXYGEN SATURATION: 97 % | WEIGHT: 141.7 LBS

## 2019-01-23 DIAGNOSIS — I10 ESSENTIAL HYPERTENSION: ICD-10-CM

## 2019-01-23 DIAGNOSIS — I10 BENIGN ESSENTIAL HYPERTENSION: ICD-10-CM

## 2019-01-23 DIAGNOSIS — F41.9 ANXIETY: ICD-10-CM

## 2019-01-23 DIAGNOSIS — G20.A1 PARKINSON DISEASE (H): ICD-10-CM

## 2019-01-23 DIAGNOSIS — I10 BENIGN ESSENTIAL HYPERTENSION: Primary | ICD-10-CM

## 2019-01-23 LAB
ALBUMIN SERPL-MCNC: 4.1 G/DL (ref 3.4–5)
ALP SERPL-CCNC: 66 U/L (ref 40–150)
ALT SERPL W P-5'-P-CCNC: 27 U/L (ref 0–50)
ANION GAP SERPL CALCULATED.3IONS-SCNC: 6 MMOL/L (ref 3–14)
AST SERPL W P-5'-P-CCNC: 22 U/L (ref 0–45)
BASOPHILS # BLD AUTO: 0 10E9/L (ref 0–0.2)
BASOPHILS NFR BLD AUTO: 0.5 %
BILIRUB SERPL-MCNC: 0.7 MG/DL (ref 0.2–1.3)
BUN SERPL-MCNC: 13 MG/DL (ref 7–30)
CALCIUM SERPL-MCNC: 9.4 MG/DL (ref 8.5–10.1)
CHLORIDE SERPL-SCNC: 100 MMOL/L (ref 94–109)
CHOLEST SERPL-MCNC: 187 MG/DL
CO2 SERPL-SCNC: 31 MMOL/L (ref 20–32)
CREAT SERPL-MCNC: 0.68 MG/DL (ref 0.52–1.04)
DIFFERENTIAL METHOD BLD: ABNORMAL
EOSINOPHIL # BLD AUTO: 0.1 10E9/L (ref 0–0.7)
EOSINOPHIL NFR BLD AUTO: 1.5 %
ERYTHROCYTE [DISTWIDTH] IN BLOOD BY AUTOMATED COUNT: 13 % (ref 10–15)
GFR SERPL CREATININE-BSD FRML MDRD: 82 ML/MIN/{1.73_M2}
GLUCOSE SERPL-MCNC: 107 MG/DL (ref 70–99)
HCT VFR BLD AUTO: 44 % (ref 35–47)
HDLC SERPL-MCNC: 43 MG/DL
HGB BLD-MCNC: 15.4 G/DL (ref 11.7–15.7)
IMM GRANULOCYTES # BLD: 0 10E9/L (ref 0–0.4)
IMM GRANULOCYTES NFR BLD: 0.3 %
LDLC SERPL CALC-MCNC: 106 MG/DL
LYMPHOCYTES # BLD AUTO: 2.9 10E9/L (ref 0.8–5.3)
LYMPHOCYTES NFR BLD AUTO: 33.3 %
MCH RBC QN AUTO: 29.9 PG (ref 26.5–33)
MCHC RBC AUTO-ENTMCNC: 35 G/DL (ref 31.5–36.5)
MCV RBC AUTO: 85 FL (ref 78–100)
MONOCYTES # BLD AUTO: 0.5 10E9/L (ref 0–1.3)
MONOCYTES NFR BLD AUTO: 6 %
NEUTROPHILS # BLD AUTO: 5.2 10E9/L (ref 1.6–8.3)
NEUTROPHILS NFR BLD AUTO: 58.4 %
NONHDLC SERPL-MCNC: 144 MG/DL
NRBC # BLD AUTO: 0 10*3/UL
NRBC BLD AUTO-RTO: 0 /100
PLATELET # BLD AUTO: 455 10E9/L (ref 150–450)
POTASSIUM SERPL-SCNC: 3.4 MMOL/L (ref 3.4–5.3)
PROT SERPL-MCNC: 7.4 G/DL (ref 6.8–8.8)
RBC # BLD AUTO: 5.15 10E12/L (ref 3.8–5.2)
SODIUM SERPL-SCNC: 137 MMOL/L (ref 133–144)
TRIGL SERPL-MCNC: 192 MG/DL
WBC # BLD AUTO: 8.8 10E9/L (ref 4–11)

## 2019-01-23 RX ORDER — LORAZEPAM 0.5 MG/1
0.5 TABLET ORAL
Qty: 20 TABLET | Refills: 0 | Status: SHIPPED | OUTPATIENT
Start: 2019-01-23 | End: 2019-07-23

## 2019-01-23 RX ORDER — CHLORTHALIDONE 25 MG/1
TABLET ORAL
Qty: 45 TABLET | Refills: 3 | Status: SHIPPED | OUTPATIENT
Start: 2019-01-23 | End: 2020-01-30

## 2019-01-23 ASSESSMENT — PAIN SCALES - GENERAL: PAINLEVEL: MILD PAIN (3)

## 2019-01-23 NOTE — PATIENT INSTRUCTIONS
[facility] Medication Refill Request Information:  * Please contact your pharmacy regarding ANY request for medication refills.  ** [facility] Prescription Fax = [phone]  * Please allow 3 business days for routine medication refills.  * Please allow 5 business days for controlled substance medication refills.     [facility] Test Result notification information:  *You will be notified with in 7-10 days of your appointment day regarding the results of your test.  If you are on MyChart you will be notified as soon as the provider has reviewed the results and signed off on them.    Primary [facility]: [phone]     Neurology [phone] ([facility], 3rd Floor E)

## 2019-01-23 NOTE — NURSING NOTE
Chief Complaint   Patient presents with     Recheck Medication     follow up, requesting referral to neurology, wants refill for chlorthalidone in 12.5 mg and lorazepam       Harrison Melton, EMT 10:34 AM on 1/23/2019.

## 2019-01-23 NOTE — PROGRESS NOTES
HPI:    Pt. Comes in for follow up today.  She was seen by Dr. Ferguson, Neurology 3/31/2018 with h/o Parkinson's and  L sided radicular sxs. She was subsequently seen by Ms. Lewis, Neurosurgery 4/11/2018 for radicular sxs.  She was seen [facility] with MRI scan 3/22/2018 with some minor findings. She was given Steroid dose pack 3/20/2018 and had another one as well (from Dr. Ferguson). These did reduce her sxs. She was given Tramdol from Dr. Jackson as well and this does reduce her sxs. She had L-sided  L5/S1 surgery 6/25/2018 and she is doing better. She has h/o HTN on Avapro and chlorthalidone. She has h/o increased cholesterol on Pravastatin. No other HEENT, cardiopulmonary, abdominal, , GYN, neurological, systemic, psychiatric complaints. She has two sons (one in [place], and one in [place]). She does not smoke nor abuse EtOH.       Past Surgical History:   Procedure Laterality Date     BREAST SURGERY  Jan 1987    biopsy     CATARACT IOL, RT/LT       GYN SURGERY  1965;1967    2 ceasarean sections     HEMILAMINECTOMY, DISCECTOMY LUMBAR ONE LEVEL, COMBINED Left 6/25/2018    Procedure: COMBINED HEMILAMINECTOMY, DISCECTOMY LUMBAR ONE LEVEL;  Open Left Lumbar 5-Sacral 1 Hemilaminectomy, Medial Facetectomy, And Microdiscectomy Attention Transitional Segment Anatomy;  Surgeon: Rosemarie Young MD;  Location: [facility] OR     ORTHOPEDIC SURGERY      ankle surgery for trimalleolar frx     PE:    Vitals noted gen nad cooperative alert, HEENT, oropharynx clear no exudate, neck supple nl rom no, LCTA, B good air movement, RRR, S1, S2, no MRG, abdomen, nd, nt, resting arm and leg tremors. No B CVA tenderness to palpation. No suprapubic tenderness to palpation.     A/P:    1. HTN;  she remains on same medication; labs done 7/23/2018. Ordered labs today 1/23/2019. She states almost always at home her SBP is in the 120 range. She does NOT want to adjust any of her antihypertensive medications today.   2.  Parkinson's; she follows with Dr. Ferguson, Neurology  3. Back pain and L sided sxs. She has injection scheduled with Dr. Calabrese  5/4/2018. She has follow up 5/23/2018 with Ms. Lewis, Neurosurgery. She had surgery 6/25/2018 L5/S1 and saw Ms. Lewis Neurosurgery 7/9/2018 and again 9/27/2018. She wants to remain on low dose Gabapentin. She has 100 mg tablets and takes 1-3/day and this seems to reduce her back sxs.   4. She follows with outside Dermatology  5. She will get future mammograms. Placed order  7/23/2018  6. Immunizations; future new Zoster Vaccination and check with insurance   7. Increased cholesterol on Pravastatin; labs checked 7/23/2018 with LDL 97 and HDL 40.   8. She was seen in Urology 12/4/2019 with negative UA/UC.    9. CXR done 7/23/3028 stable and no change for subtle L sided finding on 6/20/2018  10. Flu shot done 10/8/2018.   11. Gave low dose Ativan 0.5 mg #20 for sleep. She states this will last her almost one year.       Total time spent 25  minutes.  More than 50% of the time spent with Ms. Mccollum on counseling / coordinating her care

## 2019-03-05 ENCOUNTER — OFFICE VISIT (OUTPATIENT)
Dept: NEUROLOGY | Facility: CLINIC | Age: 79
End: 2019-03-05
Payer: MEDICARE

## 2019-03-05 VITALS — OXYGEN SATURATION: 97 % | SYSTOLIC BLOOD PRESSURE: 164 MMHG | DIASTOLIC BLOOD PRESSURE: 76 MMHG | HEART RATE: 66 BPM

## 2019-03-05 DIAGNOSIS — G20.A1 PARKINSON DISEASE (H): Primary | ICD-10-CM

## 2019-03-05 RX ORDER — CARBIDOPA 25 MG/1
25 TABLET ORAL 3 TIMES DAILY
Qty: 90 TABLET | Refills: 3 | Status: SHIPPED | OUTPATIENT
Start: 2019-03-05 | End: 2019-07-23

## 2019-03-05 RX ORDER — CARBIDOPA AND LEVODOPA 25; 100 MG/1; MG/1
1 TABLET ORAL 3 TIMES DAILY
Qty: 180 TABLET | Refills: 3 | Status: SHIPPED | OUTPATIENT
Start: 2019-03-05 | End: 2019-07-23

## 2019-03-05 ASSESSMENT — ENCOUNTER SYMPTOMS
NUMBNESS: 0
BACK PAIN: 1
TINGLING: 0
JOINT SWELLING: 0
HEADACHES: 0
ARTHRALGIAS: 1
PARALYSIS: 0
WEAKNESS: 1
DISTURBANCES IN COORDINATION: 0
MEMORY LOSS: 0
SEIZURES: 0
MUSCLE WEAKNESS: 0
NECK PAIN: 0
DIZZINESS: 0
MYALGIAS: 1
STIFFNESS: 1
TREMORS: 1
MUSCLE CRAMPS: 0
SPEECH CHANGE: 0
LOSS OF CONSCIOUSNESS: 0

## 2019-03-05 ASSESSMENT — PAIN SCALES - GENERAL: PAINLEVEL: NO PAIN (0)

## 2019-03-05 NOTE — NURSING NOTE
Chief Complaint   Patient presents with     RECHECK     [facility] RETURN PER PT       Jacqueline Arana, EMT

## 2019-03-05 NOTE — LETTER
3/5/2019       RE: Catherine Mccollum  [address]  [place] [address]     Dear Colleague,    Thank you for referring your patient, Catherine Mccollum, to the [facility] NEUROLOGY at [facility]. Please see a copy of my visit note below.    Service Date: 2019      RE: Catherine Mccollum   MRN: [id number]   : 1940      Dear Dr. Alvarez:      This is in regard to followup on Catherine Mccollum.  The patient returned today on 2019.  Her chief complaint is that of tremor, Parkinson disease, and lumbar radiculopathies.      The patient returned for routine neurologic followup today on 2019.  She feels that her Parkinson disease is getting worse.  She is a noted more difficulties utilizing her left hand and left foot.  She is interested in [facility] now.  She did have successful surgery with Dr. Young.  I had referred her for that and she did undergo a L5-S1 left hemilaminectomy with medial facetectomy as well as microdiskectomy.  The patient earlier had had successful right lumbar laminectomy.  She no longer has any leg pain and she is grateful for that.  She has now totally switched her care to [facility].  The patient does get transportation through "[employer].  She would like to stay in [place] for her physical therapy, but will continue to come to [facility].  Since I last saw her, she denied any other issues.  The patient did tell me she continues on calcium, chlorthalidone, vitamin D, gabapentin 200 mg at night now, Avapro, pravastatin, probiotic and ranitidine.  She has been off of Sinemet 25/100 b.i.d. for some time.  She said she had dyspepsia with it and felt it caused acid reflux.  She was last taking her dosage 25/100 in the morning and again in 2:00-3:00 p.m.  She noted some benefit from it, but did not like how she felt in terms of her dyspepsia.  The patient had essential tremor before she developed Parkinson disease.  That usually implies that  Parkinson's does have a better prognosis.  She still mainly has left-sided symptoms and that too is in keeping with early stage Parkinsonian syndrome.  The patient otherwise seems to be doing quite well in her present living situation.  She did talk with me about chronic issues she has had with sleep and I did tell her she may want to continue the gabapentin to help sleep and also that it may help her tremor.  She denied any other problems including cognitive change.  She still can get out of a chair.  She uses a cane though she has not suffered any falls.  The patient did discuss with me her chronic followup with her dermatologist.  She has had some solar keratoses but she assured me she never had any type of skin cancer including melanoma.  She has been given Ativan 0.5 mg to take just for sleep and she uses it rarely.      Neurologic examination revealed a pleasant woman.  Her blood pressure initially was recorded by our medical assistant as 164/76 with a pulse of 60 using a machine.  I retook it with a soft cuff and it was elevated at 150/82 with a pulse of 60.  Otherwise, she could get out of a chair with her arms folded.  She walked 25 feet in 8 seconds.  She had a reduced left arm swing.  The patient had good turns.  She did not have any postural instability and had negative pull test and negative Romberg.  She has decreased rapid alternating motion rate of the left hand and left foot.  The patient does have mild rigidity in the left arm and leg with cogwheeling involving all limbs.  She has a very mild postural component to her tremor but mainly today as a rest tremor of the left hand and is a pill rolling quality.  The patient's cranial nerve examination showed a glabellar tap sign.  She has slight decreased upward gaze.  Her lungs were clear to auscultation.  She had a cardiac rhythm without gallops or murmurs.      IMPRESSION:   1.  Parkinson disease.   2.  Prior essential tremor.      The patient is  going to start [facility].  I have talked with her about the use of Lodosyn or carbidopa.  Usually the addition of 25 mg of carbidopa to the 25/100 combined Sinemet tablet will get rid of dyspepsia.  This would then equal 50/100.  Unfortunately, this is an expensive medication, although at one time it was quite inexpensive and was actually given free to practitioners.  I have suggested that she gradually start Sinemet again and I gave her a schedule to use both Lodosyn and Sinemet together.  She is going to have followup with me in 2 months and on a p.r.n. basis.  Hopefully, my suggestions will help her.  I reassured her about her degree of parkinsonian syndrome.       I spent 30 minutes with the patient today.  Over 50% of the time this involved counseling and coordination of care.      D: 2019   T: 2019   MT: LATASHA      Name:     GÉNESIS CA   MRN:      [id number]        Account:      [id number]   :      1940           Service Date: 2019      Document: [id number]        Again, thank you for allowing me to participate in the care of your patient.      Sincerely,    Yonathan Ferguson MD    CC:  Kar Alvarez MD   [facility]    [address] [place] [id number]

## 2019-03-06 ENCOUNTER — TELEPHONE (OUTPATIENT)
Dept: NEUROLOGY | Facility: CLINIC | Age: 79
End: 2019-03-06

## 2019-03-06 NOTE — PROGRESS NOTES
Service Date: 2019      Kar Alvarez MD   [facility]    [address] [address]      RE: Catherine Mccollum   MRN: [id number]   : 1940      Dear Dr. Alvarez:      This is in regard to followup on Catherine Mccollum.  The patient returned today on 2019.  Her chief complaint is that of tremor, Parkinson disease, and lumbar radiculopathies.      The patient returned for routine neurologic followup today on 2019.  She feels that her Parkinson disease is getting worse.  She is a noted more difficulties utilizing her left hand and left foot.  She is interested in Big and Loud Therapy now.  She did have successful surgery with Dr. Young.  I had referred her for that and she did undergo a L5-S1 left hemilaminectomy with medial facetectomy as well as microdiskectomy.  The patient earlier had had successful right lumbar laminectomy.  She no longer has any leg pain and she is grateful for that.  She has now totally switched her care to [facility].  The patient does get transportation through [employer].  She would like to stay in [place] for her physical therapy, but will continue to come to [facility].  Since I last saw her, she denied any other issues.  The patient did tell me she continues on calcium, chlorthalidone, vitamin D, gabapentin 200 mg at night now, Avapro, pravastatin, probiotic and ranitidine.  She has been off of Sinemet 25/100 b.i.d. for some time.  She said she had dyspepsia with it and felt it caused acid reflux.  She was last taking her dosage 25/100 in the morning and again in 2:00-3:00 p.m.  She noted some benefit from it, but did not like how she felt in terms of her dyspepsia.  The patient had essential tremor before she developed Parkinson disease.  That usually implies that Parkinson's does have a better prognosis.  She still mainly has left-sided symptoms and that too is in keeping with early stage Parkinsonian syndrome.  The patient  otherwise seems to be doing quite well in her present living situation.  She did talk with me about chronic issues she has had with sleep and I did tell her she may want to continue the gabapentin to help sleep and also that it may help her tremor.  She denied any other problems including cognitive change.  She still can get out of a chair.  She uses a cane though she has not suffered any falls.  The patient did discuss with me her chronic followup with her dermatologist.  She has had some solar keratoses but she assured me she never had any type of skin cancer including melanoma.  She has been given Ativan 0.5 mg to take just for sleep and she uses it rarely.      Neurologic examination revealed a pleasant woman.  Her blood pressure initially was recorded by our medical assistant as 164/76 with a pulse of 60 using a machine.  I retook it with a soft cuff and it was elevated at 150/82 with a pulse of 60.  Otherwise, she could get out of a chair with her arms folded.  She walked 25 feet in 8 seconds.  She had a reduced left arm swing.  The patient had good turns.  She did not have any postural instability and had negative pull test and negative Romberg.  She has decreased rapid alternating motion rate of the left hand and left foot.  The patient does have mild rigidity in the left arm and leg with cogwheeling involving all limbs.  She has a very mild postural component to her tremor but mainly today as a rest tremor of the left hand and is a pill rolling quality.  The patient's cranial nerve examination showed a glabellar tap sign.  She has slight decreased upward gaze.  Her lungs were clear to auscultation.  She had a cardiac rhythm without gallops or murmurs.      IMPRESSION:   1.  Parkinson disease.   2.  Prior essential tremor.      The patient is going to start Big and Loud Therapy.  I have talked with her about the use of Lodosyn or carbidopa.  Usually the addition of 25 mg of carbidopa to the 25/100 combined  Sinemet tablet will get rid of dyspepsia.  This would then equal 50/100.  Unfortunately, this is an expensive medication, although at one time it was quite inexpensive and was actually given free to practitioners.  I have suggested that she gradually start Sinemet again and I gave her a schedule to use both Lodosyn and Sinemet together.  She is going to have followup with me in 2 months and on a p.r.n. basis.  Hopefully, my suggestions will help her.  I reassured her about her degree of parkinsonian syndrome.      Thank you again for allowing me to see this patient.       Sincerely yours,       Axel Ferguson MD       I spent 30 minutes with the patient today.  Over 50% of the time this involved counseling and coordination of care.         AXEL FERGUSON MD             D: 2019   T: 2019   MT: LATASHA      Name:     GÉNESIS CA   MRN:      [id number]        Account:      [id number]   :      1940           Service Date: 2019      Document: [id number]

## 2019-03-06 NOTE — TELEPHONE ENCOUNTER
Central Prior Authorization Team   [phone]    PA Initiation    Medication: Carbidopa 25MG Tablets  Insurance Company: [employer] - Phone [phone] Fax [phone]  Pharmacy Filling the Rx: [id number] - [place], [place] - [address] AT [facility] [address]  Filling Pharmacy Phone: [phone]  Filling Pharmacy Fax: [phone]  Start Date: 3/6/2019

## 2019-03-06 NOTE — TELEPHONE ENCOUNTER
Prior Authorization Specialty Medication Request    Medication/Dose: Carbidopa 25MG Tablets  ICD code:  G20  Previously Tried and Failed:  See chart    Important Lab Values: see chart  Rationale: see chart    Insurance Name: Medicare  Insurance ID: [id number]  Insurance Phone Number: [phone]    Pharmacy Information   Name:  [facility]  Phone:  [phone]

## 2019-03-07 NOTE — TELEPHONE ENCOUNTER
Prior Authorization Approval    Authorization Effective Date: 3/6/2019  Authorization Expiration Date: 12/31/2019  Medication: Carbidopa 25MG Tablets-PA APPROVED   Approved Dose/Quantity:   Reference #:     Insurance Company: [employer] - Phone [phone] Fax [phone]  Expected CoPay:       CoPay Card Available:      Foundation Assistance Needed:    Which Pharmacy is filling the prescription (Not needed for infusion/clinic administered): [id number] - [place], [place] - [address] AT [facility] [address]  Pharmacy Notified: Yes  Patient Notified: Yes

## 2019-03-13 ENCOUNTER — HOSPITAL ENCOUNTER (OUTPATIENT)
Dept: PHYSICAL THERAPY | Facility: CLINIC | Age: 79
Setting detail: THERAPIES SERIES
End: 2019-03-13
Attending: PSYCHIATRY & NEUROLOGY
Payer: MEDICARE

## 2019-03-13 DIAGNOSIS — G20.A1 PARKINSON DISEASE (H): ICD-10-CM

## 2019-03-13 PROCEDURE — 97110 THERAPEUTIC EXERCISES: CPT | Mod: GP | Performed by: PHYSICAL THERAPIST

## 2019-03-13 PROCEDURE — 97116 GAIT TRAINING THERAPY: CPT | Mod: GP,KX | Performed by: PHYSICAL THERAPIST

## 2019-03-13 PROCEDURE — 97161 PT EVAL LOW COMPLEX 20 MIN: CPT | Mod: GP | Performed by: PHYSICAL THERAPIST

## 2019-03-25 ENCOUNTER — TRANSFERRED RECORDS (OUTPATIENT)
Dept: HEALTH INFORMATION MANAGEMENT | Facility: CLINIC | Age: 79
End: 2019-03-25

## 2019-03-27 NOTE — ADDENDUM NOTE
Encounter addended by: Re Rodriguez, PT on: 3/27/2019 2:00 PM   Actions taken: Sign clinical note, Flowsheet accepted

## 2019-03-27 NOTE — PROGRESS NOTES
"   03/13/19 1100   General Information   Start of Care Date 03/13/19   Referring Physician Yonathan Ferguson   Orders Evaluate and Treat as Indicated   Additional Orders big loud   Order Date 03/05/19   Medical Diagnosis PD   Surgical/Medical history reviewed Yes   Pertinent history of current problem (include personal factors and/or comorbidities that impact the POC) saw Dr. Ferguson.  was on sinimet / off and now will go back on - insurance is checking.  has stomach issues.  Dx PD, 5 yr ago.  take bus and metro.  does not dirve.  takes [place] line.  apt - [facility].  likes.  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  they do clean once a week.  they have a van for [facility] weekly [place].  they carry the bags.     Pertinent Visual History  glasses   Prior level of functional mobility Ambulation   Ambulation cane, not always using, uses it out of ap't bldg.   has a walker. used before back surgery last june.  2ww.     Patient role/Employment history Retired  ([employer])   Living environment Apartment/condo   Current Assistive Devices Front Wheeled Walker;Standard Cane   Patient/Family Goals Statement speak louder w/ audience or more than one person.   re; walking - improve natural gait, move better on L side.  better L arm swing.    General Information Comments \"Catherine\"  - pt gave a talk about husb escaping [place].  voice got quieter.     Fall Risk Screen   Fall screen completed by PT   Have you fallen 2 or more times in the past year? No   Timed Up and Go score (seconds) 13.5   Fall screen comments 10.1 sec big.  cognitive 12.9 sec.   Pain   Patient currently in pain Yes   Pain rating a little back pain - uses tylenol   Vitals Signs   Heart Rate 70   Blood Pressure 163/64   Cognitive Status Examination   Orientation orientation to person, place and time   Level of Consciousness alert   Follows Commands and Answers Questions 100% of the time   Personal Safety and Judgment intact   Integumentary   Integumentary No deficits were " identified   Posture   Posture Forward head position;Protracted shoulders   Range of Motion (ROM)   ROM Comment ankles DF neutral bilat   Strength   Strength Comments gr 5- R side, 4 to 4+ left side.    Bed Mobility   Bed Mobility Comments able. sl slowed   Transfer Skills   Transfer Comments 5x STS  15.4 sec.     Gait   Gait Comments 518ft in 3 min   Gait Special Tests   Gait Special Tests 25 FOOT TIMED WALK;OTHER   Gait Special Tests 25 Foot Timed Walk   Seconds 12.2   Comments 8.7 sec big   Sensory Examination   Sensory Perception no deficits were identified   Coordination   Coordination other (see comments)   Coordination Comments decreased L hand w/tremor   Muscle Tone   Muscle Tone Comments tremor L hand   Planned Therapy Interventions   Planned Therapy Interventions balance training;gait training;neuromuscular re-education;strengthening;transfer training   Clinical Impression   Criteria for Skilled Therapeutic Interventions Met yes, treatment indicated   PT Diagnosis PD   Influenced by the following impairments tremor, juvenal kinesia   Functional limitations due to impairments gait difficulty   Clinical Presentation Stable/Uncomplicated   Clinical Decision Making (Complexity) Low complexity   Therapy Frequency other (see comments)   Predicted Duration of Therapy Intervention (days/wks) up to 18 x in 90 days   Risk & Benefits of therapy have been explained Yes   Patient, Family & other staff in agreement with plan of care Yes   Clinical Impression Comments pt will benefit from big therapy   Education Assessment   Preferred Learning Style Demonstration   Barriers to Learning Physical   GOALS   PT Eval Goals 1;2;3   Goal 1   Goal Identifier gait   Goal Description 25 ft walk in 9 sec or less to cross streets and lots   Target Date 06/07/19   Goal 2   Goal Identifier gait   Goal Description TUG score to be 9 sec or less for safe pivots in home and community   Target Date 06/07/19   Goal 3   Goal Identifier HEP    Goal Description pt to be indep w/ a lsvt home exer program for amplitude training   Target Date 05/31/19   Total Evaluation Time   PT Eval, Low Complexity Minutes (16357) 28

## 2019-04-03 ENCOUNTER — TRANSFERRED RECORDS (OUTPATIENT)
Dept: HEALTH INFORMATION MANAGEMENT | Facility: CLINIC | Age: 79
End: 2019-04-03

## 2019-04-08 ENCOUNTER — HOSPITAL ENCOUNTER (OUTPATIENT)
Dept: PHYSICAL THERAPY | Facility: CLINIC | Age: 79
Setting detail: THERAPIES SERIES
End: 2019-04-08
Attending: PSYCHIATRY & NEUROLOGY
Payer: MEDICARE

## 2019-04-08 PROCEDURE — 97112 NEUROMUSCULAR REEDUCATION: CPT | Mod: GP,KX | Performed by: PHYSICAL THERAPIST

## 2019-04-08 PROCEDURE — 97116 GAIT TRAINING THERAPY: CPT | Mod: GP,KX | Performed by: PHYSICAL THERAPIST

## 2019-04-09 ENCOUNTER — HOSPITAL ENCOUNTER (OUTPATIENT)
Dept: PHYSICAL THERAPY | Facility: CLINIC | Age: 79
Setting detail: THERAPIES SERIES
End: 2019-04-09
Attending: PSYCHIATRY & NEUROLOGY
Payer: MEDICARE

## 2019-04-09 PROCEDURE — 97116 GAIT TRAINING THERAPY: CPT | Mod: GP | Performed by: PHYSICAL THERAPIST

## 2019-04-09 PROCEDURE — 97110 THERAPEUTIC EXERCISES: CPT | Mod: GP,KX | Performed by: PHYSICAL THERAPIST

## 2019-04-09 PROCEDURE — 97112 NEUROMUSCULAR REEDUCATION: CPT | Mod: GP | Performed by: PHYSICAL THERAPIST

## 2019-04-10 ENCOUNTER — HOSPITAL ENCOUNTER (OUTPATIENT)
Dept: PHYSICAL THERAPY | Facility: CLINIC | Age: 79
Setting detail: THERAPIES SERIES
End: 2019-04-10
Attending: PSYCHIATRY & NEUROLOGY
Payer: MEDICARE

## 2019-04-10 PROCEDURE — 97112 NEUROMUSCULAR REEDUCATION: CPT | Mod: GP | Performed by: PHYSICAL THERAPIST

## 2019-04-10 PROCEDURE — 97116 GAIT TRAINING THERAPY: CPT | Mod: GP,KX | Performed by: PHYSICAL THERAPIST

## 2019-04-15 ENCOUNTER — HOSPITAL ENCOUNTER (OUTPATIENT)
Dept: PHYSICAL THERAPY | Facility: CLINIC | Age: 79
Setting detail: THERAPIES SERIES
End: 2019-04-15
Attending: PSYCHIATRY & NEUROLOGY
Payer: MEDICARE

## 2019-04-15 PROCEDURE — 97116 GAIT TRAINING THERAPY: CPT | Mod: GP,KX | Performed by: PHYSICAL THERAPIST

## 2019-04-15 PROCEDURE — 97112 NEUROMUSCULAR REEDUCATION: CPT | Mod: GP,KX | Performed by: PHYSICAL THERAPIST

## 2019-04-16 ENCOUNTER — HOSPITAL ENCOUNTER (OUTPATIENT)
Dept: PHYSICAL THERAPY | Facility: CLINIC | Age: 79
Setting detail: THERAPIES SERIES
End: 2019-04-16
Attending: PSYCHIATRY & NEUROLOGY
Payer: MEDICARE

## 2019-04-16 PROCEDURE — 97112 NEUROMUSCULAR REEDUCATION: CPT | Mod: GP,KX | Performed by: PHYSICAL THERAPIST

## 2019-04-16 PROCEDURE — 97116 GAIT TRAINING THERAPY: CPT | Mod: GP,KX | Performed by: PHYSICAL THERAPIST

## 2019-04-17 ENCOUNTER — HOSPITAL ENCOUNTER (OUTPATIENT)
Dept: PHYSICAL THERAPY | Facility: CLINIC | Age: 79
Setting detail: THERAPIES SERIES
End: 2019-04-17
Attending: PSYCHIATRY & NEUROLOGY
Payer: MEDICARE

## 2019-04-17 PROCEDURE — 97116 GAIT TRAINING THERAPY: CPT | Mod: GP,KX | Performed by: PHYSICAL THERAPIST

## 2019-04-17 PROCEDURE — 97112 NEUROMUSCULAR REEDUCATION: CPT | Mod: GP,KX | Performed by: PHYSICAL THERAPIST

## 2019-04-18 ENCOUNTER — HOSPITAL ENCOUNTER (OUTPATIENT)
Dept: PHYSICAL THERAPY | Facility: CLINIC | Age: 79
Setting detail: THERAPIES SERIES
End: 2019-04-18
Attending: PSYCHIATRY & NEUROLOGY
Payer: MEDICARE

## 2019-04-18 PROCEDURE — 97116 GAIT TRAINING THERAPY: CPT | Mod: GP,KX | Performed by: PHYSICAL THERAPIST

## 2019-04-18 PROCEDURE — 97112 NEUROMUSCULAR REEDUCATION: CPT | Mod: GP,KX | Performed by: PHYSICAL THERAPIST

## 2019-04-22 ENCOUNTER — HOSPITAL ENCOUNTER (OUTPATIENT)
Dept: PHYSICAL THERAPY | Facility: CLINIC | Age: 79
Setting detail: THERAPIES SERIES
End: 2019-04-22
Attending: PSYCHIATRY & NEUROLOGY
Payer: MEDICARE

## 2019-04-22 PROCEDURE — 97116 GAIT TRAINING THERAPY: CPT | Mod: GP,KX | Performed by: PHYSICAL THERAPIST

## 2019-04-22 PROCEDURE — 97112 NEUROMUSCULAR REEDUCATION: CPT | Mod: GP,KX | Performed by: PHYSICAL THERAPIST

## 2019-04-22 PROCEDURE — 97750 PHYSICAL PERFORMANCE TEST: CPT | Mod: GP,KX | Performed by: PHYSICAL THERAPIST

## 2019-04-22 NOTE — PROGRESS NOTES
04/22/19 1400   Signing Clinician's Name / Credentials   Signing clinician's name / credentials Rachel Irizarry, DPT, NCS   Functional Gait Assessment (JUAN MANUEL Goyal, SALOME Paulson, et al. (2004))   1. GAIT LEVEL SURFACE 3  (5.48 secs)   2. CHANGE IN GAIT SPEED 3   3. GAIT WITH HORIZONTAL HEAD TURNS 2   4. GAIT WITH VERTICAL HEAD TURNS 3   5. GAIT AND PIVOT TURN 3   6. STEP OVER OBSTACLE 3   7. GAIT WITH NARROW BASE OF SUPPORT 0  (2-3 steps)   8. GAIT WITH EYES CLOSED 3   9. AMBULATING BACKWARDS 3   10. STEPS 3   Total Functional Gait Assessment Score   TOTAL SCORE: (MAXIMUM SCORE 30) 26     Functional Gait Assessment (FGA): The FGA assesses postural stability during various walking tasks.   Gait assistive device used: none    Patient Score: 26/30  Scores of <22/30 have been correlated with predicting falls in community-dwelling older adults according to Jay Jay & Thompson 2010.   Scores of <18/30 have been correlated with increased risk for falls in patients with Parkinsons Disease according to Lv Santos, Bryan et al 2014.  Minimal Detectable Change for patients with acute/chronic stroke = 4.2 according to Anum & Alineschrosmery 2009  Minimal Detectable Change for patients with vestibular disorder = 8 according to Jay Jay & Thompson 2010    Assessment (rationale for performing, application to patient s function & care plan): assess falls risk, balance challenges.  Minutes billed as physical performance test: 15

## 2019-04-23 ENCOUNTER — HOSPITAL ENCOUNTER (OUTPATIENT)
Dept: PHYSICAL THERAPY | Facility: CLINIC | Age: 79
Setting detail: THERAPIES SERIES
End: 2019-04-23
Attending: PSYCHIATRY & NEUROLOGY
Payer: MEDICARE

## 2019-04-23 PROCEDURE — 97112 NEUROMUSCULAR REEDUCATION: CPT | Mod: GP | Performed by: PHYSICAL THERAPIST

## 2019-04-23 PROCEDURE — 97116 GAIT TRAINING THERAPY: CPT | Mod: GP,KX | Performed by: PHYSICAL THERAPIST

## 2019-04-23 NOTE — PROGRESS NOTES
04/23/19 1000   Signing Clinician's Name / Credentials   Signing clinician's name / credentials Re Juarezdeangelo PT   Session Number   Session Number 10  - Medicare   Progress Note/Recertification   Progress Note Completed Date 04/23/19   Goal 1   Goal Description 25 ft walk in 9 sec or less to cross streets and lots   Target Date 06/07/19   Date Met 04/18/19   Goal Identifier gait   Goal 2   Goal Description TUG score to be 9 sec or less for safe pivots in home and community   Target Date 06/07/19   Date Met 04/18/19   Goal Identifier gait   Goal 3   Goal Identifier HEP   Goal Description  pt to be indep w/ a lsvt home exer program for amplitude training   Target Date 05/31/19   Goal 4   Goal Identifier making bed   Goal Description pt to make bed w/ 0-2 pain when changing sheets.   (vs 4 or more currently)   Target Date 05/31/19   Subjective Report   Subjective Report Had a good weekend/ walked in park.  i walked on grass.  [place].  I am more confident.  pain 4 with making change sheets (low back)   Objective Measure 1   Objective Measure 25ft walk   Details 6.85 secs no AD   Objective Measure 2   Objective Measure TUG   Details 7.78 sec standard. Congitive: 8.12 secs. Manual: 8.19 secs.   Objective Measure 3   Objective Measure FGA   Details 26/30   Objective Measure 5   Objective Measure Treadmill   Details 5 min 1.6 mi/hr,  761 ft   Neuromuscular Re-education   Neuromuscular re-ed of mvmt, balance, coord, kinesthetic sense, posture, proprioception minutes (13462) 30   Skilled Intervention BIG amplitude training   Patient Response Gurmeet well overall.   Treatment Detail Big exer;  floor to ceiling no cues.  did not do side to side due to back pain.  step reach forw. back and to side all w/ sba, cues for big L hand.  no use of chair.  alternated feet for step reach forward.  others did 10x on each side then switched.   rock reach forw/back and side to side w/ sba/ cues for big hand L.  gurmeet well. sit/stand x 8  "w/ sba, cues- reminder to count out loud to 5 for stand to sit.  usually got to 4 or 5 then sat down.  cues/demo to bring head more forward w/ stand to sit for better quality.  big ball toss x 8 under and x 8 overhand.  needed cues/demo.     Progress gurmeet well; improving   Gait Training   Gait Training Minutes, includes stair climbing (96677) 24   Skilled Intervention TM, gait    Patient Response gurmeet well   Treatment Detail TM as above w/ cues;  gait outside w/ demo, cues outside no AD, use of shadow on ground for visual as well as cues/demo for big walking 800ft.  gait drills incl side stepping 20ft x 2 incl stepping over canes on floor.  then crossovers in front, then behind, then braiding in both directions w/ demo  20ft x 2 for each.  forw/ back steps counting 5 forw 4 back x 3 sets no lob.  good quality.     Progress gurmeet well; fatigued at end of session, took bus so wanted to catch [address] bus at corner.    Education   Learner Patient   Readiness Eager;Acceptance   Method Explanation;Demonstration   Response Verbalizes Understanding;Demonstrates Understanding;Needs Reinforcement   Education Comments how to use \"legs vs back to make bed\".     Plan   Homework above.   Updates to plan of care see progress note   Plan for next session cont PT to finish big training   Comments   Comments KX modifier: Pt with ongoing skilled PT needs to maximize her function and reduce falls risk in context of Parkinson's Disease.   Total Session Time   Timed Code Treatment Minutes 54   Total Treatment Time (sum of timed and untimed services) 54     "

## 2019-04-23 NOTE — PROGRESS NOTES
Outpatient Physical Therapy Progress Note     Patient: Catherine Mccollum  : 1940    Beginning/End Dates of Reporting Period:  3/13/19 to 2019    Referring Provider: Yonathan Ferguson MD    Therapy Diagnosis: bradykinesia, gait difficulty     Client Self Report: Had a good weekend/ walked in park.  i walked on grass.  [place].  I am more confident.  pain 4 with making change sheets (low back)    Objective Measurements:  Objective Measure: 25ft walk  Details: 6.85 secs no AD  Objective Measure: TUG  Details: 7.78 sec standard. Congitive: 8.12 secs. Manual: 8.19 secs.  Objective Measure: FGA  Details:   Objective Measure: Treadmill  Details: 5 min 1.6 mi/hr,  761 ft      Goals:  Goal Identifier gait   Goal Description 25 ft walk in 9 sec or less to cross streets and lots   Target Date 19   Date Met  19   Progress:     Goal Identifier gait   Goal Description TUG score to be 9 sec or less for safe pivots in home and community   Target Date 19   Date Met  19   Progress:     Goal Identifier HEP   Goal Description  pt to be indep w/ a lsvt home exer program for amplitude training   Target Date 19   Date Met      Progress:     Goal Identifier making bed   Goal Description pt to make bed w/ 0-2 pain when changing sheets.   (vs 4 or more currently)   Target Date 19   Date Met      Progress:       Progress Toward Goals:   Progress this reporting period: good progress with HEP, objective measures. Is walking more. Friends are noticing better posture.     Plan:  Continue therapy per current plan of care.    Discharge:  no    Equipment Issued: none    Discharge Plan: will continue to do lsvt big program as planned, then will discharge w/ Home exer program.  Pt knows about PD support/exer group.

## 2019-04-24 ENCOUNTER — HOSPITAL ENCOUNTER (OUTPATIENT)
Dept: PHYSICAL THERAPY | Facility: CLINIC | Age: 79
Setting detail: THERAPIES SERIES
End: 2019-04-24
Attending: PSYCHIATRY & NEUROLOGY
Payer: MEDICARE

## 2019-04-24 PROCEDURE — 97112 NEUROMUSCULAR REEDUCATION: CPT | Mod: GP,KX | Performed by: PHYSICAL THERAPIST

## 2019-04-24 PROCEDURE — 97116 GAIT TRAINING THERAPY: CPT | Mod: GP,KX | Performed by: PHYSICAL THERAPIST

## 2019-04-25 ENCOUNTER — HOSPITAL ENCOUNTER (OUTPATIENT)
Dept: PHYSICAL THERAPY | Facility: CLINIC | Age: 79
Setting detail: THERAPIES SERIES
End: 2019-04-25
Attending: PSYCHIATRY & NEUROLOGY
Payer: MEDICARE

## 2019-04-25 PROCEDURE — 97116 GAIT TRAINING THERAPY: CPT | Mod: GP,KX | Performed by: PHYSICAL THERAPIST

## 2019-04-25 PROCEDURE — 97112 NEUROMUSCULAR REEDUCATION: CPT | Mod: GP,KX | Performed by: PHYSICAL THERAPIST

## 2019-04-27 ASSESSMENT — ENCOUNTER SYMPTOMS
NECK PAIN: 0
BOWEL INCONTINENCE: 0
BACK PAIN: 1
STIFFNESS: 1
RECTAL PAIN: 0
ABDOMINAL PAIN: 0
NAUSEA: 0
ARTHRALGIAS: 1
DIARRHEA: 0
MYALGIAS: 1
JAUNDICE: 0
VOMITING: 0
MUSCLE CRAMPS: 0
BLOATING: 0
JOINT SWELLING: 0
CONSTIPATION: 0
HEARTBURN: 1
BLOOD IN STOOL: 0
MUSCLE WEAKNESS: 0

## 2019-04-29 ENCOUNTER — HOSPITAL ENCOUNTER (OUTPATIENT)
Dept: PHYSICAL THERAPY | Facility: CLINIC | Age: 79
Setting detail: THERAPIES SERIES
End: 2019-04-29
Attending: PSYCHIATRY & NEUROLOGY
Payer: MEDICARE

## 2019-04-29 PROCEDURE — 97116 GAIT TRAINING THERAPY: CPT | Mod: GP,KX | Performed by: PHYSICAL THERAPIST

## 2019-04-29 PROCEDURE — 97112 NEUROMUSCULAR REEDUCATION: CPT | Mod: GP,KX | Performed by: PHYSICAL THERAPIST

## 2019-04-30 ENCOUNTER — HOSPITAL ENCOUNTER (OUTPATIENT)
Dept: PHYSICAL THERAPY | Facility: CLINIC | Age: 79
Setting detail: THERAPIES SERIES
End: 2019-04-30
Attending: PSYCHIATRY & NEUROLOGY
Payer: MEDICARE

## 2019-04-30 PROCEDURE — 97116 GAIT TRAINING THERAPY: CPT | Mod: GP,KX | Performed by: PHYSICAL THERAPIST

## 2019-04-30 PROCEDURE — 97112 NEUROMUSCULAR REEDUCATION: CPT | Mod: GP,KX | Performed by: PHYSICAL THERAPIST

## 2019-05-01 ENCOUNTER — HOSPITAL ENCOUNTER (OUTPATIENT)
Dept: PHYSICAL THERAPY | Facility: CLINIC | Age: 79
Setting detail: THERAPIES SERIES
End: 2019-05-01
Attending: PSYCHIATRY & NEUROLOGY
Payer: MEDICARE

## 2019-05-01 PROCEDURE — 97112 NEUROMUSCULAR REEDUCATION: CPT | Mod: GP,KX | Performed by: PHYSICAL THERAPIST

## 2019-05-01 PROCEDURE — 97116 GAIT TRAINING THERAPY: CPT | Mod: GP | Performed by: PHYSICAL THERAPIST

## 2019-05-02 ENCOUNTER — HOSPITAL ENCOUNTER (OUTPATIENT)
Dept: PHYSICAL THERAPY | Facility: CLINIC | Age: 79
Setting detail: THERAPIES SERIES
End: 2019-05-02
Attending: PSYCHIATRY & NEUROLOGY
Payer: MEDICARE

## 2019-05-02 PROCEDURE — 97750 PHYSICAL PERFORMANCE TEST: CPT | Mod: GP,KX | Performed by: PHYSICAL THERAPIST

## 2019-05-02 PROCEDURE — 97112 NEUROMUSCULAR REEDUCATION: CPT | Mod: GP,KX | Performed by: PHYSICAL THERAPIST

## 2019-05-02 NOTE — IP AVS SNAPSHOT
MRN:[id number]                      After Visit Summary   5/2/2019    Catherine Mccollum    MRN: [id number]           Visit Information        Provider Department      5/2/2019 10:30 AM Shelia Irizarry, PT [facility] [place], Physical Therapy - Outpatient        Your next 10 appointments already scheduled    May 03, 2019 10:30 AM CDT  (Arrive by 10:15 AM)  Return Visit with Yonathan Ferguson MD  [facility] Neurology ([facility]) [address] [place] [address]  3rd [facility] [address]  [phone]      May 21, 2019 10:00 AM CDT  Big and loud treatment with Re Rodriguez, LACEY  [facility] [place], Physical Therapy - Outpatient ([facility]) [address] [facility] [address], [address]  [place] [address]  [phone]      Jul 23, 2019 10:35 AM CDT  (Arrive by 10:20 AM)  Return Visit with Kar Alvarez MD  [facility] ([facility]) [address] [place] [address]  4th [facility] [address]  [phone]   If you are coming in for evaluation of pain: Please note that you may not be prescribed a controlled substance such as pain medication on your first visit.  Your provider will ask for previous medical records, and determine if medications are appropriate.          Further instructions from your care team       Think BIG when changing your sheets      Pay attention to the tremor in your left hand and leg - does it feel like it is getting better?      Walk BIG outside  - Continue working on intervals where you walk BIG  - Slowly stretch       Consider increasing your step goal by 10% of what your current average is.          [employer] Information    [employer] gives you secure access to your electronic health record. If you see a primary care provider, you can also send messages to your care team and make appointments. If you have questions, please call your primary care clinic.  If you do not have a primary care  provider, please call [phone] and they will assist you.       Care EveryWhere ID    This is your Care EveryWhere ID. This could be used by other organizations to access your [facility] medical records  [id number]       Equal Access to Services    HUMPHREY BOYLE: Nadia Black, bharati aceves, qadeweyta kaalmalashonda brian, niya boyle. So [facility] [phone].    ATENCIÓN: Si habla español, tiene a franco disposición servicios gratuitos de asistencia lingüística. Llame al [phone].    We comply with applicable federal civil rights laws and [place] laws. We do not discriminate on the basis of race, color, national origin, age, disability, sex, sexual orientation, or gender identity.

## 2019-05-02 NOTE — DISCHARGE INSTRUCTIONS
Think BIG when changing your sheets      Pay attention to the tremor in your left hand and leg - does it feel like it is getting better?      Walk BIG outside  - Continue working on intervals where you walk BIG  - Slowly stretch       Consider increasing your step goal by 10% of what your current average is.

## 2019-05-02 NOTE — PROGRESS NOTES
05/02/19 1000   Signing Clinician's Name / Credentials   Signing clinician's name / credentials Rachel Irizarry, DPT, NCS   Functional Gait Assessment (JUAN MANUEL Goyal, SALOME Paulson, et al. (2004))   1. GAIT LEVEL SURFACE 3   2. CHANGE IN GAIT SPEED 3   3. GAIT WITH HORIZONTAL HEAD TURNS 3   4. GAIT WITH VERTICAL HEAD TURNS 3   5. GAIT AND PIVOT TURN 3   6. STEP OVER OBSTACLE 3   7. GAIT WITH NARROW BASE OF SUPPORT 2  (max 7 steps)   8. GAIT WITH EYES CLOSED 3   9. AMBULATING BACKWARDS 3   10. STEPS 3   Total Functional Gait Assessment Score   TOTAL SCORE: (MAXIMUM SCORE 30) 29     Functional Gait Assessment (FGA): The FGA assesses postural stability during various walking tasks.   Gait assistive device used: none    Patient Score: 29/30  Scores of <22/30 have been correlated with predicting falls in community-dwelling older adults according to Jay Jay & Thompson 2010.   Scores of <18/30 have been correlated with increased risk for falls in patients with Parkinsons Disease according to Lv Santos Zhou et al 2014.  Minimal Detectable Change for patients with acute/chronic stroke = 4.2 according to Anum & Ritschel 2009  Minimal Detectable Change for patients with vestibular disorder = 8 according to Jay Jay & Thompson 2010    Assessment (rationale for performing, application to patient s function & care plan): Pt notes she can stand for longer, more confidence in the community. Test completed to reassess pt's dynamic balance - improved 3pts from last testing.  Minutes billed as physical performance test: 10

## 2019-05-03 ENCOUNTER — OFFICE VISIT (OUTPATIENT)
Dept: NEUROLOGY | Facility: CLINIC | Age: 79
End: 2019-05-03
Payer: MEDICARE

## 2019-05-03 VITALS
SYSTOLIC BLOOD PRESSURE: 159 MMHG | WEIGHT: 139.5 LBS | DIASTOLIC BLOOD PRESSURE: 80 MMHG | OXYGEN SATURATION: 98 % | HEART RATE: 67 BPM | TEMPERATURE: 97.6 F | HEIGHT: 61 IN | BODY MASS INDEX: 26.34 KG/M2

## 2019-05-03 DIAGNOSIS — R25.1 TREMOR: ICD-10-CM

## 2019-05-03 DIAGNOSIS — G20.A1 PARKINSON DISEASE (H): Primary | ICD-10-CM

## 2019-05-03 RX ORDER — CARBIDOPA AND LEVODOPA 25; 100 MG/1; MG/1
TABLET ORAL
Qty: 360 TABLET | Refills: 3 | Status: SHIPPED | OUTPATIENT
Start: 2019-05-03 | End: 2019-11-21

## 2019-05-03 RX ORDER — GABAPENTIN 100 MG/1
CAPSULE ORAL
Qty: 360 CAPSULE | Refills: 3 | Status: SHIPPED | OUTPATIENT
Start: 2019-05-03 | End: 2019-07-23

## 2019-05-03 ASSESSMENT — MIFFLIN-ST. JEOR: SCORE: 1035.15

## 2019-05-03 ASSESSMENT — PAIN SCALES - GENERAL: PAINLEVEL: NO PAIN (1)

## 2019-05-03 NOTE — LETTER
5/3/2019       RE: Catherine Mccollum  [address]  [place] [address]     Dear Colleague,    Thank you for referring your patient, Catherine Mccollum, to the [facility] NEUROLOGY at [facility]. Please see a copy of my visit note below.    Service Date: 2019      Kar Alvarez MD   [facility]    [address] [place] [address]      RE: Catherine Mccollum   MRN: [id number]   : 1940      Dear Dr. Alvarez:      This is in regard to followup on Catherine Mccollum.  The patient returned today on 2019.  Her chief complaint is that of Parkinsonism as well as previously operated on low back.      The patient has done extremely well in [facility].  She is going to continue it and she is doing her exercises faithfully.  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  She wants to go ahead with that also.  She said her stride is better.  She is walking better and she feels her balance is better.  She has noted that her chronic low back pain has improved since she has been exercising.  The patient did note that she still has some reflux issues.  She denied really having nausea.  She said if she takes omeprazole, those symptoms go away.  She has not been doing this daily.  In the past she had tried ranitidine, but is not on it currently.  I had suspected that some of her gastrointestinal complaints did relate to Sinemet as she thought there was a correlation.  I increased her carbidopa by adding Lodosyn 25 mg to her daily dosage each time of Sinemet 25/100.  She said this really did not seem to make a difference and she did describe the expense of the drug.  She thinks at this point, though, that her left arm symptoms did not improve now taking 3 full 25/100 tablets of Sinemet 3 times a day.  She still notes that her left arm is the worst and she has some trouble with it in  terms of dexterity.  She did note that with all of her exercise she is sleeping better.  She denied any unusual dreams.  She has not been constipated.  She feels her memory is fine.      The patient did review with me her current medications including p.r.n. acetaminophen, Sinemet, carbidopa, vitamin D, gabapentin that she was to continue up to 400 mg a day in divided dosage and she had earlier been given up to 600 mg.  She has no trouble taking the drug, including daytime sedation or trouble driving.  She said it definitely helps her sleep and it did help her pain in the past.  She continues on Avapro, p.r.n. Ativan 0.5 mg for anxiety, Pravachol and probiotic.      Neurologic examination revealed a pleasant woman.  Her blood pressure was 159/80 with a pulse of 67 seated.  Upon standing, it was 154/81 with a pulse of 67.  She was able to get out of a chair with her arms folded.  She had no trouble walking 25 feet in 7 seconds.  She definitely has a better stride and it is longer and she has no festination now.  She also swings her left arm better.  She still has a prominent left arm rest tremor.  She also has mild reduced dexterity in the left hand and foot for rapid alternating motion rate.  Otherwise, she had negative pull test and negative Romberg.  Cranial nerve examination showed reduced upward gaze and she had just a hint of a glabellar tap sign today.  Her lungs were clear to auscultation.  She had a regular cardiac rhythm without gallops or murmurs.      IMPRESSION:  Parkinsonian syndrome.      The patient is going to hold off now on taking Lodosyn.  I have talked with her about just continuing on omeprazole daily.  She thinks this is a good idea.  She will do this under your direction.  I did talk with her about the possibility of increasing her dose of Sinemet up to 1-1/2, 25/100 tablets t.i.d. to work her way up to possibly 2, 25/100 tablets t.i.d.  She is resistant to this as she said it took her a long  time to get to the current dose she is on now.  She is going to have followup with me in 6 months and on a p.r.n. basis.  She promised to continue her exercises.      Thank you for allowing me to see this patient.       Sincerely yours,       Yonathan Ferguson MD      I spent 20 minutes with the patient today.  Over 50% of the time this involved counseling and coordination of care.       D: 2019   T: 2019   MT: AKA      Name:     GÉNESIS CA   MRN:      [id number]        Account:      [id number]   :      1940           Service Date: 2019      Document: [id number]

## 2019-05-03 NOTE — NURSING NOTE
Chief Complaint   Patient presents with     RECHECK     [facility] RETURN F/U       Carlita Quesada LPN

## 2019-05-05 NOTE — PROGRESS NOTES
Service Date: 2019      Kar Alvarez MD   [facility]    [address] [address]      RE: Catherine Mccollum   MRN: [id number]   : 1940      Dear Dr. Alvarez:      This is in regard to followup on Catherine Mccollum.  The patient returned today on 2019.  Her chief complaint is that of Parkinsonism as well as previously operated on low back.      The patient has done extremely well in [facility].  She is going to continue it and she is doing her exercises faithfully.  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  She wants to go ahead with that also.  She said her stride is better.  She is walking better and she feels her balance is better.  She has noted that her chronic low back pain has improved since she has been exercising.  The patient did note that she still has some reflux issues.  She denied really having nausea.  She said if she takes omeprazole, those symptoms go away.  She has not been doing this daily.  In the past she had tried ranitidine, but is not on it currently.  I had suspected that some of her gastrointestinal complaints did relate to Sinemet as she thought there was a correlation.  I increased her carbidopa by adding Lodosyn 25 mg to her daily dosage each time of Sinemet 25/100.  She said this really did not seem to make a difference and she did describe the expense of the drug.  She thinks at this point, though, that her left arm symptoms did not improve now taking 3 full 25/100 tablets of Sinemet 3 times a day.  She still notes that her left arm is the worst and she has some trouble with it in terms of dexterity.  She did note that with all of her exercise she is sleeping better.  She denied any unusual dreams.  She has not been constipated.  She feels her memory is fine.      The patient did review with me her current medications including p.r.n. acetaminophen, Sinemet,  carbidopa, vitamin D, gabapentin that she was to continue up to 400 mg a day in divided dosage and she had earlier been given up to 600 mg.  She has no trouble taking the drug, including daytime sedation or trouble driving.  She said it definitely helps her sleep and it did help her pain in the past.  She continues on Avapro, p.r.n. Ativan 0.5 mg for anxiety, Pravachol and probiotic.      Neurologic examination revealed a pleasant woman.  Her blood pressure was 159/80 with a pulse of 67 seated.  Upon standing, it was 154/81 with a pulse of 67.  She was able to get out of a chair with her arms folded.  She had no trouble walking 25 feet in 7 seconds.  She definitely has a better stride and it is longer and she has no festination now.  She also swings her left arm better.  She still has a prominent left arm rest tremor.  She also has mild reduced dexterity in the left hand and foot for rapid alternating motion rate.  Otherwise, she had negative pull test and negative Romberg.  Cranial nerve examination showed reduced upward gaze and she had just a hint of a glabellar tap sign today.  Her lungs were clear to auscultation.  She had a regular cardiac rhythm without gallops or murmurs.      IMPRESSION:  Parkinsonian syndrome.      The patient is going to hold off now on taking Lodosyn.  I have talked with her about just continuing on omeprazole daily.  She thinks this is a good idea.  She will do this under your direction.  I did talk with her about the possibility of increasing her dose of Sinemet up to 1-1/2, 25/100 tablets t.i.d. to work her way up to possibly 2, 25/100 tablets t.i.d.  She is resistant to this as she said it took her a long time to get to the current dose she is on now.  She is going to have followup with me in 6 months and on a p.r.n. basis.  She promised to continue her exercises.      Thank you for allowing me to see this patient.       Sincerely yours,       Yonathan Ferguson MD      I spent 20  minutes with the patient today.  Over 50% of the time this involved counseling and coordination of care.         AXEL RYAN MD             D: 2019   T: 2019   MT: AKA      Name:     GÉNESIS CA   MRN:      [id number]        Account:      [id number]   :      1940           Service Date: 2019      Document: [id number]

## 2019-05-21 ENCOUNTER — HOSPITAL ENCOUNTER (OUTPATIENT)
Dept: PHYSICAL THERAPY | Facility: CLINIC | Age: 79
Setting detail: THERAPIES SERIES
End: 2019-05-21
Attending: PSYCHIATRY & NEUROLOGY
Payer: MEDICARE

## 2019-05-21 PROCEDURE — 97112 NEUROMUSCULAR REEDUCATION: CPT | Mod: GP,KX | Performed by: PHYSICAL THERAPIST

## 2019-05-21 NOTE — PROGRESS NOTES
19 1000   Signing Clinician's Name / Credentials   Signing clinician's name / credentials Re Juarezdeangelo PT   Session Number   Session Number 17 Medicare   Goal 1   Goal Description 25 ft walk in 9 sec or less to cross streets and lots   Target Date 19   Date Met 19   Goal Identifier gait   Goal 2   Goal Description TUG score to be 9 sec or less for safe pivots in home and community   Target Date 19   Date Met 19   Goal Identifier gait   Goal 3   Goal Description  pt to be indep w/ a lsvt home exer program for amplitude training   Target Date 19   Goal Identifier HEP   Date Met 19   Goal 4   Goal Description pt to make bed w/ 0-2 pain when changing sheets.   (vs 4 or more currently)   Target Date 19   Goal Identifier making bed   Date Met 19   Subjective Report   Subjective Report back pain 2 or less when making bed.  they don't have enough help.  cleaning lady does it every 4 to  6 wk and i do it in betw.  leaving on the . for my family trip for graduation on the .     Objective Measure 1   Objective Measure 25ft walk   Details 5.7 sec, 13 steps.   Objective Measure 2   Objective Measure TUG   Details 7.2 sec, 7.6 cog   Objective Measure 3   Objective Measure FGA   Details    Objective Measure 4   Objective Measure timed walk (done last session)   Details 6 min walk  1449ft.    Neuromuscular Re-education   Neuromuscular re-ed of mvmt, balance, coord, kinesthetic sense, posture, proprioception minutes (65644) 50   Skilled Intervention retests, TM, lsvt exer   Patient Response gurmeet well; has HEP   Treatment Detail retests - TM 5 min at 2.0 mi/hr.  lsvt exer review incl floor to ceiling, step and reach to side/forw/back.  rock reach forw/back and w/ twist.  all w/ sba occas cues.  pt knows all exercises.  plans to do them daily.   reports is sad about friend in building who  last week, going to [place].  pt is ready for d/c   Progress goals  met.   Education   Learner Patient   Readiness Acceptance;Eager   Method Explanation;Demonstration;Booklet/handout   Response Verbalizes Understanding;Demonstrates Understanding   Education Comments balance exer given today w/ practice: SLS, stand w/ feet together, stance on foam   Plan   Homework above   Home program 2x per day if not doing other exercise (4 reps): floor to ceiling, seated twist, forward/side/back step, rock and reach, standing twist. And above exer given for balance as requested.    Updates to plan of care all goals met, pt is happy w/ progress.     Plan for next session d/c. rec pt do HEP daily and consider PD group here monthly.   Comments   Comments KX modifier: Pt with ongoing skilled PT needs to maximize her function and reduce falls risk in context of Parkinson's Disease.   Total Session Time   Timed Code Treatment Minutes 50   Total Treatment Time (sum of timed and untimed services) 50

## 2019-05-21 NOTE — PROGRESS NOTES
Outpatient Physical Therapy Discharge Note     Patient: Catherine Mccollum  : 1940    Beginning/End Dates of Reporting Period:  3/13/19 to 2019    Referring Provider: ZANDRA Ferguson    Therapy Diagnosis: bradykinesia     Client Self Report: back pain 2 or less when making bed.  they don't have enough help.  cleaning lady does it every 4 to  6 wk and i do it in betw.  leaving on the . for my family trip for graduation on the .      Objective Measurements:  Objective Measure: 25ft walk  Details: 5.7 sec, 13 steps.  Objective Measure: TUG  Details: 7.2 sec, 7.6 cog  Objective Measure: FGA  Details:   Objective Measure: timed walk (done last session)  Details: 6 min walk  1449ft.         Goals:  Goal Identifier gait   Goal Description 25 ft walk in 9 sec or less to cross streets and lots   Target Date 19   Date Met  19   Progress:     Goal Identifier gait   Goal Description TUG score to be 9 sec or less for safe pivots in home and community   Target Date 19   Date Met  19   Progress:     Goal Identifier HEP   Goal Description  pt to be indep w/ a lsvt home exer program for amplitude training   Target Date 19   Date Met  19   Progress:     Goal Identifier making bed   Goal Description pt to make bed w/ 0-2 pain when changing sheets.   (vs 4 or more currently)   Target Date 19   Date Met  19   Progress:         Progress Toward Goals:   Progress this reporting period: good progress on all tests, pt sees better gait in self and can walk outside indep w/ pole    Plan:  Discharge from therapy.    Discharge:  Yes w/ HEP

## 2019-05-24 ENCOUNTER — HOSPITAL ENCOUNTER (OUTPATIENT)
Dept: SPEECH THERAPY | Facility: CLINIC | Age: 79
Setting detail: THERAPIES SERIES
End: 2019-05-24
Attending: PSYCHIATRY & NEUROLOGY
Payer: MEDICARE

## 2019-05-24 DIAGNOSIS — G20.A1 PARKINSON DISEASE (H): ICD-10-CM

## 2019-05-24 PROCEDURE — 92524 BEHAVRAL QUALIT ANALYS VOICE: CPT | Mod: GN,KX | Performed by: SPEECH-LANGUAGE PATHOLOGIST

## 2019-05-24 NOTE — PROGRESS NOTES
05/24/19 0930   General Information   Type of Evaluation Voice   Type Of Visit Initial   Start Of Care Date 05/24/19   Referring Physician Tanner Ferguson MD    Orders Evaluate And Treat   Orders Comment LOUD    Medical Diagnosis Parkinsons disease    Onset Of Illness/injury Or Date Of Surgery 01/01/14   Precautions/Limitations  no known precautions/limitations   Hearing WFL for conversational speech production.    Surgical/Medical history reviewed Yes   Pertinent History Of Current Problem Pt is an 78 y/o female with a diagnosis of Parkinson's disease who presents for a LOUD evaluation.  Pt was diagnosed with PD in ~4-5 years ago after initially being diagnosed with essential tremor.  Pt had been seen for BIG and credits the program for regaining her ability to walk.  Pt reports no deficits with communication but reports that 'people tell me that my voice is soft' while noting that she lives in senior housing with several [facility] individuals.  Pt denies talking less due to PD but reports talking less due to refrain from repeating and desire to listen to others.  Pt denies monotone behavior or other changes in voice.  Pt reports most difficulty with fatigue; pt reports difficulty keeping her voice at a 'certain loudness' when tired.     Prior Level Of Function Comment Independent    Patient Role/employment History Retired;Other/comments  (Teacher )   Living environment   (Senior living facility )   General Observations Pt pleasant and cooperative.     Patient/family Goals Goal not formally stated; however, pt reported to be frustrated by fatigue.     Speech   Deficits in Speech Respiration Shallow   Deficits in Phonation Harsh quality;Loudness (soft);Strained-strangled quality   Deficits in Articulation Hypokinetic dysarthria   Deficits in [place] Other (Comment)  (Dysfluent as cognitive demand increased )   Speech Comments Pt is producing mild hypokinetic dysarthria which is consistent with the diagnosis of  Parkinson's disease.  Voice during conversational speech production noted to be loud harsh.  Intermittent pitch breaks noted.  Pt produced speech with an average of 72.06 dB SPL at the conversation level.   During sustained vowel production, pt produced an average of 85.75 dB SPL for a duration of 11.38 seconds; however, quality extremely poor with majority of attempts reduced in length with the exception of one very lengthy production.  Pt exhibited reduced range, loudness, and quality with pitch glide task.  Reading task resulted in pt sustaining a volume of 70.5 dB SPL. Vocal volume reduced and speech increasingly dysfluent with tasks of increasing cognitive demand.  Stimulability testing revealed reduced vocal volume to 65.03 dB SPL and significantly improved quality when given cues for posture, breath support, and ease of production.   In addition, pt demonstrated stimulability at the phrase level; pt repeated functional phrases with an average of 63.8 dB SPL.  dB SPL measured 12 inch distance from mouth to microphone.   Clinical Impression, SLP Eval   Criteria for Skilled Therapeutic Interventions Met (SLP Eval) skilled criteria for speech language intervention met   SLP Diagnosis Mild hypokinetic dysarthria    Functional limitations due to impairments Reduced ablity to be understood   Therapy Frequency 3 times;per week   Predicted Duration of Therapy Intervention (days/wks) 4 weeks   Risks and Benefits of Treatment have been explained. Yes   Patient, Family & other staff in agreement with plan of care Yes   Clinical Impression Comments Pt is exhibiting mild hypokinetic dysarthria which is consistent with the diagnosis of Parkinson's disease.  Pt reporting no deficits with communication but endorses that she sometimes needs to repeat herself to those who are [place] at her living facility.  Pt's only complaint in regards to her voice is fatigue.  Pt's overall intelligibility is rated at 100% during conversational  speech production.  Voice during conversational speech production was produced with adequate loudness; however, harsh quality with intermittent pitch breaks present.  Pt produced speech with an average of 72.06 dB SPL at the conversation level.   During sustained vowel production, pt produced an average of 85.75 dB SPL for an average duration of 11.38 seconds.  Quality significantly reduced with length of productions significantly reduced with the exception of one lengthy production.  Pt exhibited significantly reduced range and further reduced quality with pitch glide task.  Loudness noted during reading task resulted in a sustained volume for an average of 70.5 dB SPL. Vocal volume reduced and pt increasingly dysfluent with tasks of increasing cognitive demand.   Pt demonstrated stimulability for reduced loudness and improved quality during structured tasks when given a model and cues for posture and breath support.   At this time, pt would benefit from a course of ST in order to address voice.  Given summer travel plans, pt is going to defer scheduling appointments at this time with the plan of addressing voice in August.  Pt plans to contact  in late June or early July in order to schedule treatment sessions.    Language/Cognition Goal 1   Goal Identifier 1   Goal Description Pt will produce speech with vocal volume at >65 dB SPL while demnstrating good quality during production of sustained vowels, words, phrases, and reading.    Target Date 08/22/19   Language/Cognition Goal 2   Goal Identifier 2   Goal Description Pt will produce speech with vocal volume at >65 dB SPL while demonstrating good quality during production of conversational speech within both structured and natural contexts.      Target Date 08/22/19   Language/Cognition Goal 3   Goal Identifier 3   Goal Description Pt will demonstrate understanding and implementation of information presented as determined by the SLP.   Target Date  08/22/19   Total Session Time   Sound production (artic, phonology, apraxia, dysarthria) Minutes (83927) 54   Total Evaluation Time 54   Therapy Certification   Certification date from 05/24/19   Certification date to 08/22/19   Medical Diagnosis Parkinson's disease    Certification I certify the need for these services furnished under this plan of treatment and while under my care.  (Physician co-signature of this document indicates review and certification of the therapy plan).     Thank you for the referral of Catherine Mccollum.  If you have any questions about this report, please contact me using the information below.       Rosa Kumari M.S. CCC-SLP  Speech Language Pathologist   [facility] [place] [facility]   Department of Otoolaryngology, D&T- 4th Floor / [address]  Phone: [phone]  Pager: [phone]  Email: [email or web address]

## 2019-05-24 NOTE — PROGRESS NOTES
[facility]      OUTPATIENT SPEECH LANGUAGE PATHOLOGY EVALUATION  PLAN OF TREATMENT FOR OUTPATIENT REHABILITATION  (COMPLETE FOR INITIAL CLAIMS ONLY)  Patient's Last Name, First Name, M.I.  YOB: 1940  Catherine Mccollum                           Provider's Name  [facility] Medical Record No.  [id number]                               Onset Date:  3/5/2019        Start of Care Date:    5/24/2019       Type:     ___PT   ___OT   _X_SLP Medical Diagnosis:  Parkinson's disease                      SLP Diagnosis:   Dysarthria         Visits from SOC:  1   ________________________________________________________________  Plan of Treatment/Functional Goals    Planned Interventions:   Guided practice tasks to increase loudness during structured and natural tasks, guided practice to improve quality during structured and natural tasks, education                           Goals:   Language/Cognition Goal 1   Goal Identifier 1   Goal Description Pt will produce speech with vocal volume at >65 dB SPL while demnstrating good quality during production of sustained vowels, words, phrases, and reading.    Target Date 08/22/19   Language/Cognition Goal 2   Goal Identifier 2   Goal Description Pt will produce speech with vocal volume at >65 dB SPL while demonstrating good quality during production of conversational speech within both structured and natural contexts.      Target Date 08/22/19   Language/Cognition Goal 3   Goal Identifier 3   Goal Description Pt will demonstrate understanding and implementation of information presented as determined by the SLP.   Target Date 08/22/19     Therapy Frequency:  3x/week   Predicted Duration of Therapy Intervention: 4 weeks           ________________________________________________________________________________    I CERTIFY THE NEED FOR THESE SERVICES FURNISHED  UNDER        THIS PLAN OF TREATMENT AND WHILE UNDER MY CARE     (Physician co-signature of this document indicates review and certification of the therapy plan).                     Initial Assessment        See Speech Language Pathology documentation in Epic electronic health record, evaluation dated

## 2019-07-23 ENCOUNTER — ANCILLARY PROCEDURE (OUTPATIENT)
Dept: MRI IMAGING | Facility: CLINIC | Age: 79
End: 2019-07-23
Attending: INTERNAL MEDICINE
Payer: MEDICARE

## 2019-07-23 ENCOUNTER — OFFICE VISIT (OUTPATIENT)
Dept: INTERNAL MEDICINE | Facility: CLINIC | Age: 79
End: 2019-07-23
Payer: MEDICARE

## 2019-07-23 VITALS
BODY MASS INDEX: 26.53 KG/M2 | WEIGHT: 140.4 LBS | DIASTOLIC BLOOD PRESSURE: 73 MMHG | OXYGEN SATURATION: 96 % | HEART RATE: 64 BPM | TEMPERATURE: 97.6 F | SYSTOLIC BLOOD PRESSURE: 130 MMHG

## 2019-07-23 DIAGNOSIS — E78.00 HIGH CHOLESTEROL: ICD-10-CM

## 2019-07-23 DIAGNOSIS — R22.31 MASS OF RIGHT HAND: ICD-10-CM

## 2019-07-23 DIAGNOSIS — G89.29 OTHER CHRONIC BACK PAIN: Primary | ICD-10-CM

## 2019-07-23 DIAGNOSIS — I10 ESSENTIAL HYPERTENSION: ICD-10-CM

## 2019-07-23 DIAGNOSIS — F41.9 ANXIETY: ICD-10-CM

## 2019-07-23 DIAGNOSIS — G89.29 OTHER CHRONIC BACK PAIN: ICD-10-CM

## 2019-07-23 DIAGNOSIS — M54.89 OTHER CHRONIC BACK PAIN: Primary | ICD-10-CM

## 2019-07-23 DIAGNOSIS — M54.89 OTHER CHRONIC BACK PAIN: ICD-10-CM

## 2019-07-23 RX ORDER — CALCIUM CARBONATE 500(1250)
1 TABLET ORAL DAILY
COMMUNITY

## 2019-07-23 RX ORDER — PRAVASTATIN SODIUM 20 MG
20 TABLET ORAL AT BEDTIME
Qty: 90 TABLET | Refills: 3 | Status: SHIPPED | OUTPATIENT
Start: 2019-07-23 | End: 2020-09-11

## 2019-07-23 RX ORDER — LORAZEPAM 0.5 MG/1
0.5 TABLET ORAL
Qty: 20 TABLET | Refills: 0 | Status: SHIPPED | OUTPATIENT
Start: 2019-07-23 | End: 2019-12-18

## 2019-07-23 RX ORDER — LORAZEPAM 0.5 MG/1
0.5 TABLET ORAL
COMMUNITY
Start: 2018-03-09 | End: 2021-05-17

## 2019-07-23 RX ORDER — IRBESARTAN 150 MG/1
150 TABLET ORAL EVERY MORNING
Qty: 90 TABLET | Refills: 3 | Status: SHIPPED | OUTPATIENT
Start: 2019-07-23 | End: 2020-06-23

## 2019-07-23 ASSESSMENT — PAIN SCALES - GENERAL: PAINLEVEL: MILD PAIN (2)

## 2019-07-23 NOTE — NURSING NOTE
Chief Complaint   Patient presents with     Medication Request     pt would like medication refills     Mass     pt states she has a mass on her right hand       Stormy Bravo CMA at 10:29 AM on 7/23/2019.   Never smoker

## 2019-07-23 NOTE — PROGRESS NOTES
HPI:    Pt. Comes in for follow up today.  She was seen by Dr. Ferguson, Neurology 5/24/2019 with h/o Parkinson's and  L sided radicular sxs. She was subsequently seen by Ms. Lewis, Neurosurgery 4/11/2018 for radicular sxs.  She was seen [facility] with MRI scan 3/22/2018 with some minor findings. She was given Steroid dose pack 3/20/2018 and had another one as well (from Dr. Ferguson). These did reduce her sxs. She was given Tramdol from Dr. Jackson as well and this does reduce her sxs. She had L-sided  L5/S1 surgery 6/25/2018 and she is doing better. She has h/o HTN on Avapro and chlorthalidone. She has h/o increased cholesterol on Pravastatin. No other HEENT, cardiopulmonary, abdominal, , GYN, neurological, systemic, psychiatric complaints. She has two sons (one in [place], and one in [place]). She does not smoke nor abuse EtOH. She states R hand mass/cyst for many years. She feels she had an MRI in the past that was benign.       Past Surgical History:   Procedure Laterality Date     BREAST SURGERY  Jan 1987    biopsy     CATARACT IOL, RT/LT       GYN SURGERY  1965;1967    2 ceasarean sections     HEMILAMINECTOMY, DISCECTOMY LUMBAR ONE LEVEL, COMBINED Left 6/25/2018    Procedure: COMBINED HEMILAMINECTOMY, DISCECTOMY LUMBAR ONE LEVEL;  Open Left Lumbar 5-Sacral 1 Hemilaminectomy, Medial Facetectomy, And Microdiscectomy Attention Transitional Segment Anatomy;  Surgeon: Rosemarie Young MD;  [facility]     ORTHOPEDIC SURGERY      ankle surgery for trimalleolar frx     PE:    Vitals noted gen nad cooperative alert, HEENT, oropharynx clear no exudate, neck supple nl rom no, LCTA, B good air movement, RRR, S1, S2, no MRG, abdomen, nd, nt, resting arm and leg tremors. No B CVA tenderness to palpation. No suprapubic tenderness to palpation. R hand with thumb area with non-tender cystic mass? Approx. 4 cm. No erythema.     A/P:    1. HTN;  she remains on same medication; labs done 7/23/2018.  Ordered labs  1/23/2019. She states almost always at home her SBP is in the 120 range. She does NOT want to adjust any of her antihypertensive medications today.   2. Parkinson's; she follows with Dr. Ferguson, Neurology and last seen 5/24/2019  3. Back pain and L sided sxs. She has follow up 5/23/2018 with Ms. Lewis, Neurosurgery. She had surgery 6/25/2018 L5/S1 and saw MsMichel Debbie Neurosurgery 7/9/2018 and again 9/27/2018. She wants to remain on low dose Gabapentin. She has 100 mg tablets and takes 1-3/day and this seems to reduce her back sxs.   4. She follows with outside Dermatology  5. She will get future mammograms. Placed order  7/23/2018. She had mammogram recorded 8/2018 in Care Everywhere  6. Immunizations; future new Zoster Vaccination and check with insurance   7. Increased cholesterol on Pravastatin; labs checked 7/23/2018 with LDL 97 and HDL 40. Recheked 1/23/2019 HDL was 43 and   8. She was seen in Urology 12/4/2019 with negative UA/UC.    9. CXR done 7/23/3028 stable and no change for subtle L sided finding on 6/20/2018  10. Gave low dose Ativan 0.5 mg #20 for sleep. She states this will last her almost one year.   11. MRI R hand for mass and referral to ortho hand    Total time spent 25  minutes.  More than 50% of the time spent with Ms. Mccollum on counseling / coordinating her care

## 2019-07-23 NOTE — PATIENT INSTRUCTIONS
[facility] Medication Refill Request Information:  * Please contact your pharmacy regarding ANY request for medication refills.  ** [facility] Prescription Fax = [phone]  * Please allow 3 business days for routine medication refills.  * Please allow 5 business days for controlled substance medication refills.     [facility] Test Result notification information:  *You will be notified with in 7-10 days of your appointment day regarding the results of your test.  If you are on MyChart you will be notified as soon as the provider has reviewed the results and signed off on them.    [facility]: [phone]

## 2019-07-27 ENCOUNTER — TELEPHONE (OUTPATIENT)
Dept: INTERNAL MEDICINE | Facility: CLINIC | Age: 79
End: 2019-07-27

## 2019-07-27 DIAGNOSIS — R22.31 MASS OF RIGHT HAND: Primary | ICD-10-CM

## 2019-07-27 NOTE — LETTER
Catherine Mccollum  [address]   [place] [address]  : 1940  MRN:  [id number]      2019          Dear Catherine;     The results of your MRI scan are not that worrisome. However, if you have any changes or if you want an expert opinion regarding if this could be cancer, I recommend you see Dr. Brambila orthopedics. I placed a referral today and you can call [phone] to schedule this appointment.     JUAN MANUEL Alvarez MD

## 2019-07-27 NOTE — TELEPHONE ENCOUNTER
Placed orthopedic referral this encounter    Dear Catherine;     The results of your MRI scan are not that worrisome. However, if you have any changes or if you want an expert opinion regarding if this could be cancer, I recommend you see Dr. Brambila orthopedics. I placed a referral today and you can call [phone] to schedule this appointment.     JUAN MANUEL Alvarez MD

## 2019-07-29 NOTE — TELEPHONE ENCOUNTER
RECORDS RECEIVED FROM: Mass of right hand, records and imaging in Epic, pt needing to schedule next week due to being out of town, appt per pt. referred by Kar Alvarez MD   DATE RECEIVED: Aug 9, 2019    NOTES STATUS DETAILS   OFFICE NOTE from referring provider Internal 7/23/19 Dr. Alvarez   OFFICE NOTE from other specialist N/A    DISCHARGE SUMMARY from hospital N/A    DISCHARGE REPORT from the ER N/A    OPERATIVE REPORT N/A    MEDICATION LIST Internal    IMPLANT RECORD/STICKER N/A    LABS     CBC/DIFF N/A    CULTURES N/A    INJECTIONS DONE IN RADIOLOGY N/A    MRI Internal 7/23/19   CT SCAN N/A    XRAYS (IMAGES & REPORTS) N/A    TUMOR     PATHOLOGY  Slides & report N/A

## 2019-07-30 ENCOUNTER — MYC MEDICAL ADVICE (OUTPATIENT)
Dept: INTERNAL MEDICINE | Facility: CLINIC | Age: 79
End: 2019-07-30

## 2019-08-09 ENCOUNTER — OFFICE VISIT (OUTPATIENT)
Dept: ORTHOPEDICS | Facility: CLINIC | Age: 79
End: 2019-08-09
Attending: INTERNAL MEDICINE
Payer: MEDICARE

## 2019-08-09 ENCOUNTER — PRE VISIT (OUTPATIENT)
Dept: ORTHOPEDICS | Facility: CLINIC | Age: 79
End: 2019-08-09

## 2019-08-09 VITALS — HEIGHT: 61 IN | WEIGHT: 140.43 LBS | BODY MASS INDEX: 26.51 KG/M2

## 2019-08-09 DIAGNOSIS — D17.9 INTRAMUSCULAR LIPOMA: Primary | ICD-10-CM

## 2019-08-09 ASSESSMENT — MIFFLIN-ST. JEOR: SCORE: 1039.12

## 2019-08-09 NOTE — PROGRESS NOTES
"REASON FOR VISIT: Right hand mass     REFERRING PROVIDER:MARCELINA CARPENTER     HPI: Catherine Mccollum is a 79 year old right-hand-dominant female with PMH significant for Parkinson's, hypertension who presents today to discuss a right hand mass.  Patient has a 15-year history of a right dorsal hand mass between her thumb and her index finger.  She does not think that it is been growing.  It only bothers her when she \"uses her right hand a lot or sleeps on it wrong\".  Denies any numbness or tingling in the fingers.  She does not need anything for pain.  It is not painful to palpation.  She denies any fevers, chills, weight loss.  No other signs of systemic illness.  Patient says that it is not bothersome to her enough that she would want a surgery for this.    PMH:   Past Medical History:   Diagnosis Date     GERD (gastroesophageal reflux disease)      HTN (hypertension)      Parkinsonian syndrome (H)     left side tremor       PSH:   Past Surgical History:   Procedure Laterality Date     BREAST SURGERY  Jan 1987    biopsy     CATARACT IOL, RT/LT       GYN SURGERY  1965;1967    2 ceasarean sections     HEMILAMINECTOMY, DISCECTOMY LUMBAR ONE LEVEL, COMBINED Left 6/25/2018    Procedure: COMBINED HEMILAMINECTOMY, DISCECTOMY LUMBAR ONE LEVEL;  Open Left Lumbar 5-Sacral 1 Hemilaminectomy, Medial Facetectomy, And Microdiscectomy Attention Transitional Segment Anatomy;  Surgeon: Rosemarie Young MD;  Location: [facility]     ORTHOPEDIC SURGERY      ankle surgery for trimalleolar frx         ALLERGIES:      Allergies   Allergen Reactions     Fosamax Nausea and GI Disturbance     Lisinopril Other (See Comments)     cough     Sulfa Drugs Hives         MEDS:     Current Outpatient Medications   Medication     Acetaminophen (TYLENOL EXTRA STRENGTH PO)     calcium carbonate (OS-LALI) 500 MG tablet     CALCIUM PO     carbidopa-levodopa (SINEMET)  MG tablet     chlorthalidone (HYGROTON) 25 MG tablet     Cholecalciferol (VITAMIN D) " 1000 UNITS capsule     COMPOUNDED NON-CONTROLLED SUBSTANCE (CMPD RX) - PHARMACY TO MIX COMPOUNDED MEDICATION     GABAPENTIN PO     irbesartan (AVAPRO) 150 MG tablet     LORazepam (ATIVAN) 0.5 MG tablet     LORazepam (ATIVAN) 0.5 MG tablet     omeprazole (PRILOSEC) 20 MG DR capsule     pravastatin (PRAVACHOL) 20 MG tablet     Probiotic Product (PROBIOTIC PO)     No current facility-administered medications for this visit.          FAM HX: Patient denies any family history of bleeding, clotting or problems with anesthesia    ROS: Otherwise negative other than what is stated in the HPI.     SOCIAL HISTORY: Patient is a non-smoker.    EXAM: Patient is a well-appearing female of stated age in no acute distress.  She is breathing room air nonlabored.  Focused exam of her right upper extremity shows a 5 x 3 cm mass over the first dorsal interosseous and the first webspace.  This is soft, nontender and mobile.  Patient fires EPL, FPL, intrinsics.  Sensation is intact in the median, radial, ulnar nerve distribution.  Fingers are warm and well-perfused distally.    IMAGING: MRI from 7-23 is reviewed independently today.  Is remarkable for a 5 x 3 x 3 cm mass of the adductor pollicis muscle.  It is isointense with fat on T1 and T2 imaging.      ASSESSMENT & PLAN: Catherine Mccollum is a 79 year old female with PMH significant for Parkinson's and hypertension who presents with a 15-year history of a right hand mass.    We reviewed the patient's imaging with her this morning.  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  If it were to become symptomatic and she were to develop more pain or numbness over that area we would be happy to remove this for her.     At this time she is content with monitoring it on her own.  All questions and concerns were addressed.    Seen and discussed with Dr. Brambila.      Sharmila Donald MD  Orthopedic Surgery PGY-4  [phone]    Answers for HPI/ROS submitted by the patient  on 8/7/2019   General Symptoms: No  Skin Symptoms: No  HENT Symptoms: No  EYE SYMPTOMS: No  HEART SYMPTOMS: No  LUNG SYMPTOMS: No  INTESTINAL SYMPTOMS: No  URINARY SYMPTOMS: No  GYNECOLOGIC SYMPTOMS: No  BREAST SYMPTOMS: No  SKELETAL SYMPTOMS: No  BLOOD SYMPTOMS: No  NERVOUS SYSTEM SYMPTOMS: No  MENTAL HEALTH SYMPTOMS: No

## 2019-08-09 NOTE — PROGRESS NOTES
Patient was seen today in consultation.  I saw her with Dr. Marie Donald.  I agree with her assessment and plan.  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  This is long-standing and has been slowly enlarging over perhaps a decade.  She has mild symptoms and these are only when using her and are resolved by switching the cane to her left side.    I reviewed the MRI scan with her and explained the natural history of this tumor.  We discussed the options of observation versus surgical excision.  She is chosen observation.  I am in agreement with this approach.  We will see her back on a as needed basis and her not recommending interval imaging.

## 2019-08-09 NOTE — LETTER
"8/9/2019       RE: Catherine Mccollum  [address]  [place] [address]     Dear Colleague,    Thank you for referring your patient, Catherine Mccollum, to [facility]. Please see a copy of my visit note below.    REASON FOR VISIT: Right hand mass     REFERRING PROVIDER:MARCELINA CARPENTER     HPI: Catherine Mccollum is a 79 year old right-hand-dominant female with PMH significant for Parkinson's, hypertension who presents today to discuss a right hand mass.  Patient has a 15-year history of a right dorsal hand mass between her thumb and her index finger.  She does not think that it is been growing.  It only bothers her when she \"uses her right hand a lot or sleeps on it wrong\".  Denies any numbness or tingling in the fingers.  She does not need anything for pain.  It is not painful to palpation.  She denies any fevers, chills, weight loss.  No other signs of systemic illness.  Patient says that it is not bothersome to her enough that she would want a surgery for this.    PMH:   Past Medical History:   Diagnosis Date     GERD (gastroesophageal reflux disease)      HTN (hypertension)      Parkinsonian syndrome (H)     left side tremor       PSH:   Past Surgical History:   Procedure Laterality Date     BREAST SURGERY  Jan 1987    biopsy     CATARACT IOL, RT/LT       GYN SURGERY  1965;1967    2 ceasarean sections     HEMILAMINECTOMY, DISCECTOMY LUMBAR ONE LEVEL, COMBINED Left 6/25/2018    Procedure: COMBINED HEMILAMINECTOMY, DISCECTOMY LUMBAR ONE LEVEL;  Open Left Lumbar 5-Sacral 1 Hemilaminectomy, Medial Facetectomy, And Microdiscectomy Attention Transitional Segment Anatomy;  Surgeon: Rosemarie Young MD;  Location: [facility] OR     ORTHOPEDIC SURGERY      ankle surgery for trimalleolar frx       ALLERGIES:      Allergies   Allergen Reactions     Fosamax Nausea and GI Disturbance     Lisinopril Other (See Comments)     cough     Sulfa Drugs Hives "       MEDS:     Current Outpatient Medications   Medication     Acetaminophen (TYLENOL EXTRA STRENGTH PO)     calcium carbonate (OS-LALI) 500 MG tablet     CALCIUM PO     carbidopa-levodopa (SINEMET)  MG tablet     chlorthalidone (HYGROTON) 25 MG tablet     Cholecalciferol (VITAMIN D) 1000 UNITS capsule     COMPOUNDED NON-CONTROLLED SUBSTANCE (CMPD RX) - PHARMACY TO MIX COMPOUNDED MEDICATION     GABAPENTIN PO     irbesartan (AVAPRO) 150 MG tablet     LORazepam (ATIVAN) 0.5 MG tablet     LORazepam (ATIVAN) 0.5 MG tablet     omeprazole (PRILOSEC) 20 MG DR capsule     pravastatin (PRAVACHOL) 20 MG tablet     Probiotic Product (PROBIOTIC PO)     No current facility-administered medications for this visit.        FAM HX: Patient denies any family history of bleeding, clotting or problems with anesthesia    ROS: Otherwise negative other than what is stated in the HPI.     SOCIAL HISTORY: Patient is a non-smoker.    EXAM: Patient is a well-appearing female of stated age in no acute distress.  She is breathing room air nonlabored.  Focused exam of her right upper extremity shows a 5 x 3 cm mass over the first dorsal interosseous and the first webspace.  This is soft, nontender and mobile.  Patient fires EPL, FPL, intrinsics.  Sensation is intact in the median, radial, ulnar nerve distribution.  Fingers are warm and well-perfused distally.    IMAGING: MRI from 7-23 is reviewed independently today.  Is remarkable for a 5 x 3 x 3 cm mass of the adductor pollicis muscle.  It is isointense with fat on T1 and T2 imaging.    ASSESSMENT & PLAN: Catherine Mccollum is a 79 year old female with PMH significant for Parkinson's and hypertension who presents with a 15-year history of a right hand mass.    We reviewed the patient's imaging with her this morning.  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  If it were to become symptomatic and she were to develop more pain or numbness over that area we would be  happy to remove this for her.     At this time she is content with monitoring it on her own.  All questions and concerns were addressed.    Seen and discussed with Dr. Brambila.    Sharmila Donald MD  Orthopedic Surgery PGY-4  [phone]      Patient was seen today in consultation.  I saw her with Dr. Marie Donald.  I agree with her assessment and plan.  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  This is long-standing and has been slowly enlarging over perhaps a decade.  She has mild symptoms and these are only when using her and are resolved by switching the cane to her left side.    I reviewed the MRI scan with her and explained the natural history of this tumor.  We discussed the options of observation versus surgical excision.  She is chosen observation.  I am in agreement with this approach.  We will see her back on a as needed basis and her not recommending interval imaging.    Again, thank you for allowing me to participate in the care of your patient.      Sincerely,    Shane Brambila MD

## 2019-08-09 NOTE — NURSING NOTE
"Reason For Visit:   Chief Complaint   Patient presents with     Consult     Right hand mass. Ref. Dr. Alvarez       Ht 1.549 m (5' 0.98\")   Wt 63.7 kg (140 lb 6.9 oz)   BMI 26.55 kg/m      Pain Assessment  Patient Currently in Pain: [place]    Jimena Sarah ATC    "

## 2019-10-28 ENCOUNTER — TRANSFERRED RECORDS (OUTPATIENT)
Dept: HEALTH INFORMATION MANAGEMENT | Facility: CLINIC | Age: 79
End: 2019-10-28

## 2019-11-05 ENCOUNTER — HEALTH MAINTENANCE LETTER (OUTPATIENT)
Age: 79
End: 2019-11-05

## 2019-11-18 ASSESSMENT — ENCOUNTER SYMPTOMS
MYALGIAS: 1
MUSCLE CRAMPS: 0
NECK PAIN: 0
BACK PAIN: 1
ARTHRALGIAS: 1
MUSCLE WEAKNESS: 0
STIFFNESS: 0
JOINT SWELLING: 0

## 2019-11-21 ENCOUNTER — OFFICE VISIT (OUTPATIENT)
Dept: NEUROLOGY | Facility: CLINIC | Age: 79
End: 2019-11-21
Payer: MEDICARE

## 2019-11-21 ENCOUNTER — ANCILLARY PROCEDURE (OUTPATIENT)
Dept: GENERAL RADIOLOGY | Facility: CLINIC | Age: 79
End: 2019-11-21
Attending: PREVENTIVE MEDICINE
Payer: MEDICARE

## 2019-11-21 ENCOUNTER — OFFICE VISIT (OUTPATIENT)
Dept: ORTHOPEDICS | Facility: CLINIC | Age: 79
End: 2019-11-21
Payer: MEDICARE

## 2019-11-21 VITALS
BODY MASS INDEX: 26.67 KG/M2 | OXYGEN SATURATION: 96 % | HEART RATE: 64 BPM | DIASTOLIC BLOOD PRESSURE: 82 MMHG | WEIGHT: 141.1 LBS | SYSTOLIC BLOOD PRESSURE: 152 MMHG

## 2019-11-21 DIAGNOSIS — G20.A1 PARKINSON DISEASE (H): ICD-10-CM

## 2019-11-21 DIAGNOSIS — M25.562 PAIN IN BOTH KNEES, UNSPECIFIED CHRONICITY: Primary | ICD-10-CM

## 2019-11-21 DIAGNOSIS — M70.62 TROCHANTERIC BURSITIS OF BOTH HIPS: ICD-10-CM

## 2019-11-21 DIAGNOSIS — M79.604 PAIN IN BOTH LOWER EXTREMITIES: ICD-10-CM

## 2019-11-21 DIAGNOSIS — E55.9 VITAMIN D DEFICIENCY: ICD-10-CM

## 2019-11-21 DIAGNOSIS — M25.562 BILATERAL CHRONIC KNEE PAIN: ICD-10-CM

## 2019-11-21 DIAGNOSIS — M25.561 BILATERAL CHRONIC KNEE PAIN: ICD-10-CM

## 2019-11-21 DIAGNOSIS — R25.1 TREMOR: ICD-10-CM

## 2019-11-21 DIAGNOSIS — M79.605 PAIN IN BOTH LOWER EXTREMITIES: ICD-10-CM

## 2019-11-21 DIAGNOSIS — M17.12 ARTHRITIS OF LEFT KNEE: ICD-10-CM

## 2019-11-21 DIAGNOSIS — G89.29 BILATERAL CHRONIC KNEE PAIN: ICD-10-CM

## 2019-11-21 DIAGNOSIS — E55.9 VITAMIN D DEFICIENCY: Primary | ICD-10-CM

## 2019-11-21 DIAGNOSIS — M25.561 BILATERAL KNEE PAIN: ICD-10-CM

## 2019-11-21 DIAGNOSIS — M25.562 BILATERAL KNEE PAIN: ICD-10-CM

## 2019-11-21 DIAGNOSIS — M51.369 LUMBAR DEGENERATIVE DISC DISEASE: ICD-10-CM

## 2019-11-21 DIAGNOSIS — M25.561 PAIN IN BOTH KNEES, UNSPECIFIED CHRONICITY: Primary | ICD-10-CM

## 2019-11-21 DIAGNOSIS — M70.61 TROCHANTERIC BURSITIS OF BOTH HIPS: ICD-10-CM

## 2019-11-21 LAB — VIT B12 SERPL-MCNC: 306 PG/ML (ref 193–986)

## 2019-11-21 PROCEDURE — 82306 VITAMIN D 25 HYDROXY: CPT | Performed by: PSYCHIATRY & NEUROLOGY

## 2019-11-21 RX ORDER — LIDOCAINE HYDROCHLORIDE 10 MG/ML
3 INJECTION, SOLUTION EPIDURAL; INFILTRATION; INTRACAUDAL; PERINEURAL
Status: DISCONTINUED | OUTPATIENT
Start: 2019-11-21 | End: 2023-03-03

## 2019-11-21 RX ORDER — GABAPENTIN 100 MG/1
200 CAPSULE ORAL 2 TIMES DAILY
Qty: 360 CAPSULE | Refills: 3 | Status: SHIPPED | OUTPATIENT
Start: 2019-11-21 | End: 2020-11-02

## 2019-11-21 RX ORDER — TRIAMCINOLONE ACETONIDE 40 MG/ML
40 INJECTION, SUSPENSION INTRA-ARTICULAR; INTRAMUSCULAR
Status: DISCONTINUED | OUTPATIENT
Start: 2019-11-21 | End: 2023-03-03

## 2019-11-21 RX ORDER — CARBIDOPA AND LEVODOPA 25; 100 MG/1; MG/1
TABLET ORAL
Qty: 360 TABLET | Refills: 3 | Status: SHIPPED | OUTPATIENT
Start: 2019-11-21 | End: 2020-05-26

## 2019-11-21 RX ADMIN — LIDOCAINE HYDROCHLORIDE 3 ML: 10 INJECTION, SOLUTION EPIDURAL; INFILTRATION; INTRACAUDAL; PERINEURAL at 16:29

## 2019-11-21 RX ADMIN — TRIAMCINOLONE ACETONIDE 40 MG: 40 INJECTION, SUSPENSION INTRA-ARTICULAR; INTRAMUSCULAR at 16:29

## 2019-11-21 ASSESSMENT — PAIN SCALES - GENERAL: PAINLEVEL: MILD PAIN (2)

## 2019-11-21 NOTE — NURSING NOTE
[address] [address]  Dept: [phone]  ______________________________________________________________________________    Patient: Catherine Mccollum   : 1940   MRN: [id number]   2019    INVASIVE PROCEDURE SAFETY CHECKLIST    Date: 2019   Procedure:Left knee CSI with kenalog   Patient Name: Catherine Mccollum  MRN: [id number]  YOB: 1940    Action: Complete sections as appropriate. Any discrepancy results in a HARD COPY until resolved.     PRE PROCEDURE:  Patient ID verified with 2 identifiers (name and  or MRN): Yes  Procedure and site verified with patient/designee (when able): Yes  Accurate consent documentation in medical record: Yes  H&P (or appropriate assessment) documented in medical record: Yes  H&P must be up to 20 days prior to procedure and updates within 24 hours of procedure as applicable: NA  Relevant diagnostic and radiology test results appropriately labeled and displayed as applicable: Yes  Procedure site(s) marked with provider initials: Yes    TIMEOUT:  Time-Out performed immediately prior to starting procedure, including verbal and active participation of all team members addressing the following:NA  * Correct patient identify  * Confirmed that the correct side and site are marked  * An accurate procedure consent form  * Agreement on the procedure to be done  * Correct patient position  * Relevant images and results are properly labeled and appropriately displayed  * The need to administer antibiotics or fluids for irrigation purposes during the procedure as applicable   * Safety precautions based on patient history or medication use    DURING PROCEDURE: Verification of correct person, site, and procedures any time the responsibility for care of the patient is transferred to another member of the care team.       Prior to injection, verified patient identity using patient's name and date of  birth.  Due to injection administration, patient instructed to remain in clinic for 15 minutes  afterwards, and to report any adverse reaction to me immediately.    Joint injection was performed.      Drug Amount Wasted:  Yes: 2 mg/ml   Vial/Syringe: Single dose vial  Expiration Date:  5/1/23      Sabrina Cronin ATC  November 21, 2019

## 2019-11-21 NOTE — PROGRESS NOTES
SPORTS & ORTHOPEDIC WALK-IN VISIT 11/21/2019    Primary Care Physician: Dr. Alvarez    She has bilateral knee pain that has been bothering a lot the last few weeks. She has been seeing a PT for her knees twice a week but it hasn't been helping much. The majority of pain is on her anterior knee. Parkinsons left leg weakness.     Reason for visit:     What part of your body is injured / painful?  bilateral knee    What caused the injury /pain? No inciting event     How long ago did your injury occur or pain begin? about a month ago    What are your most bothersome symptoms? Pain    How would you characterize your symptom?  shooting    What makes your symptoms better? Tylenol and Wrap or brace    What makes your symptoms worse? Walking    Have you been previously seen for this problem? No    Medical History:    Any recent changes to your medical history? No    Any new medication prescribed since last visit? No    Have you had surgery on this body part before? No    Social History:    Occupation: Retired    Handedness: Right    Exercise: Walking    Review of Systems:    Do you have fever, chills, weight loss? No    Do you have any vision problems? No    Do you have any chest pain or edema? No    Do you have any shortness of breath or wheezing?  No    Do you have stomach problems? No    Do you have any numbness or focal weakness? No    Do you have diabetes? No    Do you have problems with bleeding or clotting? No    Do you have an rashes or other skin lesions? No       Large Joint Injection/Arthocentesis: L knee joint  Date/Time: 11/21/2019 4:29 PM  Performed by: Kar Griffin MD  Authorized by: Kar Griffin MD     Indications:  Pain and osteoarthritis  Needle Size:  22 G  Guidance: ultrasound    Approach:  Anterolateral  Location:  Knee      Medications:  40 mg triamcinolone 40 MG/ML; 3 mL lidocaine (PF) 1 %  Outcome:  Tolerated well, no immediate complications  Procedure discussed: discussed  risks, benefits, and alternatives    Consent Given by:  Patient  Timeout: timeout called immediately prior to procedure    Prep: patient was prepped and draped in usual sterile fashion

## 2019-11-21 NOTE — LETTER
11/21/2019       RE: Catherine Mccollum  [address]  [place] [address]     Dear Colleague,    Thank you for referring your patient, Catherine Mccollum, to the [facility] SPORTS AND ORTHOPAEDIC WALK IN CLINIC at [facility]. Please see a copy of my visit note below.          SPORTS & ORTHOPEDIC WALK-IN VISIT 11/21/2019    Primary Care Physician: Dr. Alvarez    She has bilateral knee pain that has been bothering a lot the last few weeks. She has been seeing a PT for her knees twice a week but it hasn't been helping much. The majority of pain is on her anterior knee. Parkinsons left leg weakness.     Reason for visit:     What part of your body is injured / painful?  bilateral knee    What caused the injury /pain? No inciting event     How long ago did your injury occur or pain begin? about a month ago    What are your most bothersome symptoms? Pain    How would you characterize your symptom?  shooting    What makes your symptoms better? Tylenol and Wrap or brace    What makes your symptoms worse? Walking    Have you been previously seen for this problem? No    Medical History:    Any recent changes to your medical history? No    Any new medication prescribed since last visit? No    Have you had surgery on this body part before? No    Social History:    Occupation: Retired    Handedness: Right    Exercise: Walking    Review of Systems:    Do you have fever, chills, weight loss? No    Do you have any vision problems? No    Do you have any chest pain or edema? No    Do you have any shortness of breath or wheezing?  No    Do you have stomach problems? No    Do you have any numbness or focal weakness? No    Do you have diabetes? No    Do you have problems with bleeding or clotting? No    Do you have an rashes or other skin lesions? No       Large Joint Injection/Arthocentesis: L knee joint  Date/Time: 11/21/2019 4:29 PM  Performed by: Kar Griffin MD  Authorized by:  Kar Griffin MD     Indications:  Pain and osteoarthritis  Needle Size:  22 G  Guidance: ultrasound    Approach:  Anterolateral  Location:  Knee      Medications:  40 mg triamcinolone 40 MG/ML; 3 mL lidocaine (PF) 1 %  Outcome:  Tolerated well, no immediate complications  Procedure discussed: discussed risks, benefits, and alternatives    Consent Given by:  Patient  Timeout: timeout called immediately prior to procedure    Prep: patient was prepped and draped in usual sterile fashion            HISTORY OF PRESENT ILLNESS  Ms. Mccollum is a pleasant 79 year old year old female who presents to clinic today with bialteral knee pain, left worse  Low back pain and radiation of pain into both hips  Catherine explains that she has developed more pain in her knees and hips and low back with more and more walking  Location: bilateral knees and low back and hips  Quality:  achy pain    Severity: 6/10 at worst    Duration: years, worse knee pain over past few months  Timing: occurs intermittently  Context: occurs while exercising and lifting  Modifying factors:  resting and non-use makes it better, movement and use makes it worse  Associated signs & symptoms: radiation of pain into hips  Exercise: lifting weights and cardiovascular on machine  Additional history: as documented    MEDICAL HISTORY  Patient Active Problem List   Diagnosis     Essential hypertension, benign     Cervical spondylosis without myelopathy     Glossodynia     Hyperlipidemia, unspecified     Urinary frequency     Essential and other specified forms of tremor     Health Care Home     Transient insomnia     Ankle fracture, left, trimalleolar, 2002     Diverticulitis of colon     Parkinsonism (H)     Osteoarthritis of spine with radiculopathy, lumbosacral region     Left lumbar radiculopathy     Spondylolisthesis of lumbar region     Lumbosacral radiculopathy at S1     Backache     Cervical stenosis of spine     Controlled substance agreement signed      Foot pain     Gastroesophageal reflux disease     HTN (hypertension)     Metatarsalgia     Spinal stenosis     Spinal stenosis of lumbar region     Spinal stenosis, lumbar       Current Outpatient Medications   Medication Sig Dispense Refill     Acetaminophen (TYLENOL EXTRA STRENGTH PO) Take 1,000 mg by mouth 2 times daily Muscle and joint pain       calcium carbonate (OS-LALI) 500 MG tablet Take 1 tablet by mouth       CALCIUM PO Take 1 tablet by mouth every morning       carbidopa-levodopa (SINEMET)  MG tablet Take one and a half tabs tid 360 tablet 3     chlorthalidone (HYGROTON) 25 MG tablet Take 1/2 tab daily 45 tablet 3     Cholecalciferol (VITAMIN D) 1000 UNITS capsule Take 2,000 Units by mouth every morning        COMPOUNDED NON-CONTROLLED SUBSTANCE (CMPD RX) - PHARMACY TO MIX COMPOUNDED MEDICATION Estriol 1 mg/g  Apply small amount to finger and apply to inside vagina daily for 2 weeks then twice weekly  Route: vaginally 30 g 11     gabapentin (NEURONTIN) 100 MG capsule Take 2 capsules (200 mg) by mouth 2 times daily 360 capsule 3     GABAPENTIN PO Take 200 mg by mouth 2 times daily        irbesartan (AVAPRO) 150 MG tablet Take 1 tablet (150 mg) by mouth every morning 90 tablet 3     LORazepam (ATIVAN) 0.5 MG tablet Take 0.5 mg by mouth       LORazepam (ATIVAN) 0.5 MG tablet Take 1 tablet (0.5 mg) by mouth nightly as needed for anxiety 20 tablet 0     omeprazole (PRILOSEC) 20 MG DR capsule Take 1 capsule (20 mg) by mouth daily 90 capsule 3     pravastatin (PRAVACHOL) 20 MG tablet Take 1 tablet (20 mg) by mouth At Bedtime 90 tablet 3     Probiotic Product (PROBIOTIC PO) Take by mouth daily          Allergies   Allergen Reactions     Fosamax Nausea and GI Disturbance     Lisinopril Other (See Comments)     cough     Sulfa Drugs Hives       Family History   Problem Relation Age of Onset     C.A.D. Other      Hypertension Other         granddaughter      Cancer Other      Diabetes No family hx of         Additional medical/Social/Surgical histories reviewed in [facility] and updated as appropriate.     REVIEW OF SYSTEMS (11/21/2019)  10 point ROS of systems including Constitutional, Eyes, Respiratory, Cardiovascular, Gastroenterology, Genitourinary, Integumentary, Musculoskeletal, Psychiatric were all negative except for pertinent positives noted in my HPI.     PHYSICAL EXAM  VSS    General  - normal appearance, in no obvious distress  CV  - normal popliteal pulse  Pulm  - normal respiratory pattern, non-labored  Musculoskeletal - left knee  - stance: non antalgic gait, genu varum  - inspection: no generalized swelling, trace effusion  - palpation: medial joint line tenderness and pain with palpatin over medial PF joint, patella and patellar tendon non-tender, normal popliteal pulse  - ROM: 100 degrees flexion, 0 degrees extension, painful active ROM  - strength: 5/5 in flexion, 5/5 in extension  - neuro: no sensory or motor deficit  - special tests:  (-) varus at 0 and 30 degrees flexion  (-) valgus at 0 and 30 degrees flexion  (+) Jaswinder s compression test  (-) patellar apprehension  Neuro  - no sensory or motor deficit, grossly normal coordination, normal muscle tone  Skin  - no ecchymosis, erythema, warmth, or induration, no obvious rash  Psych  - interactive, appropriate, normal mood and affect  Lumbar: has some pain with flexion and extension of lumbar spine, negative SLR    ASSESSMENT & PLAN  80 yo female with left knee arthritis, lumbar ddd, radicular pain  Reviewed knee xrays: shows djd in left knee  Reviewed previous lumbar MRI: shows ddd  Given knee injection  Ordered lumbar MRI  Consider DENISSE  Will f/u with me by phone after MRI and consider DENISSE  Consider HL injections for knees as well    Kar Griffin MD, CAQSM    PROCEDURE:       left     Knee joint injection   The patient was apprised of the risks and the benefits of the procedure and consented and a written consent was signed.   The patient s left    KNEE was sterilely prepped with chloraprep.   40 mg of triamcinolone suspension was drawn up into a 5 mL syringe with 3 mL of 1% lidocaine  The patient was injected with a 1.5-inch 22-gauge needle at the_superiorlateral aspect of their knee joint  There were no complications. The patient tolerated the procedure well. There was minimal bleeding.   The patient was instructed to ice the knee upon leaving clinic and refrain from overuse over the next 3 days.   The patient was instructed to go to the emergency room with any usual pain, swelling, or redness occurred in the injected area.   The patient was given a followup appointment to evaluate response to the injection to their increased range of motion and reduction of pain.  Follow up PRN  Dr Kar Griffin

## 2019-11-21 NOTE — LETTER
RE: Catherine Mccollum  [address]  [place] [address]       Service Date: 2019      Kar Alvarez MD   [facility]   [address], [address] [place]  [address]      RE: Catherine Mccollum   MRN: [id number]   : 1940      Dear Dr. Alvarez:      This is in regard to followup on Catherine Mccollum.  The patient returned today with chief complaint of Parkinson disease, bilateral knee and hip discomfort.      The patient last saw me on 2019.  She said that she is doing well except for chronic knee pain that  definitely limits her.  She also notes pain involving both hips at bed rest.  She said she has to switch from side to side because of discomfort.  She said that she was walking in the past up to 2 miles but now is limited to 1 mile per day because of her knee pain.  She did tell me that the pain is worse at the top of her patellae.  The patient also has noted her left hip is worse than her right and probably the same for the knees.  She has not had any end-of-dose deterioration.  The patient said that her Parkinson symptoms seem to be under good control.  As you are aware, she did have essential tremor for many years before developing parkinsonian symptoms.  She is aware that people with essential tremor do have more easily treated parkinsonian symptoms when they develop that condition.  The patient has had no trouble with activities of daily living.  She feels her balance has gotten better when she has been in classes and has done balance therapy.  She said she is able to do tandem now and she feels she is better than other people in her class.  She continues on gabapentin for chronic lower back pain.  As you are aware, she has had 2 laminectomies.  The patient denied any sedation taking the drug.  She is not depressed.  There is no ankle edema or weight gain.  She does not drive.      The patient did go over with me her current medicines.  She  continues on 200 mg b.i.d. of gabapentin, carbidopa/levodopa 25/100, 1-1/2 tablets t.i.d., Avapro, Ativan 0.5 mg p.r.n. for anxiety, Pravachol and probiotic.  The patient also told me she continues on omeprazole, which has controlled any of her reflux symptoms.  She has not had any sensitivity now to Sinemet.  The patient does continue on vitamin D therapy 1000 International Units.        The patient denied any trouble with sleeping now.  She has not had any REM sleep issues.  She is not constipated.  She feels that her memory is fine.  She is still much involved with her family, including her granddaughter who goes to [employer].      Neurologic examination revealed a pleasant woman.  Her blood pressure was recorded by our medical assistant at 150/83 with a pulse of 64 seated.  Upon standing, it was 152/82 with the same pulse.  I retook it and got the same blood pressure with a soft cuff and when she was relaxed.  Otherwise, the patient could get out of a chair with her arms folded.  She walked 25 feet in 8 seconds.  She has a left hand tremor that is more prominent as she walks.  She had good limb movement.  The patient almost could do tandem.  She had negative Romberg and negative pull test.  She was actually quite loose today with a mild to moderate cogwheeling of the left arm and just a hint of cogwheeling of the right hand.  She had a moderate postural hand tremor, the left greater than the right, in the left hand rest tremor.  She had mildly to moderately reduced rapid alternating motion rate of the left hand and left foot.  The patient had good facial expression but did have a positive glabellar tap sign.  She had good extraocular movements.  Her cardiac rate was regular without gallops or murmurs.  She did not have any cervical bruits.  Her lungs were clear to auscultation.  She was tender over both trochanteric bursae.      IMPRESSION:   1.  Trochanteric bursitis.   2.  Chronic knee pain.   3.  Parkinsonian  syndrome in the setting of prior known essential tremor.      The patient overall is quite stable from a neurologic perspective.  I have recommended that she be seen today in the Sports Medicine Clinic.  She is going to have appropriate blood work now including B12 level and vitamin D levels checked.  She will be seen in followup to the [facility] in the Movement Disorder section.  I have referred her to Dr. Jones to be seen in the next 6 months and on a p.r.n. basis.  She understands I will be retiring on 2020.  I did refill her medicines.      Thank you for allowing me to see this patient.      Sincerely,      Yonathan Ferguson MD      I spent 30 minutes with the patient today.  Over 50% of the time involved counseling and coordination of care.      D: 2019   T: 2019   MT: herbert      Name:     GÉNESIS CA   MRN:      [id number]        Account:      [id number]   :      1940           Service Date: 2019      Document: [id number]

## 2019-11-22 LAB — DEPRECATED CALCIDIOL+CALCIFEROL SERPL-MC: 51 UG/L (ref 20–75)

## 2019-11-22 NOTE — PROGRESS NOTES
HISTORY OF PRESENT ILLNESS  Ms. Mccollum is a pleasant 79 year old year old female who presents to clinic today with bialteral knee pain, left worse  Low back pain and radiation of pain into both hips  Catherine explains that she has developed more pain in her knees and hips and low back with more and more walking  Location: bilateral knees and low back and hips  Quality:  achy pain    Severity: 6/10 at worst    Duration: years, worse knee pain over past few months  Timing: occurs intermittently  Context: occurs while exercising and lifting  Modifying factors:  resting and non-use makes it better, movement and use makes it worse  Associated signs & symptoms: radiation of pain into hips  Exercise: lifting weights and cardiovascular on machine  Additional history: as documented    MEDICAL HISTORY  Patient Active Problem List   Diagnosis     Essential hypertension, benign     Cervical spondylosis without myelopathy     Glossodynia     Hyperlipidemia, unspecified     Urinary frequency     Essential and other specified forms of tremor     Health Care Home     Transient insomnia     Ankle fracture, left, trimalleolar, 2002     Diverticulitis of colon     Parkinsonism (H)     Osteoarthritis of spine with radiculopathy, lumbosacral region     Left lumbar radiculopathy     Spondylolisthesis of lumbar region     Lumbosacral radiculopathy at S1     Backache     Cervical stenosis of spine     Controlled substance agreement signed     Foot pain     Gastroesophageal reflux disease     HTN (hypertension)     Metatarsalgia     Spinal stenosis     Spinal stenosis of lumbar region     Spinal stenosis, lumbar       Current Outpatient Medications   Medication Sig Dispense Refill     Acetaminophen (TYLENOL EXTRA STRENGTH PO) Take 1,000 mg by mouth 2 times daily Muscle and joint pain       calcium carbonate (OS-LALI) 500 MG tablet Take 1 tablet by mouth       CALCIUM PO Take 1 tablet by mouth every morning       carbidopa-levodopa (SINEMET)   MG tablet Take one and a half tabs tid 360 tablet 3     chlorthalidone (HYGROTON) 25 MG tablet Take 1/2 tab daily 45 tablet 3     Cholecalciferol (VITAMIN D) 1000 UNITS capsule Take 2,000 Units by mouth every morning        COMPOUNDED NON-CONTROLLED SUBSTANCE (CMPD RX) - PHARMACY TO MIX COMPOUNDED MEDICATION Estriol 1 mg/g  Apply small amount to finger and apply to inside vagina daily for 2 weeks then twice weekly  Route: vaginally 30 g 11     gabapentin (NEURONTIN) 100 MG capsule Take 2 capsules (200 mg) by mouth 2 times daily 360 capsule 3     GABAPENTIN PO Take 200 mg by mouth 2 times daily        irbesartan (AVAPRO) 150 MG tablet Take 1 tablet (150 mg) by mouth every morning 90 tablet 3     LORazepam (ATIVAN) 0.5 MG tablet Take 0.5 mg by mouth       LORazepam (ATIVAN) 0.5 MG tablet Take 1 tablet (0.5 mg) by mouth nightly as needed for anxiety 20 tablet 0     omeprazole (PRILOSEC) 20 MG DR capsule Take 1 capsule (20 mg) by mouth daily 90 capsule 3     pravastatin (PRAVACHOL) 20 MG tablet Take 1 tablet (20 mg) by mouth At Bedtime 90 tablet 3     Probiotic Product (PROBIOTIC PO) Take by mouth daily          Allergies   Allergen Reactions     Fosamax Nausea and GI Disturbance     Lisinopril Other (See Comments)     cough     Sulfa Drugs Hives       Family History   Problem Relation Age of Onset     C.A.D. Other      Hypertension Other         granddaughter      Cancer Other      Diabetes No family hx of        Additional medical/Social/Surgical histories reviewed in [facility] and updated as appropriate.     REVIEW OF SYSTEMS (11/21/2019)  10 point ROS of systems including Constitutional, Eyes, Respiratory, Cardiovascular, Gastroenterology, Genitourinary, Integumentary, Musculoskeletal, Psychiatric were all negative except for pertinent positives noted in my HPI.     PHYSICAL EXAM  VSS    General  - normal appearance, in no obvious distress  CV  - normal popliteal pulse  Pulm  - normal respiratory pattern,  non-labored  Musculoskeletal - left knee  - stance: non antalgic gait, genu varum  - inspection: no generalized swelling, trace effusion  - palpation: medial joint line tenderness and pain with palpatin over medial PF joint, patella and patellar tendon non-tender, normal popliteal pulse  - ROM: 100 degrees flexion, 0 degrees extension, painful active ROM  - strength: 5/5 in flexion, 5/5 in extension  - neuro: no sensory or motor deficit  - special tests:  (-) varus at 0 and 30 degrees flexion  (-) valgus at 0 and 30 degrees flexion  (+) Jaswinder s compression test  (-) patellar apprehension  Neuro  - no sensory or motor deficit, grossly normal coordination, normal muscle tone  Skin  - no ecchymosis, erythema, warmth, or induration, no obvious rash  Psych  - interactive, appropriate, normal mood and affect  Lumbar: has some pain with flexion and extension of lumbar spine, negative SLR    ASSESSMENT & PLAN  78 yo female with left knee arthritis, lumbar ddd, radicular pain  Reviewed knee xrays: shows djd in left knee  Reviewed previous lumbar MRI: shows ddd  Given knee injection  Ordered lumbar MRI  Consider DENISSE  Will f/u with me by phone after MRI and consider DENISSE  Consider HL injections for knees as well    Kar Griffin MD, CAQSM    PROCEDURE:       left     Knee joint injection   The patient was apprised of the risks and the benefits of the procedure and consented and a written consent was signed.   The patient s left   KNEE was sterilely prepped with chloraprep.   40 mg of triamcinolone suspension was drawn up into a 5 mL syringe with 3 mL of 1% lidocaine  The patient was injected with a 1.5-inch 22-gauge needle at the_superiorlateral aspect of their knee joint  There were no complications. The patient tolerated the procedure well. There was minimal bleeding.   The patient was instructed to ice the knee upon leaving clinic and refrain from overuse over the next 3 days.   The patient was instructed to go to the  emergency room with any usual pain, swelling, or redness occurred in the injected area.   The patient was given a followup appointment to evaluate response to the injection to their increased range of motion and reduction of pain.  Follow up PRN  Dr Kar Griffin

## 2019-11-22 NOTE — PROGRESS NOTES
Service Date: 2019      Kar Alvarez MD   [facility]   [address], 4th [place]  [address]      RE: Catherine Mccollum   MRN: [id number]   : 1940      Dear Dr. Alvarez:      This is in regard to followup on Catherine Mccollum.  The patient returned today with chief complaint of Parkinson disease, bilateral knee and hip discomfort.      The patient last saw me on 2019.  She said that she is doing well except for chronic knee pain that  definitely limits her.  She also notes pain involving both hips at bed rest.  She said she has to switch from side to side because of discomfort.  She said that she was walking in the past up to 2 miles but now is limited to 1 mile per day because of her knee pain.  She did tell me that the pain is worse at the top of her patellae.  The patient also has noted her left hip is worse than her right and probably the same for the knees.  She has not had any end-of-dose deterioration.  The patient said that her Parkinson symptoms seem to be under good control.  As you are aware, she did have essential tremor for many years before developing parkinsonian symptoms.  She is aware that people with essential tremor do have more easily treated parkinsonian symptoms when they develop that condition.  The patient has had no trouble with activities of daily living.  She feels her balance has gotten better when she has been in classes and has done balance therapy.  She said she is able to do tandem now and she feels she is better than other people in her class.  She continues on gabapentin for chronic lower back pain.  As you are aware, she has had 2 laminectomies.  The patient denied any sedation taking the drug.  She is not depressed.  There is no ankle edema or weight gain.  She does not drive.      The patient did go over with me her current medicines.  She continues on 200 mg b.i.d. of gabapentin, carbidopa/levodopa 25/100, 1-1/2 tablets  t.i.d., Avapro, Ativan 0.5 mg p.r.n. for anxiety, Pravachol and probiotic.  The patient also told me she continues on omeprazole, which has controlled any of her reflux symptoms.  She has not had any sensitivity now to Sinemet.  The patient does continue on vitamin D therapy 1000 International Units.        The patient denied any trouble with sleeping now.  She has not had any REM sleep issues.  She is not constipated.  She feels that her memory is fine.  She is still much involved with her family, including her granddaughter who goes to [employer].      Neurologic examination revealed a pleasant woman.  Her blood pressure was recorded by our medical assistant at 150/83 with a pulse of 64 seated.  Upon standing, it was 152/82 with the same pulse.  I retook it and got the same blood pressure with a soft cuff and when she was relaxed.  Otherwise, the patient could get out of a chair with her arms folded.  She walked 25 feet in 8 seconds.  She has a left hand tremor that is more prominent as she walks.  She had good limb movement.  The patient almost could do tandem.  She had negative Romberg and negative pull test.  She was actually quite loose today with a mild to moderate cogwheeling of the left arm and just a hint of cogwheeling of the right hand.  She had a moderate postural hand tremor, the left greater than the right, in the left hand rest tremor.  She had mildly to moderately reduced rapid alternating motion rate of the left hand and left foot.  The patient had good facial expression but did have a positive glabellar tap sign.  She had good extraocular movements.  Her cardiac rate was regular without gallops or murmurs.  She did not have any cervical bruits.  Her lungs were clear to auscultation.  She was tender over both trochanteric bursae.      IMPRESSION:   1.  Trochanteric bursitis.   2.  Chronic knee pain.   3.  Parkinsonian syndrome in the setting of prior known essential tremor.      The patient overall is  quite stable from a neurologic perspective.  I have recommended that she be seen today in the [facility] Medicine Clinic.  She is going to have appropriate blood work now including B12 level and vitamin D levels checked.  She will be seen in followup to the [facility] in the Movement Disorder section.  I have referred her to Dr. Jones to be seen in the next 6 months and on a p.r.n. basis.  She understands I will be retiring on 2020.  I did refill her medicines.      Thank you for allowing me to see this patient.      Sincerely,      Axel Ferguson MD      I spent 30 minutes with the patient today.  Over 50% of the time involved counseling and coordination of care.         AXEL FERGUSON MD             D: 2019   T: 2019   MT: herbert      Name:     GÉNESIS CA   MRN:      [id number]        Account:      [id number]   :      1940           Service Date: 2019      Document: [id number]

## 2019-11-29 ENCOUNTER — TELEPHONE (OUTPATIENT)
Dept: NEUROLOGY | Facility: CLINIC | Age: 79
End: 2019-11-29

## 2019-11-29 NOTE — TELEPHONE ENCOUNTER
"I left pt a voicemail that Dr. Ferguson reviewed her lab work and her \"Vit b12 306; should take 1000mcg b12 2x per week to keep level up. Vit D level perfect.\"  I asked her to call clinic with questions.     Marlena KARIMI  "

## 2019-12-14 ASSESSMENT — ACTIVITIES OF DAILY LIVING (ADL)
IN_THE_PAST_7_DAYS,_DID_YOU_NEED_HELP_FROM_OTHERS_TO_TAKE_CARE_OF_THINGS_SUCH_AS_LAUNDRY_AND_HOUSEKEEPING,_BANKING,_SHOPPING,_USING_THE_TELEPHONE,_FOOD_PREPARATION,_TRANSPORTATION,_OR_TAKING_YOUR_OWN_MEDICATIONS?: N
IN_THE_PAST_7_DAYS,_DID_YOU_NEED_HELP_FROM_OTHERS_TO_PERFORM_EVERYDAY_ACTIVITIES_SUCH_AS_EATING,_GETTING_DRESSED,_GROOMING,_BATHING,_WALKING,_OR_USING_THE_TOILET: N

## 2019-12-18 ENCOUNTER — ANCILLARY PROCEDURE (OUTPATIENT)
Dept: MRI IMAGING | Facility: CLINIC | Age: 79
End: 2019-12-18
Attending: PREVENTIVE MEDICINE
Payer: MEDICARE

## 2019-12-18 ENCOUNTER — OFFICE VISIT (OUTPATIENT)
Dept: INTERNAL MEDICINE | Facility: CLINIC | Age: 79
End: 2019-12-18
Payer: MEDICARE

## 2019-12-18 VITALS
RESPIRATION RATE: 16 BRPM | OXYGEN SATURATION: 98 % | TEMPERATURE: 97.6 F | HEART RATE: 65 BPM | SYSTOLIC BLOOD PRESSURE: 153 MMHG | DIASTOLIC BLOOD PRESSURE: 84 MMHG

## 2019-12-18 DIAGNOSIS — I10 BENIGN ESSENTIAL HYPERTENSION: ICD-10-CM

## 2019-12-18 DIAGNOSIS — E78.00 HIGH BLOOD CHOLESTEROL: ICD-10-CM

## 2019-12-18 DIAGNOSIS — F41.9 ANXIETY: ICD-10-CM

## 2019-12-18 DIAGNOSIS — I10 BENIGN ESSENTIAL HYPERTENSION: Primary | ICD-10-CM

## 2019-12-18 DIAGNOSIS — M51.369 LUMBAR DEGENERATIVE DISC DISEASE: ICD-10-CM

## 2019-12-18 LAB
ALBUMIN SERPL-MCNC: 4 G/DL (ref 3.4–5)
ALP SERPL-CCNC: 61 U/L (ref 40–150)
ALT SERPL W P-5'-P-CCNC: 12 U/L (ref 0–50)
ANION GAP SERPL CALCULATED.3IONS-SCNC: 5 MMOL/L (ref 3–14)
AST SERPL W P-5'-P-CCNC: 16 U/L (ref 0–45)
BASOPHILS # BLD AUTO: 0.1 10E9/L (ref 0–0.2)
BASOPHILS NFR BLD AUTO: 0.5 %
BILIRUB SERPL-MCNC: 0.7 MG/DL (ref 0.2–1.3)
BUN SERPL-MCNC: 16 MG/DL (ref 7–30)
CALCIUM SERPL-MCNC: 9.4 MG/DL (ref 8.5–10.1)
CHLORIDE SERPL-SCNC: 98 MMOL/L (ref 94–109)
CHOLEST SERPL-MCNC: 174 MG/DL
CO2 SERPL-SCNC: 31 MMOL/L (ref 20–32)
CREAT SERPL-MCNC: 0.68 MG/DL (ref 0.52–1.04)
DIFFERENTIAL METHOD BLD: ABNORMAL
EOSINOPHIL # BLD AUTO: 0.1 10E9/L (ref 0–0.7)
EOSINOPHIL NFR BLD AUTO: 0.9 %
ERYTHROCYTE [DISTWIDTH] IN BLOOD BY AUTOMATED COUNT: 13.2 % (ref 10–15)
GFR SERPL CREATININE-BSD FRML MDRD: 82 ML/MIN/{1.73_M2}
GLUCOSE SERPL-MCNC: 107 MG/DL (ref 70–99)
HCT VFR BLD AUTO: 42.7 % (ref 35–47)
HDLC SERPL-MCNC: 54 MG/DL
HGB BLD-MCNC: 14.7 G/DL (ref 11.7–15.7)
IMM GRANULOCYTES # BLD: 0 10E9/L (ref 0–0.4)
IMM GRANULOCYTES NFR BLD: 0.4 %
LDLC SERPL CALC-MCNC: 94 MG/DL
LYMPHOCYTES # BLD AUTO: 3.6 10E9/L (ref 0.8–5.3)
LYMPHOCYTES NFR BLD AUTO: 31.9 %
MCH RBC QN AUTO: 30.2 PG (ref 26.5–33)
MCHC RBC AUTO-ENTMCNC: 34.4 G/DL (ref 31.5–36.5)
MCV RBC AUTO: 88 FL (ref 78–100)
MONOCYTES # BLD AUTO: 0.7 10E9/L (ref 0–1.3)
MONOCYTES NFR BLD AUTO: 6.3 %
NEUTROPHILS # BLD AUTO: 6.7 10E9/L (ref 1.6–8.3)
NEUTROPHILS NFR BLD AUTO: 60 %
NONHDLC SERPL-MCNC: 121 MG/DL
NRBC # BLD AUTO: 0 10*3/UL
NRBC BLD AUTO-RTO: 0 /100
PLATELET # BLD AUTO: 494 10E9/L (ref 150–450)
POTASSIUM SERPL-SCNC: 3.6 MMOL/L (ref 3.4–5.3)
PROT SERPL-MCNC: 7.2 G/DL (ref 6.8–8.8)
RBC # BLD AUTO: 4.87 10E12/L (ref 3.8–5.2)
SODIUM SERPL-SCNC: 134 MMOL/L (ref 133–144)
TRIGL SERPL-MCNC: 134 MG/DL
WBC # BLD AUTO: 11.2 10E9/L (ref 4–11)

## 2019-12-18 RX ORDER — LORAZEPAM 0.5 MG/1
0.5 TABLET ORAL
Qty: 30 TABLET | Refills: 0 | Status: SHIPPED | OUTPATIENT
Start: 2019-12-18 | End: 2020-06-23

## 2019-12-18 RX ORDER — LANOLIN ALCOHOL/MO/W.PET/CERES
1000 CREAM (GRAM) TOPICAL
COMMUNITY
Start: 2019-12-09

## 2019-12-18 ASSESSMENT — PAIN SCALES - GENERAL: PAINLEVEL: MILD PAIN (3)

## 2019-12-18 NOTE — PROGRESS NOTES
HPI:    Pt. Comes in for physical  today.  She was seen by Dr. Ferguson, Neurology 5/24/2019 and 11/21/2019 with h/o Parkinson's and  L sided radicular sxs. She was subsequently seen by Ms. Lewis, Neurosurgery 4/11/2018 for radicular sxs.  She was seen [facility] with MRI scan 3/22/2018 with some minor findings. She was given Steroid dose pack 3/20/2018 and had another one as well (from Dr. Ferguson). These did reduce her sxs. She was given Tramdol from Dr. Jackson as well and this does reduce her sxs. She had L-sided  L5/S1 surgery 6/25/2018 and she is doing better. She has h/o HTN on Avapro and chlorthalidone. She has h/o increased cholesterol on Pravastatin. No other HEENT, cardiopulmonary, abdominal, , GYN, neurological, systemic, psychiatric complaints. She has two sons (one in [place], and one in [place]). She does not smoke nor abuse EtOH. She states R hand mass/cyst for many years. She feels she had an MRI in the past that was benign.       Past Surgical History:   Procedure Laterality Date     BREAST SURGERY  Jan 1987    biopsy     CATARACT IOL, RT/LT       GYN SURGERY  1965;1967    2 ceasarean sections     HEMILAMINECTOMY, DISCECTOMY LUMBAR ONE LEVEL, COMBINED Left 6/25/2018    Procedure: COMBINED HEMILAMINECTOMY, DISCECTOMY LUMBAR ONE LEVEL;  Open Left Lumbar 5-Sacral 1 Hemilaminectomy, Medial Facetectomy, And Microdiscectomy Attention Transitional Segment Anatomy;  Surgeon: Rosemarie Young MD;  Location: [facility] OR     ORTHOPEDIC SURGERY      ankle surgery for trimalleolar frx     PE:    Vitals noted gen nad cooperative alert, HEENT, oropharynx clear no exudate, neck supple nl rom no, LCTA, B good air movement, RRR, S1, S2, no MRG, abdomen, nd, nt, resting arm and leg tremors. No B CVA tenderness to palpation. No suprapubic tenderness to palpation. R hand with thumb area with non-tender cystic mass? Approx. 4 cm. No erythema.     A/P:    1. HTN;  she remains on same medication; labs done  7/23/2018. Ordered labs  1/23/2019. She states almost always at home her SBP is in the 120 range. She does NOT want to adjust any of her antihypertensive medications today.   2. Parkinson's; she follows with Dr. Ferguson, Neurology and last seen 11/21/2019. She will be seeing Dr. Jones 5/6/2020. She remains on the same dose of Sinemet.   3. Back pain and L sided sxs. She has follow up 5/23/2018 with MsMichel Debbie, Neurosurgery. She had surgery 6/25/2018 L5/S1 and saw Ms. Lewis Neurosurgery 7/9/2018 and again 9/27/2018. She wants to remain on low dose Gabapentin. She has 100 mg tablets and takes 1-3/day and this seems to reduce her back sxs. She was seen by Dr. Griffin, orthopedics 11/21/2019 and she has an MRI lumbar today 12/18/2019.  4. She follows with outside Dermatology in [place]  5. She will get future mammograms. Placed order  7/23/2018. She had mammogram recorded 8/2018 in [facility] Everywhere. She states he may get mammogram in 2020  6. Immunizations; future new Zoster Vaccination and check with insurance. She got influenza vaccination this year  7. Increased cholesterol on Pravastatin; labs checked 7/23/2018 with LDL 97 and HDL 40. Recheked 1/23/2019 HDL was 43 and . Ordered labs today 12/18/2019  8. She was seen in Urology 12/4/2019 with negative UA/UC.    9. CXR done 7/23/3028 stable and no change for subtle L sided finding on 6/20/2018  10. Gave low dose Ativan 0.5 mg #30 for sleep. She states this will last her almost one year.   11. MRI R hand for mass and referral to ortho hand was done 7/23/2019 and she saw Dr. Brambila, ortho 8/9/2019. Recommended just to follow

## 2019-12-19 ENCOUNTER — TELEPHONE (OUTPATIENT)
Dept: INTERNAL MEDICINE | Facility: CLINIC | Age: 79
End: 2019-12-19

## 2019-12-19 DIAGNOSIS — R79.89 ELEVATED PLATELET COUNT: Primary | ICD-10-CM

## 2019-12-19 NOTE — TELEPHONE ENCOUNTER
Placed future lab order this encounter        Dear Catherine;    Your laboratory tests are attached. Your platelets and white blood cell count is up a little and I recommend we recheck in about 3 months. I placed a laboratory order today and you can call [phone] to schedule the appointment.     JUAN MANUEL Alvarez MD

## 2019-12-23 ENCOUNTER — TELEPHONE (OUTPATIENT)
Dept: ORTHOPEDICS | Facility: CLINIC | Age: 79
End: 2019-12-23

## 2019-12-23 NOTE — TELEPHONE ENCOUNTER
Dr. Griffin wanted me to pass along information from Catherine's MRI:    Significant degenerative changes in her spine.  He recommends an Epidural injection.    She is happy with the treatment plan and will call the scheduling number within the next week.     - Flavio VANN ATC

## 2019-12-23 NOTE — TELEPHONE ENCOUNTER
[facility] Call Center    Phone Message    May a detailed message be left on voicemail: yes    Reason for Call: Requesting Results   Name/type of test: MRI  Date of test: 12/18/19  Was test done at a location other than [facility] (Please fill in the location if not [facility])?: No      Action Taken: Message routed to:  [facility] ([facility] Ortho

## 2019-12-24 DIAGNOSIS — M51.16 LUMBAR DISC HERNIATION WITH RADICULOPATHY: Primary | ICD-10-CM

## 2020-01-07 NOTE — BRIEF OP NOTE
[facility] [place]    Brief Operative Note    Pre-operative diagnosis: Left Lumbar Radiculopathy  Post-operative diagnosis same  Procedure: Procedure(s):  Open Left Lumbar 5-Sacral 1 Hemilaminectomy, Medial Facetectomy, And Microdiscectomy Attention Transitional Segment Anatomy - Wound Class: I-Clean  Surgeon: Surgeon(s) and Role:     * Rosemarie Young MD - Primary     * Sam Vasquez MD - Resident - Assisting  Anesthesia: General   Estimated blood loss: Minimal  Drains: None  Specimens: * No specimens in log *  Findings:   Small disc fragment superior to disc space, removed.   Complications: None.  Implants: None.         OFFICE VISIT      Patient: Kate Melendrez    : 1955 MRN: [id number]     SUBJECTIVE:     Chief Complaint   Patient presents with   • Annual Exam     wants to check cholesterol previosuly elevated level    • Imm/Inj     flu vaccine          HISTORY OF PRESENT ILLNESS:  Kate Melendrez is a 64 year old female who presents today for   History of Present Illness  1. Location: Here for Annual visiit .   2.Other issues addressed other than annual visit :               Problem List Items Addressed This Visit        Digestive    Obesity (BMI 30-39.9) - Primary    Relevant Orders    URIC ACID    LIPID PANEL WITH REFLEX    HEMOGLOBIN AND HEMATOCRIT    GLYCOHEMOGLOBIN    COMPREHENSIVE METABOLIC PANEL    HEPATITIS C ANTIBODY WITH REFLEX    MAMMO SCREENING BILATERAL    SERVICE TO OB GYN      Other Visit Diagnoses     Screening for diabetes mellitus (DM)        Relevant Orders    URIC ACID    LIPID PANEL WITH REFLEX    HEMOGLOBIN AND HEMATOCRIT    GLYCOHEMOGLOBIN    COMPREHENSIVE METABOLIC PANEL    HEPATITIS C ANTIBODY WITH REFLEX    MAMMO SCREENING BILATERAL    SERVICE TO OB GYN    High cholesterol        Relevant Orders    URIC ACID    LIPID PANEL WITH REFLEX    HEMOGLOBIN AND HEMATOCRIT    GLYCOHEMOGLOBIN    COMPREHENSIVE METABOLIC PANEL    HEPATITIS C ANTIBODY WITH REFLEX    MAMMO SCREENING BILATERAL    SERVICE TO OB GYN    Routine health maintenance        Relevant Orders    URIC ACID    LIPID PANEL WITH REFLEX    HEMOGLOBIN AND HEMATOCRIT    GLYCOHEMOGLOBIN    COMPREHENSIVE METABOLIC PANEL    HEPATITIS C ANTIBODY WITH REFLEX    MAMMO SCREENING BILATERAL    SERVICE TO OB GYN    Encounter for screening mammogram for malignant neoplasm of breast         Relevant Orders    MAMMO SCREENING BILATERAL            PAST MEDICAL HISTORY:  Past Medical History:   Diagnosis Date   • Fecal incontinence    • GERD (gastroesophageal reflux disease)    • No known problems    • OAB (overactive bladder)    • Overactive bladder         MEDICATIONS:  Current Outpatient Medications   Medication Sig   • atorvastatin (LIPITOR) 20 MG tablet Take 1 tablet by mouth daily.   • diphenoxylate-atropine (LOMOTIL) 2.5-0.025 MG tablet Take 2 tablets by mouth 4 times daily as needed for Diarrhea.     No current facility-administered medications for this visit.        ALLERGIES:  No Known Allergies    PAST SURGICAL HISTORY:  Past Surgical History:   Procedure Laterality Date   • No past surgeries         FAMILY HISTORY:  Family History   Problem Relation Age of Onset   • Diabetes Sister        SOCIAL HISTORY:  Social History     Tobacco Use   • Smoking status: Never Smoker   • Smokeless tobacco: Never Used   Substance Use Topics   • Alcohol use: No     Frequency: Never   • Drug use: No       Review of Systems   Constitutional: Negative.    Respiratory: Negative.    Cardiovascular: Negative.    Gastrointestinal: Negative.    Genitourinary: Negative.    All other systems reviewed and are negative.        OBJECTIVE:     Visit Vitals  /87 (BP Location: LUE - Left upper extremity, Patient Position: Sitting, Cuff Size: Regular)   Pulse 70   Resp 18   Ht 5' 4\" (1.626 m)   Wt 76 kg (167 lb 8.8 oz)   SpO2 97%   BMI 28.76 kg/m²       Physical Exam   Constitutional: She is oriented to person, place, and time. She appears well-developed and well-nourished. No distress.   HENT:   Head: Normocephalic and atraumatic.   Cardiovascular: Normal rate, regular rhythm and normal heart sounds. Exam reveals no gallop.   No murmur heard.  Pulmonary/Chest: Effort normal and breath sounds normal. No respiratory distress. She has no wheezes. She has no rales. She exhibits no tenderness.   Abdominal: She exhibits no distension. There is no tenderness. Musculoskeletal: Normal range of motion.     Neurological: She is alert and oriented to person, place, and time. No cranial nerve deficit. Coordination normal.   Skin: She is not diaphoretic.   Psychiatric: She has a normal mood  and affect. Her behavior is normal. Thought content normal.   Nursing note and vitals reviewed.        DIAGNOSTIC STUDIES:   LAB RESULTS:    Reviewed previous lab results.           Assessment AND PLAN:   This is a 64 year old year-old female who presents with     1. Obesity (BMI 30-39.9)    2. Screening for diabetes mellitus (DM)    3. High cholesterol    4. Routine health maintenance    5. Encounter for screening mammogram for malignant neoplasm of breast           Plan      Orders Placed This Encounter   • MAMMO SCREENING BILATERAL   • Uric Acid   • Lipid Panel With Reflex   • Hemoglobin and Hematocrit   • GLYCOHEMOGLOBIN   • COMPREHENSIVE METABOLIC PANEL   • Hepatitis C Antibody With Reflex   • SERVICE TO OB GYN           Instructions provided as documented in the AVS.        King JESSICA Coleman MD

## 2020-01-29 DIAGNOSIS — I10 ESSENTIAL HYPERTENSION: ICD-10-CM

## 2020-01-30 RX ORDER — CHLORTHALIDONE 25 MG/1
TABLET ORAL
Qty: 45 TABLET | Refills: 3 | Status: SHIPPED | OUTPATIENT
Start: 2020-01-30 | End: 2020-06-23

## 2020-01-30 NOTE — TELEPHONE ENCOUNTER
CHLORTHALIDONE 25 MG Tablet   Last Written Prescription Date:  1/23/2019  Last Fill Quantity: 45,   # refills: 3  Last Office Visit : 12/18/2019  Future Office visit:  6/23/2020  Pt refused to adjust blood pressure med's today.   Continue with same dose of B/P med's per doctor's orders.  45 Tabs, 3 Refills sent to pharmacy for Pt care.   1/30/2020    Shira Smith RN  Central Triage Red Flags/Med Refills         A/P:     Kar Alvarez MD   Internal Medicine     12/18/2019      1. HTN;  she remains on same medication; labs done 7/23/2018. Ordered labs  1/23/2019. She states almost always at home her SBP is in the 120 range. She does NOT want to adjust any of her antihypertensive medications today.   2. Parkinson's; she follows with Dr. Ferguson, Neurology and last seen 11/21/2019. She will be seeing Dr. Jones 5/6/2020. She remains on the same dose of Sinemet.   3. Back pain and L sided sxs. She has follow up 5/23/2018 with Ms. Lewis, Neurosurgery. She had surgery 6/25/2018 L5/S1 and saw Ms. Lewis Neurosurgery 7/9/2018 and again 9/27/2018. She wants to remain on low dose Gabapentin. She has 100 mg tablets and takes 1-3/day and this seems to reduce her back sxs. She was seen by Dr. Griffin, orthopedics 11/21/2019 and she has an MRI lumbar today 12/18/2019.  4. She follows with outside Dermatology in [place]  5. She will get future mammograms. Placed order  7/23/2018. She had mammogram recorded 8/2018 in [facility] Everywhere. She states he may get mammogram in 2020  6. Immunizations; future new Zoster Vaccination and check with insurance. She got influenza vaccination this year  7. Increased cholesterol on Pravastatin; labs checked 7/23/2018 with LDL 97 and HDL 40. Recheked 1/23/2019 HDL was 43 and . Ordered labs today 12/18/2019  8. She was seen in Urology 12/4/2019 with negative UA/UC.    9. CXR done 7/23/3028 stable and no change for subtle L sided finding on 6/20/2018  10. Gave low dose Ativan 0.5 mg  #30 for sleep. She states this will last her almost one year.   11. MRI R hand for mass and referral to ortho hand was done 7/23/2019 and she saw Dr. Brambila, ortho 8/9/2019. Recommended just to follow

## 2020-02-01 ENCOUNTER — MEDICAL CORRESPONDENCE (OUTPATIENT)
Dept: HEALTH INFORMATION MANAGEMENT | Facility: CLINIC | Age: 80
End: 2020-02-01

## 2020-02-12 ENCOUNTER — ANCILLARY PROCEDURE (OUTPATIENT)
Dept: GENERAL RADIOLOGY | Facility: CLINIC | Age: 80
End: 2020-02-12
Attending: PREVENTIVE MEDICINE
Payer: MEDICARE

## 2020-02-12 VITALS — SYSTOLIC BLOOD PRESSURE: 171 MMHG | OXYGEN SATURATION: 99 % | DIASTOLIC BLOOD PRESSURE: 93 MMHG | HEART RATE: 68 BPM

## 2020-02-12 DIAGNOSIS — M51.16 LUMBAR DISC HERNIATION WITH RADICULOPATHY: ICD-10-CM

## 2020-02-12 RX ORDER — IOPAMIDOL 408 MG/ML
10 INJECTION, SOLUTION INTRATHECAL ONCE
Status: COMPLETED | OUTPATIENT
Start: 2020-02-12 | End: 2020-02-12

## 2020-02-12 RX ORDER — BUPIVACAINE HYDROCHLORIDE 5 MG/ML
10 INJECTION, SOLUTION EPIDURAL; INTRACAUDAL ONCE
Status: COMPLETED | OUTPATIENT
Start: 2020-02-12 | End: 2020-02-12

## 2020-02-12 RX ORDER — LIDOCAINE HYDROCHLORIDE 10 MG/ML
30 INJECTION, SOLUTION EPIDURAL; INFILTRATION; INTRACAUDAL; PERINEURAL ONCE
Status: COMPLETED | OUTPATIENT
Start: 2020-02-12 | End: 2020-02-12

## 2020-02-12 RX ORDER — METHYLPREDNISOLONE ACETATE 80 MG/ML
80 INJECTION, SUSPENSION INTRA-ARTICULAR; INTRALESIONAL; INTRAMUSCULAR; SOFT TISSUE ONCE
Status: COMPLETED | OUTPATIENT
Start: 2020-02-12 | End: 2020-02-12

## 2020-02-12 RX ADMIN — METHYLPREDNISOLONE ACETATE 80 MG: 80 INJECTION, SUSPENSION INTRA-ARTICULAR; INTRALESIONAL; INTRAMUSCULAR; SOFT TISSUE at 10:40

## 2020-02-12 RX ADMIN — IOPAMIDOL 2 ML: 408 INJECTION, SOLUTION INTRATHECAL at 10:40

## 2020-02-12 RX ADMIN — BUPIVACAINE HYDROCHLORIDE 10 MG: 5 INJECTION, SOLUTION EPIDURAL; INTRACAUDAL at 10:40

## 2020-02-12 RX ADMIN — LIDOCAINE HYDROCHLORIDE 5 ML: 10 INJECTION, SOLUTION EPIDURAL; INFILTRATION; INTRACAUDAL; PERINEURAL at 10:40

## 2020-02-12 NOTE — PROGRESS NOTES
Pt did well with the procedure, no complications.  AVSS.  Pt escorted to waiting room.    Nannette Maloney, BS, RN, BSN, PHN

## 2020-03-07 ENCOUNTER — MEDICAL CORRESPONDENCE (OUTPATIENT)
Dept: HEALTH INFORMATION MANAGEMENT | Facility: CLINIC | Age: 80
End: 2020-03-07

## 2020-03-07 ENCOUNTER — TRANSFERRED RECORDS (OUTPATIENT)
Dept: HEALTH INFORMATION MANAGEMENT | Facility: CLINIC | Age: 80
End: 2020-03-07

## 2020-03-28 ENCOUNTER — MEDICAL CORRESPONDENCE (OUTPATIENT)
Dept: HEALTH INFORMATION MANAGEMENT | Facility: CLINIC | Age: 80
End: 2020-03-28

## 2020-04-14 ENCOUNTER — TRANSFERRED RECORDS (OUTPATIENT)
Dept: HEALTH INFORMATION MANAGEMENT | Facility: CLINIC | Age: 80
End: 2020-04-14

## 2020-05-05 NOTE — TELEPHONE ENCOUNTER
RECORDS RECEIVED FROM: Internal   Date of Appt: 5/26/20   NOTES (FOR ALL VISITS) STATUS DETAILS   OFFICE NOTE from referring provider Internal Dr Ferguson @ [facility] Neurology:  11/21/19  5/3/19  3/5/19  5/11/19   OFFICE NOTE from other specialist N/A    DISCHARGE SUMMARY from hospital N/A    DISCHARGE REPORT from the ER N/A    OPERATIVE REPORT N/A    MEDICATION LIST Internal    IMAGING  (FOR ALL VISITS)     EMG N/A    EEG N/A    MRI (HEAD, NECK, SPINE) Received [facility]:  MRI Lumbar Spine 12/18/19    [facility] Radiology:  MRI Lumbar Spine 3/22/18   LUMBAR PUNCTURE N/A    ADALI Scan N/A    CT (HEAD, NECK, SPINE) N/A

## 2020-05-22 ASSESSMENT — ENCOUNTER SYMPTOMS: BACK PAIN: 1

## 2020-05-26 ENCOUNTER — VIRTUAL VISIT (OUTPATIENT)
Dept: NEUROLOGY | Facility: CLINIC | Age: 80
End: 2020-05-26
Attending: PSYCHIATRY & NEUROLOGY
Payer: MEDICARE

## 2020-05-26 ENCOUNTER — PRE VISIT (OUTPATIENT)
Dept: NEUROLOGY | Facility: CLINIC | Age: 80
End: 2020-05-26

## 2020-05-26 DIAGNOSIS — G20.A1 PARKINSON'S DISEASE (H): Primary | ICD-10-CM

## 2020-05-26 DIAGNOSIS — R25.1 TREMOR: ICD-10-CM

## 2020-05-26 DIAGNOSIS — G20.A1 PARKINSON DISEASE (H): ICD-10-CM

## 2020-05-26 RX ORDER — CARBIDOPA AND LEVODOPA 25; 100 MG/1; MG/1
TABLET ORAL
Qty: 480 TABLET | Refills: 3 | Status: SHIPPED | OUTPATIENT
Start: 2020-05-26 | End: 2022-01-13

## 2020-05-26 ASSESSMENT — PAIN SCALES - GENERAL: PAINLEVEL: NO PAIN (1)

## 2020-05-26 NOTE — LETTER
"5/26/2020     RE: Catherine Mccollum  [address]  [place] [address]     Dear Colleague,    Thank you for referring your patient, Catherine Mccollum, to the [facility] NEUROLOGY at [facility]. Please see a copy of my visit note below.    Catherine Mccollum is a 80 year old female who is being evaluated via a billable video visit.      The patient has been notified of following:     \"This video visit will be conducted via a call between you and your physician/provider. We have found that certain health care needs can be provided without the need for an in-person physical exam.  This service lets us provide the care you need with a video conversation.  If a prescription is necessary we can send it directly to your pharmacy.  If lab work is needed we can place an order for that and you can then stop by our lab to have the test done at a later time.    Video visits are billed at different rates depending on your insurance coverage.  Please reach out to your insurance provider with any questions.    If during the course of the call the physician/provider feels a video visit is not appropriate, you will not be charged for this service.\"    Patient has given verbal consent for Video visit? YES    How would you like to obtain your AVS? MyChart    Patient would like the video invitation sent by: [email or web address]    Will anyone else be joining your video visit? No    I changed the patient's scheduled in-person visit to a virtual video visit  because of the COVID19 crisis.  The rationale was to protect the patient from unnecessary interpersonal contact.    Chief complaint: Parkinson's disease    Referral source: Dr. 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 Yonathan Ferguson to our retired colleague for essential tremor and Parkinson's disease.  She states that her mother had a head tremor. "  There is no other family history of tremor.  The patient has not noticed any alcohol effect with her tremor as she has no alcohol intake.  Her tremor began 20 years ago.  It started in the left hand and left leg.  She is quite adamant that for many years she has had no tremor on the right side.  Only now she noticing slight right hand tremor.  The tremor was both resting and action tremor.  She notices it most when she is holding something or serving something with her left hand.  She notices it in bed at night in her left leg.    There is no family history of Parkinson's disease.  MRI in 2015 was normal.  She has no history of head injury or encephalitis.    She denies reduced sense of smell.  She is not observed so she does not know if she has green enactment.  She denies drooling.  Her handwriting has not changed and is not small.  When she walks she does not lift her left leg as much as the right.  She went through physical therapy with [facility] and this helped the walking.  She feels she now walks well for her age.  Her speech is somewhat soft.    Dr. Ferguson has followed her since 2015 and treated her with carbidopa levodopa.  She now takes carbidopa levodopa, 25/100, 1-1/2 tablets 3 times a day.  She says this helps a little but not greatly.  She is cut it back to twice a day.  She always takes it about 1/2-hour before meals.  She notices no motor fluctuations with this.  She is having no side effects to the medication.    She does notice that her eyes are shut much much of the time.  This is not bothersome to her.    Examination:  1.  On her video visit we could do several things.  She has a 3+ left arm resting tremor.  We could not focus the camera on her legs.  She has a 1+ right hand resting tremor.  She has a severe left arm re-emergent tremor which becomes 4+ with hand in front of face.  She has good AMRs of her left and right hand.  We could not watch her walk.  Her face looks normal in terms of  expression.  She does appear to have p apraxia of eyelid opening.    Impression:  1.  Tremor predominant Parkinson's disease  2.  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  I discussed with her that she has a benign form of Parkinson's disease which is tremor predominant Parkinson's disease.  She has had it for 20 years and has tremor but no other major manifestations of parkinsonism.  2.  We would escalate the carbidopa levodopa to the maximum to see if it would help her tremor.  She would go on carbidopa/levodopa, 25/100, 1-1/2 tablets 3 times a day 1/2-hour before meals.  She could then escalate further by a half tab every 3 weeks going to 2 tabs 3 times a day for 3 weeks, then 2-1/2 tabs 3 times a day for 3 weeks and finally 3 tabs 3 times a day for 3 weeks as needed and as tolerated.  She would call us with the progress of this.  Is possible she might develop nausea, dizziness or somnolence as she increases the medicine.  If so we would cut back immediately to her dose of 1-1/2 tablets 3 times a day which is currently tolerated.  She is cautioned to take a lot of fluids as we increase her carbidopa levodopa to guard against hypotension.  3.  If carbidopa levodopa escalation is not effective she could consider deep brain stimulation or focused ultrasound.  I would not try anticholinergics in her as a would be less likely to cause disabling side effects.    Discussed in full.    Bassem Jones MD  Video-Visit Details    Type of service:  Video Visit    Video Start Time: 10:05  Video End Time: 10:50    Originating Location (pt. Location): Home    Distant Location (provider location):  [facility] NEUROLOGY     Platform used for Video Visit: Jama Weiss, EMT

## 2020-05-26 NOTE — PATIENT INSTRUCTIONS
Impression:  1.  Tremor predominant Parkinson's disease  2.  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  I discussed with her that she has a benign form of Parkinson's disease which is tremor predominant Parkinson's disease.  She has had it for 20 years and has tremor but no other major manifestations of parkinsonism.  2.  We would escalate the carbidopa levodopa to the maximum to see if it would help her tremor.  She would go on carbidopa/levodopa, 25/100, 1-1/2 tablets 3 times a day 1/2-hour before meals.  She could then escalate further by a half tab every 3 weeks going to 2 tabs 3 times a day for 3 weeks, then 2-1/2 tabs 3 times a day for 3 weeks and finally 3 tabs 3 times a day for 3 weeks as needed and as tolerated.  She would call us with the progress of this.  Is possible she might develop nausea, dizziness or somnolence as she increases the medicine.  If so we would cut back immediately to her dose of 1-1/2 tablets 3 times a day which is currently tolerated.  She is cautioned to take a lot of fluids as we increase her carbidopa levodopa to guard against hypotension.  3.  If carbidopa levodopa escalation is not effective she could consider deep brain stimulation or focused ultrasound.  I would not try anticholinergics in her as a would be less likely to cause disabling side effects.

## 2020-05-26 NOTE — PROGRESS NOTES
"Catherine Mccollum is a 80 year old female who is being evaluated via a billable video visit.      The patient has been notified of following:     \"This video visit will be conducted via a call between you and your physician/provider. We have found that certain health care needs can be provided without the need for an in-person physical exam.  This service lets us provide the care you need with a video conversation.  If a prescription is necessary we can send it directly to your pharmacy.  If lab work is needed we can place an order for that and you can then stop by our lab to have the test done at a later time.    Video visits are billed at different rates depending on your insurance coverage.  Please reach out to your insurance provider with any questions.    If during the course of the call the physician/provider feels a video visit is not appropriate, you will not be charged for this service.\"    Patient has given verbal consent for Video visit? YES    How would you like to obtain your AVS? [facility]    Patient would like the video invitation sent by: [email or web address]    Will anyone else be joining your video visit? No    I changed the patient's scheduled in-person visit to a virtual video visit  because of the COVID19 crisis.  The rationale was to protect the patient from unnecessary interpersonal contact.    Chief complaint: Parkinson's disease    Referral source: Dr. 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 Yonathan Ferguson to our retired colleague for essential tremor and Parkinson's disease.  She states that her mother had a head tremor.  There is no other family history of tremor.  The patient has not noticed any alcohol effect with her tremor as she has no alcohol intake.  Her tremor began 20 years ago.  It started in the left hand and left leg.  She is quite adamant that for many years she has had no tremor on " the right side.  Only now she noticing slight right hand tremor.  The tremor was both resting and action tremor.  She notices it most when she is holding something or serving something with her left hand.  She notices it in bed at night in her left leg.    There is no family history of Parkinson's disease.  MRI in 2015 was normal.  She has no history of head injury or encephalitis.    She denies reduced sense of smell.  She is not observed so she does not know if she has green enactment.  She denies drooling.  Her handwriting has not changed and is not small.  When she walks she does not lift her left leg as much as the right.  She went through physical therapy with [facility] and this helped the walking.  She feels she now walks well for her age.  Her speech is somewhat soft.    Dr. Ferguson has followed her since 2015 and treated her with carbidopa levodopa.  She now takes carbidopa levodopa, 25/100, 1-1/2 tablets 3 times a day.  She says this helps a little but not greatly.  She is cut it back to twice a day.  She always takes it about 1/2-hour before meals.  She notices no motor fluctuations with this.  She is having no side effects to the medication.    She does notice that her eyes are shut much much of the time.  This is not bothersome to her.    Examination:  1.  On her video visit we could do several things.  She has a 3+ left arm resting tremor.  We could not focus the camera on her legs.  She has a 1+ right hand resting tremor.  She has a severe left arm re-emergent tremor which becomes 4+ with hand in front of face.  She has good AMRs of her left and right hand.  We could not watch her walk.  Her face looks normal in terms of expression.  She does appear to have p apraxia of eyelid opening.    Impression:  1.  Tremor predominant Parkinson's disease  2.  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  I discussed with her that she has a benign form of Parkinson's disease  which is tremor predominant Parkinson's disease.  She has had it for 20 years and has tremor but no other major manifestations of parkinsonism.  2.  We would escalate the carbidopa levodopa to the maximum to see if it would help her tremor.  She would go on carbidopa/levodopa, 25/100, 1-1/2 tablets 3 times a day 1/2-hour before meals.  She could then escalate further by a half tab every 3 weeks going to 2 tabs 3 times a day for 3 weeks, then 2-1/2 tabs 3 times a day for 3 weeks and finally 3 tabs 3 times a day for 3 weeks as needed and as tolerated.  She would call us with the progress of this.  Is possible she might develop nausea, dizziness or somnolence as she increases the medicine.  If so we would cut back immediately to her dose of 1-1/2 tablets 3 times a day which is currently tolerated.  She is cautioned to take a lot of fluids as we increase her carbidopa levodopa to guard against hypotension.  3.  If carbidopa levodopa escalation is not effective she could consider deep brain stimulation or focused ultrasound.  I would not try anticholinergics in her as a would be less likely to cause disabling side effects.    Discussed in full.    Bassem Jones MD  Video-Visit Details    Type of service:  Video Visit    Video Start Time: 10:05  Video End Time: 10:50    Originating Location (pt. Location): Home    Distant Location (provider location):  [place] [facility]UROLOGY     Platform used for Video Visit: Jama Weiss, EMT

## 2020-06-09 ENCOUNTER — TRANSFERRED RECORDS (OUTPATIENT)
Dept: HEALTH INFORMATION MANAGEMENT | Facility: CLINIC | Age: 80
End: 2020-06-09

## 2020-06-16 ENCOUNTER — TRANSFERRED RECORDS (OUTPATIENT)
Dept: HEALTH INFORMATION MANAGEMENT | Facility: CLINIC | Age: 80
End: 2020-06-16

## 2020-06-23 ENCOUNTER — OFFICE VISIT (OUTPATIENT)
Dept: INTERNAL MEDICINE | Facility: CLINIC | Age: 80
End: 2020-06-23
Payer: MEDICARE

## 2020-06-23 VITALS
HEART RATE: 73 BPM | DIASTOLIC BLOOD PRESSURE: 87 MMHG | WEIGHT: 143.2 LBS | OXYGEN SATURATION: 97 % | SYSTOLIC BLOOD PRESSURE: 151 MMHG | BODY MASS INDEX: 27.07 KG/M2

## 2020-06-23 DIAGNOSIS — E78.00 HIGH CHOLESTEROL: ICD-10-CM

## 2020-06-23 DIAGNOSIS — R94.6 THYROID FUNCTION TEST ABNORMAL: ICD-10-CM

## 2020-06-23 DIAGNOSIS — F41.9 ANXIETY: ICD-10-CM

## 2020-06-23 DIAGNOSIS — R79.89 ELEVATED PLATELET COUNT: ICD-10-CM

## 2020-06-23 DIAGNOSIS — D72.9 ABNORMAL WHITE BLOOD CELL: ICD-10-CM

## 2020-06-23 DIAGNOSIS — I10 BENIGN ESSENTIAL HYPERTENSION: ICD-10-CM

## 2020-06-23 DIAGNOSIS — I10 ESSENTIAL HYPERTENSION: ICD-10-CM

## 2020-06-23 DIAGNOSIS — D72.9 ABNORMAL WHITE BLOOD CELL: Primary | ICD-10-CM

## 2020-06-23 LAB
ALBUMIN SERPL-MCNC: 4.3 G/DL (ref 3.4–5)
ALP SERPL-CCNC: 67 U/L (ref 40–150)
ALT SERPL W P-5'-P-CCNC: 10 U/L (ref 0–50)
ANION GAP SERPL CALCULATED.3IONS-SCNC: 8 MMOL/L (ref 3–14)
AST SERPL W P-5'-P-CCNC: 22 U/L (ref 0–45)
BASOPHILS # BLD AUTO: 0 10E9/L (ref 0–0.2)
BASOPHILS NFR BLD AUTO: 0.4 %
BILIRUB SERPL-MCNC: 0.7 MG/DL (ref 0.2–1.3)
BUN SERPL-MCNC: 12 MG/DL (ref 7–30)
CALCIUM SERPL-MCNC: 9.4 MG/DL (ref 8.5–10.1)
CHLORIDE SERPL-SCNC: 96 MMOL/L (ref 94–109)
CO2 SERPL-SCNC: 28 MMOL/L (ref 20–32)
CREAT SERPL-MCNC: 0.67 MG/DL (ref 0.52–1.04)
DIFFERENTIAL METHOD BLD: ABNORMAL
EOSINOPHIL # BLD AUTO: 0.1 10E9/L (ref 0–0.7)
EOSINOPHIL NFR BLD AUTO: 1.1 %
ERYTHROCYTE [DISTWIDTH] IN BLOOD BY AUTOMATED COUNT: 12.8 % (ref 10–15)
FOLATE SERPL-MCNC: 29.3 NG/ML
GFR SERPL CREATININE-BSD FRML MDRD: 82 ML/MIN/{1.73_M2}
GLUCOSE SERPL-MCNC: 127 MG/DL (ref 70–99)
HCT VFR BLD AUTO: 44.1 % (ref 35–47)
HGB BLD-MCNC: 15.5 G/DL (ref 11.7–15.7)
IMM GRANULOCYTES # BLD: 0 10E9/L (ref 0–0.4)
IMM GRANULOCYTES NFR BLD: 0.3 %
LYMPHOCYTES # BLD AUTO: 3.9 10E9/L (ref 0.8–5.3)
LYMPHOCYTES NFR BLD AUTO: 36.8 %
MCH RBC QN AUTO: 30.6 PG (ref 26.5–33)
MCHC RBC AUTO-ENTMCNC: 35.1 G/DL (ref 31.5–36.5)
MCV RBC AUTO: 87 FL (ref 78–100)
MONOCYTES # BLD AUTO: 0.7 10E9/L (ref 0–1.3)
MONOCYTES NFR BLD AUTO: 6.5 %
NEUTROPHILS # BLD AUTO: 5.8 10E9/L (ref 1.6–8.3)
NEUTROPHILS NFR BLD AUTO: 54.9 %
NRBC # BLD AUTO: 0 10*3/UL
NRBC BLD AUTO-RTO: 0 /100
PLATELET # BLD AUTO: 664 10E9/L (ref 150–450)
POTASSIUM SERPL-SCNC: 3 MMOL/L (ref 3.4–5.3)
PROT SERPL-MCNC: 7.5 G/DL (ref 6.8–8.8)
RBC # BLD AUTO: 5.06 10E12/L (ref 3.8–5.2)
SODIUM SERPL-SCNC: 132 MMOL/L (ref 133–144)
TSH SERPL DL<=0.005 MIU/L-ACNC: 1.69 MU/L (ref 0.4–4)
VIT B12 SERPL-MCNC: 506 PG/ML (ref 193–986)
WBC # BLD AUTO: 10.5 10E9/L (ref 4–11)

## 2020-06-23 PROCEDURE — 82746 ASSAY OF FOLIC ACID SERUM: CPT | Performed by: INTERNAL MEDICINE

## 2020-06-23 RX ORDER — LORAZEPAM 0.5 MG/1
0.5 TABLET ORAL
Qty: 30 TABLET | Refills: 0 | Status: SHIPPED | OUTPATIENT
Start: 2020-06-23 | End: 2021-03-23

## 2020-06-23 RX ORDER — CHLORTHALIDONE 25 MG/1
25 TABLET ORAL DAILY
Qty: 90 TABLET | Refills: 3 | Status: SHIPPED | OUTPATIENT
Start: 2020-06-23 | End: 2021-05-17

## 2020-06-23 RX ORDER — IRBESARTAN 150 MG/1
225 TABLET ORAL EVERY MORNING
Qty: 180 TABLET | Refills: 3 | Status: SHIPPED | OUTPATIENT
Start: 2020-06-23 | End: 2021-08-24

## 2020-06-23 ASSESSMENT — PAIN SCALES - GENERAL: PAINLEVEL: NO PAIN (0)

## 2020-06-23 NOTE — PROGRESS NOTES
HPI:    Pt. With h/o Parkinson's and had Neurology appt. With Dr. Jones 5/26/2020. She remains on Sinemet. She has h/o HTN and is on chlorthalidone and irbesartan. She also is on Pravastatin. Overall she is doing well. She has increased her Avapro a little. No new HEENT, cardiopulmonary, abdominal, , GYN, neurological, systemic, psychiatric, lymphatic complaints. She would like to go to [place], [place] to see her son    Past Medical History:   Diagnosis Date     GERD (gastroesophageal reflux disease)      HTN (hypertension)      Parkinsonian syndrome (H)     left side tremor     Past Surgical History:   Procedure Laterality Date     BREAST SURGERY  Jan 1987    biopsy     CATARACT IOL, RT/LT       GYN SURGERY  1965;1967    2 ceasarean sections     HEMILAMINECTOMY, DISCECTOMY LUMBAR ONE LEVEL, COMBINED Left 6/25/2018    Procedure: COMBINED HEMILAMINECTOMY, DISCECTOMY LUMBAR ONE LEVEL;  Open Left Lumbar 5-Sacral 1 Hemilaminectomy, Medial Facetectomy, And Microdiscectomy Attention Transitional Segment Anatomy;  Surgeon: Rosemarie Young MD;  Location: [facility]     ORTHOPEDIC SURGERY      ankle surgery for trimalleolar frx     PE:    Vitals noted, gen, nad, cooperative, alert, she is wearing a mask, lungs with good air movement, RRR, S1, S2, no MRG, abdomen, w/o acute findings.     A/P:    1. Parkinson's stable and had recent Neurology appt.   2. HTN; checked labs today; she has increased her medication. A little low K+ today related to chlorthalidone.   3. Elevated WBC and platelets, ordered labs again today 6/23/2020 and placed Hematology referral   4. Increased cholesterol on Pravastatin.   5. Back pain; she follows with Dr. Elias hill and had injection 2/12/2020. No current complaints.     Total time spent 25 minutes.  More than 50% of the time spent with Ms. Mccollum on counseling / coordinating her care

## 2020-06-23 NOTE — NURSING NOTE
Chief Complaint   Patient presents with     Recheck Medication     Pt would like to discuss medications      Diana Morrison, EMT at 9:59 AM sign on 6/23/2020

## 2020-06-25 ENCOUNTER — TELEPHONE (OUTPATIENT)
Dept: INTERNAL MEDICINE | Facility: CLINIC | Age: 80
End: 2020-06-25

## 2020-06-25 DIAGNOSIS — I10 BENIGN ESSENTIAL HYPERTENSION: Primary | ICD-10-CM

## 2020-06-25 RX ORDER — POTASSIUM CHLORIDE 750 MG/1
10 TABLET, EXTENDED RELEASE ORAL 2 TIMES DAILY
Qty: 6 TABLET | Refills: 0 | Status: SHIPPED | OUTPATIENT
Start: 2020-06-25 | End: 2021-05-17

## 2020-06-25 NOTE — TELEPHONE ENCOUNTER
Dear Haven;    Please give Catherine a call regarding somewhat low potassium. I sent Rx. To her pharmacy [facility] in [place]    Thanks, JUAN MANUEL Alvarez      Dear Catherine;    Your laboratory tests are  More-or-less stable but your potassium is a little low. I recommend some supplementation. I will have my nurse Haven give you a follow up call.    MD Haris    Pt called and plan of care discussed. Pt did not have any questions about the medication dosages or uses. Clinic numbers given for questions or concerns.  Haven White RN 11:51 AM on 6/25/2020.

## 2020-06-25 NOTE — TELEPHONE ENCOUNTER
Central Prior Authorization Team   Phone: [phone]    P/A demographics have been submitted to insurance, am awaiting question set.

## 2020-06-25 NOTE — TELEPHONE ENCOUNTER
Oncology/Surgical Oncology Referral Request:     Specialty Requested: Medical Oncology     Referring Provider: Dr Kar Alvarez MD    Referring Clinic/Organization: [facility]    Records location: [facility]     Requested Provider (if specified): Not Specified

## 2020-06-25 NOTE — TELEPHONE ENCOUNTER
Pharmacy has started a Prior Authorization request via Cover My Meds for Irbesartan 150MG tablets, Key: [id number]

## 2020-07-07 ENCOUNTER — TELEPHONE (OUTPATIENT)
Dept: INTERNAL MEDICINE | Facility: CLINIC | Age: 80
End: 2020-07-07

## 2020-07-07 ENCOUNTER — VIRTUAL VISIT (OUTPATIENT)
Dept: ONCOLOGY | Facility: CLINIC | Age: 80
End: 2020-07-07
Attending: INTERNAL MEDICINE
Payer: MEDICARE

## 2020-07-07 DIAGNOSIS — R79.89 ELEVATED PLATELET COUNT: ICD-10-CM

## 2020-07-07 DIAGNOSIS — D72.9 ABNORMAL WHITE BLOOD CELL: ICD-10-CM

## 2020-07-07 DIAGNOSIS — I10 BENIGN ESSENTIAL HYPERTENSION: ICD-10-CM

## 2020-07-07 DIAGNOSIS — R94.6 THYROID FUNCTION TEST ABNORMAL: ICD-10-CM

## 2020-07-07 PROCEDURE — 99203 OFFICE O/P NEW LOW 30 MIN: CPT | Mod: 95 | Performed by: INTERNAL MEDICINE

## 2020-07-07 PROCEDURE — 40001009 ZZH VIDEO/TELEPHONE VISIT; NO CHARGE

## 2020-07-07 ASSESSMENT — PAIN SCALES - GENERAL: PAINLEVEL: NO PAIN (0)

## 2020-07-07 NOTE — PROGRESS NOTES
"Catherine Mccollum is a 80 year old female who is being evaluated via a billable video visit.      The patient has been notified of following:     \"This video visit will be conducted via a call between you and your physician/provider. We have found that certain health care needs can be provided without the need for an in-person physical exam.  This service lets us provide the care you need with a video conversation.  If a prescription is necessary we can send it directly to your pharmacy.  If lab work is needed we can place an order for that and you can then stop by our lab to have the test done at a later time.    Video visits are billed at different rates depending on your insurance coverage.  Please reach out to your insurance provider with any questions.    If during the course of the call the physician/provider feels a video visit is not appropriate, you will not be charged for this service.\"    Patient has given verbal consent for Video visit? Yes    Will anyone else be joining your video visit? No         I have reviewed and updated the patient's allergies and medication list.    Concerns: No new concerns.   Refills: None needed.     No vitals taken at home.      Sintia Roberts, [facility] Hematology Consultation    Outpatient Visit Note:    Patient: Catherine Mccollum  MRN: [id number]  : 1940  THAO: 2020    Reason for Consultation:  Catherine Mccollum is a referred by Dr Alvarez for evaluation and treatment of thrombocytosis.    History of Present Illness:  Catherine Mccollum is a 80 year old woman with Parkinson's Disease who presents for evaluation of thrombocytosis.  Overall she reports that she is feeling well.  She notes no night sweats, fevers, unintentional weight loss.  She has no left upper quadrant pain or fullness.  Appetite is good.  Energy has been a bit decreased since the COVID-19 outbreak and she has been more homebound.  She notes no history of deep vein thrombosis, stroke, or heart " attack.  She notes no leg swelling or pain, cough, shortness of breath, hemoptysis.    She notes no vision changes or headaches.  She has no palmar erythema or post bathing pruritus.  She notes no lymphadenopathy.  No easy bruising or bleeding.    Past Medical History:  Past Medical History:   Diagnosis Date     GERD (gastroesophageal reflux disease)      HTN (hypertension)      Parkinsonian syndrome (H)     left side tremor       Past Surgical History:  Past Surgical History:   Procedure Laterality Date     BREAST SURGERY  Jan 1987    biopsy     CATARACT IOL, RT/LT       GYN SURGERY  1965;1967    2 ceasarean sections     HEMILAMINECTOMY, DISCECTOMY LUMBAR ONE LEVEL, COMBINED Left 6/25/2018    Procedure: COMBINED HEMILAMINECTOMY, DISCECTOMY LUMBAR ONE LEVEL;  Open Left Lumbar 5-Sacral 1 Hemilaminectomy, Medial Facetectomy, And Microdiscectomy Attention Transitional Segment Anatomy;  Surgeon: Rosemarie Young MD;  Location: [facility] OR     ORTHOPEDIC SURGERY      ankle surgery for trimalleolar frx       Medications:  Current Outpatient Medications   Medication Sig     Acetaminophen (TYLENOL EXTRA STRENGTH PO) Take 1,000 mg by mouth 2 times daily Muscle and joint pain     calcium carbonate (OS-LALI) 500 MG tablet Take 1 tablet by mouth     CALCIUM PO Take 1 tablet by mouth every morning     carbidopa-levodopa (SINEMET)  MG tablet Increase to one and a half tabs tid.  Then increase by 1/2 tab every 3 weeks to 2, then 2.5 then 3 tabs three times a day as needed and as tolerated     chlorthalidone (HYGROTON) 25 MG tablet Take 1 tablet (25 mg) by mouth daily     Cholecalciferol (VITAMIN D) 1000 UNITS capsule Take 2,000 Units by mouth every morning      COMPOUNDED NON-CONTROLLED SUBSTANCE (CMPD RX) - PHARMACY TO MIX COMPOUNDED MEDICATION Estriol 1 mg/g  Apply small amount to finger and apply to inside vagina daily for 2 weeks then twice weekly  Route: vaginally     cyanocobalamin (VITAMIN B-12) 1000 MCG tablet       gabapentin (NEURONTIN) 100 MG capsule Take 2 capsules (200 mg) by mouth 2 times daily (Patient taking differently: Take 200 mg by mouth 2 times daily Pt takes 2, 100 mg tablets, BID)     irbesartan (AVAPRO) 150 MG tablet Take 1.5 tablets (225 mg) by mouth every morning     LORazepam (ATIVAN) 0.5 MG tablet Take 1 tablet (0.5 mg) by mouth nightly as needed for anxiety     LORazepam (ATIVAN) 0.5 MG tablet Take 0.5 mg by mouth     omeprazole (PRILOSEC) 20 MG DR capsule Take 1 capsule (20 mg) by mouth daily     potassium chloride ER (KLOR-CON M) 10 MEQ CR tablet Take 1 tablet (10 mEq) by mouth 2 times daily     pravastatin (PRAVACHOL) 20 MG tablet Take 1 tablet (20 mg) by mouth At Bedtime     Probiotic Product (PROBIOTIC PO) Take by mouth daily      GABAPENTIN PO Take 200 mg by mouth 2 times daily      Current Facility-Administered Medications   Medication     lidocaine (PF) (XYLOCAINE) 1 % injection 3 mL     triamcinolone (KENALOG-40) injection 40 mg       Allergies:  Allergies   Allergen Reactions     Fosamax Nausea and GI Disturbance     Lisinopril Other (See Comments)     cough     Sulfa Drugs Hives       ROS:  A 14 point ROS is negative except as stated in the HPI    Social History:  Denies any tobacco use. No significant alcohol use. Denies any illicit drug use.     Family History:  No blood disorders in the family    Objective:  Exam:  Constitutional: Appears well, no distress  Eyes: no discharge, injection or icterus  Respiratory: no cough or labored breathing  Skin: no rashes or petechiae  Neurological: no deficits appreciated, speech is fluent  Psych: affect is normal    Labs:  Results for GÉNESIS CA (MRN [id number]) as of 7/7/2020 09:53   Ref. Range 1/23/2019 11:20 12/18/2019 11:45 6/23/2020 09:48   WBC Latest Ref Range: 4.0 - 11.0 10e9/L 8.8 11.2 (H) 10.5   Hemoglobin Latest Ref Range: 11.7 - 15.7 g/dL 15.4 14.7 15.5   Hematocrit Latest Ref Range: 35.0 - 47.0 % 44.0 42.7 44.1   Platelet Count Latest Ref  Range: 150 - 450 10e9/L 455 (H) 494 (H) 664 (H)       Imaging:  none    Assessment:  In summary, Catherine Mccollum is a 80 year old woman with Parkinson's disease and isolated mild thrombocytosis, who would be reasonable to test for the myeloproliferative neoplasm such as essential thrombocythemia.    I explained to her that the reason to sort out whether she is having myeloproliferative disorder or secondary thrombocytosis is that MPN's would predispose her to potentially serious thrombotic events such as stroke, heart attack, or pulmonary embolism.  The purpose of treatment would agree to reduce the risk for those life-threatening events with a goal of bringing her platelet count back into the normal range.  If, however, she has secondary thrombocytosis this does not necessarily need treatment.  In our discussion today I found no clear cause for thrombocytosis such as longstanding inflammatory diseases, however iron deficiency is certainly a common cause for thrombocytosis so it is reasonable to test her for iron deficiency.    Plan:  1. Majority of today's visit was spent counseling the patient regarding causes for elevated platelets, natural history and treatment for myeloproliferative neoplasms versus secondary thrombocytosis.  2.  I have ordered testing for acquired mutations in LALI R, Miguel Angel 2 and MPL genes as part of her MPN panel  3.  Ferritin check with her blood draw as a potential cause for secondary thrombocytosis  4. She will call us about a week after her blood draw if she hasn't heard back from us about her results.    The patient is given our center's contact information and is instructed to call if she should have any further questions or concerns.      Bernabe Dietz MD   of Medicine  [facility]         Video-Visit Details    Type of service:  Video Visit    Video Start Time: 847 AM  Video End Time: 906 AM    Originating Location (pt. Location):  Home    Distant Location (provider location):  [facility] CANCER [facility]     Platform used for Video Visit: Jacobo

## 2020-07-07 NOTE — TELEPHONE ENCOUNTER
[facility] Call Center    Phone Message    May a detailed message be left on voicemail: yes     Reason for Call: Order(s): Other:   Reason for requested: Telephone order for Covid Testing.   Department of [facility] is testing the facility   Date needed: ASAP    Provider name: Dr Alvarez    Please call TODAY with verbal orders.     You can call the facility or you can call Vin directly on her cell phone  - [phone]      Action Taken: Message routed to:  [facility] Primary    Travel Screening: Not Applicable

## 2020-07-07 NOTE — LETTER
2020         RE: Catherine Mccollum  [address]  [place] [address]        Dear Colleague,    Thank you for referring your patient, Catherine Mccollum, to the [facility]. Please see a copy of my visit note below.    Catherine Mccollum is a 80 year old female who is being evaluated via a billable video visit.          I have reviewed and updated the patient's allergies and medication list.    Concerns: No new concerns.   Refills: None needed.     No vitals taken at home.      Sintia Roberts, [facility] Hematology Consultation    Outpatient Visit Note:    Patient: Catherine Mccollum  MRN: [id number]  : 1940  THAO: 2020    Reason for Consultation:  Catherine Mccollum is a referred by Dr Alvarez for evaluation and treatment of thrombocytosis.    History of Present Illness:  Catherine Mccollum is a 80 year old woman with Parkinson's Disease who presents for evaluation of thrombocytosis.  Overall she reports that she is feeling well.  She notes no night sweats, fevers, unintentional weight loss.  She has no left upper quadrant pain or fullness.  Appetite is good.  Energy has been a bit decreased since the COVID-19 outbreak and she has been more homebound.  She notes no history of deep vein thrombosis, stroke, or heart attack.  She notes no leg swelling or pain, cough, shortness of breath, hemoptysis.    She notes no vision changes or headaches.  She has no palmar erythema or post bathing pruritus.  She notes no lymphadenopathy.  No easy bruising or bleeding.    Past Medical History:  Past Medical History:   Diagnosis Date     GERD (gastroesophageal reflux disease)      HTN (hypertension)      Parkinsonian syndrome (H)     left side tremor       Past Surgical History:  Past Surgical History:   Procedure Laterality Date     BREAST SURGERY  1987    biopsy     CATARACT IOL, RT/LT       GYN SURGERY  ;    2 ceasarean sections     HEMILAMINECTOMY, DISCECTOMY LUMBAR ONE  LEVEL, COMBINED Left 6/25/2018    Procedure: COMBINED HEMILAMINECTOMY, DISCECTOMY LUMBAR ONE LEVEL;  Open Left Lumbar 5-Sacral 1 Hemilaminectomy, Medial Facetectomy, And Microdiscectomy Attention Transitional Segment Anatomy;  Surgeon: Rosemarie Young MD;  Location: [facility]     ORTHOPEDIC SURGERY      ankle surgery for trimalleolar frx       Medications:  Current Outpatient Medications   Medication Sig     Acetaminophen (TYLENOL EXTRA STRENGTH PO) Take 1,000 mg by mouth 2 times daily Muscle and joint pain     calcium carbonate (OS-LALI) 500 MG tablet Take 1 tablet by mouth     CALCIUM PO Take 1 tablet by mouth every morning     carbidopa-levodopa (SINEMET)  MG tablet Increase to one and a half tabs tid.  Then increase by 1/2 tab every 3 weeks to 2, then 2.5 then 3 tabs three times a day as needed and as tolerated     chlorthalidone (HYGROTON) 25 MG tablet Take 1 tablet (25 mg) by mouth daily     Cholecalciferol (VITAMIN D) 1000 UNITS capsule Take 2,000 Units by mouth every morning      COMPOUNDED NON-CONTROLLED SUBSTANCE (CMPD RX) - PHARMACY TO MIX COMPOUNDED MEDICATION Estriol 1 mg/g  Apply small amount to finger and apply to inside vagina daily for 2 weeks then twice weekly  Route: vaginally     cyanocobalamin (VITAMIN B-12) 1000 MCG tablet      gabapentin (NEURONTIN) 100 MG capsule Take 2 capsules (200 mg) by mouth 2 times daily (Patient taking differently: Take 200 mg by mouth 2 times daily Pt takes 2, 100 mg tablets, BID)     irbesartan (AVAPRO) 150 MG tablet Take 1.5 tablets (225 mg) by mouth every morning     LORazepam (ATIVAN) 0.5 MG tablet Take 1 tablet (0.5 mg) by mouth nightly as needed for anxiety     LORazepam (ATIVAN) 0.5 MG tablet Take 0.5 mg by mouth     omeprazole (PRILOSEC) 20 MG DR capsule Take 1 capsule (20 mg) by mouth daily     potassium chloride ER (KLOR-CON M) 10 MEQ CR tablet Take 1 tablet (10 mEq) by mouth 2 times daily     pravastatin (PRAVACHOL) 20 MG tablet Take 1 tablet (20  mg) by mouth At Bedtime     Probiotic Product (PROBIOTIC PO) Take by mouth daily      GABAPENTIN PO Take 200 mg by mouth 2 times daily      Current Facility-Administered Medications   Medication     lidocaine (PF) (XYLOCAINE) 1 % injection 3 mL     triamcinolone (KENALOG-40) injection 40 mg       Allergies:  Allergies   Allergen Reactions     Fosamax Nausea and GI Disturbance     Lisinopril Other (See Comments)     cough     Sulfa Drugs Hives       ROS:  A 14 point ROS is negative except as stated in the HPI    Social History:  Denies any tobacco use. No significant alcohol use. Denies any illicit drug use.     Family History:  No blood disorders in the family    Objective:  Exam:  Constitutional: Appears well, no distress  Eyes: no discharge, injection or icterus  Respiratory: no cough or labored breathing  Skin: no rashes or petechiae  Neurological: no deficits appreciated, speech is fluent  Psych: affect is normal    Labs:  Results for CATHERINE MCCOLLUM (MRN [id number]) as of 7/7/2020 09:53   Ref. Range 1/23/2019 11:20 12/18/2019 11:45 6/23/2020 09:48   WBC Latest Ref Range: 4.0 - 11.0 10e9/L 8.8 11.2 (H) 10.5   Hemoglobin Latest Ref Range: 11.7 - 15.7 g/dL 15.4 14.7 15.5   Hematocrit Latest Ref Range: 35.0 - 47.0 % 44.0 42.7 44.1   Platelet Count Latest Ref Range: 150 - 450 10e9/L 455 (H) 494 (H) 664 (H)       Imaging:  none    Assessment:  In summary, Catherine Mccollum is a 80 year old woman with Parkinson's disease and isolated mild thrombocytosis, who would be reasonable to test for the myeloproliferative neoplasm such as essential thrombocythemia.    I explained to her that the reason to sort out whether she is having myeloproliferative disorder or secondary thrombocytosis is that MPN's would predispose her to potentially serious thrombotic events such as stroke, heart attack, or pulmonary embolism.  The purpose of treatment would agree to reduce the risk for those life-threatening events with a goal of bringing her  platelet count back into the normal range.  If, however, she has secondary thrombocytosis this does not necessarily need treatment.  In our discussion today I found no clear cause for thrombocytosis such as longstanding inflammatory diseases, however iron deficiency is certainly a common cause for thrombocytosis so it is reasonable to test her for iron deficiency.    Plan:  1. Majority of today's visit was spent counseling the patient regarding causes for elevated platelets, natural history and treatment for myeloproliferative neoplasms versus secondary thrombocytosis.  2.  I have ordered testing for acquired mutations in LALI R, Miguel Angel 2 and MPL genes as part of her MPN panel  3.  Ferritin check with her blood draw as a potential cause for secondary thrombocytosis  4. She will call us about a week after her blood draw if she hasn't heard back from us about her results.    The patient is given our center's contact information and is instructed to call if she should have any further questions or concerns.      Bernabe Dietz MD   of Medicine  [facility] [employer] [facility] [employer]         Video-Visit Details    Type of service:  Video Visit    Video Start Time: 847 AM  Video End Time: 906 AM    Originating Location (pt. Location): Home    Distant Location (provider location):  [facility]     Platform used for Video Visit: Well    Again, thank you for allowing me to participate in the care of your patient.        Sincerely,        Bernabe Dietz MD

## 2020-07-08 DIAGNOSIS — D72.9 ABNORMAL WHITE BLOOD CELL: ICD-10-CM

## 2020-07-08 DIAGNOSIS — R79.89 ELEVATED PLATELET COUNT: ICD-10-CM

## 2020-07-08 PROCEDURE — 81403 MOPATH PROCEDURE LEVEL 4: CPT | Performed by: INTERNAL MEDICINE

## 2020-07-08 PROCEDURE — 82728 ASSAY OF FERRITIN: CPT | Performed by: FAMILY MEDICINE

## 2020-07-08 PROCEDURE — 81219 CALR GENE COM VARIANTS: CPT | Performed by: INTERNAL MEDICINE

## 2020-07-08 PROCEDURE — 81270 JAK2 GENE: CPT | Performed by: INTERNAL MEDICINE

## 2020-07-08 PROCEDURE — 36415 COLL VENOUS BLD VENIPUNCTURE: CPT | Performed by: FAMILY MEDICINE

## 2020-07-09 LAB — FERRITIN SERPL-MCNC: 48 NG/ML (ref 8–252)

## 2020-07-13 ENCOUNTER — MEDICAL CORRESPONDENCE (OUTPATIENT)
Dept: HEALTH INFORMATION MANAGEMENT | Facility: CLINIC | Age: 80
End: 2020-07-13

## 2020-07-14 NOTE — TELEPHONE ENCOUNTER
I called and left detailed VM. Order was approved by Kim. If they are not doing testing anymore we do have drive up where she can get it if it is required.   India Diego, EMT at 1:31 PM on 7/14/2020.

## 2020-07-15 ENCOUNTER — PATIENT OUTREACH (OUTPATIENT)
Dept: ONCOLOGY | Facility: CLINIC | Age: 80
End: 2020-07-15

## 2020-07-15 NOTE — PROGRESS NOTES
Writer placed call to patient to advise that her Myeloproliferative Neoplasms focused NGS Panel drawn on 7/8/2020 is still in process. Patient had left a VM requesting an update on test results. Patient confirmed that she saw her ferritin test result and no further questions were posed at this time. Patient voiced understanding and will await call back when test is completed.

## 2020-07-17 LAB — COPATH REPORT: NORMAL

## 2020-07-21 ENCOUNTER — PRE VISIT (OUTPATIENT)
Dept: ONCOLOGY | Facility: CLINIC | Age: 80
End: 2020-07-21

## 2020-07-22 ENCOUNTER — MYC MEDICAL ADVICE (OUTPATIENT)
Dept: NEUROLOGY | Facility: CLINIC | Age: 80
End: 2020-07-22

## 2020-07-22 NOTE — TELEPHONE ENCOUNTER
carbidopa-levodopa (SINEMET)  MG tablet  480 tablet  3  5/26/2020   No    Sig: Increase to one and a half tabs tid.  Then increase by 1/2 tab every 3 weeks to 2, then 2.5 then 3 tabs three times a day as needed and as tolerated      Called [facility] pharmacy and spoke to Jorge (pharmacy tech) and Efrain (pharmacist). The above prescription had been voided by the pharmacy. I gave a verbal to Efrain for the above prescription and she repeated the instructions back to me twice. I asked that they priority ship this as Catherine will be needing the refill soon.   yes

## 2020-07-22 NOTE — TELEPHONE ENCOUNTER
[facility] Call Center    Phone Message    May a detailed message be left on voicemail: yes     Reason for Call: Medication Refill Request    Has the patient contacted the pharmacy for the refill? Yes   Name of medication being requested: Rx with increased dosing needs to be approved by [facility].   Patient   Provider who prescribed the medication: A small supply on sent [facility] since she will be without until [facility] can get authorization to complete the order. She only has a week supply left and will be leaving out of town and worries she won't get the new order from [facility] in time.   Pharmacy: Authorize for [facility] and send a supply to small supply to [id number] - [place], [place] - [address] AT [facility] [address]    Date medication is needed: asap         Action Taken: Other: P NEUROLOGY ADULT [facility]    Travel Screening: Not Applicable

## 2020-07-28 ENCOUNTER — VIRTUAL VISIT (OUTPATIENT)
Dept: ONCOLOGY | Facility: CLINIC | Age: 80
End: 2020-07-28
Attending: INTERNAL MEDICINE
Payer: MEDICARE

## 2020-07-28 VITALS — OXYGEN SATURATION: 97 % | TEMPERATURE: 96 F | DIASTOLIC BLOOD PRESSURE: 80 MMHG | SYSTOLIC BLOOD PRESSURE: 126 MMHG

## 2020-07-28 DIAGNOSIS — D47.3 ESSENTIAL THROMBOCYTHEMIA (H): Primary | ICD-10-CM

## 2020-07-28 PROCEDURE — 40001009 ZZH VIDEO/TELEPHONE VISIT; NO CHARGE

## 2020-07-28 PROCEDURE — 99214 OFFICE O/P EST MOD 30 MIN: CPT | Mod: 95 | Performed by: INTERNAL MEDICINE

## 2020-07-28 RX ORDER — HYDROXYUREA 500 MG/1
500 CAPSULE ORAL DAILY
Qty: 30 CAPSULE | Refills: 11 | Status: SHIPPED | OUTPATIENT
Start: 2020-07-28 | End: 2020-12-08

## 2020-07-28 ASSESSMENT — PAIN SCALES - GENERAL: PAINLEVEL: NO PAIN (0)

## 2020-07-28 NOTE — PROGRESS NOTES
"Catherine Mccollum is a 80 year old female who is being evaluated via a billable video visit.      The patient has been notified of following:     \"This video visit will be conducted via a call between you and your physician/provider. We have found that certain health care needs can be provided without the need for an in-person physical exam.  This service lets us provide the care you need with a video conversation.  If a prescription is necessary we can send it directly to your pharmacy.  If lab work is needed we can place an order for that and you can then stop by our lab to have the test done at a later time.    Video visits are billed at different rates depending on your insurance coverage.  Please reach out to your insurance provider with any questions.    If during the course of the call the physician/provider feels a video visit is not appropriate, you will not be charged for this service.\"    Patient has given verbal consent for Video visit? Yes  How would you like to obtain your AVS? MyChart  If you are dropped from the video visit, the video invite should be resent to: Send to e-mail at: [email or web address]  Will anyone else be joining your video visit? No      Patient reported vitals added.   0/10 pain scale.     No refills needed.   No additional concerns.     Sintia Ferguson, [facility] Hematology Follow Up Visit    Outpatient Visit Note:    Patient: Catherine Mccollum  MRN: [id number]  : 1940  THAO: 2020     Catherine Mccollum is a 80 year old woman with Parkinson's Disease and a history of recently diagnosed ET, Jak2 pos, who returns for routine follow up.      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  No symptoms or thrombotic events    Visit History:  Catherine Mccollum is a 80 year old woman with a history of Parkinson's Disease and Jak2 positive ET who returns for follow up to discuss the " diagnosis.  She continues to feel completely well with no complaints.  She is open to treatment of ET to lower her risk of thrombosis but has had a history of severe gastritis with ASA so she refuses this therapy today.  No post bathing pruritus, weight loss, fevers, bleeding, erythromelalgia, or thrombotic events.    Medications:  Current Outpatient Medications   Medication Sig     Acetaminophen (TYLENOL EXTRA STRENGTH PO) Take 1,000 mg by mouth 2 times daily Muscle and joint pain     calcium carbonate (OS-LALI) 500 MG tablet Take 1 tablet by mouth     CALCIUM PO Take 1 tablet by mouth every morning     carbidopa-levodopa (SINEMET)  MG tablet Increase to one and a half tabs tid.  Then increase by 1/2 tab every 3 weeks to 2, then 2.5 then 3 tabs three times a day as needed and as tolerated     chlorthalidone (HYGROTON) 25 MG tablet Take 1 tablet (25 mg) by mouth daily     Cholecalciferol (VITAMIN D) 1000 UNITS capsule Take 2,000 Units by mouth every morning      COMPOUNDED NON-CONTROLLED SUBSTANCE (CMPD RX) - PHARMACY TO MIX COMPOUNDED MEDICATION Estriol 1 mg/g  Apply small amount to finger and apply to inside vagina daily for 2 weeks then twice weekly  Route: vaginally     cyanocobalamin (VITAMIN B-12) 1000 MCG tablet      gabapentin (NEURONTIN) 100 MG capsule Take 2 capsules (200 mg) by mouth 2 times daily (Patient taking differently: Take 200 mg by mouth 2 times daily Pt takes 2, 100 mg tablets, BID)     hydroxyurea (HYDREA) 500 MG capsule Take 1 capsule (500 mg) by mouth daily     irbesartan (AVAPRO) 150 MG tablet Take 1.5 tablets (225 mg) by mouth every morning     LORazepam (ATIVAN) 0.5 MG tablet Take 1 tablet (0.5 mg) by mouth nightly as needed for anxiety     LORazepam (ATIVAN) 0.5 MG tablet Take 0.5 mg by mouth     omeprazole (PRILOSEC) 20 MG DR capsule Take 1 capsule (20 mg) by mouth daily     potassium chloride ER (KLOR-CON M) 10 MEQ CR tablet Take 1 tablet (10 mEq) by mouth 2 times daily      pravastatin (PRAVACHOL) 20 MG tablet Take 1 tablet (20 mg) by mouth At Bedtime     Probiotic Product (PROBIOTIC PO) Take by mouth daily      Current Facility-Administered Medications   Medication     lidocaine (PF) (XYLOCAINE) 1 % injection 3 mL     triamcinolone (KENALOG-40) injection 40 mg       Allergies:  Allergies   Allergen Reactions     Fosamax Nausea and GI Disturbance     Lisinopril Other (See Comments)     cough     Sulfa Drugs Hives       ROS:  A 14 point ROS is negative except as stated in the HPI    Objective:  She is in no distress, no cough of shortness of breath, affect and speech are normal    Labs:  Results for CATHERINE MCCOLLUM (MRN [id number]) as of 8/3/2020 21:47   Ref. Range 6/23/2020 09:48   WBC Latest Ref Range: 4.0 - 11.0 10e9/L 10.5   Hemoglobin Latest Ref Range: 11.7 - 15.7 g/dL 15.5   Hematocrit Latest Ref Range: 35.0 - 47.0 % 44.1   Platelet Count Latest Ref Range: 150 - 450 10e9/L 664 (H)     7/8/20 NGS panel:  Detected Alterations of Known or Potential Pathogenicity: JAK2 V617F     Detected Alterations of Uncertain Significance: None     Genes with No Detected Clinically Significant Alterations: CALR, MPL     Imaging:  none    Assessment:  In summary, Catherine Mccollum is a 80 year old woman with high-risk ET based on age and Jak2 positivity.  I recommended initiation of hydroxyurea for cytoreduction with the intent to lower her risk for thrombosis.  While ideally we would also start ASA, she refused this therapy due to prior intolerance.      Plan:  1. Majority of today's visit was spent counseling the patient regarding the new diagnosis, prognosis, natural history and treatment.  2. Start HU 500mg daily  3. No ASA due to prior intolerance to this therapy.  She understands the risk of thrombosis associated with this disease  4. Monitoring with CBC in 2 weeks and then monthly for any evidence of toxicity.  5. Discussed bone marrow biopsy being the gold standard in the diseases to make the the  diagnosis (ET vs PV), but the risk of this procedure at this point outweighs the prognostic value of the procedure.    The patient is given our center's contact information and is instructed to call if she should have any further questions or concerns.      Bernabe Dietz MD   of Medicine  [facility] [employer]     Video-Visit Details    Type of service:  Video Visit    Video Start Time: 1142 AM  Video End Time: 1158 AM    Originating Location (pt. Location): Home    Distant Location (provider location):  [facility] CAN[facility]ER [facility]     Platform used for Video Visit: [employer]

## 2020-07-28 NOTE — LETTER
2020         RE: Catherine Mccollum  [address]  [place] [address]        Dear Colleague,    Thank you for referring your patient, Catherine Mccollum, to the [facility]. Please see a copy of my visit note below.          Patient reported vitals added.   0/10 pain scale.     No refills needed.   No additional concerns.     Sintia Ferguson, [facility] Hematology Follow Up Visit    Outpatient Visit Note:    Patient: Catherine Mccollum  MRN: [id number]  : 1940  THAO: 2020     Catherine Mccollum is a 80 year old woman with Parkinson's Disease and a history of recently diagnosed ET, Jak2 pos, who returns for routine follow up.      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  No symptoms or thrombotic events    Visit History:  Catherine Mccollum is a 80 year old woman with a history of Parkinson's Disease and Jak2 positive ET who returns for follow up to discuss the diagnosis.  She continues to feel completely well with no complaints.  She is open to treatment of ET to lower her risk of thrombosis but has had a history of severe gastritis with ASA so she refuses this therapy today.  No post bathing pruritus, weight loss, fevers, bleeding, erythromelalgia, or thrombotic events.    Medications:  Current Outpatient Medications   Medication Sig     Acetaminophen (TYLENOL EXTRA STRENGTH PO) Take 1,000 mg by mouth 2 times daily Muscle and joint pain     calcium carbonate (OS-LALI) 500 MG tablet Take 1 tablet by mouth     CALCIUM PO Take 1 tablet by mouth every morning     carbidopa-levodopa (SINEMET)  MG tablet Increase to one and a half tabs tid.  Then increase by 1/2 tab every 3 weeks to 2, then 2.5 then 3 tabs three times a day as needed and as tolerated     chlorthalidone (HYGROTON) 25 MG tablet Take 1 tablet (25 mg) by mouth daily     Cholecalciferol (VITAMIN D) 1000 UNITS  capsule Take 2,000 Units by mouth every morning      COMPOUNDED NON-CONTROLLED SUBSTANCE (CMPD RX) - PHARMACY TO MIX COMPOUNDED MEDICATION Estriol 1 mg/g  Apply small amount to finger and apply to inside vagina daily for 2 weeks then twice weekly  Route: vaginally     cyanocobalamin (VITAMIN B-12) 1000 MCG tablet      gabapentin (NEURONTIN) 100 MG capsule Take 2 capsules (200 mg) by mouth 2 times daily (Patient taking differently: Take 200 mg by mouth 2 times daily Pt takes 2, 100 mg tablets, BID)     hydroxyurea (HYDREA) 500 MG capsule Take 1 capsule (500 mg) by mouth daily     irbesartan (AVAPRO) 150 MG tablet Take 1.5 tablets (225 mg) by mouth every morning     LORazepam (ATIVAN) 0.5 MG tablet Take 1 tablet (0.5 mg) by mouth nightly as needed for anxiety     LORazepam (ATIVAN) 0.5 MG tablet Take 0.5 mg by mouth     omeprazole (PRILOSEC) 20 MG DR capsule Take 1 capsule (20 mg) by mouth daily     potassium chloride ER (KLOR-CON M) 10 MEQ CR tablet Take 1 tablet (10 mEq) by mouth 2 times daily     pravastatin (PRAVACHOL) 20 MG tablet Take 1 tablet (20 mg) by mouth At Bedtime     Probiotic Product (PROBIOTIC PO) Take by mouth daily      Current Facility-Administered Medications   Medication     lidocaine (PF) (XYLOCAINE) 1 % injection 3 mL     triamcinolone (KENALOG-40) injection 40 mg       Allergies:  Allergies   Allergen Reactions     Fosamax Nausea and GI Disturbance     Lisinopril Other (See Comments)     cough     Sulfa Drugs Hives       ROS:  A 14 point ROS is negative except as stated in the HPI    Objective:  She is in no distress, no cough of shortness of breath, affect and speech are normal    Labs:  Results for GÉNESIS CA (MRN [id number]) as of 8/3/2020 21:47   Ref. Range 6/23/2020 09:48   WBC Latest Ref Range: 4.0 - 11.0 10e9/L 10.5   Hemoglobin Latest Ref Range: 11.7 - 15.7 g/dL 15.5   Hematocrit Latest Ref Range: 35.0 - 47.0 % 44.1   Platelet Count Latest Ref Range: 150 - 450 10e9/L 664 (H)      7/8/20 NGS panel:  Detected Alterations of Known or Potential Pathogenicity: JAK2 V617F     Detected Alterations of Uncertain Significance: None     Genes with No Detected Clinically Significant Alterations: CALR, MPL     Imaging:  none    Assessment:  In summary, Catherine Mccollum is a 80 year old woman with high-risk ET based on age and Jak2 positivity.  I recommended initiation of hydroxyurea for cytoreduction with the intent to lower her risk for thrombosis.  While ideally we would also start ASA, she refused this therapy due to prior intolerance.      Plan:  1. Majority of today's visit was spent counseling the patient regarding the new diagnosis, prognosis, natural history and treatment.  2. Start HU 500mg daily  3. No ASA due to prior intolerance to this therapy.  She understands the risk of thrombosis associated with this disease  4. Monitoring with CBC in 2 weeks and then monthly for any evidence of toxicity.  5. Discussed bone marrow biopsy being the gold standard in the diseases to make the the diagnosis (ET vs PV), but the risk of this procedure at this point outweighs the prognostic value of the procedure.    The patient is given our center's contact information and is instructed to call if she should have any further questions or concerns.      Bernabe Dietz MD   of Medicine  [facility] [employer]     Video-Visit Details    Type of service:  Video Visit    Video Start Time: 1142 AM  Video End Time: 1158 AM    Originating Location (pt. Location): Home    Distant Location (provider location):  [facility] CANCER [facility]     Platform used for Video Visit: [facility]        Again, thank you for allowing me to participate in the care of your patient.        Sincerely,        Bernabe Dietz MD

## 2020-08-04 NOTE — PATIENT INSTRUCTIONS
It was pleasure having a video with you to discuss your recent diagnosis of Essential Thrombocythemia.  This diagnosis was made based on your high platelet count and a new change in the DNA in your blood cells called the Jak2 mutation.  This is present in about 50% of people with this disease.    Most people with Essential Thrombocythemia live a normal lifespan.  The most common complication of the disease is blood clots such as heart attack, stroke, blood clots in the legs or abdomen.  The purpose of treatment is to lower your risk of clots.  The first treatment for this disease is a medicine called Hydroxyurea (Hydrea is the brand name).  This medicine puts a brake on the bone marrow so we do have to monitor that you don't get too much of this medicine which could make your blood counts go low.  The most common side effect is upset stomach, but many patients have no side effects.  Please don't hesitate to call us if the medicine is making your feel ill.  We will monitor for side effects with blood tests every 2 weeks or so in the beginning and then monitor less frequently once we have found a dose that lowers your platelet count to the normal range without making you feel ill.  Ideally we would add aspirin and do a bone marrow biopsy.  Given your trouble with aspirin in the past, I agree that we can forego this medicine. I do not think the value of a bone marrow biopsy (primarily to confirm the diagnosis) is with the discomfort of the procedure so we will hold off on this as well.    I will plan to see you back in about 4-6 weeks to see how you are feeling and review your blood tests with you.  You can get your blood tests done at any [facility].

## 2020-09-03 DIAGNOSIS — D47.3 ESSENTIAL THROMBOCYTHEMIA (H): ICD-10-CM

## 2020-09-03 LAB
BASOPHILS # BLD AUTO: 0 10E9/L (ref 0–0.2)
BASOPHILS NFR BLD AUTO: 0.3 %
DIFFERENTIAL METHOD BLD: ABNORMAL
EOSINOPHIL # BLD AUTO: 0.2 10E9/L (ref 0–0.7)
EOSINOPHIL NFR BLD AUTO: 1.9 %
ERYTHROCYTE [DISTWIDTH] IN BLOOD BY AUTOMATED COUNT: 14.1 % (ref 10–15)
HCT VFR BLD AUTO: 41.6 % (ref 35–47)
HGB BLD-MCNC: 14.7 G/DL (ref 11.7–15.7)
LYMPHOCYTES # BLD AUTO: 4.1 10E9/L (ref 0.8–5.3)
LYMPHOCYTES NFR BLD AUTO: 37.2 %
MCH RBC QN AUTO: 31.1 PG (ref 26.5–33)
MCHC RBC AUTO-ENTMCNC: 35.3 G/DL (ref 31.5–36.5)
MCV RBC AUTO: 88 FL (ref 78–100)
MONOCYTES # BLD AUTO: 0.8 10E9/L (ref 0–1.3)
MONOCYTES NFR BLD AUTO: 7.7 %
NEUTROPHILS # BLD AUTO: 5.8 10E9/L (ref 1.6–8.3)
NEUTROPHILS NFR BLD AUTO: 52.9 %
PLATELET # BLD AUTO: 567 10E9/L (ref 150–450)
RBC # BLD AUTO: 4.73 10E12/L (ref 3.8–5.2)
WBC # BLD AUTO: 11 10E9/L (ref 4–11)

## 2020-09-03 PROCEDURE — 36415 COLL VENOUS BLD VENIPUNCTURE: CPT | Performed by: INTERNAL MEDICINE

## 2020-09-03 PROCEDURE — 85025 COMPLETE CBC W/AUTO DIFF WBC: CPT | Performed by: INTERNAL MEDICINE

## 2020-09-08 DIAGNOSIS — E78.00 HIGH CHOLESTEROL: Primary | ICD-10-CM

## 2020-09-08 NOTE — PROGRESS NOTES
[facility]        OUTPATIENT PHYSICAL THERAPY FUNCTIONAL EVALUATION  PLAN OF TREATMENT FOR OUTPATIENT REHABILITATION  (COMPLETE FOR INITIAL CLAIMS ONLY)  Patient's Last Name, First Name, M.I.  YOB: 1940  Catherine Mccollum        Provider's Name   [facility]   Medical Record No.  [id number]     Start of Care Date:  03/13/19   Onset Date:   3/5/19   Type:     _X__PT   ____OT  ____SLP Medical Diagnosis:   PD     PT Diagnosis:  PD w/ bradykinesia and slowed gait Visits from SOC:  1                              __________________________________________________________________________________  Plan of Treatment/Functional Goals:  balance training, gait training, neuromuscular re-education, strengthening, transfer training        GOALS  gait  25 ft walk in 9 sec or less to cross streets and lots  06/07/19    gait  TUG score to be 9 sec or less for safe pivots in home and community  06/07/19    HEP   pt to be indep w/ a lsvt home exer program for amplitude training  05/31/19       Therapy Frequency:  other (see comments)   Predicted Duration of Therapy Intervention:  up to 18 x in 90 days    Re Rodriguez, PT                                    I CERTIFY THE NEED FOR THESE SERVICES FURNISHED UNDER        THIS PLAN OF TREATMENT AND WHILE UNDER MY CARE     (Physician co-signature of this document indicates review and certification of the therapy plan).                Certification Date From: 3/13/19     Certification Date To:   6/7/19    Referring Provider:  Yonathan Ferguson    Initial Assessment  See Epic Evaluation- Start of Care Date: 03/13/19            Adequate: hears normal conversation without difficulty

## 2020-09-10 ENCOUNTER — TRANSFERRED RECORDS (OUTPATIENT)
Dept: HEALTH INFORMATION MANAGEMENT | Facility: CLINIC | Age: 80
End: 2020-09-10

## 2020-09-11 RX ORDER — PRAVASTATIN SODIUM 20 MG
20 TABLET ORAL AT BEDTIME
Qty: 90 TABLET | Refills: 2 | Status: SHIPPED | OUTPATIENT
Start: 2020-09-11 | End: 2021-05-21

## 2020-09-15 ENCOUNTER — VIRTUAL VISIT (OUTPATIENT)
Dept: ONCOLOGY | Facility: CLINIC | Age: 80
End: 2020-09-15
Attending: INTERNAL MEDICINE
Payer: MEDICARE

## 2020-09-15 DIAGNOSIS — Z15.89 JAK2 V617F MUTATION: Primary | ICD-10-CM

## 2020-09-15 DIAGNOSIS — Z51.81 THERAPEUTIC DRUG MONITORING: ICD-10-CM

## 2020-09-15 DIAGNOSIS — D47.1 MYELOPROLIFERATIVE DISORDER (H): ICD-10-CM

## 2020-09-15 PROCEDURE — 99442 ZZC PHYSICIAN TELEPHONE EVALUATION 11-20 MIN: CPT | Mod: 95 | Performed by: INTERNAL MEDICINE

## 2020-09-15 PROCEDURE — 40001009 ZZH VIDEO/TELEPHONE VISIT; NO CHARGE

## 2020-09-15 NOTE — PROGRESS NOTES
"Catherine Mccollum is a 80 year old female who is being evaluated via a billable video visit.      The patient has been notified of following:     \"This video visit will be conducted via a call between you and your physician/provider. We have found that certain health care needs can be provided without the need for an in-person physical exam.  This service lets us provide the care you need with a video conversation.  If a prescription is necessary we can send it directly to your pharmacy.  If lab work is needed we can place an order for that and you can then stop by our lab to have the test done at a later time.    Video visits are billed at different rates depending on your insurance coverage.  Please reach out to your insurance provider with any questions.    If during the course of the call the physician/provider feels a video visit is not appropriate, you will not be charged for this service.\"    Patient has given verbal consent for Video visit? YES    How would you like to obtain your AVS? MyChart     If you are dropped from the video visit, the video invite should be resent to: Send to e-mail at: [email or web address]     Will anyone else be joining your video visit? No         I have reviewed and updated the patient's allergies and pt confirmed any changes to their medication list.  Patient was asked to provide any patient recorded vital signs, height and/or weight.  Please see \"Patient Reported Vital Signs\" tab for that information.  Patient instructed to be in the virtual waiting room 10-15 minutes prior to appointment time.      Concerns: Patient has a sore on the tip of her tongue, which has persisted for 1 week    Refill: None     Shawn Yadav, EMT            [facility] Hematology Follow Up Visit    Outpatient Visit Note:    Patient: Catherine Mccollum  MRN: [id number]  : 1940  THAO: Sep 15, 2020     Catherine Mccollum is a 80 year old woman with Parkinson's Disease and a history of recently diagnosed ET, " Jak2 pos, who returns for routine follow up.  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  No symptoms or thrombotic events.  Started  mg daily.  She refused ASA due to history of gastric ulcer.  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  She reports overall feeling well.  She started HU just a few weeks ago because she went on a trip to visit her son in [place] and didn't want to start a new mediation away from home.  Her only complaint is of a small nonpainful shallow ulcer on the tip of her tongue.  Doesn't interfere with eating or drinking and doesn't bother her much.  We reviewed her blood counts today.  The medication overall doesn't bother her.      Medications:  Current Outpatient Medications   Medication Sig     Acetaminophen (TYLENOL EXTRA STRENGTH PO) Take 1,000 mg by mouth 2 times daily Muscle and joint pain     calcium carbonate (OS-LALI) 500 MG tablet Take 1 tablet by mouth     CALCIUM PO Take 1 tablet by mouth every morning     carbidopa-levodopa (SINEMET)  MG tablet Increase to one and a half tabs tid.  Then increase by 1/2 tab every 3 weeks to 2, then 2.5 then 3 tabs three times a day as needed and as tolerated     chlorthalidone (HYGROTON) 25 MG tablet Take 1 tablet (25 mg) by mouth daily     Cholecalciferol (VITAMIN D) 1000 UNITS capsule Take 2,000 Units by mouth every morning      COMPOUNDED NON-CONTROLLED SUBSTANCE (CMPD RX) - PHARMACY TO MIX COMPOUNDED MEDICATION Estriol 1 mg/g  Apply small amount to finger and apply to inside vagina daily for 2 weeks then twice weekly  Route: vaginally     cyanocobalamin  (VITAMIN B-12) 1000 MCG tablet      gabapentin (NEURONTIN) 100 MG capsule Take 2 capsules (200 mg) by mouth 2 times daily (Patient taking differently: Take 200 mg by mouth 2 times daily Pt takes 2, 100 mg tablets, BID)     hydroxyurea (HYDREA) 500 MG capsule Take 1 capsule (500 mg) by mouth daily     irbesartan (AVAPRO) 150 MG tablet Take 1.5 tablets (225 mg) by mouth every morning     LORazepam (ATIVAN) 0.5 MG tablet Take 1 tablet (0.5 mg) by mouth nightly as needed for anxiety     omeprazole (PRILOSEC) 20 MG DR capsule Take 1 capsule (20 mg) by mouth daily     pravastatin (PRAVACHOL) 20 MG tablet Take 1 tablet (20 mg) by mouth At Bedtime     Probiotic Product (PROBIOTIC PO) Take by mouth daily      LORazepam (ATIVAN) 0.5 MG tablet Take 0.5 mg by mouth     potassium chloride ER (KLOR-CON M) 10 MEQ CR tablet Take 1 tablet (10 mEq) by mouth 2 times daily     Current Facility-Administered Medications   Medication     lidocaine (PF) (XYLOCAINE) 1 % injection 3 mL     triamcinolone (KENALOG-40) injection 40 mg       Allergies:  Allergies   Allergen Reactions     Fosamax Nausea and GI Disturbance     Lisinopril Other (See Comments)     cough     Sulfa Drugs Hives       ROS:  A 14 point ROS is negative except as stated in the HPI    Objective:  She is in no distress, no cough of shortness of breath, affect and speech are normal    Labs:  Results for CATHERINE MCCOLLUM (MRN [id number]) as of 9/15/2020 10:43   Ref. Range 6/23/2020 09:48 9/3/2020 09:44   WBC Latest Ref Range: 4.0 - 11.0 10e9/L 10.5 11.0   Hemoglobin Latest Ref Range: 11.7 - 15.7 g/dL 15.5 14.7   Hematocrit Latest Ref Range: 35.0 - 47.0 % 44.1 41.6   Platelet Count Latest Ref Range: 150 - 450 10e9/L 664 (H) 567 (H)       Imaging:  none    Assessment:  In summary, Catherine Mccollum is a 80 year old woman with high-risk ET based on age and Jak2 positivity, doing well on initiating dose of HU but not yet quite at treatment goal for PLT (if she has ET), but at her hematocrit  goal, which may be of greater importance.      Plan:  1. Continue  mg daily  2. CBC monthly and CMP intermittently for HU toxicity monitoring  3. Follow up in 2 months to review counts  4. She will call with questions or concerns    The patient is given our center's contact information and is instructed to call if she should have any further questions or concerns.      Bernabe Dietz MD   of Medicine  [facility]      Total telephone time: 16 minutes

## 2020-09-15 NOTE — LETTER
"    9/15/2020         RE: Catherine Mccollum  [address]  [place] [address]        Dear Colleague,    Thank you for referring your patient, Catherine Mccollum, to the [facility]. Please see a copy of my visit note below.    Catherine Mccollum is a 80 year old female who is being evaluated via a billable video visit.      The patient has been notified of following:     \"This video visit will be conducted via a call between you and your physician/provider. We have found that certain health care needs can be provided without the need for an in-person physical exam.  This service lets us provide the care you need with a video conversation.  If a prescription is necessary we can send it directly to your pharmacy.  If lab work is needed we can place an order for that and you can then stop by our lab to have the test done at a later time.    Video visits are billed at different rates depending on your insurance coverage.  Please reach out to your insurance provider with any questions.    If during the course of the call the physician/provider feels a video visit is not appropriate, you will not be charged for this service.\"    Patient has given verbal consent for Video visit? YES    How would you like to obtain your AVS? MyChart     If you are dropped from the video visit, the video invite should be resent to: Send to e-mail at: [email or web address]     Will anyone else be joining your video visit? No         I have reviewed and updated the patient's allergies and pt confirmed any changes to their medication list.  Patient was asked to provide any patient recorded vital signs, height and/or weight.  Please see \"Patient Reported Vital Signs\" tab for that information.  Patient instructed to be in the virtual waiting room 10-15 minutes prior to appointment time.      Concerns: Patient has a sore on the tip of her tongue, which has persisted for 1 week    Refill: None     Shawn Yadav, " EMT            [facility] Hematology Follow Up Visit    Outpatient Visit Note:    Patient: Catherine Mccollum  MRN: [id number]  : 1940  THAO: Sep 15, 2020     Catherine Mccollum is a 80 year old woman with Parkinson's Disease and a history of recently diagnosed ET, Jak2 pos, who returns for routine follow up.  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  No symptoms or thrombotic events.  Started  mg daily.  She refused ASA due to history of gastric ulcer.  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  She reports overall feeling well.  She started HU just a few weeks ago because she went on a trip to visit her son in [place] and didn't want to start a new mediation away from home.  Her only complaint is of a small nonpainful shallow ulcer on the tip of her tongue.  Doesn't interfere with eating or drinking and doesn't bother her much.  We reviewed her blood counts today.  The medication overall doesn't bother her.      Medications:  Current Outpatient Medications   Medication Sig     Acetaminophen (TYLENOL EXTRA STRENGTH PO) Take 1,000 mg by mouth 2 times daily Muscle and joint pain     calcium carbonate (OS-LALI) 500 MG tablet Take 1 tablet by mouth     CALCIUM PO Take 1 tablet by mouth every morning     carbidopa-levodopa (SINEMET)  MG tablet Increase to one and a half tabs tid.  Then increase by 1/2 tab every 3 weeks to 2, then 2.5 then 3 tabs three times a day as needed and as tolerated     chlorthalidone (HYGROTON) 25 MG tablet Take 1 tablet (25 mg) by mouth daily     Cholecalciferol (VITAMIN D) 1000 UNITS capsule Take 2,000  Units by mouth every morning      COMPOUNDED NON-CONTROLLED SUBSTANCE (CMPD RX) - PHARMACY TO MIX COMPOUNDED MEDICATION Estriol 1 mg/g  Apply small amount to finger and apply to inside vagina daily for 2 weeks then twice weekly  Route: vaginally     cyanocobalamin (VITAMIN B-12) 1000 MCG tablet      gabapentin (NEURONTIN) 100 MG capsule Take 2 capsules (200 mg) by mouth 2 times daily (Patient taking differently: Take 200 mg by mouth 2 times daily Pt takes 2, 100 mg tablets, BID)     hydroxyurea (HYDREA) 500 MG capsule Take 1 capsule (500 mg) by mouth daily     irbesartan (AVAPRO) 150 MG tablet Take 1.5 tablets (225 mg) by mouth every morning     LORazepam (ATIVAN) 0.5 MG tablet Take 1 tablet (0.5 mg) by mouth nightly as needed for anxiety     omeprazole (PRILOSEC) 20 MG DR capsule Take 1 capsule (20 mg) by mouth daily     pravastatin (PRAVACHOL) 20 MG tablet Take 1 tablet (20 mg) by mouth At Bedtime     Probiotic Product (PROBIOTIC PO) Take by mouth daily      LORazepam (ATIVAN) 0.5 MG tablet Take 0.5 mg by mouth     potassium chloride ER (KLOR-CON M) 10 MEQ CR tablet Take 1 tablet (10 mEq) by mouth 2 times daily     Current Facility-Administered Medications   Medication     lidocaine (PF) (XYLOCAINE) 1 % injection 3 mL     triamcinolone (KENALOG-40) injection 40 mg       Allergies:  Allergies   Allergen Reactions     Fosamax Nausea and GI Disturbance     Lisinopril Other (See Comments)     cough     Sulfa Drugs Hives       ROS:  A 14 point ROS is negative except as stated in the HPI    Objective:  She is in no distress, no cough of shortness of breath, affect and speech are normal    Labs:  Results for GÉNESIS CA (MRN [id number]) as of 9/15/2020 10:43   Ref. Range 6/23/2020 09:48 9/3/2020 09:44   WBC Latest Ref Range: 4.0 - 11.0 10e9/L 10.5 11.0   Hemoglobin Latest Ref Range: 11.7 - 15.7 g/dL 15.5 14.7   Hematocrit Latest Ref Range: 35.0 - 47.0 % 44.1 41.6   Platelet Count Latest Ref Range: 150 - 450 10e9/L 664  (H) 567 (H)       Imaging:  none    Assessment:  In summary, Catherine Mccollum is a 80 year old woman with high-risk ET based on age and Jak2 positivity, doing well on initiating dose of HU but not yet quite at treatment goal for PLT (if she has ET), but at her hematocrit goal, which may be of greater importance.      Plan:  1. Continue  mg daily  2. CBC monthly and CMP intermittently for HU toxicity monitoring  3. Follow up in 2 months to review counts  4. She will call with questions or concerns    The patient is given our center's contact information and is instructed to call if she should have any further questions or concerns.      Bernabe Dietz MD   of Medicine  [facility]      Total telephone time: 16 minutes      Again, thank you for allowing me to participate in the care of your patient.        Sincerely,        Bernabe Dietz MD

## 2020-10-05 DIAGNOSIS — Z15.89 JAK2 V617F MUTATION: ICD-10-CM

## 2020-10-05 DIAGNOSIS — Z51.81 THERAPEUTIC DRUG MONITORING: ICD-10-CM

## 2020-10-05 DIAGNOSIS — D47.1 MYELOPROLIFERATIVE DISORDER (H): ICD-10-CM

## 2020-10-05 LAB
ALBUMIN SERPL-MCNC: 3.9 G/DL (ref 3.4–5)
ALP SERPL-CCNC: 67 U/L (ref 40–150)
ALT SERPL W P-5'-P-CCNC: 13 U/L (ref 0–50)
ANION GAP SERPL CALCULATED.3IONS-SCNC: 7 MMOL/L (ref 3–14)
AST SERPL W P-5'-P-CCNC: 17 U/L (ref 0–45)
BASOPHILS # BLD AUTO: 0 10E9/L (ref 0–0.2)
BASOPHILS NFR BLD AUTO: 0.3 %
BILIRUB SERPL-MCNC: 0.6 MG/DL (ref 0.2–1.3)
BUN SERPL-MCNC: 17 MG/DL (ref 7–30)
CALCIUM SERPL-MCNC: 9.7 MG/DL (ref 8.5–10.1)
CHLORIDE SERPL-SCNC: 98 MMOL/L (ref 94–109)
CO2 SERPL-SCNC: 29 MMOL/L (ref 20–32)
CREAT SERPL-MCNC: 0.62 MG/DL (ref 0.52–1.04)
DIFFERENTIAL METHOD BLD: ABNORMAL
EOSINOPHIL # BLD AUTO: 0.2 10E9/L (ref 0–0.7)
EOSINOPHIL NFR BLD AUTO: 2.1 %
ERYTHROCYTE [DISTWIDTH] IN BLOOD BY AUTOMATED COUNT: 15.1 % (ref 10–15)
GFR SERPL CREATININE-BSD FRML MDRD: 84 ML/MIN/{1.73_M2}
GLUCOSE SERPL-MCNC: 97 MG/DL (ref 70–99)
HCT VFR BLD AUTO: 40 % (ref 35–47)
HGB BLD-MCNC: 14.1 G/DL (ref 11.7–15.7)
LYMPHOCYTES # BLD AUTO: 3.8 10E9/L (ref 0.8–5.3)
LYMPHOCYTES NFR BLD AUTO: 41.6 %
MCH RBC QN AUTO: 32 PG (ref 26.5–33)
MCHC RBC AUTO-ENTMCNC: 35.3 G/DL (ref 31.5–36.5)
MCV RBC AUTO: 91 FL (ref 78–100)
MONOCYTES # BLD AUTO: 0.7 10E9/L (ref 0–1.3)
MONOCYTES NFR BLD AUTO: 8 %
NEUTROPHILS # BLD AUTO: 4.4 10E9/L (ref 1.6–8.3)
NEUTROPHILS NFR BLD AUTO: 48 %
PLATELET # BLD AUTO: 448 10E9/L (ref 150–450)
POTASSIUM SERPL-SCNC: 3.4 MMOL/L (ref 3.4–5.3)
PROT SERPL-MCNC: 7.1 G/DL (ref 6.8–8.8)
RBC # BLD AUTO: 4.4 10E12/L (ref 3.8–5.2)
SODIUM SERPL-SCNC: 134 MMOL/L (ref 133–144)
WBC # BLD AUTO: 9.2 10E9/L (ref 4–11)

## 2020-10-05 PROCEDURE — 36415 COLL VENOUS BLD VENIPUNCTURE: CPT | Performed by: INTERNAL MEDICINE

## 2020-10-05 PROCEDURE — 80053 COMPREHEN METABOLIC PANEL: CPT | Performed by: INTERNAL MEDICINE

## 2020-10-05 PROCEDURE — 85025 COMPLETE CBC W/AUTO DIFF WBC: CPT | Performed by: INTERNAL MEDICINE

## 2020-10-30 ENCOUNTER — DOCUMENTATION ONLY (OUTPATIENT)
Dept: ONCOLOGY | Facility: CLINIC | Age: 80
End: 2020-10-30

## 2020-10-30 NOTE — PROGRESS NOTES
"Patient has lab only appt 11/5/20 for \"CMP and CBC\", no order for CMP in chart.  Please place FUTURE/STANDING orders in Epic if appropriate.    Thanks,   [facility]    "

## 2020-11-02 ENCOUNTER — MYC MEDICAL ADVICE (OUTPATIENT)
Dept: NEUROLOGY | Facility: CLINIC | Age: 80
End: 2020-11-02

## 2020-11-02 DIAGNOSIS — M79.604 PAIN IN BOTH LOWER EXTREMITIES: ICD-10-CM

## 2020-11-02 DIAGNOSIS — M79.605 PAIN IN BOTH LOWER EXTREMITIES: ICD-10-CM

## 2020-11-03 DIAGNOSIS — D47.1 MYELOPROLIFERATIVE DISORDER (H): Primary | ICD-10-CM

## 2020-11-04 RX ORDER — GABAPENTIN 100 MG/1
CAPSULE ORAL
Qty: 180 CAPSULE | Refills: 3 | Status: SHIPPED | OUTPATIENT
Start: 2020-11-04 | End: 2020-11-11

## 2020-11-05 DIAGNOSIS — Z15.89 JAK2 V617F MUTATION: ICD-10-CM

## 2020-11-05 DIAGNOSIS — D47.1 MYELOPROLIFERATIVE DISORDER (H): ICD-10-CM

## 2020-11-05 DIAGNOSIS — Z51.81 THERAPEUTIC DRUG MONITORING: ICD-10-CM

## 2020-11-05 LAB
ALBUMIN SERPL-MCNC: 4.1 G/DL (ref 3.4–5)
ALP SERPL-CCNC: 60 U/L (ref 40–150)
ALT SERPL W P-5'-P-CCNC: 16 U/L (ref 0–50)
ANION GAP SERPL CALCULATED.3IONS-SCNC: 6 MMOL/L (ref 3–14)
AST SERPL W P-5'-P-CCNC: 19 U/L (ref 0–45)
BASOPHILS # BLD AUTO: 0 10E9/L (ref 0–0.2)
BASOPHILS NFR BLD AUTO: 0.4 %
BILIRUB SERPL-MCNC: 0.8 MG/DL (ref 0.2–1.3)
BUN SERPL-MCNC: 15 MG/DL (ref 7–30)
CALCIUM SERPL-MCNC: 9.7 MG/DL (ref 8.5–10.1)
CHLORIDE SERPL-SCNC: 98 MMOL/L (ref 94–109)
CO2 SERPL-SCNC: 31 MMOL/L (ref 20–32)
CREAT SERPL-MCNC: 0.61 MG/DL (ref 0.52–1.04)
DIFFERENTIAL METHOD BLD: ABNORMAL
EOSINOPHIL # BLD AUTO: 0.2 10E9/L (ref 0–0.7)
EOSINOPHIL NFR BLD AUTO: 2.1 %
ERYTHROCYTE [DISTWIDTH] IN BLOOD BY AUTOMATED COUNT: 14.8 % (ref 10–15)
GFR SERPL CREATININE-BSD FRML MDRD: 84 ML/MIN/{1.73_M2}
GLUCOSE SERPL-MCNC: 110 MG/DL (ref 70–99)
HCT VFR BLD AUTO: 40.6 % (ref 35–47)
HGB BLD-MCNC: 14.6 G/DL (ref 11.7–15.7)
LYMPHOCYTES # BLD AUTO: 4.3 10E9/L (ref 0.8–5.3)
LYMPHOCYTES NFR BLD AUTO: 40 %
MCH RBC QN AUTO: 33.8 PG (ref 26.5–33)
MCHC RBC AUTO-ENTMCNC: 36 G/DL (ref 31.5–36.5)
MCV RBC AUTO: 94 FL (ref 78–100)
MONOCYTES # BLD AUTO: 0.7 10E9/L (ref 0–1.3)
MONOCYTES NFR BLD AUTO: 6.9 %
NEUTROPHILS # BLD AUTO: 5.4 10E9/L (ref 1.6–8.3)
NEUTROPHILS NFR BLD AUTO: 50.6 %
PLATELET # BLD AUTO: 466 10E9/L (ref 150–450)
POTASSIUM SERPL-SCNC: 3.7 MMOL/L (ref 3.4–5.3)
PROT SERPL-MCNC: 7.2 G/DL (ref 6.8–8.8)
RBC # BLD AUTO: 4.32 10E12/L (ref 3.8–5.2)
SODIUM SERPL-SCNC: 135 MMOL/L (ref 133–144)
WBC # BLD AUTO: 10.6 10E9/L (ref 4–11)

## 2020-11-05 PROCEDURE — 80053 COMPREHEN METABOLIC PANEL: CPT | Performed by: INTERNAL MEDICINE

## 2020-11-05 PROCEDURE — 85025 COMPLETE CBC W/AUTO DIFF WBC: CPT | Performed by: INTERNAL MEDICINE

## 2020-11-11 ENCOUNTER — MYC MEDICAL ADVICE (OUTPATIENT)
Dept: NEUROLOGY | Facility: CLINIC | Age: 80
End: 2020-11-11

## 2020-11-11 DIAGNOSIS — M79.604 PAIN IN BOTH LOWER EXTREMITIES: ICD-10-CM

## 2020-11-11 DIAGNOSIS — M79.605 PAIN IN BOTH LOWER EXTREMITIES: ICD-10-CM

## 2020-11-11 RX ORDER — GABAPENTIN 100 MG/1
CAPSULE ORAL
Qty: 180 CAPSULE | Refills: 3 | Status: SHIPPED | OUTPATIENT
Start: 2020-11-11 | End: 2021-05-17

## 2020-12-07 ENCOUNTER — MYC MEDICAL ADVICE (OUTPATIENT)
Dept: ONCOLOGY | Facility: CLINIC | Age: 80
End: 2020-12-07

## 2020-12-07 DIAGNOSIS — Z51.81 THERAPEUTIC DRUG MONITORING: ICD-10-CM

## 2020-12-07 DIAGNOSIS — Z15.89 JAK2 V617F MUTATION: ICD-10-CM

## 2020-12-07 DIAGNOSIS — D47.3 ESSENTIAL THROMBOCYTHEMIA (H): ICD-10-CM

## 2020-12-07 DIAGNOSIS — D47.1 MYELOPROLIFERATIVE DISORDER (H): ICD-10-CM

## 2020-12-07 LAB
BASOPHILS # BLD AUTO: 0 10E9/L (ref 0–0.2)
BASOPHILS NFR BLD AUTO: 0.4 %
DIFFERENTIAL METHOD BLD: ABNORMAL
EOSINOPHIL # BLD AUTO: 0.1 10E9/L (ref 0–0.7)
EOSINOPHIL NFR BLD AUTO: 1.1 %
ERYTHROCYTE [DISTWIDTH] IN BLOOD BY AUTOMATED COUNT: 13.1 % (ref 10–15)
HCT VFR BLD AUTO: 40.9 % (ref 35–47)
HGB BLD-MCNC: 14.6 G/DL (ref 11.7–15.7)
LYMPHOCYTES # BLD AUTO: 3.8 10E9/L (ref 0.8–5.3)
LYMPHOCYTES NFR BLD AUTO: 34.5 %
MCH RBC QN AUTO: 34.4 PG (ref 26.5–33)
MCHC RBC AUTO-ENTMCNC: 35.7 G/DL (ref 31.5–36.5)
MCV RBC AUTO: 97 FL (ref 78–100)
MONOCYTES # BLD AUTO: 0.8 10E9/L (ref 0–1.3)
MONOCYTES NFR BLD AUTO: 7.5 %
NEUTROPHILS # BLD AUTO: 6.1 10E9/L (ref 1.6–8.3)
NEUTROPHILS NFR BLD AUTO: 56.5 %
PLATELET # BLD AUTO: 504 10E9/L (ref 150–450)
RBC # BLD AUTO: 4.24 10E12/L (ref 3.8–5.2)
WBC # BLD AUTO: 10.9 10E9/L (ref 4–11)

## 2020-12-07 PROCEDURE — 36415 COLL VENOUS BLD VENIPUNCTURE: CPT | Performed by: INTERNAL MEDICINE

## 2020-12-07 PROCEDURE — 85025 COMPLETE CBC W/AUTO DIFF WBC: CPT | Performed by: INTERNAL MEDICINE

## 2020-12-08 DIAGNOSIS — D47.1 MYELOPROLIFERATIVE DISORDER (H): Primary | ICD-10-CM

## 2020-12-09 RX ORDER — HYDROXYUREA 500 MG/1
1000 CAPSULE ORAL DAILY
Qty: 180 CAPSULE | Refills: 0 | Status: SHIPPED | OUTPATIENT
Start: 2020-12-09 | End: 2021-03-02

## 2020-12-16 ENCOUNTER — VIRTUAL VISIT (OUTPATIENT)
Dept: INTERNAL MEDICINE | Facility: CLINIC | Age: 80
End: 2020-12-16
Payer: MEDICARE

## 2020-12-16 DIAGNOSIS — M25.552 HIP PAIN, LEFT: Primary | ICD-10-CM

## 2020-12-16 PROCEDURE — 99442 PR PHYSICIAN TELEPHONE EVALUATION 11-20 MIN: CPT | Mod: 95 | Performed by: INTERNAL MEDICINE

## 2020-12-16 NOTE — Clinical Note
(1) follow up with me in 3 months either in-person or telephone  (2) orthopedic appt. With Dr. Griffin in 2/2021; she wants to wait until then. L hip and R shoulder pain

## 2020-12-16 NOTE — PROGRESS NOTES
Telephone visit    Ms. Mccollum agrees to a telephone visit    She follows with Dr. Dietz, Hematology for positive Jak2 and elevated platelets. She has follow up 3/2/2021. She is on Hydroxyurea    She has h/o Parkison's on Sinemet and this is more-or-less stable.     HTN; stable and she states they check her BP at her care facility    Increased cholesterol on Pravastatin. Lipid panel 12/18/2019 with LDL 94 and HDL 54    Last in-person visit with me 6/23/2020 and additional details are in that note.     She states she is tested at least weekly at her care facility for COVID and she may get the COVID vaccine in a few weeks    She states ongoing L hip and R shoulder pain and would like to see Dr. Griffin orthopedics in 2/2021    She states that medical marijuana  is more effective for pain and sleep than Lorazepam. I will certify her    She got this year's influenza vaccination    Will have a 3 month either in-person or telephone visit     JUAN MANUEL Alvarez MD    Total time today 11 minutes

## 2020-12-16 NOTE — NURSING NOTE
Chief Complaint   Patient presents with     Sleep Problem     Pt would like to discuss sleep issues      Pain     Pt would like to discuss hip pain     Gastric Problem     Pt reports indigestion      DEXTER Llamas at 9:19 AM sign on 12/16/2020

## 2021-01-20 ENCOUNTER — TRANSFERRED RECORDS (OUTPATIENT)
Dept: HEALTH INFORMATION MANAGEMENT | Facility: CLINIC | Age: 81
End: 2021-01-20

## 2021-01-27 DIAGNOSIS — D47.1 MYELOPROLIFERATIVE DISORDER (H): ICD-10-CM

## 2021-01-27 LAB
ALBUMIN SERPL-MCNC: 4 G/DL (ref 3.4–5)
ALP SERPL-CCNC: 61 U/L (ref 40–150)
ALT SERPL W P-5'-P-CCNC: 11 U/L (ref 0–50)
ANION GAP SERPL CALCULATED.3IONS-SCNC: 7 MMOL/L (ref 3–14)
AST SERPL W P-5'-P-CCNC: 23 U/L (ref 0–45)
BASOPHILS # BLD AUTO: 0 10E9/L (ref 0–0.2)
BASOPHILS NFR BLD AUTO: 0.3 %
BILIRUB SERPL-MCNC: 0.8 MG/DL (ref 0.2–1.3)
BUN SERPL-MCNC: 14 MG/DL (ref 7–30)
CALCIUM SERPL-MCNC: 10.2 MG/DL (ref 8.5–10.1)
CHLORIDE SERPL-SCNC: 94 MMOL/L (ref 94–109)
CO2 SERPL-SCNC: 31 MMOL/L (ref 20–32)
CREAT SERPL-MCNC: 0.65 MG/DL (ref 0.52–1.04)
DIFFERENTIAL METHOD BLD: ABNORMAL
EOSINOPHIL # BLD AUTO: 0.2 10E9/L (ref 0–0.7)
EOSINOPHIL NFR BLD AUTO: 1.8 %
ERYTHROCYTE [DISTWIDTH] IN BLOOD BY AUTOMATED COUNT: 13.4 % (ref 10–15)
GFR SERPL CREATININE-BSD FRML MDRD: 82 ML/MIN/{1.73_M2}
GLUCOSE SERPL-MCNC: 129 MG/DL (ref 70–99)
HCT VFR BLD AUTO: 39.7 % (ref 35–47)
HGB BLD-MCNC: 14.5 G/DL (ref 11.7–15.7)
LYMPHOCYTES # BLD AUTO: 3.9 10E9/L (ref 0.8–5.3)
LYMPHOCYTES NFR BLD AUTO: 44.6 %
MCH RBC QN AUTO: 36.5 PG (ref 26.5–33)
MCHC RBC AUTO-ENTMCNC: 36.5 G/DL (ref 31.5–36.5)
MCV RBC AUTO: 100 FL (ref 78–100)
MONOCYTES # BLD AUTO: 0.6 10E9/L (ref 0–1.3)
MONOCYTES NFR BLD AUTO: 6.9 %
NEUTROPHILS # BLD AUTO: 4 10E9/L (ref 1.6–8.3)
NEUTROPHILS NFR BLD AUTO: 46.4 %
PLATELET # BLD AUTO: 318 10E9/L (ref 150–450)
POTASSIUM SERPL-SCNC: 3.4 MMOL/L (ref 3.4–5.3)
PROT SERPL-MCNC: 7.2 G/DL (ref 6.8–8.8)
RBC # BLD AUTO: 3.97 10E12/L (ref 3.8–5.2)
SODIUM SERPL-SCNC: 132 MMOL/L (ref 133–144)
WBC # BLD AUTO: 8.7 10E9/L (ref 4–11)

## 2021-01-27 PROCEDURE — 80053 COMPREHEN METABOLIC PANEL: CPT | Performed by: INTERNAL MEDICINE

## 2021-01-27 PROCEDURE — 36415 COLL VENOUS BLD VENIPUNCTURE: CPT | Performed by: INTERNAL MEDICINE

## 2021-01-27 PROCEDURE — 85025 COMPLETE CBC W/AUTO DIFF WBC: CPT | Performed by: INTERNAL MEDICINE

## 2021-02-08 NOTE — TELEPHONE ENCOUNTER
DIAGNOSIS: LT pain in hip    APPOINTMENT DATE: 3/16/21   NOTES STATUS DETAILS   OFFICE NOTE from referring provider Internal Virtual OV 12/16/20   XRAYS (IMAGES & REPORTS) Internal XR Lumbar injection 2/12/20

## 2021-02-13 ENCOUNTER — HEALTH MAINTENANCE LETTER (OUTPATIENT)
Age: 81
End: 2021-02-13

## 2021-02-26 DIAGNOSIS — D47.1 MYELOPROLIFERATIVE DISORDER (H): ICD-10-CM

## 2021-02-26 LAB
ALBUMIN SERPL-MCNC: 4.2 G/DL (ref 3.4–5)
ALP SERPL-CCNC: 65 U/L (ref 40–150)
ALT SERPL W P-5'-P-CCNC: 10 U/L (ref 0–50)
ANION GAP SERPL CALCULATED.3IONS-SCNC: 6 MMOL/L (ref 3–14)
AST SERPL W P-5'-P-CCNC: 23 U/L (ref 0–45)
BASOPHILS NFR BLD AUTO: 0.4 %
BILIRUB SERPL-MCNC: 0.8 MG/DL (ref 0.2–1.3)
BUN SERPL-MCNC: 17 MG/DL (ref 7–30)
CALCIUM SERPL-MCNC: 10.5 MG/DL (ref 8.5–10.1)
CHLORIDE SERPL-SCNC: 100 MMOL/L (ref 94–109)
CO2 SERPL-SCNC: 29 MMOL/L (ref 20–32)
CREAT SERPL-MCNC: 0.68 MG/DL (ref 0.52–1.04)
DIFFERENTIAL METHOD BLD: ABNORMAL
EOSINOPHIL NFR BLD AUTO: 1.3 %
ERYTHROCYTE [DISTWIDTH] IN BLOOD BY AUTOMATED COUNT: 14.3 % (ref 10–15)
GFR SERPL CREATININE-BSD FRML MDRD: 81 ML/MIN/{1.73_M2}
GLUCOSE SERPL-MCNC: 113 MG/DL (ref 70–99)
HCT VFR BLD AUTO: 39.2 % (ref 35–47)
HGB BLD-MCNC: 14.4 G/DL (ref 11.7–15.7)
LYMPHOCYTES NFR BLD AUTO: 43.7 %
MCH RBC QN AUTO: 36.6 PG (ref 26.5–33)
MCHC RBC AUTO-ENTMCNC: 36.7 G/DL (ref 31.5–36.5)
MCV RBC AUTO: 100 FL (ref 78–100)
MONOCYTES NFR BLD AUTO: 6.5 %
NEUTROPHILS NFR BLD AUTO: 48.1 %
PLATELET # BLD AUTO: 317 10E9/L (ref 150–450)
POTASSIUM SERPL-SCNC: 3.8 MMOL/L (ref 3.4–5.3)
PROT SERPL-MCNC: 7.5 G/DL (ref 6.8–8.8)
RBC # BLD AUTO: 3.93 10E12/L (ref 3.8–5.2)
SODIUM SERPL-SCNC: 135 MMOL/L (ref 133–144)
WBC # BLD AUTO: 7.4 10E9/L (ref 4–11)

## 2021-02-26 PROCEDURE — 80053 COMPREHEN METABOLIC PANEL: CPT | Performed by: INTERNAL MEDICINE

## 2021-02-26 PROCEDURE — 85025 COMPLETE CBC W/AUTO DIFF WBC: CPT | Performed by: INTERNAL MEDICINE

## 2021-02-26 PROCEDURE — 36415 COLL VENOUS BLD VENIPUNCTURE: CPT | Performed by: INTERNAL MEDICINE

## 2021-03-02 ENCOUNTER — VIRTUAL VISIT (OUTPATIENT)
Dept: ONCOLOGY | Facility: CLINIC | Age: 81
End: 2021-03-02
Attending: INTERNAL MEDICINE
Payer: MEDICARE

## 2021-03-02 DIAGNOSIS — D47.3 ESSENTIAL THROMBOCYTHEMIA (H): ICD-10-CM

## 2021-03-02 PROCEDURE — 99214 OFFICE O/P EST MOD 30 MIN: CPT | Mod: 95 | Performed by: INTERNAL MEDICINE

## 2021-03-02 RX ORDER — HYDROXYUREA 500 MG/1
1000 CAPSULE ORAL DAILY
Qty: 180 CAPSULE | Refills: 4 | Status: SHIPPED | OUTPATIENT
Start: 2021-03-02 | End: 2021-06-14

## 2021-03-02 NOTE — PROGRESS NOTES
Catherine is a 80 year old who is being evaluated via a billable video visit.      How would you like to obtain your AVS? MyChart  If the video visit is dropped, the invitation should be resent by: Text to cell phone: [phone]  Will anyone else be joining your video visit? 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    Plan:  1. Continue HU 1000 mg daily; discussed potential reduction in dose if symptoms are problematic  2. CBC and CMP in 3 months then 6 months for HU toxicity monitoring  3. Follow up in 6 months to review counts  4. She will call with questions or concerns    The patient is given our center's contact information and is instructed to call if she should have any further questions or concerns.      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  No symptoms or thrombotic events.    - Started  mg daily, increased to 1000mg daily in Dec 2020  - She refused ASA due to history of gastric ulcer.  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  Overall she feels relatively well.  A little more fatigue on HU but not debilitating.   She attributes some of her fatigue to poor sleep.  Son are in [facility] and [place], granddaughter returning to [place] this Fall.  Overall she reports she is doing well.  No symptoms of thrombosis.  Needs a HU refill.    Medications:  Reviewed and notable for HU 1000mg daily, no ASA    Allergies:  Allergies   Allergen Reactions     Fosamax Nausea and GI Disturbance     Lisinopril Other (See Comments)     cough     Sulfa Drugs Hives       ROS:  A 14 point ROS is negative except as stated in the HPI    Objective:  She is in no distress, no cough of shortness of breath, affect and speech are normal    Labs:  Results for GÉNESIS CA (MRN [id number]) as of 3/2/2021 10:41   Ref. Range 12/7/2020 09:43 1/27/2021 10:38 2/26/2021 09:42   WBC Latest Ref Range: 4.0 - 11.0 10e9/L 10.9 8.7 7.4   Hemoglobin Latest Ref Range: 11.7 - 15.7 g/dL 14.6 14.5 14.4   Hematocrit Latest Ref Range: 35.0 - 47.0 % 40.9 39.7 39.2   Platelet Count Latest Ref Range: 150 - 450 10e9/L 504 (H) 318 317     Liver and renal function are normal    Imaging:  none    Video-Visit Details    Type of service:  Video Visit  Video Start Time: 1005 AM  Video End Time:10:35 AM    Originating Location (pt. Location): Home    Distant Location (provider location):  [facility]     Platform used for Video Visit: [employer]

## 2021-03-02 NOTE — LETTER
3/2/2021         RE: Catherine Mccollum  [address]  [place] [address]        Dear Colleague,    Thank you for referring your patient, Catherine Mccollum, to the [facility]. Please see a copy of my visit note below.    Catherine is a 80 year old who is being evaluated via a billable video visit.      How would you like to obtain your AVS? MyChart  If the video visit is dropped, the invitation should be resent by: Text to cell phone: [phone]  Will anyone else be joining your video visit? 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    Plan:  1. Continue HU 1000 mg daily; discussed potential reduction in dose if symptoms are problematic  2. CBC and CMP in 3 months then 6 months for HU toxicity monitoring  3. Follow up in 6 months to review counts  4. She will call with questions or concerns    The patient is given our center's contact information and is instructed to call if she should have any further questions or concerns.      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  No symptoms or thrombotic events.    - Started  mg daily, increased to 1000mg daily in Dec 2020  - She refused ASA due to history of gastric ulcer.  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  Overall she feels relatively well.  A little more fatigue on HU but not debilitating.  She attributes some of her fatigue to poor sleep.  Son are in [facility] and [place], granddaughter returning to [place] this Fall.  Overall she reports she is doing well.  No symptoms of thrombosis.  Needs a HU refill.    Medications:  Reviewed and notable for HU 1000mg daily, no ASA    Allergies:  Allergies   Allergen Reactions     Fosamax Nausea and GI Disturbance     Lisinopril Other (See Comments)     cough     Sulfa Drugs Hives       ROS:  A 14 point ROS is negative except as stated in the HPI    Objective:  She is in no distress, no cough of shortness of breath, affect and speech are normal    Labs:  Results for GÉNESIS CA (MRN [id number]) as of 3/2/2021 10:41   Ref. Range 12/7/2020 09:43 1/27/2021 10:38 2/26/2021 09:42   WBC Latest Ref Range: 4.0 - 11.0 10e9/L 10.9 8.7 7.4   Hemoglobin Latest Ref Range: 11.7 - 15.7 g/dL 14.6 14.5 14.4   Hematocrit Latest Ref Range: 35.0 - 47.0 % 40.9 39.7 39.2   Platelet Count Latest Ref Range: 150 - 450 10e9/L 504 (H) 318 317     Liver and renal function are normal    Imaging:  none    Video-Visit Details    Type of service:  Video Visit  Video Start Time: 1005 AM  Video End Time:10:35 AM    Originating Location (pt. Location): Home    Distant Location (provider location):  [facility]     Platform used for Video Visit: Jacobo      Again, thank you for allowing me to participate in the care of your patient.        Sincerely,        Bernabe Dietz MD

## 2021-03-15 DIAGNOSIS — M25.552 LEFT HIP PAIN: Primary | ICD-10-CM

## 2021-03-16 ENCOUNTER — OFFICE VISIT (OUTPATIENT)
Dept: ORTHOPEDICS | Facility: CLINIC | Age: 81
End: 2021-03-16
Payer: MEDICARE

## 2021-03-16 ENCOUNTER — ANCILLARY PROCEDURE (OUTPATIENT)
Dept: GENERAL RADIOLOGY | Facility: CLINIC | Age: 81
End: 2021-03-16
Attending: PREVENTIVE MEDICINE
Payer: MEDICARE

## 2021-03-16 ENCOUNTER — PRE VISIT (OUTPATIENT)
Dept: ORTHOPEDICS | Facility: CLINIC | Age: 81
End: 2021-03-16

## 2021-03-16 VITALS — RESPIRATION RATE: 17 BRPM | BODY MASS INDEX: 27.09 KG/M2 | HEIGHT: 60 IN | WEIGHT: 138 LBS

## 2021-03-16 DIAGNOSIS — M51.16 LUMBAR DISC HERNIATION WITH RADICULOPATHY: Primary | ICD-10-CM

## 2021-03-16 DIAGNOSIS — M25.552 LEFT HIP PAIN: ICD-10-CM

## 2021-03-16 PROCEDURE — 73502 X-RAY EXAM HIP UNI 2-3 VIEWS: CPT | Mod: LT | Performed by: RADIOLOGY

## 2021-03-16 PROCEDURE — 99214 OFFICE O/P EST MOD 30 MIN: CPT | Performed by: PREVENTIVE MEDICINE

## 2021-03-16 ASSESSMENT — MIFFLIN-ST. JEOR: SCORE: 1008.08

## 2021-03-16 NOTE — LETTER
3/16/2021      RE: Catherine Mccollum  [address]  [place] [address]       HISTORY OF PRESENT ILLNESS  Ms. Mccollum is a pleasant 80 year old year old female who presents to clinic today with low back, hip pain  Catherine explains that her hips and legs and low back have bothered her over the past several months  She has had lumbar DENISSE in the past that was helpful for a while  Location: low back, legs  Quality:  achy pain    Severity: 6/10 at worst    Duration: worse over past several months  Timing: occurs intermittently  Context: occurs while exercising and lifting  Modifying factors:  resting and non-use makes it better, movement and use makes it worse  Associated signs & symptoms: radiation down both legs    MEDICAL HISTORY  Patient Active Problem List   Diagnosis     Essential hypertension, benign     Cervical spondylosis without myelopathy     Glossodynia     Hyperlipidemia, unspecified     Urinary frequency     Essential and other specified forms of tremor     Health Care Home     Transient insomnia     Ankle fracture, left, trimalleolar, 2002     Diverticulitis of colon     Parkinsonism (H)     Osteoarthritis of spine with radiculopathy, lumbosacral region     Left lumbar radiculopathy     Spondylolisthesis of lumbar region     Lumbosacral radiculopathy at S1     Backache     Cervical stenosis of spine     Controlled substance agreement signed     Foot pain     Gastroesophageal reflux disease     HTN (hypertension)     Metatarsalgia     Spinal stenosis     Spinal stenosis of lumbar region     Spinal stenosis, lumbar       Current Outpatient Medications   Medication Sig Dispense Refill     Acetaminophen (TYLENOL EXTRA STRENGTH PO) Take 1,000 mg by mouth 2 times daily Muscle and joint pain       calcium carbonate (OS-LALI) 500 MG tablet Take 1 tablet by mouth       CALCIUM PO Take 1 tablet by mouth every morning       carbidopa-levodopa (SINEMET)  MG tablet Increase to one and a half tabs  tid.  Then increase by 1/2 tab every 3 weeks to 2, then 2.5 then 3 tabs three times a day as needed and as tolerated 480 tablet 3     chlorthalidone (HYGROTON) 25 MG tablet Take 1 tablet (25 mg) by mouth daily 90 tablet 3     Cholecalciferol (VITAMIN D) 1000 UNITS capsule Take 2,000 Units by mouth every morning        cyanocobalamin (VITAMIN B-12) 1000 MCG tablet        gabapentin (NEURONTIN) 100 MG capsule Take 2 capsule by mouth twice daily. (Patient taking differently: Take 1 capsule by mouth in AM and 2 capsule by mouth in PM. Pt reported) 180 capsule 3     hydroxyurea (HYDREA) 500 MG capsule Take 2 capsules (1,000 mg) by mouth daily 180 capsule 4     irbesartan (AVAPRO) 150 MG tablet Take 1.5 tablets (225 mg) by mouth every morning 180 tablet 3     LORazepam (ATIVAN) 0.5 MG tablet Take 1 tablet (0.5 mg) by mouth nightly as needed for anxiety 30 tablet 0     omeprazole (PRILOSEC) 20 MG DR capsule Take 1 capsule (20 mg) by mouth daily 90 capsule 3     pravastatin (PRAVACHOL) 20 MG tablet Take 1 tablet (20 mg) by mouth At Bedtime 90 tablet 2     COMPOUNDED NON-CONTROLLED SUBSTANCE (CMPD RX) - PHARMACY TO MIX COMPOUNDED MEDICATION Estriol 1 mg/g  Apply small amount to finger and apply to inside vagina daily for 2 weeks then twice weekly  Route: vaginally 30 g 11     LORazepam (ATIVAN) 0.5 MG tablet Take 0.5 mg by mouth       potassium chloride ER (KLOR-CON M) 10 MEQ CR tablet Take 1 tablet (10 mEq) by mouth 2 times daily 6 tablet 0     Probiotic Product (PROBIOTIC PO) Take by mouth daily          Allergies   Allergen Reactions     Fosamax Nausea and GI Disturbance     Lisinopril Other (See Comments)     cough     Sulfa Drugs Hives       Family History   Problem Relation Age of Onset     C.A.D. Other      Hypertension Other         granddaughter      Cancer Other      Heart Disease Father      Diabetes No family hx of      Social History     Socioeconomic History     Marital status:      Spouse name: None      "Number of children: None     Years of education: None     Highest education level: None   Occupational History     None   Social Needs     Financial resource strain: None     Food insecurity     Worry: None     Inability: None     Transportation needs     Medical: None     Non-medical: None   Tobacco Use     Smoking status: Never Smoker     Smokeless tobacco: Never Used   Substance and Sexual Activity     Alcohol use: No     Alcohol/week: 0.0 standard drinks     Drug use: No     Sexual activity: Not Currently   Lifestyle     Physical activity     Days per week: None     Minutes per session: None     Stress: None   Relationships     Social connections     Talks on phone: None     Gets together: None     Attends Tenriism service: None     Active member of club or organization: None     Attends meetings of clubs or organizations: None     Relationship status: None     Intimate partner violence     Fear of current or ex partner: None     Emotionally abused: None     Physically abused: None     Forced sexual activity: None   Other Topics Concern     Parent/sibling w/ CABG, MI or angioplasty before 65F 55M? Not Asked   Social History Narrative     None       Additional medical/Social/Surgical histories reviewed in [employer] and updated as appropriate.     REVIEW OF SYSTEMS (3/16/2021)  10 point ROS of systems including Constitutional, Eyes, Respiratory, Cardiovascular, Gastroenterology, Genitourinary, Integumentary, Musculoskeletal, Psychiatric, Allergic/Immunologic were all negative except for pertinent positives noted in my HPI.     PHYSICAL EXAM  Vitals:    03/16/21 1552   Resp: 17   Weight: 62.6 kg (138 lb)   Height: 1.525 m (5' 0.04\")     Vital Signs: Resp 17   Ht 1.525 m (5' 0.04\")   Wt 62.6 kg (138 lb)   BMI 26.92 kg/m   Patient declined being weighed. Body mass index is 26.92 kg/m .    General  - normal appearance, in no obvious distress  HEENT  - conjunctivae not injected, moist mucous membranes, " normocephalic/atraumatic head, ears normal appearance, no lesions, mouth normal appearance, no scars, normal dentition and teeth present  CV  - normal peripheral perfusion  Pulm  - normal respiratory pattern, non-labored  Musculoskeletal - lumbar spine  - stance: normal gait without limp, no obvious leg length discrepancy, normal heel and toe walk  - inspection: normal bone and joint alignment, no obvious scoliosis  - palpation: no paravertebral or bony tenderness  - ROM: flexion exacerbates pain, normal extension, sidebending, rotation  - strength: lower extremities 5/5 in all planes  - special tests:  (+) straight leg raise  (+) slump test  Neuro  - patellar and Achilles DTRs 2+ bilaterally, lower extremity sensory deficit throughout L5 distribution, grossly normal coordination, normal muscle tone  Skin  - no ecchymosis, erythema, warmth, or induration, no obvious rash  Psych  - interactive, appropriate, normal mood and affect  Hips: has some pain with external rotation of hips in low back area    ASSESSMENT & PLAN  82 yo female with bilateral hip arthritis, lumbar ddd, radicular pain  Reviewed xrays hips: shows arthritis  Reviewed previous lumbar MRI: shows ddd  Ordered lumbar MRI  Consider DENISSE repeat  F/u after MRI    Appropriate PPE was utilized for prevention of spread of Covid-19.  Kar Griffin MD, CAM

## 2021-03-16 NOTE — PROGRESS NOTES
HISTORY OF PRESENT ILLNESS  Ms. Mccollum is a pleasant 80 year old year old female who presents to clinic today with low back, hip pain  Catherine explains that her hips and legs and low back have bothered her over the past several months  She has had lumbar DENISSE in the past that was helpful for a while  Location: low back, legs  Quality:  achy pain    Severity: 6/10 at worst    Duration: worse over past several months  Timing: occurs intermittently  Context: occurs while exercising and lifting  Modifying factors:  resting and non-use makes it better, movement and use makes it worse  Associated signs & symptoms: radiation down both legs    MEDICAL HISTORY  Patient Active Problem List   Diagnosis     Essential hypertension, benign     Cervical spondylosis without myelopathy     Glossodynia     Hyperlipidemia, unspecified     Urinary frequency     Essential and other specified forms of tremor     Health Care Home     Transient insomnia     Ankle fracture, left, trimalleolar, 2002     Diverticulitis of colon     Parkinsonism (H)     Osteoarthritis of spine with radiculopathy, lumbosacral region     Left lumbar radiculopathy     Spondylolisthesis of lumbar region     Lumbosacral radiculopathy at S1     Backache     Cervical stenosis of spine     Controlled substance agreement signed     Foot pain     Gastroesophageal reflux disease     HTN (hypertension)     Metatarsalgia     Spinal stenosis     Spinal stenosis of lumbar region     Spinal stenosis, lumbar       Current Outpatient Medications   Medication Sig Dispense Refill     Acetaminophen (TYLENOL EXTRA STRENGTH PO) Take 1,000 mg by mouth 2 times daily Muscle and joint pain       calcium carbonate (OS-LALI) 500 MG tablet Take 1 tablet by mouth       CALCIUM PO Take 1 tablet by mouth every morning       carbidopa-levodopa (SINEMET)  MG tablet Increase to one and a half tabs tid.  Then increase by 1/2 tab every 3 weeks to 2, then 2.5 then 3 tabs three times a day as  needed and as tolerated 480 tablet 3     chlorthalidone (HYGROTON) 25 MG tablet Take 1 tablet (25 mg) by mouth daily 90 tablet 3     Cholecalciferol (VITAMIN D) 1000 UNITS capsule Take 2,000 Units by mouth every morning        cyanocobalamin (VITAMIN B-12) 1000 MCG tablet        gabapentin (NEURONTIN) 100 MG capsule Take 2 capsule by mouth twice daily. (Patient taking differently: Take 1 capsule by mouth in AM and 2 capsule by mouth in PM. Pt reported) 180 capsule 3     hydroxyurea (HYDREA) 500 MG capsule Take 2 capsules (1,000 mg) by mouth daily 180 capsule 4     irbesartan (AVAPRO) 150 MG tablet Take 1.5 tablets (225 mg) by mouth every morning 180 tablet 3     LORazepam (ATIVAN) 0.5 MG tablet Take 1 tablet (0.5 mg) by mouth nightly as needed for anxiety 30 tablet 0     omeprazole (PRILOSEC) 20 MG DR capsule Take 1 capsule (20 mg) by mouth daily 90 capsule 3     pravastatin (PRAVACHOL) 20 MG tablet Take 1 tablet (20 mg) by mouth At Bedtime 90 tablet 2     COMPOUNDED NON-CONTROLLED SUBSTANCE (CMPD RX) - PHARMACY TO MIX COMPOUNDED MEDICATION Estriol 1 mg/g  Apply small amount to finger and apply to inside vagina daily for 2 weeks then twice weekly  Route: vaginally 30 g 11     LORazepam (ATIVAN) 0.5 MG tablet Take 0.5 mg by mouth       potassium chloride ER (KLOR-CON M) 10 MEQ CR tablet Take 1 tablet (10 mEq) by mouth 2 times daily 6 tablet 0     Probiotic Product (PROBIOTIC PO) Take by mouth daily          Allergies   Allergen Reactions     Fosamax Nausea and GI Disturbance     Lisinopril Other (See Comments)     cough     Sulfa Drugs Hives       Family History   Problem Relation Age of Onset     C.A.D. Other      Hypertension Other         granddaughter      Cancer Other      Heart Disease Father      Diabetes No family hx of      Social History     Socioeconomic History     Marital status:      Spouse name: None     Number of children: None     Years of education: None     Highest education level: None  "  Occupational History     None   Social Needs     Financial resource strain: None     Food insecurity     Worry: None     Inability: None     Transportation needs     Medical: None     Non-medical: None   Tobacco Use     Smoking status: Never Smoker     Smokeless tobacco: Never Used   Substance and Sexual Activity     Alcohol use: No     Alcohol/week: 0.0 standard drinks     Drug use: No     Sexual activity: Not Currently   Lifestyle     Physical activity     Days per week: None     Minutes per session: None     Stress: None   Relationships     Social connections     Talks on phone: None     Gets together: None     Attends Sabianism service: None     Active member of club or organization: None     Attends meetings of clubs or organizations: None     Relationship status: None     Intimate partner violence     Fear of current or ex partner: None     Emotionally abused: None     Physically abused: None     Forced sexual activity: None   Other Topics Concern     Parent/sibling w/ CABG, MI or angioplasty before 65F 55M? Not Asked   Social History Narrative     None       Additional medical/Social/Surgical histories reviewed in Psychiatric and updated as appropriate.     REVIEW OF SYSTEMS (3/16/2021)  10 point ROS of systems including Constitutional, Eyes, Respiratory, Cardiovascular, Gastroenterology, Genitourinary, Integumentary, Musculoskeletal, Psychiatric, Allergic/Immunologic were all negative except for pertinent positives noted in my HPI.     PHYSICAL EXAM  Vitals:    03/16/21 1552   Resp: 17   Weight: 62.6 kg (138 lb)   Height: 1.525 m (5' 0.04\")     Vital Signs: Resp 17   Ht 1.525 m (5' 0.04\")   Wt 62.6 kg (138 lb)   BMI 26.92 kg/m   Patient declined being weighed. Body mass index is 26.92 kg/m .    General  - normal appearance, in no obvious distress  HEENT  - conjunctivae not injected, moist mucous membranes, normocephalic/atraumatic head, ears normal appearance, no lesions, mouth normal appearance, no scars, " normal dentition and teeth present  CV  - normal peripheral perfusion  Pulm  - normal respiratory pattern, non-labored  Musculoskeletal - lumbar spine  - stance: normal gait without limp, no obvious leg length discrepancy, normal heel and toe walk  - inspection: normal bone and joint alignment, no obvious scoliosis  - palpation: no paravertebral or bony tenderness  - ROM: flexion exacerbates pain, normal extension, sidebending, rotation  - strength: lower extremities 5/5 in all planes  - special tests:  (+) straight leg raise  (+) slump test  Neuro  - patellar and Achilles DTRs 2+ bilaterally, lower extremity sensory deficit throughout L5 distribution, grossly normal coordination, normal muscle tone  Skin  - no ecchymosis, erythema, warmth, or induration, no obvious rash  Psych  - interactive, appropriate, normal mood and affect  Hips: has some pain with external rotation of hips in low back area    ASSESSMENT & PLAN  82 yo female with bilateral hip arthritis, lumbar ddd, radicular pain  Reviewed xrays hips: shows arthritis  Reviewed previous lumbar MRI: shows ddd  Ordered lumbar MRI  Consider DENISSE repeat  F/u after MRI    Appropriate PPE was utilized for prevention of spread of Covid-19.  Kar Griffin MD, CAQSM

## 2021-03-16 NOTE — NURSING NOTE
"Reason For Visit:   Chief Complaint   Patient presents with     Consult     pain in left hip        Resp 17   Ht 1.525 m (5' 0.04\")   Wt 62.6 kg (138 lb)   BMI 26.92 kg/m      Pain Assessment  Patient Currently in Pain: Yes  0-10 Pain Scale: 3  Primary Pain Location: Hip  Aggravating Factors: Walking, Movement    Ofe Lincoln ATC   "

## 2021-03-23 ENCOUNTER — OFFICE VISIT (OUTPATIENT)
Dept: INTERNAL MEDICINE | Facility: CLINIC | Age: 81
End: 2021-03-23
Payer: MEDICARE

## 2021-03-23 VITALS
OXYGEN SATURATION: 98 % | DIASTOLIC BLOOD PRESSURE: 83 MMHG | WEIGHT: 138 LBS | HEART RATE: 71 BPM | SYSTOLIC BLOOD PRESSURE: 160 MMHG | BODY MASS INDEX: 26.92 KG/M2

## 2021-03-23 DIAGNOSIS — F41.9 ANXIETY: ICD-10-CM

## 2021-03-23 PROCEDURE — 99215 OFFICE O/P EST HI 40 MIN: CPT | Performed by: INTERNAL MEDICINE

## 2021-03-23 RX ORDER — LORAZEPAM 0.5 MG/1
0.5 TABLET ORAL
Qty: 30 TABLET | Refills: 0 | Status: SHIPPED | OUTPATIENT
Start: 2021-03-23 | End: 2021-06-22

## 2021-03-23 NOTE — PROGRESS NOTES
HPI:  Catherine presents today for routine follow-up. Last telephone visit completed 12/16/2020. She recently saw Dr. Griffin for left radiculopathy. Her pain is more pronounced at night and contributes to her insomnia. Her nighttime gabapentin dose was increased from 200-300 mg which has provided some relief. A lumbar MRI is planned on 4/2/21. The possibility of repeating an DENISSE was discussed.     Blood pressure in clinic today is elevated at 160/83. Home systolic BP readings are as low as 120. She denies any changes to chlorthalidone or ibersartan medications.    She was seen by Dr. Dietz on 3/2/21 for essential JAK2 positive thrombocytosis. She is currently on 1000 mg hydoxyurea daily. She notes a possible increase in daytime fatigue since increasing her dose in January. This could also be related to her insomnia and hip pain. She plans to discuss this further with Dr. Dietz at her next visit if it persists.     She experiences intermittent insomnia, sometimes severe. She has used lorazepam 0.5 mg sparingly with relief as well as cannabis.      Past Medical History:   Diagnosis Date     GERD (gastroesophageal reflux disease)      HTN (hypertension)      Osteoporosis 2000     Parkinsonian syndrome (H)     left side tremor     Past Surgical History:   Procedure Laterality Date     BREAST SURGERY  Jan 1987    biopsy     CATARACT IOL, RT/LT       GYN SURGERY  1965;1967    2 ceasarean sections     HEMILAMINECTOMY, DISCECTOMY LUMBAR ONE LEVEL, COMBINED Left 6/25/2018    Procedure: COMBINED HEMILAMINECTOMY, DISCECTOMY LUMBAR ONE LEVEL;  Open Left Lumbar 5-Sacral 1 Hemilaminectomy, Medial Facetectomy, And Microdiscectomy Attention Transitional Segment Anatomy;  Surgeon: Rosemarie Young MD;  Location: [facility]     ORTHOPEDIC SURGERY      ankle surgery for trimalleolar frx     CO SPINE SURGERY PROCEDURE UNLISTED        ROS: 10 point ROS neg other than the symptoms noted above in the HPI.    BP (!) 160/83   Pulse 71    Wt 62.6 kg (138 lb)   SpO2 98%   BMI 26.92 kg/m      PE:  General: Pleasant female, NAD.  Respiratory: Symmetric thoracic expansion. Lungs clear to auscultation without crackles, wheezes or rhonchi.   Cardiac: RRR, S1 and S2 auscultated without murmurs, click or rubs. Peripheral pulses palpable and equal bilaterally.   GI: Abdomen soft and nontender to palpation. Bowel sounds audible x 4. No hepatomegaly or splenomegaly.  Musculoskeletal: Full range of motion bilaterally without limitation. Strength equal bilaterally. Fatty-appearing, mobile mass on right hand between first and second digits.  Skin: Warm and dry without rashes or lesions.   Neurologic: Alert and oriented, no focal deficits or tremor.  Psych: Pleasant mood and affect.     A/P:    1. She has MRI lumbar scheduled 4/2/2021 and ortho follow up with Dr. Griffin 4/6/2021. Last visit with Dr. Griffin 3/16/2021  2. ONC visit with Dr. Dietz 3/2/2021 for positive Jak2 positive essential thrombocytosis. See initial note from Dr. Dietz 7/7/2020. On hydroxyurea 1000 mg daily. CBC repeated every 3 months.  3. Parkinson's on Sinemet and stable.   4. Immunizations; she had completed the Moderna Vaccination series   5. HTN; On ibersartan and chlorthalidone. K+ replacement. Electrolytes checked 2/26/2021. Home BP systolic readings as low as 120. No change recommended in current management due to risk for hypotension.  6. Increased lipids on Pravastatin; liver tests checked 2/26/2021.   7. She takes a little ativan for anxiety/sleep. This will be renewed today. She has used recreational cannabis edibles for insomnia and is interested in enrollment into the state medical cannabis program.    Riana Ellison  Nurse Practitioner Student  [facility]      Staff note; I personally interviewed Ms. Mccollum, I reviewed her past medical records. I examined her. I agree with the above assessment and plan.    JUAN MANUEL Alvarez MD    40 minutes spent on the date of the  encounter doing chart review, history and exam, documentation and further activities as noted above

## 2021-03-23 NOTE — NURSING NOTE
Chief Complaint   Patient presents with     Recheck Medication     3 month follow up     Kimberly Nissen, EMT at 9:52 AM on 3/23/2021

## 2021-04-01 ENCOUNTER — TRANSFERRED RECORDS (OUTPATIENT)
Dept: HEALTH INFORMATION MANAGEMENT | Facility: CLINIC | Age: 81
End: 2021-04-01

## 2021-04-02 ENCOUNTER — ANCILLARY PROCEDURE (OUTPATIENT)
Dept: MRI IMAGING | Facility: CLINIC | Age: 81
End: 2021-04-02
Attending: PREVENTIVE MEDICINE
Payer: MEDICARE

## 2021-04-02 DIAGNOSIS — M51.16 LUMBAR DISC HERNIATION WITH RADICULOPATHY: ICD-10-CM

## 2021-04-02 PROCEDURE — 72148 MRI LUMBAR SPINE W/O DYE: CPT | Mod: ME | Performed by: RADIOLOGY

## 2021-04-02 PROCEDURE — G1004 CDSM NDSC: HCPCS | Performed by: RADIOLOGY

## 2021-04-06 ENCOUNTER — VIRTUAL VISIT (OUTPATIENT)
Dept: ORTHOPEDICS | Facility: CLINIC | Age: 81
End: 2021-04-06
Payer: MEDICARE

## 2021-04-06 DIAGNOSIS — M51.369 DDD (DEGENERATIVE DISC DISEASE), LUMBAR: ICD-10-CM

## 2021-04-06 DIAGNOSIS — M51.16 LUMBAR DISC HERNIATION WITH RADICULOPATHY: Primary | ICD-10-CM

## 2021-04-06 PROCEDURE — 99213 OFFICE O/P EST LOW 20 MIN: CPT | Mod: 95 | Performed by: PREVENTIVE MEDICINE

## 2021-04-06 RX ORDER — GABAPENTIN 300 MG/1
300 CAPSULE ORAL AT BEDTIME
Qty: 30 CAPSULE | Refills: 0 | Status: SHIPPED | OUTPATIENT
Start: 2021-04-06 | End: 2021-07-13

## 2021-04-06 NOTE — LETTER
"  4/6/2021      RE: Catherine Mccollum  [address]  [place] [address]       Catherine is a 81 year old who is being evaluated via a billable video visit.    [facility] ismael tse: [email or web address]  Wants PT order sent here      How would you like to obtain your AVS? MyChart  If the video visit is dropped, the invitation should be resent by: Text to cell phone: 22  Will anyone else be joining your video visit? No      Video Start Time: 1:42pm    Assessment & Plan   80 yo female with   Lumbar disc herniation with radiculopathy  Not resolved  Reviewed lumbar MRI: shows multi-level ddd, disc herniations  - gabapentin (NEURONTIN) 300 MG capsule; Take 1 capsule (300 mg) by mouth At Bedtime  - XR Lumbar Epidural Injection Incl Imaging; Future    DDD (degenerative disc disease), lumbar  See above           BMI:   Estimated body mass index is 26.92 kg/m  as calculated from the following:    Height as of 3/16/21: 1.525 m (5' 0.04\").    Weight as of 3/23/21: 62.6 kg (138 lb).       No follow-ups on file.    Kar Griffin MD  [facility]   Catherine is a 81 year old who presents for f/u for her lumbar MRI and to discuss her low back pain that radiates into her left leg  She has not had a lot of improvement since we last visited  Wants to discuss her lumbar MRI    HPI       Review of Systems   Constitutional, HEENT, cardiovascular, pulmonary, gi and gu systems are negative, except as otherwise noted.      Objective           Vitals:  No vitals were obtained today due to virtual visit.    Physical Exam   GENERAL: Healthy, alert and no distress  EYES: Eyes grossly normal to inspection.  No discharge or erythema, or obvious scleral/conjunctival abnormalities.  RESP: No audible wheeze, cough, or visible cyanosis.  No visible retractions or increased work of breathing.    SKIN: Visible skin clear. No significant rash, abnormal pigmentation or " lesions.  NEURO: Cranial nerves grossly intact.  Mentation and speech appropriate for age.  PSYCH: Mentation appears normal, affect normal/bright, judgement and insight intact, normal speech and appearance well-groomed.  Low back: has some limited ROM with flexion and extension due to pain          Video-Visit Details    Type of service:  Video Visit    Video End Time: 1:58pm    Originating Location (pt. Location): Home    Distant Location (provider location):  [facility]     Platform used for Video Visit: Jacobo Griffin MD

## 2021-04-06 NOTE — NURSING NOTE
Reason For Visit:   Chief Complaint   Patient presents with     RECHECK     f/u MRI        There were no vitals taken for this visit.    Pain Assessment  Patient Currently in Pain: Yes  0-10 Pain Scale: 2  Primary Pain Location: Hip  Other Pain Locations: pt reported the pain is worse at night 8/`0.    Ofe Lincoln ATC

## 2021-04-06 NOTE — PROGRESS NOTES
"Catherine is a 81 year old who is being evaluated via a billable video visit.    [facility] tse: [email or web address]  Wants PT order sent here      How would you like to obtain your AVS? MyChart  If the video visit is dropped, the invitation should be resent by: Text to cell phone: 22  Will anyone else be joining your video visit? No      Video Start Time: 1:42pm    Assessment & Plan   80 yo female with   Lumbar disc herniation with radiculopathy  Not resolved  Reviewed lumbar MRI: shows multi-level ddd, disc herniations  - gabapentin (NEURONTIN) 300 MG capsule; Take 1 capsule (300 mg) by mouth At Bedtime  - XR Lumbar Epidural Injection Incl Imaging; Future    DDD (degenerative disc disease), lumbar  See above           BMI:   Estimated body mass index is 26.92 kg/m  as calculated from the following:    Height as of 3/16/21: 1.525 m (5' 0.04\").    Weight as of 3/23/21: 62.6 kg (138 lb).       No follow-ups on file.    Kar Griffin MD  [facility]   Catherine is a 81 year old who presents for f/u for her lumbar MRI and to discuss her low back pain that radiates into her left leg  She has not had a lot of improvement since we last visited  Wants to discuss her lumbar MRI    HPI       Review of Systems   Constitutional, HEENT, cardiovascular, pulmonary, gi and gu systems are negative, except as otherwise noted.      Objective           Vitals:  No vitals were obtained today due to virtual visit.    Physical Exam   GENERAL: Healthy, alert and no distress  EYES: Eyes grossly normal to inspection.  No discharge or erythema, or obvious scleral/conjunctival abnormalities.  RESP: No audible wheeze, cough, or visible cyanosis.  No visible retractions or increased work of breathing.    SKIN: Visible skin clear. No significant rash, abnormal pigmentation or lesions.  NEURO: Cranial nerves grossly intact.  Mentation and speech appropriate for " age.  PSYCH: Mentation appears normal, affect normal/bright, judgement and insight intact, normal speech and appearance well-groomed.  Low back: has some limited ROM with flexion and extension due to pain          Video-Visit Details    Type of service:  Video Visit    Video End Time: 1:58pm    Originating Location (pt. Location): Home    Distant Location (provider location):  [facility] [place]     Platform used for Video Visit: [employer]

## 2021-04-06 NOTE — LETTER
"4/6/2021       RE: Catherine Mccollum  [address]  [place] [address]     Dear Colleague,    Thank you for referring your patient, Catherine Mccollum, to the [facility] [place] [facility]. Please see a copy of my visit note below.    Catherine is a 81 year old who is being evaluated via a billable video visit.    [facility] is[facility]el tse: [email or web address]  Wants PT order sent here      How would you like to obtain your AVS? MyChart  If the video visit is dropped, the invitation should be resent by: Text to cell phone: 22  Will anyone else be joining your video visit? No      Video Start Time: 1:42pm    Assessment & Plan   80 yo female with   Lumbar disc herniation with radiculopathy  Not resolved  Reviewed lumbar MRI: shows multi-level ddd, disc herniations  - gabapentin (NEURONTIN) 300 MG capsule; Take 1 capsule (300 mg) by mouth At Bedtime  - XR Lumbar Epidural Injection Incl Imaging; Future    DDD (degenerative disc disease), lumbar  See above           BMI:   Estimated body mass index is 26.92 kg/m  as calculated from the following:    Height as of 3/16/21: 1.525 m (5' 0.04\").    Weight as of 3/23/21: 62.6 kg (138 lb).       No follow-ups on file.    Kar Griffin MD  [facility] [place]    Subjective   Catherine is a 81 year old who presents for f/u for her lumbar MRI and to discuss her low back pain that radiates into her left leg  She has not had a lot of improvement since we last visited  Wants to discuss her lumbar MRI    HPI       Review of Systems   Constitutional, HEENT, cardiovascular, pulmonary, gi and gu systems are negative, except as otherwise noted.      Objective           Vitals:  No vitals were obtained today due to virtual visit.    Physical Exam   GENERAL: Healthy, alert and no distress  EYES: Eyes grossly normal to inspection.  No discharge or " erythema, or obvious scleral/conjunctival abnormalities.  RESP: No audible wheeze, cough, or visible cyanosis.  No visible retractions or increased work of breathing.    SKIN: Visible skin clear. No significant rash, abnormal pigmentation or lesions.  NEURO: Cranial nerves grossly intact.  Mentation and speech appropriate for age.  PSYCH: Mentation appears normal, affect normal/bright, judgement and insight intact, normal speech and appearance well-groomed.  Low back: has some limited ROM with flexion and extension due to pain          Video-Visit Details    Type of service:  Video Visit    Video End Time: 1:58pm    Originating Location (pt. Location): Home    Distant Location (provider location):  [facility]     Platform used for Video Visit: Jacobo      Again, thank you for allowing me to participate in the care of your patient.      Sincerely,    Kar Griffin MD

## 2021-04-07 ENCOUNTER — MYC MEDICAL ADVICE (OUTPATIENT)
Dept: ORTHOPEDICS | Facility: CLINIC | Age: 81
End: 2021-04-07

## 2021-04-16 ENCOUNTER — TRANSFERRED RECORDS (OUTPATIENT)
Dept: HEALTH INFORMATION MANAGEMENT | Facility: CLINIC | Age: 81
End: 2021-04-16

## 2021-05-08 ENCOUNTER — OFFICE VISIT (OUTPATIENT)
Dept: URGENT CARE | Facility: URGENT CARE | Age: 81
End: 2021-05-08
Payer: MEDICARE

## 2021-05-08 ENCOUNTER — NURSE TRIAGE (OUTPATIENT)
Dept: NURSING | Facility: CLINIC | Age: 81
End: 2021-05-08

## 2021-05-08 VITALS
BODY MASS INDEX: 26.92 KG/M2 | OXYGEN SATURATION: 99 % | SYSTOLIC BLOOD PRESSURE: 137 MMHG | TEMPERATURE: 98.9 F | DIASTOLIC BLOOD PRESSURE: 81 MMHG | HEART RATE: 66 BPM | WEIGHT: 138 LBS

## 2021-05-08 DIAGNOSIS — K52.9 GASTROENTERITIS: Primary | ICD-10-CM

## 2021-05-08 DIAGNOSIS — R39.9 SYMPTOMS OF URINARY TRACT INFECTION: ICD-10-CM

## 2021-05-08 LAB
ALBUMIN UR-MCNC: NEGATIVE MG/DL
APPEARANCE UR: CLEAR
BASOPHILS # BLD AUTO: 0 10E9/L (ref 0–0.2)
BASOPHILS NFR BLD AUTO: 0.2 %
BILIRUB UR QL STRIP: NEGATIVE
COLOR UR AUTO: YELLOW
DIFFERENTIAL METHOD BLD: ABNORMAL
EOSINOPHIL # BLD AUTO: 0 10E9/L (ref 0–0.7)
EOSINOPHIL NFR BLD AUTO: 0.4 %
ERYTHROCYTE [DISTWIDTH] IN BLOOD BY AUTOMATED COUNT: 13.1 % (ref 10–15)
GLUCOSE UR STRIP-MCNC: NEGATIVE MG/DL
HCT VFR BLD AUTO: 34.9 % (ref 35–47)
HGB BLD-MCNC: 13.1 G/DL (ref 11.7–15.7)
HGB UR QL STRIP: ABNORMAL
KETONES UR STRIP-MCNC: NEGATIVE MG/DL
LEUKOCYTE ESTERASE UR QL STRIP: NEGATIVE
LYMPHOCYTES # BLD AUTO: 1.4 10E9/L (ref 0.8–5.3)
LYMPHOCYTES NFR BLD AUTO: 26.6 %
MCH RBC QN AUTO: 39.9 PG (ref 26.5–33)
MCHC RBC AUTO-ENTMCNC: 37.5 G/DL (ref 31.5–36.5)
MCV RBC AUTO: 106 FL (ref 78–100)
MONOCYTES # BLD AUTO: 0.6 10E9/L (ref 0–1.3)
MONOCYTES NFR BLD AUTO: 12 %
NEUTROPHILS # BLD AUTO: 3.2 10E9/L (ref 1.6–8.3)
NEUTROPHILS NFR BLD AUTO: 60.8 %
NITRATE UR QL: NEGATIVE
PH UR STRIP: 7 PH (ref 5–7)
PLATELET # BLD AUTO: 181 10E9/L (ref 150–450)
RBC # BLD AUTO: 3.28 10E12/L (ref 3.8–5.2)
RBC #/AREA URNS AUTO: ABNORMAL /HPF
SOURCE: ABNORMAL
SP GR UR STRIP: 1.01 (ref 1–1.03)
UROBILINOGEN UR STRIP-ACNC: 0.2 EU/DL (ref 0.2–1)
WBC # BLD AUTO: 5.3 10E9/L (ref 4–11)
WBC #/AREA URNS AUTO: ABNORMAL /HPF

## 2021-05-08 PROCEDURE — 85025 COMPLETE CBC W/AUTO DIFF WBC: CPT | Performed by: FAMILY MEDICINE

## 2021-05-08 PROCEDURE — 36415 COLL VENOUS BLD VENIPUNCTURE: CPT | Performed by: FAMILY MEDICINE

## 2021-05-08 PROCEDURE — 99204 OFFICE O/P NEW MOD 45 MIN: CPT | Performed by: FAMILY MEDICINE

## 2021-05-08 PROCEDURE — 81001 URINALYSIS AUTO W/SCOPE: CPT | Performed by: FAMILY MEDICINE

## 2021-05-08 PROCEDURE — 87086 URINE CULTURE/COLONY COUNT: CPT | Performed by: FAMILY MEDICINE

## 2021-05-08 PROCEDURE — 80053 COMPREHEN METABOLIC PANEL: CPT | Performed by: FAMILY MEDICINE

## 2021-05-08 NOTE — PATIENT INSTRUCTIONS
Continue to push clear fluids, no milk products     Start the BRAT diet=bananas, applesauce, rice and toast     increase omeprazole to am and bedtime for nausea/heartburn    If unable to keep down fluids, feeling dizzy/lightheaded, any blood in your diarrhea or diarrhea/stool black in color to ER    Make an appointment to be seen with your regular provider this week or one of their associates, if better cancel appointment     Ok to continue imodium as needed for diarrhea     Doreen Barnard MD          Patient Education     Noninfectious Gastroenteritis (Adult)    Gastroenteritis can cause nausea, vomiting, diarrhea, and cramping in the belly. This may occur from food sensitivity, inflammation of your gastrointestinal tract, medicines, stress, or other causes not related to infection. Your symptoms will usually last from 1 to 3 days, but can last longer. Antibiotics are not effective, but simple home treatment will be helpful.  Home care  Medicine    You may use acetaminophen or NSAID medicines like ibuprofen or naproxen to control fever, unless another medicine is prescribed. (Note: If you have chronic liver or kidney disease, or ever had a stomach ulcer or gastrointestinaI bleeding, talk with your healthcare provider before using these medicines.) Aspirin should never be used in anyone under 18 years of age who is ill with a fever. It may cause severe liver damage. Don't increase your NSAID medicines if you are already taking these medicines for another condition (like arthritis). Don't use NSAIDS if you are on aspirin (such as for heart disease, or after a stroke).    If medicines for diarrhea or vomiting are prescribed, take only as directed.  General care and preventing spread of the illness    If symptoms are severe, rest at home for the next 24 hours or until you feel better.    Hand washing with soap and water is the best way to prevent the spread of infection. Wash your hands after touching anyone who is  sick.    Wash your hands after using the toilet and before meals. Clean the toilet after each use.    Caffeine, tobacco, and alcohol can make your diarrhea, cramping, and pain worse.  Diet    Water and clear liquids are important so you do not get dehydrated. Drink a small amount at a time.    Don't force yourself to eat, especially if you have cramps, vomiting, or diarrhea. When you finally decide to start eating, do not eat large amounts at a time, even if you are hungry.    If you eat, avoid fatty, greasy, spicy, or fried foods.    Don't eat dairy products if you have diarrhea; they can make the diarrhea worse.  During the first 24 hours (the first full day), follow the diet below:    Beverages: Water, clear liquids, soft drinks without caffeine, like ginger ale; mineral water (plain or flavored); decaffeinated tea and coffee.    Soups: Clear broth, consommé, and bouillon sports drinks aren't a good choice because they have too much sugar and not enough electrolytes. In this case, commercially available products called oral rehydration solutions are best.    Desserts: Plain gelatin, ice pops, and fruit juice bars  During the next 24 hours (the second day), you may add the following to the above if you have improved. If not, continue what you did the first day:    Hot cereal, plain toast, bread, rolls, crackers    Plain noodles, rice, mashed potatoes, chicken noodle or rice soup    Unsweetened canned fruit and bananas (don't eat pineapple or citrus)    Limit caffeine and chocolate. No spices or seasonings except salt.  During the next 24 hours    Gradually resume a normal diet, as you feel better and your symptoms improve.    If at any time your symptoms start getting worse, go back to clear liquids until you feel better.    Food preparation    If you have diarrhea, you should not prepare food for others. When you  prepare food for yourself, wash your hands before and after.    Wash your hands after using cutting  boards, countertops, and knives that have been in contact with raw food.    Keep uncooked meats away from cooked and ready-to-eat foods.    Follow-up care  Follow up with your healthcare provider if you are not improving over the next 2 to 3 days, or as advised. If a stool (diarrhea) sample was taken, call for the results as directed.  Call 911  Call 911 if any of these occur:    Trouble breathing    Chest pain    Confusion    Severe drowsiness or trouble awakening    Seizure    Stiff neck  When to seek medical advice  Call your healthcare provider right away if any of these occur:     Increasing belly pain or constant lower right belly pain    Continued vomiting (unable to keep liquids down)    Frequent diarrhea (more than 5 times a day)    Blood in vomit or stool (black or red color)    Inability to tolerate solid food after a few days.    Dark urine, reduced urine output    Weakness, dizziness    Drowsiness    Fever of 100.4 F (38.0 C) or higher, or as directed by your healthcare provider    New rash  [employer] last reviewed this educational content on 3/1/2018    7525-7403 [employer]. All rights reserved. This information is not intended as a substitute for professional medical care. Always follow your healthcare professional's instructions.

## 2021-05-08 NOTE — PROGRESS NOTES
Patient presents with:  Urgent Care  Diarrhea: c/o diarrhea and vomiting for 2 days       Subjective     Catherine Mccollum is a 81 year old female who presents to clinic today for the following health issues:    HPI   Diarrhea  Onset/Duration:  2 days ago  Description:       Consistency of stool: watery and loose       Blood in stool: no/no melena       Number of loose stools past 24 hours: no stool today, took immodium, >10 in 24 hour period, no diarrhea last pm-took immodium  Progression of Symptoms: improved with immodium  Accompanying signs and symptoms:       Fever: no/no weakness, no change in mental status       Nausea/Vomiting: episode of vomiting yesterday-last night, mild nausea present       Abdominal pain: cramping pain prior to episodes of diarrhea,        Weight loss: no       Episodes of constipation: YES- has history of intermittent constipation, not taking daily stool softner or laxative/mrialax  History   Ill contacts: no  Recent use of antibiotics: no  Recent travels: no  Recent medication-new or changes(Rx or OTC): no  Precipitating or alleviating factors: None  Therapies tried and outcome: Imodium AD and drinking tea/clear fluids, diarrhea/nause worse if tries to eat solid foods      Patient Active Problem List   Diagnosis     Essential hypertension, benign     Cervical spondylosis without myelopathy     Glossodynia     Hyperlipidemia, unspecified     Urinary frequency     Essential and other specified forms of tremor     Health Care Home     Transient insomnia     Ankle fracture, left, trimalleolar, 2002     Diverticulitis of colon     Parkinsonism (H)     Osteoarthritis of spine with radiculopathy, lumbosacral region     Left lumbar radiculopathy     Spondylolisthesis of lumbar region     Lumbosacral radiculopathy at S1     Backache     Cervical stenosis of spine     Controlled substance agreement signed     Foot pain     Gastroesophageal reflux disease     HTN (hypertension)     Metatarsalgia      Spinal stenosis     Spinal stenosis of lumbar region     Spinal stenosis, lumbar     Past Surgical History:   Procedure Laterality Date     BREAST SURGERY  Jan 1987    biopsy     CATARACT IOL, RT/LT       GYN SURGERY  1965;1967    2 ceasarean sections     HEMILAMINECTOMY, DISCECTOMY LUMBAR ONE LEVEL, COMBINED Left 6/25/2018    Procedure: COMBINED HEMILAMINECTOMY, DISCECTOMY LUMBAR ONE LEVEL;  Open Left Lumbar 5-Sacral 1 Hemilaminectomy, Medial Facetectomy, And Microdiscectomy Attention Transitional Segment Anatomy;  Surgeon: Rosemarie Young MD;  Location: [facility]     ORTHOPEDIC SURGERY      ankle surgery for trimalleolar frx     [place] SPINE SURGERY PROCEDURE UNLISTED         Social History     Tobacco Use     Smoking status: Never Smoker     Smokeless tobacco: Never Used   Substance Use Topics     Alcohol use: No     Alcohol/week: 0.0 standard drinks     Family History   Problem Relation Age of Onset     C.A.D. Other      Hypertension Other         granddaughter      Cancer Other      Heart Disease Father      Diabetes No family hx of          Current Outpatient Medications   Medication Sig Dispense Refill     Acetaminophen (TYLENOL EXTRA STRENGTH PO) Take 1,000 mg by mouth 2 times daily Muscle and joint pain       calcium carbonate (OS-LALI) 500 MG tablet Take 1 tablet by mouth       CALCIUM PO Take 1 tablet by mouth every morning       carbidopa-levodopa (SINEMET)  MG tablet Increase to one and a half tabs tid.  Then increase by 1/2 tab every 3 weeks to 2, then 2.5 then 3 tabs three times a day as needed and as tolerated 480 tablet 3     Cholecalciferol (VITAMIN D) 1000 UNITS capsule Take 2,000 Units by mouth every morning        cyanocobalamin (VITAMIN B-12) 1000 MCG tablet        gabapentin (NEURONTIN) 300 MG capsule Take 1 capsule (300 mg) by mouth At Bedtime 30 capsule 0     hydroxyurea (HYDREA) 500 MG capsule Take 2 capsules (1,000 mg) by mouth daily 180 capsule 4     irbesartan (AVAPRO) 150 MG  tablet Take 1.5 tablets (225 mg) by mouth every morning 180 tablet 3     LORazepam (ATIVAN) 0.5 MG tablet Take 1 tablet (0.5 mg) by mouth nightly as needed for anxiety 30 tablet 0     omeprazole (PRILOSEC) 20 MG DR capsule Take 1 capsule (20 mg) by mouth daily 90 capsule 3     pravastatin (PRAVACHOL) 20 MG tablet Take 1 tablet (20 mg) by mouth At Bedtime 90 tablet 2     Probiotic Product (PROBIOTIC PO) Take by mouth daily        Allergies   Allergen Reactions     Fosamax Nausea and GI Disturbance     Lisinopril Other (See Comments)     cough     Sulfa Drugs Hives     Recent Labs   Lab Test 05/08/21  1610 02/26/21  0942 01/27/21  1038 06/23/20  0948 06/23/20  0948 12/18/19  1145 01/23/19  1120 07/23/18  0849 11/14/13  1129 11/14/13  1129   A1C  --   --   --   --   --   --   --   --   --  4.9   LDL  --   --   --   --   --  94 106* 97   < > 125   HDL  --   --   --   --   --  54 43* 40*   < > 46   TRIG  --   --   --   --   --  134 192* 200*   < >  --    ALT 18 10 11   < > 10 12 27 30   < >  --    CR 0.65 0.68 0.65   < > 0.67 0.68 0.68 0.67   < > 0.7   GFRESTIMATED 82 81 82   < > 82 82 82 85   < >  --    GFRESTBLACK >90 >90 >90   < > >90 >90 >90 >90   < >  --    POTASSIUM 4.2 3.8 3.4   < > 3.0* 3.6 3.4 3.4   < > 3.4   TSH  --   --   --   --  1.69  --   --   --   --   --     < > = values in this interval not displayed.      BP Readings from Last 3 Encounters:   05/12/21 (!) 155/81   05/08/21 137/81   03/23/21 (!) 160/83    Wt Readings from Last 3 Encounters:   05/12/21 62.6 kg (138 lb)   05/08/21 62.6 kg (138 lb)   03/23/21 62.6 kg (138 lb)                      Reviewed and updated as needed this visit by Provider                 Review of Systems   Constitutional, HEENT, cardiovascular, pulmonary, gi and gu systems are negative, except as otherwise noted.      Objective    /81   Pulse 66   Temp 98.9  F (37.2  C) (Tympanic)   Wt 62.6 kg (138 lb)   SpO2 99%   BMI 26.92 kg/m    Body mass index is 26.92  kg/m .  Physical Exam   GENERAL: healthy, alert and no distress  EYES: Eyes grossly normal to inspection, PERRL and conjunctivae and sclerae normal  HENT: ear canals and TM's normal, nose and mouth without ulcers or lesions, posterior pharynx is clear   NECK: no adenopathy, no asymmetry, masses, or scars and thyroid normal to palpation  RESP: lungs clear to auscultation - no rales, rhonchi or wheezes  CV: regular rate and rhythm, normal S1 S2, no S3 or S4, no murmur, click or rub,   ABDOMEN: soft, nontender, no hepatosplenomegaly, no masses and bowel sounds normal  MS: no gross musculoskeletal defects noted, no edema  NEURO: baseline strength and tone/gait, mentation intact and speech normal  BACK: no CVA tenderness, no paralumbar tenderness      Diagnostic Test Results:  Labs reviewed in Epic  Results for orders placed or performed in visit on 05/08/21   UA with Microscopic reflex to Culture     Status: Abnormal    Specimen: Midstream Urine   Result Value Ref Range    Color Urine Yellow     Appearance Urine Clear     Glucose Urine Negative NEG^Negative mg/dL    Bilirubin Urine Negative NEG^Negative    Ketones Urine Negative NEG^Negative mg/dL    Specific Gravity Urine 1.015 1.003 - 1.035    pH Urine 7.0 5.0 - 7.0 pH    Protein Albumin Urine Negative NEG^Negative mg/dL    Urobilinogen Urine 0.2 0.2 - 1.0 EU/dL    Nitrite Urine Negative NEG^Negative    Blood Urine Trace (A) NEG^Negative    Leukocyte Esterase Urine Negative NEG^Negative    Source Midstream Urine     WBC Urine 0 - 5 OTO5^0 - 5 /HPF    RBC Urine O - 2 OTO2^O - 2 /HPF   CBC with platelets and differential     Status: Abnormal   Result Value Ref Range    WBC 5.3 4.0 - 11.0 10e9/L    RBC Count 3.28 (L) 3.8 - 5.2 10e12/L    Hemoglobin 13.1 11.7 - 15.7 g/dL    Hematocrit 34.9 (L) 35.0 - 47.0 %     (H) 78 - 100 fl    MCH 39.9 (H) 26.5 - 33.0 pg    MCHC 37.5 (H) 31.5 - 36.5 g/dL    RDW 13.1 10.0 - 15.0 %    Platelet Count 181 150 - 450 10e9/L    %  Neutrophils 60.8 %    % Lymphocytes 26.6 %    % Monocytes 12.0 %    % Eosinophils 0.4 %    % Basophils 0.2 %    Absolute Neutrophil 3.2 1.6 - 8.3 10e9/L    Absolute Lymphocytes 1.4 0.8 - 5.3 10e9/L    Absolute Monocytes 0.6 0.0 - 1.3 10e9/L    Absolute Eosinophils 0.0 0.0 - 0.7 10e9/L    Absolute Basophils 0.0 0.0 - 0.2 10e9/L    Diff Method Automated Method    Comprehensive metabolic panel     Status: Abnormal   Result Value Ref Range    Sodium 133 133 - 144 mmol/L    Potassium 4.2 3.4 - 5.3 mmol/L    Chloride 96 94 - 109 mmol/L    Carbon Dioxide 30 20 - 32 mmol/L    Anion Gap 7 3 - 14 mmol/L    Glucose 110 (H) 70 - 99 mg/dL    Urea Nitrogen 10 7 - 30 mg/dL    Creatinine 0.65 0.52 - 1.04 mg/dL    GFR Estimate 82 >60 mL/min/[1.73_m2]    GFR Estimate If Black >90 >60 mL/min/[1.73_m2]    Calcium 9.6 8.5 - 10.1 mg/dL    Bilirubin Total 0.8 0.2 - 1.3 mg/dL    Albumin 4.0 3.4 - 5.0 g/dL    Protein Total 7.0 6.8 - 8.8 g/dL    Alkaline Phosphatase 56 40 - 150 U/L    ALT 18 0 - 50 U/L    AST 32 0 - 45 U/L          Result                               Assessment & Plan     Catherine was seen today for urgent care and diarrhea.    Diagnoses and all orders for this visit:    Gastroenteritis  -     CBC with platelets and differential  -     Comprehensive metabolic panel    Symptoms of urinary tract infection  -     UA with Microscopic reflex to Culture  -     Urine Culture Aerobic Bacterial             ASSESSMENT/PLAN:      ICD-10-CM    1. Gastroenteritis  K52.9 CBC with platelets and differential     Comprehensive metabolic panel   2. Symptoms of urinary tract infection  R39.9 UA with Microscopic reflex to Culture     Urine Culture Aerobic Bacterial     On the day of the encounter, time spend on chart review, patient visit, review of testing, documentation and discussion with other providers was 45 minutes     Patient Instructions     Continue to push clear fluids, no milk products     Start the BRAT diet=bananas, applesauce, rice  and toast     increase omeprazole to am and bedtime for nausea/heartburn    If unable to keep down fluids, feeling dizzy/lightheaded, any blood in your diarrhea or diarrhea/stool black in color to ER    Make an appointment to be seen with your regular provider this week or one of their associates, if better cancel appointment     Ok to continue imodium as needed for diarrhea     Doreen Barnard MD          Patient Education     Noninfectious Gastroenteritis (Adult)    Gastroenteritis can cause nausea, vomiting, diarrhea, and cramping in the belly. This may occur from food sensitivity, inflammation of your gastrointestinal tract, medicines, stress, or other causes not related to infection. Your symptoms will usually last from 1 to 3 days, but can last longer. Antibiotics are not effective, but simple home treatment will be helpful.  Home care  Medicine    You may use acetaminophen or NSAID medicines like ibuprofen or naproxen to control fever, unless another medicine is prescribed. (Note: If you have chronic liver or kidney disease, or ever had a stomach ulcer or gastrointestinaI bleeding, talk with your healthcare provider before using these medicines.) Aspirin should never be used in anyone under 18 years of age who is ill with a fever. It may cause severe liver damage. Don't increase your NSAID medicines if you are already taking these medicines for another condition (like arthritis). Don't use NSAIDS if you are on aspirin (such as for heart disease, or after a stroke).    If medicines for diarrhea or vomiting are prescribed, take only as directed.  General care and preventing spread of the illness    If symptoms are severe, rest at home for the next 24 hours or until you feel better.    Hand washing with soap and water is the best way to prevent the spread of infection. Wash your hands after touching anyone who is sick.    Wash your hands after using the toilet and before meals. Clean the toilet after each  use.    Caffeine, tobacco, and alcohol can make your diarrhea, cramping, and pain worse.  Diet    Water and clear liquids are important so you do not get dehydrated. Drink a small amount at a time.    Don't force yourself to eat, especially if you have cramps, vomiting, or diarrhea. When you finally decide to start eating, do not eat large amounts at a time, even if you are hungry.    If you eat, avoid fatty, greasy, spicy, or fried foods.    Don't eat dairy products if you have diarrhea; they can make the diarrhea worse.  During the first 24 hours (the first full day), follow the diet below:    Beverages: Water, clear liquids, soft drinks without caffeine, like ginger ale; mineral water (plain or flavored); decaffeinated tea and coffee.    Soups: Clear broth, consommé, and bouillon sports drinks aren't a good choice because they have too much sugar and not enough electrolytes. In this case, commercially available products called oral rehydration solutions are best.    Desserts: Plain gelatin, ice pops, and fruit juice bars  During the next 24 hours (the second day), you may add the following to the above if you have improved. If not, continue what you did the first day:    Hot cereal, plain toast, bread, rolls, crackers    Plain noodles, rice, mashed potatoes, chicken noodle or rice soup    Unsweetened canned fruit and bananas (don't eat pineapple or citrus)    Limit caffeine and chocolate. No spices or seasonings except salt.  During the next 24 hours    Gradually resume a normal diet, as you feel better and your symptoms improve.    If at any time your symptoms start getting worse, go back to clear liquids until you feel better.    Food preparation    If you have diarrhea, you should not prepare food for others. When you  prepare food for yourself, wash your hands before and after.    Wash your hands after using cutting boards, countertops, and knives that have been in contact with raw food.    Keep uncooked  meats away from cooked and ready-to-eat foods.    Follow-up care  Follow up with your healthcare provider if you are not improving over the next 2 to 3 days, or as advised. If a stool (diarrhea) sample was taken, call for the results as directed.  Call 911  Call 911 if any of these occur:    Trouble breathing    Chest pain    Confusion    Severe drowsiness or trouble awakening    Seizure    Stiff neck  When to seek medical advice  Call your healthcare provider right away if any of these occur:     Increasing belly pain or constant lower right belly pain    Continued vomiting (unable to keep liquids down)    Frequent diarrhea (more than 5 times a day)    Blood in vomit or stool (black or red color)    Inability to tolerate solid food after a few days.    Dark urine, reduced urine output    Weakness, dizziness    Drowsiness    Fever of 100.4 F (38.0 C) or higher, or as directed by your healthcare provider    New rash  [employer] last reviewed this educational content on 3/1/2018    0688-1006 [employer]. All rights reserved. This information is not intended as a substitute for professional medical care. Always follow your healthcare professional's instructions.                 Doreen Barnard MD  [facility] [place]    ASSESSMENT/PLAN:      ICD-10-CM    1. Gastroenteritis  K52.9 CBC with platelets and differential     Comprehensive metabolic panel   2. Symptoms of urinary tract infection  R39.9 UA with Microscopic reflex to Culture     Urine Culture Aerobic Bacterial       Patient Instructions     Continue to push clear fluids, no milk products     Start the BRAT diet=bananas, applesauce, rice and toast     increase omeprazole to am and bedtime for nausea/heartburn    If unable to keep down fluids, feeling dizzy/lightheaded, any blood in your diarrhea or diarrhea/stool black in color to ER    Make an appointment to be seen with your regular provider this week or one of their  associates, if better cancel appointment     Ok to continue imodium as needed for diarrhea     Doreen Barnard MD          Patient Education     Noninfectious Gastroenteritis (Adult)    Gastroenteritis can cause nausea, vomiting, diarrhea, and cramping in the belly. This may occur from food sensitivity, inflammation of your gastrointestinal tract, medicines, stress, or other causes not related to infection. Your symptoms will usually last from 1 to 3 days, but can last longer. Antibiotics are not effective, but simple home treatment will be helpful.  Home care  Medicine    You may use acetaminophen or NSAID medicines like ibuprofen or naproxen to control fever, unless another medicine is prescribed. (Note: If you have chronic liver or kidney disease, or ever had a stomach ulcer or gastrointestinaI bleeding, talk with your healthcare provider before using these medicines.) Aspirin should never be used in anyone under 18 years of age who is ill with a fever. It may cause severe liver damage. Don't increase your NSAID medicines if you are already taking these medicines for another condition (like arthritis). Don't use NSAIDS if you are on aspirin (such as for heart disease, or after a stroke).    If medicines for diarrhea or vomiting are prescribed, take only as directed.  General care and preventing spread of the illness    If symptoms are severe, rest at home for the next 24 hours or until you feel better.    Hand washing with soap and water is the best way to prevent the spread of infection. Wash your hands after touching anyone who is sick.    Wash your hands after using the toilet and before meals. Clean the toilet after each use.    Caffeine, tobacco, and alcohol can make your diarrhea, cramping, and pain worse.  Diet    Water and clear liquids are important so you do not get dehydrated. Drink a small amount at a time.    Don't force yourself to eat, especially if you have cramps, vomiting, or diarrhea. When you  finally decide to start eating, do not eat large amounts at a time, even if you are hungry.    If you eat, avoid fatty, greasy, spicy, or fried foods.    Don't eat dairy products if you have diarrhea; they can make the diarrhea worse.  During the first 24 hours (the first full day), follow the diet below:    Beverages: Water, clear liquids, soft drinks without caffeine, like ginger ale; mineral water (plain or flavored); decaffeinated tea and coffee.    Soups: Clear broth, consommé, and bouillon sports drinks aren't a good choice because they have too much sugar and not enough electrolytes. In this case, commercially available products called oral rehydration solutions are best.    Desserts: Plain gelatin, ice pops, and fruit juice bars  During the next 24 hours (the second day), you may add the following to the above if you have improved. If not, continue what you did the first day:    Hot cereal, plain toast, bread, rolls, crackers    Plain noodles, rice, mashed potatoes, chicken noodle or rice soup    Unsweetened canned fruit and bananas (don't eat pineapple or citrus)    Limit caffeine and chocolate. No spices or seasonings except salt.  During the next 24 hours    Gradually resume a normal diet, as you feel better and your symptoms improve.    If at any time your symptoms start getting worse, go back to clear liquids until you feel better.    Food preparation    If you have diarrhea, you should not prepare food for others. When you  prepare food for yourself, wash your hands before and after.    Wash your hands after using cutting boards, countertops, and knives that have been in contact with raw food.    Keep uncooked meats away from cooked and ready-to-eat foods.    Follow-up care  Follow up with your healthcare provider if you are not improving over the next 2 to 3 days, or as advised. If a stool (diarrhea) sample was taken, call for the results as directed.  Call 911  Call 911 if any of these  occur:    Trouble breathing    Chest pain    Confusion    Severe drowsiness or trouble awakening    Seizure    Stiff neck  When to seek medical advice  Call your healthcare provider right away if any of these occur:     Increasing belly pain or constant lower right belly pain    Continued vomiting (unable to keep liquids down)    Frequent diarrhea (more than 5 times a day)    Blood in vomit or stool (black or red color)    Inability to tolerate solid food after a few days.    Dark urine, reduced urine output    Weakness, dizziness    Drowsiness    Fever of 100.4 F (38.0 C) or higher, or as directed by your healthcare provider    New rash  [employer] last reviewed this educational content on 3/1/2018    9518-7949 [employer]. All rights reserved. This information is not intended as a substitute for professional medical care. Always follow your healthcare professional's instructions.                   Reviewed medication instructions and side effects. Follow up if experiences side effects.     I reviewed supportive care, otc meds to use if needed, expected course, and signs of concern.  Follow up as needed or if she does not improve within 1 -2 day(s) or if worsens in any way.  Reviewed red flag symptoms and is to go to the ER if experiences any of these.

## 2021-05-08 NOTE — TELEPHONE ENCOUNTER
"Catherine said last few days nauseated and vomiting. Seemed better yesterday but then worsened last night. Diarrhea \"bad\" two days ago, took Imodium which temporarily helped but back. No fever, no abdominal pain. Drinking water and tea, feels is staying hydrated, just can't eat. Recommended being seen within 24 hours. She verbalized understanding and plans to do this.       Reason for Disposition    [1] MILD or MODERATE vomiting AND [2] present > 48 hours (2 days) (Exception: mild vomiting with associated diarrhea)    Additional Information    Negative: Shock suspected (e.g., cold/pale/clammy skin, too weak to stand, low BP, rapid pulse)    Negative: Difficult to awaken or acting confused (e.g., disoriented, slurred speech)    Negative: Sounds like a life-threatening emergency to the triager    Negative: Vomiting occurs only while coughing    Negative: [1] Pregnant < 20 Weeks AND [2] nausea/vomiting began in early pregnancy (i.e., 4-8 weeks pregnant)    Negative: Chest pain    Negative: Headache is main symptom    Negative: Vomiting (or Nausea) in a cancer patient who is currently (or recently) receiving chemotherapy or radiation therapy, or cancer patient who has metastatic or end-stage cancer and is receiving palliative care    Negative: [1] Vomiting AND [2] contains red blood or black (\"coffee ground\") material  (Exception: few red streaks in vomit that only happened once)    Negative: Severe pain in one eye    Negative: Recent head injury (within last 3 days)    Negative: Recent abdominal injury (within last 3 days)    Negative: [1] Insulin-dependent diabetes (Type I) AND [2] glucose > 400 mg/dl (22 mmol/l)    Negative: [1] SEVERE vomiting (e.g., 6 or more times/day) AND [2] present > 8 hours    Negative: [1] MODERATE vomiting (e.g., 3 - 5 times/day) AND [2] age > 60    Negative: Severe headache (e.g., excruciating) (Exception: similar to previous migraines)    Negative: High-risk adult (e.g., diabetes mellitus, " brain tumor, V-P shunt, hernia)    Negative: [1] Drinking very little AND [2] dehydration suspected (e.g., no urine > 12 hours, very dry mouth, very lightheaded)    Negative: Patient sounds very sick or weak to the triager    Negative: [1] Vomiting AND [2] abdomen looks much more swollen than usual    Negative: [1] Vomiting AND [2] contains bile (green color)    Negative: [1] Constant abdominal pain AND [2] present > 2 hours    Negative: [1] Fever > 103 F (39.4 C) AND [2] not able to get the fever down using Fever Care Advice    Negative: [1] Fever > 101 F (38.3 C) AND [2] age > 60    Negative: [1] Fever > 100.0 F (37.8 C) AND [2] bedridden (e.g., nursing home patient, CVA, chronic illness, recovering from surgery)    Negative: [1] Fever > 100.0 F (37.8 C) AND [2] weak immune system (e.g., HIV positive, cancer chemo, splenectomy, organ transplant, chronic steroids)    Negative: Taking any of the following medications: digoxin (Lanoxin), lithium, theophylline, phenytoin (Dilantin)    Protocols used: VOMITING-A-AH

## 2021-05-09 LAB
ALBUMIN SERPL-MCNC: 4 G/DL (ref 3.4–5)
ALP SERPL-CCNC: 56 U/L (ref 40–150)
ALT SERPL W P-5'-P-CCNC: 18 U/L (ref 0–50)
ANION GAP SERPL CALCULATED.3IONS-SCNC: 7 MMOL/L (ref 3–14)
AST SERPL W P-5'-P-CCNC: 32 U/L (ref 0–45)
BILIRUB SERPL-MCNC: 0.8 MG/DL (ref 0.2–1.3)
BUN SERPL-MCNC: 10 MG/DL (ref 7–30)
CALCIUM SERPL-MCNC: 9.6 MG/DL (ref 8.5–10.1)
CHLORIDE SERPL-SCNC: 96 MMOL/L (ref 94–109)
CO2 SERPL-SCNC: 30 MMOL/L (ref 20–32)
CREAT SERPL-MCNC: 0.65 MG/DL (ref 0.52–1.04)
GFR SERPL CREATININE-BSD FRML MDRD: 82 ML/MIN/{1.73_M2}
GLUCOSE SERPL-MCNC: 110 MG/DL (ref 70–99)
POTASSIUM SERPL-SCNC: 4.2 MMOL/L (ref 3.4–5.3)
PROT SERPL-MCNC: 7 G/DL (ref 6.8–8.8)
SODIUM SERPL-SCNC: 133 MMOL/L (ref 133–144)

## 2021-05-09 NOTE — RESULT ENCOUNTER NOTE
This result suggests you may have vitamin B12 deficiency. See your regular doctor to sort this out in the next week or 2

## 2021-05-10 ENCOUNTER — MYC MEDICAL ADVICE (OUTPATIENT)
Dept: INTERNAL MEDICINE | Facility: CLINIC | Age: 81
End: 2021-05-10

## 2021-05-10 LAB
BACTERIA SPEC CULT: NO GROWTH
Lab: NORMAL
SPECIMEN SOURCE: NORMAL

## 2021-05-12 ENCOUNTER — OFFICE VISIT (OUTPATIENT)
Dept: FAMILY MEDICINE | Facility: CLINIC | Age: 81
End: 2021-05-12
Payer: MEDICARE

## 2021-05-12 VITALS
OXYGEN SATURATION: 97 % | BODY MASS INDEX: 26.92 KG/M2 | HEART RATE: 66 BPM | SYSTOLIC BLOOD PRESSURE: 155 MMHG | DIASTOLIC BLOOD PRESSURE: 81 MMHG | TEMPERATURE: 98 F | WEIGHT: 138 LBS

## 2021-05-12 DIAGNOSIS — R11.0 NAUSEA: ICD-10-CM

## 2021-05-12 DIAGNOSIS — R63.0 POOR APPETITE: ICD-10-CM

## 2021-05-12 DIAGNOSIS — K59.1 FUNCTIONAL DIARRHEA: Primary | ICD-10-CM

## 2021-05-12 PROCEDURE — 99213 OFFICE O/P EST LOW 20 MIN: CPT | Performed by: NURSE PRACTITIONER

## 2021-05-12 ASSESSMENT — ENCOUNTER SYMPTOMS
FEVER: 0
NAUSEA: 1
FATIGUE: 1
ABDOMINAL PAIN: 0
DIARRHEA: 1
CHILLS: 0

## 2021-05-12 ASSESSMENT — PAIN SCALES - GENERAL: PAINLEVEL: NO PAIN (0)

## 2021-05-12 NOTE — TELEPHONE ENCOUNTER
She was seen in NP clinic today and has follow up 5/17/2021 (Virtual). I could see her in person 5/21/2021    JUAN MANUEL Alvarez

## 2021-05-12 NOTE — PROGRESS NOTES
HPI       Catherine Mccollum is a 81 year old woman who presents with multiple concerns.   Chief Complaint   Patient presents with     Diarrhea     Pt has loose diarrhea and nausea.     Functional diarrhea/Nausea/Poor appetite: seen in UC on 05/08/2021 for loose stools and GI symptoms. UA/UC essentially normal. Labs essentially normal. Catherine has been eating a bland diet. She thought she was improving last evening and then this morning woke up with more loose stools. She presents for examination and evaluation.   Component      Latest Ref Rng & Units 5/8/2021   RBC Count      3.8 - 5.2 10e12/L 3.28 (L)   Hemoglobin      11.7 - 15.7 g/dL 13.1   Hematocrit      35.0 - 47.0 % 34.9 (L)   MCV      78 - 100 fl 106 (H)   MCH      26.5 - 33.0 pg 39.9 (H)   MCHC      31.5 - 36.5 g/dL 37.5 (H)   RDW      10.0 - 15.0 % 13.1   Platelet Count      150 - 450 10e9/L 181   % Neutrophils      % 60.8   % Lymphocytes      % 26.6   % Monocytes      % 12.0   % Eosinophils      % 0.4   % Basophils      % 0.2   Absolute Neutrophil      1.6 - 8.3 10e9/L 3.2   Absolute Lymphocytes      0.8 - 5.3 10e9/L 1.4   Absolute Monocytes      0.0 - 1.3 10e9/L 0.6   Absolute Eosinophils      0.0 - 0.7 10e9/L 0.0   Absolute Basophils      0.0 - 0.2 10e9/L 0.0   Diff Method       Automated Method   Sodium      133 - 144 mmol/L 133   Potassium      3.4 - 5.3 mmol/L 4.2   Chloride      94 - 109 mmol/L 96   Carbon Dioxide      20 - 32 mmol/L 30   Anion Gap      3 - 14 mmol/L 7   Glucose      70 - 99 mg/dL 110 (H)   Urea Nitrogen      7 - 30 mg/dL 10   Creatinine      0.52 - 1.04 mg/dL 0.65   GFR Estimate      >60 mL/min/1.73:m2 82   GFR Estimate If Black      >60 mL/min/1.73:m2 >90   Calcium      8.5 - 10.1 mg/dL 9.6   Bilirubin Total      0.2 - 1.3 mg/dL 0.8   Albumin      3.4 - 5.0 g/dL 4.0   Protein Total      6.8 - 8.8 g/dL 7.0   Alkaline Phosphatase      40 - 150 U/L 56   ALT      0 - 50 U/L 18   AST      0 - 45 U/L 32   Color Urine       Yellow    Appearance Urine       Clear   Glucose Urine      NEG:Negative mg/dL Negative   Bilirubin Urine      NEG:Negative Negative   Ketones Urine      NEG:Negative mg/dL Negative   Specific Gravity Urine      1.003 - 1.035 1.015   pH Urine      5.0 - 7.0 pH 7.0   Protein Albumin Urine      NEG:Negative mg/dL Negative   Urobilinogen Urine      0.2 - 1.0 EU/dL 0.2   Nitrite Urine      NEG:Negative Negative   Blood Urine      NEG:Negative Trace (A)   Leukocyte Esterase Urine      NEG:Negative Negative   Source       Midstream Urine   WBC Urine      OTO5:0 - 5 /HPF 0 - 5   RBC Urine      OTO2:O - 2 /HPF O - 2   Specimen Description       Midstream Urine   Special Requests       Specimen received in preservative   Culture Micro       No growth     Problem, Medication and Allergy Lists were reviewed and updated if needed.    Patient is an established patient of this clinic.         Review of Systems:   Review of Systems     Constitutional:  Positive for fatigue. Negative for fever and chills.   Gastrointestinal:  Positive for nausea and diarrhea. Negative for abdominal pain.               Physical Exam:     Vitals:    05/12/21 1029 05/12/21 1031   BP: (!) 167/79 (!) 155/81   Pulse: 66    Temp: 98  F (36.7  C)    SpO2: 97%    Weight: 62.6 kg (138 lb)      Body mass index is 26.92 kg/m .  Vitals were reviewed and were normal.     Physical Exam  Vitals signs reviewed.   Constitutional:       Appearance: Normal appearance.   HENT:      Head: Normocephalic.   Cardiovascular:      Rate and Rhythm: Normal rate and regular rhythm.      Pulses: Normal pulses.      Heart sounds: Normal heart sounds.   Pulmonary:      Effort: Pulmonary effort is normal.      Breath sounds: Normal breath sounds.   Abdominal:      General: Abdomen is flat. Bowel sounds are normal. There is no distension.      Palpations: Abdomen is soft.      Tenderness: There is abdominal tenderness.   Musculoskeletal: Normal range of motion.   Skin:     General: Skin is  warm and dry.   Neurological:      General: No focal deficit present.      Mental Status: She is alert and oriented to person, place, and time.   Psychiatric:         Mood and Affect: Mood normal.         Behavior: Behavior normal.         Results:   Stool studies pending.   Assessment and Plan     1. Functional diarrhea    - Clostridium difficile toxin B PCR; Future  - Enteric Bacteria and Virus Panel by FLAQUITA Stool; Future  - Stool Ova and Parasite; Future    2. Nausea    - Clostridium difficile toxin B PCR; Future  - Enteric Bacteria and Virus Panel by FLAQUITA Stool; Future  - Stool Ova and Parasite; Future    3. Poor appetite    - Clostridium difficile toxin B PCR; Future  - Enteric Bacteria and Virus Panel by FLAQUITA Stool; Future  - Stool Ova and Parasite; Future  Patient instructions: First, please eat a BRAT diet (Bananas, Rice, Applesauce, and Toast). Next, make sure you are drinking enough water. Next, please perform stool studies if your stools remain loose. Ok to bring samples to [facility] on [facility]. Next, please follow up with me Monday, May 17 with a telephone visit to ensure things are improving. Finally, please call with any questions or concerns. Thank you. Options for treatment and follow-up care were reviewed with the patient. Catherine Mccollum  engaged in the decision making process and verbalized understanding of the options discussed and agreed with the final plan.  KAY Moran, CNP  Addendum 05/13/2021 1248:I called to check on Catherine's status. She reported that she is feeling a bit better today. She was able to collect a few stool samples, not all of them. She will try to collect the remainder of tests today. She did make an appt to see Dr. Alvarez next Friday. She will also have an ear wash when she sees her primary in clinic.   KAY Moran, CNP

## 2021-05-12 NOTE — NURSING NOTE
81 year old  Chief Complaint   Patient presents with     Diarrhea     Pt has loose diarrhea and nausea.       Blood pressure (!) 155/81, pulse 66, temperature 98  F (36.7  C), weight 62.6 kg (138 lb), SpO2 97 %, not currently breastfeeding. Body mass index is 26.92 kg/m .  BP completed using cuff size:      Ofe Coker, [facility]  May 12, 2021 10:38 AM

## 2021-05-12 NOTE — PATIENT INSTRUCTIONS
First, please eat a BRAT diet (Bananas, Rice, Applesauce, and Toast). Next, make sure you are drinking enough water. Next, please perform stool studies if your stools remain loose. Ok to bring samples to [facility] on [facility]. Next, please follow up with me Monday, May 17 with a telephone visit to ensure things are improving. Finally, please call with any questions or concerns. Thank you.   [facility] Medication Refill Request Information:  * Please contact your pharmacy regarding ANY request for medication refills.  ** NP Clinic Prescription Fax = [phone]  * Please allow 3 business days for routine medication refills.  * Please allow 5 business days for controlled substance medication refills.     [facility] Test Result notification information:  *You will be notified with in 7-10 days of your appointment day regarding the results of your test.  If you are on MyChart you will be notified as soon as the provider has reviewed the results and signed off on them.    [facility]: [phone]     If you have questions regarding Covid-19 and the Covid-19 vaccine, please visit this website.    [email or web address]

## 2021-05-14 DIAGNOSIS — R11.0 NAUSEA: ICD-10-CM

## 2021-05-14 DIAGNOSIS — K59.1 FUNCTIONAL DIARRHEA: ICD-10-CM

## 2021-05-14 DIAGNOSIS — R63.0 POOR APPETITE: ICD-10-CM

## 2021-05-14 LAB
C COLI+JEJUNI+LARI FUSA STL QL NAA+PROBE: NOT DETECTED
C DIFF TOX B STL QL: NEGATIVE
EC STX1 GENE STL QL NAA+PROBE: NOT DETECTED
EC STX2 GENE STL QL NAA+PROBE: NOT DETECTED
ENTERIC PATHOGEN COMMENT: ABNORMAL
NOROV GI+II ORF1-ORF2 JNC STL QL NAA+PR: ABNORMAL
RVA NSP5 STL QL NAA+PROBE: NOT DETECTED
SALMONELLA SP RPOD STL QL NAA+PROBE: NOT DETECTED
SHIGELLA SP+EIEC IPAH STL QL NAA+PROBE: NOT DETECTED
SPECIMEN SOURCE: NORMAL
V CHOL+PARA RFBL+TRKH+TNAA STL QL NAA+PR: NOT DETECTED
Y ENTERO RECN STL QL NAA+PROBE: NOT DETECTED

## 2021-05-14 PROCEDURE — 87177 OVA AND PARASITES SMEARS: CPT | Performed by: NURSE PRACTITIONER

## 2021-05-14 PROCEDURE — 87209 SMEAR COMPLEX STAIN: CPT | Performed by: NURSE PRACTITIONER

## 2021-05-14 PROCEDURE — 87493 C DIFF AMPLIFIED PROBE: CPT | Performed by: NURSE PRACTITIONER

## 2021-05-14 PROCEDURE — 87506 IADNA-DNA/RNA PROBE TQ 6-11: CPT | Performed by: NURSE PRACTITIONER

## 2021-05-17 ENCOUNTER — VIRTUAL VISIT (OUTPATIENT)
Dept: FAMILY MEDICINE | Facility: CLINIC | Age: 81
End: 2021-05-17
Payer: MEDICARE

## 2021-05-17 ENCOUNTER — ALLIED HEALTH/NURSE VISIT (OUTPATIENT)
Dept: FAMILY MEDICINE | Facility: CLINIC | Age: 81
End: 2021-05-17
Payer: MEDICARE

## 2021-05-17 DIAGNOSIS — H61.21 IMPACTED CERUMEN OF RIGHT EAR: ICD-10-CM

## 2021-05-17 DIAGNOSIS — R63.0 POOR APPETITE: ICD-10-CM

## 2021-05-17 DIAGNOSIS — R19.5 LOOSE STOOLS: Primary | ICD-10-CM

## 2021-05-17 DIAGNOSIS — H61.22 IMPACTED CERUMEN OF LEFT EAR: Primary | ICD-10-CM

## 2021-05-17 LAB
Lab: NORMAL
O+P STL MICRO: NORMAL
O+P STL MICRO: NORMAL
SPECIMEN SOURCE: NORMAL

## 2021-05-17 PROCEDURE — 99207 PR NO CHARGE NURSE ONLY: CPT

## 2021-05-17 PROCEDURE — 99213 OFFICE O/P EST LOW 20 MIN: CPT | Mod: 95 | Performed by: NURSE PRACTITIONER

## 2021-05-17 ASSESSMENT — PAIN SCALES - GENERAL: PAINLEVEL: NO PAIN (0)

## 2021-05-17 NOTE — PATIENT INSTRUCTIONS
[facility] Medication Refill Request Information:  * Please contact your pharmacy regarding ANY request for medication refills.  ** [facility] Clinic Prescription Fax = [phone]  * Please allow 3 business days for routine medication refills.  * Please allow 5 business days for controlled substance medication refills.     [facility] Test Result notification information:  *You will be notified with in 7-10 days of your appointment day regarding the results of your test.  If you are on MyChart you will be notified as soon as the provider has reviewed the results and signed off on them.    [facility]: [phone]     If you have questions regarding Covid-19 and the Covid-19 vaccine, please visit this website.    [email or web address]

## 2021-05-17 NOTE — PROGRESS NOTES
Catherine is a 81 year old who is being evaluated via a billable telephone visit.      What phone number would you like to be contacted at? [phone]  How would you like to obtain your AVS? [place]      Subjective   Catherine is a 81 year old who presents for follow up on GI symptoms.     HPI:  Symptoms have been present for more than one week. Loose stool/poor appetite continues. Norovirus test positive.     Review of Systems   Constitutional, HEENT, cardiovascular, pulmonary, gi and gu systems are negative, except as otherwise noted.      Objective         Vitals:  No vitals were obtained today due to virtual visit.    Physical Exam   healthy, alert and no distress  PSYCH: Alert and oriented times 3; coherent speech, normal   rate and volume, able to articulate logical thoughts, able   to abstract reason, no tangential thoughts, no hallucinations   or delusions  Her affect is normal  RESP: No cough, no audible wheezing, able to talk in full sentences  Remainder of exam unable to be completed due to telephone visits    Recent lab work:  Component      Latest Ref Rng & Units 5/14/2021   Salmonella species by FLAQUITA      NDET:Not Detected Not Detected   Shigella species by FLAQUITA      NDET:Not Detected Not Detected   Vibrio group by FLAQUITA      NDET:Not Detected Not Detected   Rotavirus A by FLAQUITA      NDET:Not Detected Not Detected   Shiga toxin 1 gene by FLAQUITA      NDET:Not Detected Not Detected   Shiga toxin 2 gene by FLAQUITA      NDET:Not Detected Not Detected   Norovirus I and II by FLAQUITA      NDET:Not Detected Detected, Abnormal Result (A)   Yersinia enterocolitica by FLAQUITA      NDET:Not Detected Not Detected   Enteric pathogen comment       Testing performed by multiplexed, qualitative PCR using the [employer] Enteric . . .   Specimen Description       Feces   C Diff Toxin B PCR      NEG:Negative Negative     Phone call duration: 7 minutes  Assessment & Plan     Loose stools      Poor appetite    Sees Dr. Alvarez in 4 days.    Continue BRAT diet, slowly introduce new foods. Call with concerns prior to your next appt. She verbalizes understanding of the plan.   KAY Moran, CNP

## 2021-05-17 NOTE — NURSING NOTE
81 year old  Chief Complaint   Patient presents with     Diarrhea     Follow up on loose diarrhea symptoms are the same.       Ofe Coker, JUN  May 17, 2021 9:34 AM

## 2021-05-17 NOTE — NURSING NOTE
Catherine Mccollum comes into clinic today at the request of Claire Garcia Ordering Provider for an Ear wash on both ears.    Patient tolerated ear wash well.  All amount of impacted cerumen removed from both ear/ears.  Tympanic membrane visible.      This service provided today was under the supervising provider of the day Claire Garcia, who was available if needed.    Ofe Coker, A 2:33 PM  5/17/2021

## 2021-05-21 ENCOUNTER — OFFICE VISIT (OUTPATIENT)
Dept: INTERNAL MEDICINE | Facility: CLINIC | Age: 81
End: 2021-05-21
Payer: MEDICARE

## 2021-05-21 VITALS
DIASTOLIC BLOOD PRESSURE: 79 MMHG | HEART RATE: 72 BPM | SYSTOLIC BLOOD PRESSURE: 148 MMHG | OXYGEN SATURATION: 97 % | BODY MASS INDEX: 26.14 KG/M2 | WEIGHT: 134 LBS

## 2021-05-21 DIAGNOSIS — I10 ESSENTIAL HYPERTENSION: ICD-10-CM

## 2021-05-21 DIAGNOSIS — F41.9 ANXIETY: ICD-10-CM

## 2021-05-21 DIAGNOSIS — I10 BENIGN ESSENTIAL HYPERTENSION: Primary | ICD-10-CM

## 2021-05-21 DIAGNOSIS — E78.00 HIGH CHOLESTEROL: ICD-10-CM

## 2021-05-21 PROCEDURE — 99214 OFFICE O/P EST MOD 30 MIN: CPT | Performed by: INTERNAL MEDICINE

## 2021-05-21 RX ORDER — PRAVASTATIN SODIUM 20 MG
20 TABLET ORAL AT BEDTIME
Qty: 90 TABLET | Refills: 2 | Status: SHIPPED | OUTPATIENT
Start: 2021-05-21 | End: 2021-12-07

## 2021-05-21 ASSESSMENT — PAIN SCALES - GENERAL: PAINLEVEL: NO PAIN (0)

## 2021-05-21 NOTE — NURSING NOTE
Chief Complaint   Patient presents with     Recheck Medication     pt here to follow up       Divya Johnson CMA, EMT at 11:55 AM on 5/21/2021.

## 2021-05-21 NOTE — PROGRESS NOTES
HPI:    Last visit with me 3/23/2021. Seen NP clinic 5/17/2021 for loose stools. C. Diff negative 5/14/2021. Norovirus positive 5/14/2021. Labs checked 5/8/2021 unremarkable/stable. Overall doing much better. He loose stools stopped about 2 days ago. She had a BM this AM. She has some mild anorexia but did eat a little last night. No nausea or vomiting. No systemic sxs. No other HEENT, cardiopulmonary, abdominal, , GYN, neurological, systemic, psychiatric, lymphatic, endocrine complaints.    Past Medical History:   Diagnosis Date     GERD (gastroesophageal reflux disease)      HTN (hypertension)      Osteoporosis 2000     Parkinsonian syndrome (H)     left side tremor     Past Surgical History:   Procedure Laterality Date     BREAST SURGERY  Jan 1987    biopsy     CATARACT IOL, RT/LT       GYN SURGERY  1965;1967    2 ceasarean sections     HEMILAMINECTOMY, DISCECTOMY LUMBAR ONE LEVEL, COMBINED Left 6/25/2018    Procedure: COMBINED HEMILAMINECTOMY, DISCECTOMY LUMBAR ONE LEVEL;  Open Left Lumbar 5-Sacral 1 Hemilaminectomy, Medial Facetectomy, And Microdiscectomy Attention Transitional Segment Anatomy;  Surgeon: Rosemarie Young MD;  Location: [facility] OR     ORTHOPEDIC SURGERY      ankle surgery for trimalleolar frx     IA SPINE SURGERY PROCEDURE UNLISTED       PE:    Vitals noted, gen, nad, cooperative, alert, neck supple nl rom, lungs with good air movement, RRR, S1, S2, no MRG, abdomen, positive bowel sounds, no rebound, no acute findings. She has a resting tremor that is chronic.     A/P:    1. 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   2. Immunizations; she has completed the Moderna COVID vaccination system  3. Loose stools positive Norovirus on 5/14/2021. Clinically doing much better and sxs. Have abated. She is hydrated and starting to eat.     30 minutes spent on the date of the encounter doing chart review, history and exam,  documentation and further activities as noted above

## 2021-05-25 ENCOUNTER — RECORDS - HEALTHEAST (OUTPATIENT)
Dept: ADMINISTRATIVE | Facility: CLINIC | Age: 81
End: 2021-05-25

## 2021-05-26 ENCOUNTER — RECORDS - HEALTHEAST (OUTPATIENT)
Dept: ADMINISTRATIVE | Facility: CLINIC | Age: 81
End: 2021-05-26

## 2021-05-27 ENCOUNTER — RECORDS - HEALTHEAST (OUTPATIENT)
Dept: ADMINISTRATIVE | Facility: CLINIC | Age: 81
End: 2021-05-27

## 2021-05-28 ENCOUNTER — RECORDS - HEALTHEAST (OUTPATIENT)
Dept: ADMINISTRATIVE | Facility: CLINIC | Age: 81
End: 2021-05-28

## 2021-05-29 ENCOUNTER — RECORDS - HEALTHEAST (OUTPATIENT)
Dept: ADMINISTRATIVE | Facility: CLINIC | Age: 81
End: 2021-05-29

## 2021-05-30 ENCOUNTER — RECORDS - HEALTHEAST (OUTPATIENT)
Dept: ADMINISTRATIVE | Facility: CLINIC | Age: 81
End: 2021-05-30

## 2021-05-31 ENCOUNTER — RECORDS - HEALTHEAST (OUTPATIENT)
Dept: ADMINISTRATIVE | Facility: CLINIC | Age: 81
End: 2021-05-31

## 2021-05-31 VITALS — HEIGHT: 61 IN | WEIGHT: 138 LBS | BODY MASS INDEX: 26.06 KG/M2

## 2021-06-01 DIAGNOSIS — D47.1 MYELOPROLIFERATIVE DISORDER (H): ICD-10-CM

## 2021-06-01 DIAGNOSIS — I10 BENIGN ESSENTIAL HYPERTENSION: ICD-10-CM

## 2021-06-01 LAB
ALBUMIN SERPL-MCNC: 4.2 G/DL (ref 3.4–5)
ALP SERPL-CCNC: 60 U/L (ref 40–150)
ALT SERPL W P-5'-P-CCNC: 16 U/L (ref 0–50)
ANION GAP SERPL CALCULATED.3IONS-SCNC: 6 MMOL/L (ref 3–14)
AST SERPL W P-5'-P-CCNC: 23 U/L (ref 0–45)
BASOPHILS # BLD AUTO: 0 10E9/L (ref 0–0.2)
BASOPHILS NFR BLD AUTO: 0.4 %
BILIRUB SERPL-MCNC: 0.9 MG/DL (ref 0.2–1.3)
BUN SERPL-MCNC: 12 MG/DL (ref 7–30)
CALCIUM SERPL-MCNC: 9.3 MG/DL (ref 8.5–10.1)
CHLORIDE SERPL-SCNC: 99 MMOL/L (ref 94–109)
CO2 SERPL-SCNC: 30 MMOL/L (ref 20–32)
CREAT SERPL-MCNC: 0.62 MG/DL (ref 0.52–1.04)
DIFFERENTIAL METHOD BLD: ABNORMAL
EOSINOPHIL # BLD AUTO: 0.1 10E9/L (ref 0–0.7)
EOSINOPHIL NFR BLD AUTO: 0.6 %
ERYTHROCYTE [DISTWIDTH] IN BLOOD BY AUTOMATED COUNT: 12 % (ref 10–15)
GFR SERPL CREATININE-BSD FRML MDRD: 83 ML/MIN/{1.73_M2}
GLUCOSE SERPL-MCNC: 108 MG/DL (ref 70–99)
HCT VFR BLD AUTO: 35.9 % (ref 35–47)
HGB BLD-MCNC: 13.4 G/DL (ref 11.7–15.7)
LYMPHOCYTES # BLD AUTO: 3.8 10E9/L (ref 0.8–5.3)
LYMPHOCYTES NFR BLD AUTO: 44.7 %
MCH RBC QN AUTO: 38.6 PG (ref 26.5–33)
MCHC RBC AUTO-ENTMCNC: 37.3 G/DL (ref 31.5–36.5)
MCV RBC AUTO: 104 FL (ref 78–100)
MONOCYTES # BLD AUTO: 0.5 10E9/L (ref 0–1.3)
MONOCYTES NFR BLD AUTO: 5.8 %
NEUTROPHILS # BLD AUTO: 4.1 10E9/L (ref 1.6–8.3)
NEUTROPHILS NFR BLD AUTO: 48.5 %
PLATELET # BLD AUTO: 280 10E9/L (ref 150–450)
POTASSIUM SERPL-SCNC: 3.5 MMOL/L (ref 3.4–5.3)
PROT SERPL-MCNC: 7.1 G/DL (ref 6.8–8.8)
RBC # BLD AUTO: 3.47 10E12/L (ref 3.8–5.2)
SODIUM SERPL-SCNC: 135 MMOL/L (ref 133–144)
WBC # BLD AUTO: 8.4 10E9/L (ref 4–11)

## 2021-06-01 PROCEDURE — 36415 COLL VENOUS BLD VENIPUNCTURE: CPT | Performed by: INTERNAL MEDICINE

## 2021-06-01 PROCEDURE — 80053 COMPREHEN METABOLIC PANEL: CPT | Performed by: INTERNAL MEDICINE

## 2021-06-01 PROCEDURE — 85025 COMPLETE CBC W/AUTO DIFF WBC: CPT | Performed by: INTERNAL MEDICINE

## 2021-06-08 PROBLEM — D47.3 ESSENTIAL THROMBOCYTHEMIA (H): Status: ACTIVE | Noted: 2021-06-08

## 2021-06-12 NOTE — ANESTHESIA CARE TRANSFER NOTE
Last vitals:   Vitals:    09/07/17 1400   BP: 169/72   Pulse: (!) 104   Resp: 18   Temp: 36.7  C (98  F)   SpO2: 98%     Patient's level of consciousness is awake  Spontaneous respirations: yes  Maintains airway independently: yes  Dentition unchanged: yes  Oropharynx: oropharynx clear of all foreign objects    QCDR Measures:  ASA# 20 - Surgical Safety Checklist: WHO surgical safety checklist completed prior to induction  PQRS# 430 - Adult PONV Prevention: 4558F - Pt received => 2 anti-emetic agents (different classes) preop & intraop  ASA# 8 - Peds PONV Prevention: NA - Not pediatric patient, not GA or 2 or more risk factors NOT present  PQRS# 424 - Rose-op Temp Management: 4559F - At least one body temp DOCUMENTED => 35.5C or 95.9F within required timeframe  PQRS# 426 - PACU Transfer Protocol: - Transfer of care checklist used  ASA# 14 - Acute Post-op Pain: ASA14B - Patient did NOT experience pain >= 7 out of 10

## 2021-06-12 NOTE — ANESTHESIA POSTPROCEDURE EVALUATION
Patient: Catherine Mccollum  #3 RIGHT L4-5 AND L5-S1 LATERAL RECESS DECOMPRESSION  Anesthesia type: general    Patient location: PACU  Last vitals:   Vitals:    09/07/17 1450   BP: 117/76   Pulse: 69   Resp: 13   Temp:    SpO2: 97%     Post vital signs: stable  Level of consciousness: awake, oriented and responds to simple questions  Post-anesthesia pain: pain controlled  Post-anesthesia nausea and vomiting: no  Pulmonary: unassisted, return to baseline, nasal cannula  Cardiovascular: stable and blood pressure at baseline  Hydration: adequate  Anesthetic events: no    QCDR Measures:  ASA# 11 - Rose-op Cardiac Arrest: ASA11B - Patient did NOT experience unanticipated cardiac arrest  ASA# 12 - Rose-op Mortality Rate: ASA12B - Patient did NOT die  ASA# 13 - PACU Re-Intubation Rate: ASA13B - Patient did NOT require a new airway mgmt  ASA# 10 - Composite Anes Safety: ASA10A - No serious adverse event    Additional Notes:

## 2021-06-12 NOTE — ANESTHESIA PREPROCEDURE EVALUATION
Anesthesia Evaluation      Patient summary reviewed   No history of anesthetic complications     Airway   Mallampati: I  Neck ROM: full   Pulmonary - negative ROS and normal exam    breath sounds clear to auscultation                         Cardiovascular - normal exam  (+) hypertension well controlled, ,     Rhythm: regular  Rate: normal,         Neuro/Psych    (+) Parkinson's disease,     Endo/Other - negative ROS      GI/Hepatic/Renal - negative ROS           Dental    (+) caps                       Anesthesia Plan  Planned anesthetic: general endotracheal    ASA 2     Anesthetic plan and risks discussed with: patient    Post-op plan: routine recovery

## 2021-06-14 DIAGNOSIS — D47.3 ESSENTIAL THROMBOCYTHEMIA (H): ICD-10-CM

## 2021-06-14 RX ORDER — HYDROXYUREA 500 MG/1
500 CAPSULE ORAL DAILY
COMMUNITY
Start: 2021-06-14 | End: 2021-09-07

## 2021-06-22 ENCOUNTER — OFFICE VISIT (OUTPATIENT)
Dept: INTERNAL MEDICINE | Facility: CLINIC | Age: 81
End: 2021-06-22
Payer: MEDICARE

## 2021-06-22 VITALS
HEART RATE: 66 BPM | RESPIRATION RATE: 16 BRPM | DIASTOLIC BLOOD PRESSURE: 81 MMHG | OXYGEN SATURATION: 98 % | WEIGHT: 134 LBS | HEIGHT: 60 IN | BODY MASS INDEX: 26.31 KG/M2 | SYSTOLIC BLOOD PRESSURE: 144 MMHG

## 2021-06-22 DIAGNOSIS — E78.00 HIGH BLOOD CHOLESTEROL: Primary | ICD-10-CM

## 2021-06-22 DIAGNOSIS — F41.9 ANXIETY: ICD-10-CM

## 2021-06-22 PROCEDURE — 99214 OFFICE O/P EST MOD 30 MIN: CPT | Performed by: INTERNAL MEDICINE

## 2021-06-22 RX ORDER — LORAZEPAM 0.5 MG/1
0.5 TABLET ORAL
Qty: 30 TABLET | Refills: 0 | Status: SHIPPED | OUTPATIENT
Start: 2021-06-22 | End: 2021-10-19

## 2021-06-22 ASSESSMENT — PAIN SCALES - GENERAL: PAINLEVEL: NO PAIN (0)

## 2021-06-22 ASSESSMENT — MIFFLIN-ST. JEOR: SCORE: 984.96

## 2021-06-22 NOTE — PROGRESS NOTES
HPI:    Last visit with me 5/21/2021 and additional details in that note. Overall she is doing well. She is traveling to [facility] to see her son this week. She is on a stable dose of Sinemet. She uses low dose Ativan at night. No further GI sxs. Very rare loose stools. She tries to stay active. Otherwise, no additional HEENT, cardiopulmonary, abdominal, , GYN, neurological, systemic, psychiatric, lymphatic, endocrine, vascular complaints.       Past Medical History:   Diagnosis Date     Essential thrombocythemia (H) 6/8/2021     GERD (gastroesophageal reflux disease)      HTN (hypertension)      Osteoporosis 2000     Parkinsonian syndrome (H)     left side tremor     Past Surgical History:   Procedure Laterality Date     BREAST SURGERY  Jan 1987    biopsy     CATARACT IOL, RT/LT       GYN SURGERY  1965;1967    2 ceasarean sections     HEMILAMINECTOMY, DISCECTOMY LUMBAR ONE LEVEL, COMBINED Left 6/25/2018    Procedure: COMBINED HEMILAMINECTOMY, DISCECTOMY LUMBAR ONE LEVEL;  Open Left Lumbar 5-Sacral 1 Hemilaminectomy, Medial Facetectomy, And Microdiscectomy Attention Transitional Segment Anatomy;  Surgeon: Rosemarie Young MD;  Location: [facility] OR     ORTHOPEDIC SURGERY      ankle surgery for trimalleolar frx     MO SPINE SURGERY PROCEDURE UNLISTED       PE:    Vitals noted, gen, nad, cooperative, alert, neck supple nl rom, lungs with good air movement, RRR, S1, S2, no MRG, abdomen, no acute findings. Very minimal resting hand tremor    A/P:    1. ONC follow up with Dr. Dietz 9/7/2021 for elevated platelets on Hydroxyurea. Plates normal on 6/1/2021 at 280.   2. Immunizations; she has completed the Moderna COVID vaccination series  3. Parkinson's on Sinemet; stable.   4. HTN; on Avapro; labs checked 6/1/2021 normal renal function and K+  5. She takes low dose Gabapentin at night as well as low dose Ativan  6. Increased lipids on Pravastatin; placed future lipid lab today 6/22/2021  7. She had MRI lumbar spine  4/2/2021 with some findings. She had ortho follow up with Dr. Person 4/6/2021. No current sxs.       30 minutes spent on the date of the encounter doing chart review, history and exam, documentation and further activities as noted above

## 2021-06-22 NOTE — NURSING NOTE
Chief Complaint   Patient presents with     Recheck Medication     Patient comes in for a follow up.         Berto Smith MA on 6/22/2021 at 10:31 AM

## 2021-07-13 DIAGNOSIS — M51.16 LUMBAR DISC HERNIATION WITH RADICULOPATHY: ICD-10-CM

## 2021-07-13 RX ORDER — GABAPENTIN 300 MG/1
300 CAPSULE ORAL AT BEDTIME
Qty: 30 CAPSULE | Refills: 0 | Status: SHIPPED | OUTPATIENT
Start: 2021-07-13 | End: 2021-08-30

## 2021-07-13 NOTE — TELEPHONE ENCOUNTER
gabapentin (NEURONTIN) 300 MG capsule 30 capsule 0 4/6/2021  --   Sig - Route: Take 1 capsule (300 mg) by mouth At Bedtime - Oral   Sent to pharmacy as: Gabapentin 300 MG Oral Capsule (NEURONTIN)   Class: E-Prescribe   Order: [id number]   E-Prescribing Status: Receipt confirmed by pharmacy (4/6/2021  1:57 PM CDT)     Received request for RX from [facility]

## 2021-07-14 ENCOUNTER — LAB (OUTPATIENT)
Dept: LAB | Facility: CLINIC | Age: 81
End: 2021-07-14
Payer: MEDICARE

## 2021-07-14 DIAGNOSIS — D47.1 MYELOPROLIFERATIVE DISORDER (H): ICD-10-CM

## 2021-07-14 LAB
ALBUMIN SERPL-MCNC: 4 G/DL (ref 3.4–5)
ALP SERPL-CCNC: 59 U/L (ref 40–150)
ALT SERPL W P-5'-P-CCNC: 16 U/L
ANION GAP SERPL CALCULATED.3IONS-SCNC: 8 MMOL/L (ref 3–14)
AST SERPL W P-5'-P-CCNC: 20 U/L (ref 0–45)
BASOPHILS # BLD AUTO: 0 10E3/UL (ref 0–0.2)
BASOPHILS NFR BLD AUTO: 0 %
BILIRUB SERPL-MCNC: 0.8 MG/DL (ref 0.2–1.3)
BUN SERPL-MCNC: 13 MG/DL (ref 7–30)
CALCIUM SERPL-MCNC: 9.4 MG/DL (ref 8.5–10.1)
CHLORIDE BLD-SCNC: 99 MMOL/L
CO2 SERPL-SCNC: 28 MMOL/L (ref 20–32)
CREAT SERPL-MCNC: 0.69 MG/DL
EOSINOPHIL # BLD AUTO: 0.1 10E3/UL (ref 0–0.7)
EOSINOPHIL NFR BLD AUTO: 1 %
ERYTHROCYTE [DISTWIDTH] IN BLOOD BY AUTOMATED COUNT: 12.2 % (ref 10–15)
GFR SERPL CREATININE-BSD FRML MDRD: 81 ML/MIN/1.73M2
GLUCOSE BLD-MCNC: 118 MG/DL (ref 70–99)
HCT VFR BLD AUTO: 38.2 % (ref 35–47)
HGB BLD-MCNC: 14.2 G/DL (ref 11.7–15.7)
IMM GRANULOCYTES # BLD: 0 10E3/UL
IMM GRANULOCYTES NFR BLD: 0 %
LYMPHOCYTES # BLD AUTO: 3.5 10E3/UL (ref 0.8–5.3)
LYMPHOCYTES NFR BLD AUTO: 48 %
MCH RBC QN AUTO: 37.4 PG (ref 26.5–33)
MCHC RBC AUTO-ENTMCNC: 37.2 G/DL (ref 31.5–36.5)
MCV RBC AUTO: 101 FL (ref 78–100)
MONOCYTES # BLD AUTO: 0.6 10E3/UL (ref 0–1.3)
MONOCYTES NFR BLD AUTO: 8 %
NEUTROPHILS # BLD AUTO: 3 10E3/UL (ref 1.6–8.3)
NEUTROPHILS NFR BLD AUTO: 42 %
PLATELET # BLD AUTO: 323 10E3/UL (ref 150–450)
POTASSIUM BLD-SCNC: 3.4 MMOL/L (ref 3.4–5.3)
PROT SERPL-MCNC: 6.7 G/DL (ref 6.8–8.8)
RBC # BLD AUTO: 3.8 10E6/UL (ref 3.8–5.2)
SODIUM SERPL-SCNC: 135 MMOL/L (ref 133–144)
WBC # BLD AUTO: 7.2 10E3/UL (ref 4–11)

## 2021-07-14 PROCEDURE — 36415 COLL VENOUS BLD VENIPUNCTURE: CPT

## 2021-07-14 PROCEDURE — 80053 COMPREHEN METABOLIC PANEL: CPT

## 2021-07-14 PROCEDURE — 85025 COMPLETE CBC W/AUTO DIFF WBC: CPT

## 2021-08-03 ENCOUNTER — TELEPHONE (OUTPATIENT)
Dept: INTERNAL MEDICINE | Facility: CLINIC | Age: 81
End: 2021-08-03

## 2021-08-03 NOTE — TELEPHONE ENCOUNTER
[facility] Call Center    Phone Message    May a detailed message be left on voicemail: yes     Reason for Call: Order(s): Home Care Orders: Other:  Verbal order for Speech therapy; voice evaluation.      Action Taken: Message routed to:  [facility] ([facility] pcc    Travel Screening: Not Applicable

## 2021-08-04 NOTE — TELEPHONE ENCOUNTER
I called and left  approving requested orders below  India Diego, EMT at 11:26 AM on 8/4/2021.  [facility]  [facility]  [place]  [phone]

## 2021-08-11 ENCOUNTER — TRANSFERRED RECORDS (OUTPATIENT)
Dept: HEALTH INFORMATION MANAGEMENT | Facility: CLINIC | Age: 81
End: 2021-08-11

## 2021-08-15 ENCOUNTER — MEDICAL CORRESPONDENCE (OUTPATIENT)
Dept: HEALTH INFORMATION MANAGEMENT | Facility: CLINIC | Age: 81
End: 2021-08-15

## 2021-08-20 DIAGNOSIS — I10 ESSENTIAL HYPERTENSION: ICD-10-CM

## 2021-08-24 RX ORDER — IRBESARTAN 150 MG/1
TABLET ORAL
Qty: 135 TABLET | Refills: 1 | Status: SHIPPED | OUTPATIENT
Start: 2021-08-24 | End: 2021-12-07

## 2021-08-24 NOTE — TELEPHONE ENCOUNTER
"IRBESARTAN 150 MG Tablet    Last Written Prescription Date:  6/23/20  Last Fill Quantity: 180,   # refills: 3  Last Office Visit : 6/22/21  Future Office visit:  10/19/21    Per protocol may refill x 3 months if BP greater than or equal to 140/90, and refer to PCP for follow up.    HTN reviewed at 6/22/21 appt\" HTN; on Avapro; labs checked 6/1/2021 normal renal function and K+\"  06/22/21 (!) 144/81   05/21/21 (!) 148/79   05/12/21 (!) 155/81          "

## 2021-08-31 ENCOUNTER — LAB (OUTPATIENT)
Dept: LAB | Facility: CLINIC | Age: 81
End: 2021-08-31
Payer: MEDICARE

## 2021-08-31 DIAGNOSIS — D47.1 MYELOPROLIFERATIVE DISORDER (H): ICD-10-CM

## 2021-08-31 LAB
BASOPHILS # BLD AUTO: 0 10E3/UL (ref 0–0.2)
BASOPHILS NFR BLD AUTO: 1 %
EOSINOPHIL # BLD AUTO: 0.1 10E3/UL (ref 0–0.7)
EOSINOPHIL NFR BLD AUTO: 1 %
ERYTHROCYTE [DISTWIDTH] IN BLOOD BY AUTOMATED COUNT: 12.4 % (ref 10–15)
HCT VFR BLD AUTO: 39.1 % (ref 35–47)
HGB BLD-MCNC: 14.3 G/DL (ref 11.7–15.7)
IMM GRANULOCYTES # BLD: 0 10E3/UL
IMM GRANULOCYTES NFR BLD: 0 %
LYMPHOCYTES # BLD AUTO: 2.9 10E3/UL (ref 0.8–5.3)
LYMPHOCYTES NFR BLD AUTO: 36 %
MCH RBC QN AUTO: 35.8 PG (ref 26.5–33)
MCHC RBC AUTO-ENTMCNC: 36.6 G/DL (ref 31.5–36.5)
MCV RBC AUTO: 98 FL (ref 78–100)
MONOCYTES # BLD AUTO: 0.7 10E3/UL (ref 0–1.3)
MONOCYTES NFR BLD AUTO: 8 %
NEUTROPHILS # BLD AUTO: 4.3 10E3/UL (ref 1.6–8.3)
NEUTROPHILS NFR BLD AUTO: 54 %
PLATELET # BLD AUTO: 368 10E3/UL (ref 150–450)
RBC # BLD AUTO: 4 10E6/UL (ref 3.8–5.2)
WBC # BLD AUTO: 8.1 10E3/UL (ref 4–11)

## 2021-08-31 PROCEDURE — 80053 COMPREHEN METABOLIC PANEL: CPT

## 2021-08-31 PROCEDURE — 36415 COLL VENOUS BLD VENIPUNCTURE: CPT

## 2021-08-31 PROCEDURE — 85025 COMPLETE CBC W/AUTO DIFF WBC: CPT

## 2021-09-01 LAB
ALBUMIN SERPL-MCNC: 3.8 G/DL (ref 3.4–5)
ALP SERPL-CCNC: 61 U/L (ref 40–150)
ALT SERPL W P-5'-P-CCNC: 12 U/L (ref 0–50)
ANION GAP SERPL CALCULATED.3IONS-SCNC: 4 MMOL/L (ref 3–14)
AST SERPL W P-5'-P-CCNC: 20 U/L (ref 0–45)
BILIRUB SERPL-MCNC: 1 MG/DL (ref 0.2–1.3)
BUN SERPL-MCNC: 13 MG/DL (ref 7–30)
CALCIUM SERPL-MCNC: 9.4 MG/DL (ref 8.5–10.1)
CHLORIDE BLD-SCNC: 99 MMOL/L (ref 94–109)
CO2 SERPL-SCNC: 32 MMOL/L (ref 20–32)
CREAT SERPL-MCNC: 0.71 MG/DL (ref 0.52–1.04)
GFR SERPL CREATININE-BSD FRML MDRD: 79 ML/MIN/1.73M2
GLUCOSE BLD-MCNC: 77 MG/DL (ref 70–99)
POTASSIUM BLD-SCNC: 3.5 MMOL/L (ref 3.4–5.3)
PROT SERPL-MCNC: 6.8 G/DL (ref 6.8–8.8)
SODIUM SERPL-SCNC: 135 MMOL/L (ref 133–144)

## 2021-09-07 ENCOUNTER — VIRTUAL VISIT (OUTPATIENT)
Dept: ONCOLOGY | Facility: CLINIC | Age: 81
End: 2021-09-07
Attending: INTERNAL MEDICINE
Payer: MEDICARE

## 2021-09-07 DIAGNOSIS — D47.3 ESSENTIAL THROMBOCYTHEMIA (H): ICD-10-CM

## 2021-09-07 PROCEDURE — 99214 OFFICE O/P EST MOD 30 MIN: CPT | Mod: 95 | Performed by: INTERNAL MEDICINE

## 2021-09-07 PROCEDURE — 999N001193 HC VIDEO/TELEPHONE VISIT; NO CHARGE

## 2021-09-07 RX ORDER — HYDROXYUREA 500 MG/1
500 CAPSULE ORAL DAILY
Qty: 90 CAPSULE | Refills: 4 | Status: SHIPPED | OUTPATIENT
Start: 2021-09-07 | End: 2022-10-20

## 2021-09-07 NOTE — LETTER
2021         RE: Catherine Mccollum  [address]  [place] [address]        Dear Colleague,    Thank you for referring your patient, Catherine Mccollum, to the [facility]. Please see a copy of my visit note below.    Catherine is a 81 year old who is being evaluated via a billable video visit.      How would you like to obtain your AVS? MyChart  If the video visit is dropped, the invitation should be resent by: Text to cell phone: [phone]  Will anyone else be joining your video visit? 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  At this point I'll make no changes and decrease frequency on monitoring.     Recommendations:  1. I counseled the patient about my assessment of her current disease control, natural history of MPNs and risk associated with thrombosis, which is the primary rationale for treatment, and our monitoring strategy while on HU.  2. Continue HU 500mg daily, Rx renewed 1 year  3. CBC and diff every 2 months  4. 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  No symptoms or thrombotic events.    - Started  mg daily, increased to 1000mg daily in Dec 2020  - She refused ASA due to history of gastric ulcer.  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  She reports no side effects from the HU other than some fatigue.  She notes no easy bruising or bleeding.  No GI upset.  Otherwise no concerns    Medications are reviewed in the EMR and notable for HU 500mg daily    Objective:  Exam:  There is no height or weight on file to calculate BMI.   Constitutional: Appears well, no distress  Eyes: no discharge, injection or icterus  Respiratory: no cough or labored breathing  Skin: no rashes or petechiae  Neurological: no deficits appreciated, speech is fluent  Psych: affect is normal      Labs:  Results for CATHERINE MCCOLLUM (MRN [id number]) as of 9/7/2021 10:13   Ref. Range 6/1/2021 10:52 7/14/2021 10:59 8/31/2021 10:41   WBC Latest Ref Range: 4.0 - 11.0 10e3/uL 8.4 7.2 8.1   Hemoglobin Latest Ref Range: 11.7 - 15.7 g/dL 13.4 14.2 14.3   Hematocrit Latest Ref Range: 35.0 - 47.0 % 35.9 38.2 39.1   Platelet Count Latest Ref Range: 150 - 450 10e3/uL 280 323 368       Imaging:  none      Video-Visit Details    Type of service:  Video Visit  Video Start Time: 1014  AM  Video End Time:10:24 AM    Originating Location (pt. Location): Home    Distant Location (provider location):  [facility]     Platform used for Video Visit: Jacobo      Again, thank you for allowing me to participate in the care of your patient.        Sincerely,        Bernabe Dietz MD

## 2021-09-07 NOTE — PROGRESS NOTES
Catherine is a 81 year old who is being evaluated via a billable video visit.      How would you like to obtain your AVS? Kulizahart  If the video visit is dropped, the invitation should be resent by: Text to cell phone: [phone]  Will anyone else be joining your video visit? 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  At this point I'll make no changes and decrease frequency on monitoring.     Recommendations:  1. I counseled the patient about my assessment of her current disease control, natural history of MPNs and risk associated with thrombosis, which is the primary rationale for treatment, and our monitoring strategy while on HU.  2. Continue HU 500mg daily, Rx renewed 1 year  3. CBC and diff every 2 months  4. RTC 6 monthsl    16 minutes spent on the date of the encounter doing chart review, review of test results, interpretation of tests, patient visit and documentation       Bernabe Dietz MD   of Medicine, Division of Hematology, Oncology and Transplantation  [employer]     --------------------------------------------------------  Forward History:  2020 referred for mild thrombocytosis 500-600 since , mild leukocytosis (~10), found to have Jak2 pos MPN (suspected ET).  No symptoms or thrombotic events.    - Started  mg daily, increased to 1000mg daily in Dec 2020  - She refused ASA due to history of gastric ulcer.  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  She reports no side effects from the HU other than some fatigue.  She notes no easy bruising or bleeding.  No GI upset.  Otherwise no concerns    Medications are reviewed in the EMR and notable for HU 500mg daily    Objective:  Exam:  There is no height or weight on file to calculate BMI.   Constitutional: Appears well, no distress  Eyes: no discharge, injection or icterus  Respiratory: no cough or labored breathing  Skin: no rashes or petechiae  Neurological: no deficits appreciated, speech is fluent  Psych: affect is normal      Labs:  Results for CATHERINE MCCOLLUM (MRN [id number]) as of 9/7/2021 10:13   Ref. Range 6/1/2021 10:52 7/14/2021 10:59 8/31/2021 10:41   WBC Latest Ref Range: 4.0 - 11.0 10e3/uL 8.4 7.2 8.1   Hemoglobin Latest Ref Range: 11.7 - 15.7 g/dL 13.4 14.2 14.3   Hematocrit Latest Ref Range: 35.0 - 47.0 % 35.9 38.2 39.1   Platelet Count Latest Ref Range: 150 - 450 10e3/uL 280 323 368       Imaging:  none      Video-Visit Details    Type of service:  Video Visit  Video Start Time: 1014  AM  Video End Time:10:24 AM    Originating Location (pt. Location): Home    Distant Location (provider location):  [facility]     Platform used for Video Visit: [employer]

## 2021-09-13 ENCOUNTER — TRANSFERRED RECORDS (OUTPATIENT)
Dept: HEALTH INFORMATION MANAGEMENT | Facility: CLINIC | Age: 81
End: 2021-09-13

## 2021-09-19 ENCOUNTER — HEALTH MAINTENANCE LETTER (OUTPATIENT)
Age: 81
End: 2021-09-19

## 2021-10-11 ENCOUNTER — TRANSFERRED RECORDS (OUTPATIENT)
Dept: HEALTH INFORMATION MANAGEMENT | Facility: CLINIC | Age: 81
End: 2021-10-11

## 2021-10-15 ENCOUNTER — LAB (OUTPATIENT)
Dept: LAB | Facility: CLINIC | Age: 81
End: 2021-10-15
Payer: MEDICARE

## 2021-10-15 DIAGNOSIS — D47.1 MYELOPROLIFERATIVE DISORDER (H): ICD-10-CM

## 2021-10-15 LAB
ALBUMIN SERPL-MCNC: 3.7 G/DL (ref 3.4–5)
ALP SERPL-CCNC: 62 U/L (ref 40–150)
ALT SERPL W P-5'-P-CCNC: 31 U/L (ref 0–50)
ANION GAP SERPL CALCULATED.3IONS-SCNC: 7 MMOL/L (ref 3–14)
AST SERPL W P-5'-P-CCNC: 22 U/L (ref 0–45)
BASOPHILS # BLD AUTO: 0 10E3/UL (ref 0–0.2)
BASOPHILS NFR BLD AUTO: 1 %
BILIRUB SERPL-MCNC: 0.8 MG/DL (ref 0.2–1.3)
BUN SERPL-MCNC: 16 MG/DL (ref 7–30)
CALCIUM SERPL-MCNC: 9.3 MG/DL (ref 8.5–10.1)
CHLORIDE BLD-SCNC: 97 MMOL/L (ref 94–109)
CO2 SERPL-SCNC: 28 MMOL/L (ref 20–32)
CREAT SERPL-MCNC: 0.73 MG/DL (ref 0.52–1.04)
EOSINOPHIL # BLD AUTO: 0.1 10E3/UL (ref 0–0.7)
EOSINOPHIL NFR BLD AUTO: 1 %
ERYTHROCYTE [DISTWIDTH] IN BLOOD BY AUTOMATED COUNT: 12.8 % (ref 10–15)
GFR SERPL CREATININE-BSD FRML MDRD: 76 ML/MIN/1.73M2
GLUCOSE BLD-MCNC: 118 MG/DL (ref 70–99)
HCT VFR BLD AUTO: 40.5 % (ref 35–47)
HGB BLD-MCNC: 14.5 G/DL (ref 11.7–15.7)
IMM GRANULOCYTES # BLD: 0 10E3/UL
IMM GRANULOCYTES NFR BLD: 0 %
LYMPHOCYTES # BLD AUTO: 3.3 10E3/UL (ref 0.8–5.3)
LYMPHOCYTES NFR BLD AUTO: 41 %
MCH RBC QN AUTO: 34.9 PG (ref 26.5–33)
MCHC RBC AUTO-ENTMCNC: 35.8 G/DL (ref 31.5–36.5)
MCV RBC AUTO: 98 FL (ref 78–100)
MONOCYTES # BLD AUTO: 0.7 10E3/UL (ref 0–1.3)
MONOCYTES NFR BLD AUTO: 8 %
NEUTROPHILS # BLD AUTO: 4 10E3/UL (ref 1.6–8.3)
NEUTROPHILS NFR BLD AUTO: 50 %
PLATELET # BLD AUTO: 397 10E3/UL (ref 150–450)
POTASSIUM BLD-SCNC: 3.4 MMOL/L (ref 3.4–5.3)
PROT SERPL-MCNC: 7 G/DL (ref 6.8–8.8)
RBC # BLD AUTO: 4.15 10E6/UL (ref 3.8–5.2)
SODIUM SERPL-SCNC: 132 MMOL/L (ref 133–144)
WBC # BLD AUTO: 8.1 10E3/UL (ref 4–11)

## 2021-10-15 PROCEDURE — 36415 COLL VENOUS BLD VENIPUNCTURE: CPT

## 2021-10-15 PROCEDURE — 85025 COMPLETE CBC W/AUTO DIFF WBC: CPT

## 2021-10-15 PROCEDURE — 80053 COMPREHEN METABOLIC PANEL: CPT

## 2021-10-18 NOTE — PROGRESS NOTES
HPI:    Last visit with me 2021. She states about one week of L upper abdominal pain. This seems to be worse when she takes Sinemet and she has stopped taking this medication. She remains on her other medications including omeprazole. She has been taking some Mylanta. She states certain foods, crackers reduce her sxs. However some other foods may cause pain? No nausea or vomiting. She states no constipation. No dark stools. No chest pain. No SOB. No other HEENT, cardiopulmonary, abdominal, , GYN, neurological, systemic (no F/C/NS). Psychiatric, lymphatic, endocrine, vascular complaints.     Past Medical History:   Diagnosis Date     Essential thrombocythemia (H) 2021     GERD (gastroesophageal reflux disease)      HTN (hypertension)      Osteoporosis      Parkinsonian syndrome (H)     left side tremor     Past Surgical History:   Procedure Laterality Date     ANKLE SURGERY Right      BREAST SURGERY  1987    biopsy     CATARACT IOL, RT/LT        SECTION       GYN SURGERY  ;    2 ceasarean sections     HEMILAMINECTOMY, DISCECTOMY LUMBAR ONE LEVEL, COMBINED Left 2018    Procedure: COMBINED HEMILAMINECTOMY, DISCECTOMY LUMBAR ONE LEVEL;  Open Left Lumbar 5-Sacral 1 Hemilaminectomy, Medial Facetectomy, And Microdiscectomy Attention Transitional Segment Anatomy;  Surgeon: Rosemarie Young MD;  Location: [facility] OR     LUMBAR LAMINECTOMY Right 2017    Procedure:  RIGHT L4-5 AND L5-S1 LATERAL RECESS DECOMPRESSION;  Surgeon: Raudel Jackson MD;  Location: [facility];  Service:      ORTHOPEDIC SURGERY      ankle surgery for trimalleolar frx     AZ SPINE SURGERY PROCEDURE UNLISTED     '    PE:    Vitals noted, gen, nad, cooperative, alert, neck supple nl rom, lungs with good air movement, RRR, S1, S2, no MRG, abdomen, positive bowel sounds, no rebound, no B CVA tenderness to palpation. She has mild L hand/arm tremor.     EKG: SR at 60; no change from  6/20/2021          A/P:    1. Immunizations; she has completed the Moderna COVID vaccination series. She has completed Shingrix vaccination series. Influenza vaccine?  2. She remains on Sinemet for Parkinson's disease  3. She takes low dose Ativan for sleep/anxiety  4. Increased lipids on Pravastatin   5. On hydroxyurea for Polycythemia Vera; followed by Dr. Dietz, Hematology, CBC stable on 10/15/2021. She was seen 9/7/2021 by Dr. Dietz  6. Gabapentin for night time sleep/pain   7. HTN; on Irbesartan and Chlorthalidone; electrolytes and Creatinine stable 10/15/2021.   8. L upper abdominal pain, Labs, imaging CT chest/abdomen/pelvis, EKG and return to clinic today. Also ordered UA and H. Pylori. May also may EGD?     60 minutes spent on the date of the encounter doing chart review, history and exam, documentation and further activities as noted above

## 2021-10-19 ENCOUNTER — OFFICE VISIT (OUTPATIENT)
Dept: INTERNAL MEDICINE | Facility: CLINIC | Age: 81
End: 2021-10-19
Payer: MEDICARE

## 2021-10-19 ENCOUNTER — ANCILLARY PROCEDURE (OUTPATIENT)
Dept: CT IMAGING | Facility: CLINIC | Age: 81
End: 2021-10-19
Attending: INTERNAL MEDICINE
Payer: MEDICARE

## 2021-10-19 ENCOUNTER — LAB (OUTPATIENT)
Dept: LAB | Facility: CLINIC | Age: 81
End: 2021-10-19
Payer: MEDICARE

## 2021-10-19 ENCOUNTER — TRANSFERRED RECORDS (OUTPATIENT)
Dept: HEALTH INFORMATION MANAGEMENT | Facility: CLINIC | Age: 81
End: 2021-10-19

## 2021-10-19 VITALS
RESPIRATION RATE: 16 BRPM | DIASTOLIC BLOOD PRESSURE: 83 MMHG | HEART RATE: 66 BPM | WEIGHT: 138 LBS | SYSTOLIC BLOOD PRESSURE: 160 MMHG | OXYGEN SATURATION: 98 % | HEIGHT: 60 IN | BODY MASS INDEX: 27.09 KG/M2

## 2021-10-19 DIAGNOSIS — R10.12 ABDOMINAL PAIN, LEFT UPPER QUADRANT: ICD-10-CM

## 2021-10-19 DIAGNOSIS — F41.9 ANXIETY: ICD-10-CM

## 2021-10-19 DIAGNOSIS — R52 PAIN: ICD-10-CM

## 2021-10-19 DIAGNOSIS — R52 PAIN: Primary | ICD-10-CM

## 2021-10-19 LAB
ALBUMIN SERPL-MCNC: 4 G/DL (ref 3.4–5)
ALBUMIN UR-MCNC: NEGATIVE MG/DL
ALP SERPL-CCNC: 63 U/L (ref 40–150)
ALT SERPL W P-5'-P-CCNC: 11 U/L (ref 0–50)
AMYLASE SERPL-CCNC: 52 U/L (ref 30–110)
ANION GAP SERPL CALCULATED.3IONS-SCNC: 7 MMOL/L (ref 3–14)
APPEARANCE UR: CLEAR
AST SERPL W P-5'-P-CCNC: 18 U/L (ref 0–45)
BASOPHILS # BLD AUTO: 0 10E3/UL (ref 0–0.2)
BASOPHILS NFR BLD AUTO: 1 %
BILIRUB SERPL-MCNC: 0.8 MG/DL (ref 0.2–1.3)
BILIRUB UR QL STRIP: NEGATIVE
BUN SERPL-MCNC: 13 MG/DL (ref 7–30)
CALCIUM SERPL-MCNC: 10 MG/DL (ref 8.5–10.1)
CHLORIDE BLD-SCNC: 96 MMOL/L (ref 94–109)
CO2 SERPL-SCNC: 32 MMOL/L (ref 20–32)
COLOR UR AUTO: ABNORMAL
CREAT SERPL-MCNC: 0.67 MG/DL (ref 0.52–1.04)
EOSINOPHIL # BLD AUTO: 0 10E3/UL (ref 0–0.7)
EOSINOPHIL NFR BLD AUTO: 0 %
ERYTHROCYTE [DISTWIDTH] IN BLOOD BY AUTOMATED COUNT: 12.4 % (ref 10–15)
GFR SERPL CREATININE-BSD FRML MDRD: 82 ML/MIN/1.73M2
GLUCOSE BLD-MCNC: 120 MG/DL (ref 70–99)
GLUCOSE UR STRIP-MCNC: NEGATIVE MG/DL
HCT VFR BLD AUTO: 40.7 % (ref 35–47)
HGB BLD-MCNC: 15 G/DL (ref 11.7–15.7)
HGB UR QL STRIP: ABNORMAL
IMM GRANULOCYTES # BLD: 0 10E3/UL
IMM GRANULOCYTES NFR BLD: 1 %
KETONES UR STRIP-MCNC: NEGATIVE MG/DL
LEUKOCYTE ESTERASE UR QL STRIP: NEGATIVE
LIPASE SERPL-CCNC: 270 U/L (ref 73–393)
LYMPHOCYTES # BLD AUTO: 2.2 10E3/UL (ref 0.8–5.3)
LYMPHOCYTES NFR BLD AUTO: 27 %
MCH RBC QN AUTO: 36.2 PG (ref 26.5–33)
MCHC RBC AUTO-ENTMCNC: 36.9 G/DL (ref 31.5–36.5)
MCV RBC AUTO: 98 FL (ref 78–100)
MONOCYTES # BLD AUTO: 0.6 10E3/UL (ref 0–1.3)
MONOCYTES NFR BLD AUTO: 7 %
MUCOUS THREADS #/AREA URNS LPF: PRESENT /LPF
NEUTROPHILS # BLD AUTO: 5.2 10E3/UL (ref 1.6–8.3)
NEUTROPHILS NFR BLD AUTO: 64 %
NITRATE UR QL: NEGATIVE
NRBC # BLD AUTO: 0 10E3/UL
NRBC BLD AUTO-RTO: 0 /100
PH UR STRIP: 6 [PH] (ref 5–7)
PLATELET # BLD AUTO: 353 10E3/UL (ref 150–450)
POTASSIUM BLD-SCNC: 3.4 MMOL/L (ref 3.4–5.3)
PROT SERPL-MCNC: 7.3 G/DL (ref 6.8–8.8)
RADIOLOGIST FLAGS: NORMAL
RBC # BLD AUTO: 4.14 10E6/UL (ref 3.8–5.2)
RBC URINE: <1 /HPF
SODIUM SERPL-SCNC: 135 MMOL/L (ref 133–144)
SP GR UR STRIP: 1.01 (ref 1–1.03)
SQUAMOUS EPITHELIAL: <1 /HPF
TRANSITIONAL EPI: <1 /HPF
TROPONIN I SERPL-MCNC: 0.03 UG/L (ref 0–0.04)
UROBILINOGEN UR STRIP-MCNC: NORMAL MG/DL
WBC # BLD AUTO: 8.1 10E3/UL (ref 4–11)
WBC URINE: <1 /HPF

## 2021-10-19 PROCEDURE — 74176 CT ABD & PELVIS W/O CONTRAST: CPT | Mod: MC | Performed by: RADIOLOGY

## 2021-10-19 PROCEDURE — 36415 COLL VENOUS BLD VENIPUNCTURE: CPT | Performed by: PATHOLOGY

## 2021-10-19 PROCEDURE — 82150 ASSAY OF AMYLASE: CPT | Performed by: PATHOLOGY

## 2021-10-19 PROCEDURE — 93000 ELECTROCARDIOGRAM COMPLETE: CPT | Performed by: INTERNAL MEDICINE

## 2021-10-19 PROCEDURE — 81001 URINALYSIS AUTO W/SCOPE: CPT | Performed by: PATHOLOGY

## 2021-10-19 PROCEDURE — 83690 ASSAY OF LIPASE: CPT | Performed by: PATHOLOGY

## 2021-10-19 PROCEDURE — 99215 OFFICE O/P EST HI 40 MIN: CPT | Mod: 25 | Performed by: INTERNAL MEDICINE

## 2021-10-19 PROCEDURE — 71250 CT THORAX DX C-: CPT | Mod: MC | Performed by: RADIOLOGY

## 2021-10-19 PROCEDURE — 80053 COMPREHEN METABOLIC PANEL: CPT | Performed by: PATHOLOGY

## 2021-10-19 PROCEDURE — 84484 ASSAY OF TROPONIN QUANT: CPT | Performed by: PATHOLOGY

## 2021-10-19 PROCEDURE — 85025 COMPLETE CBC W/AUTO DIFF WBC: CPT | Performed by: PATHOLOGY

## 2021-10-19 RX ORDER — GABAPENTIN 100 MG/1
CAPSULE ORAL
Qty: 90 CAPSULE | Refills: 3 | Status: SHIPPED | OUTPATIENT
Start: 2021-10-19 | End: 2022-05-18

## 2021-10-19 RX ORDER — LORAZEPAM 0.5 MG/1
0.5 TABLET ORAL
Qty: 30 TABLET | Refills: 0 | Status: SHIPPED | OUTPATIENT
Start: 2021-10-19 | End: 2022-05-18

## 2021-10-19 ASSESSMENT — PAIN SCALES - GENERAL: PAINLEVEL: NO PAIN (0)

## 2021-10-19 ASSESSMENT — MIFFLIN-ST. JEOR: SCORE: 1002.46

## 2021-10-19 NOTE — NURSING NOTE
Catherine Mccollum is a 81 year old female patient that presents today in clinic for the following:    Chief Complaint   Patient presents with     RECHECK     Patient comes in for a four-month follow up.      The patient's allergies and medications were reviewed as noted. A set of vitals were recorded as noted without incident. The patient does not have any other questions for the provider.    Alexander Rodriguez, EMT at 9:37 AM on 10/19/2021

## 2021-10-20 LAB
ATRIAL RATE - MUSE: 60 BPM
DIASTOLIC BLOOD PRESSURE - MUSE: NORMAL MMHG
INTERPRETATION ECG - MUSE: NORMAL
P AXIS - MUSE: 48 DEGREES
PR INTERVAL - MUSE: 156 MS
QRS DURATION - MUSE: 94 MS
QT - MUSE: 440 MS
QTC - MUSE: 440 MS
R AXIS - MUSE: -50 DEGREES
SYSTOLIC BLOOD PRESSURE - MUSE: NORMAL MMHG
T AXIS - MUSE: 29 DEGREES
VENTRICULAR RATE- MUSE: 60 BPM

## 2021-10-20 PROCEDURE — 87338 HPYLORI STOOL AG IA: CPT | Performed by: INTERNAL MEDICINE

## 2021-10-21 LAB — H PYLORI AG STL QL IA: NEGATIVE

## 2021-10-23 ENCOUNTER — TELEPHONE (OUTPATIENT)
Dept: INTERNAL MEDICINE | Facility: CLINIC | Age: 81
End: 2021-10-23

## 2021-10-23 DIAGNOSIS — R93.5 ABNORMAL ABDOMINAL CT SCAN: Primary | ICD-10-CM

## 2021-10-23 NOTE — TELEPHONE ENCOUNTER
Placed future MRI order this encounter    Dear Catherine;    Your CT scan does not show any acute findings. However, there is a liver finding that recommended for MRI abdominal study. I placed an order for this today and you can call [phone] to schedule this appointment.    JUAN MANUEL Alvarez MD

## 2021-10-23 NOTE — LETTER
Catherine Mccollum  [address]   [place] [address]  : 1940  MRN:  [id number]      2021          Dear Catherine;    Your CT scan does not show any acute findings. However, there is a liver finding that recommended for MRI abdominal study. I placed an order for this today and you can call [phone] to schedule this appointment.    JUAN MANUEL Alvarez MD

## 2021-10-28 NOTE — TELEPHONE ENCOUNTER
Faxed MRI order to fax number below.     Called and let patient know.      Jamey Santos CMA ([facility]) at 9:53 AM on 10/28/2021

## 2021-10-28 NOTE — TELEPHONE ENCOUNTER
Pt calling requesting the MRI order to be sent to [facility]. Pt stating they still haven't received the order. Fax [phone]

## 2021-11-04 ENCOUNTER — TRANSFERRED RECORDS (OUTPATIENT)
Dept: HEALTH INFORMATION MANAGEMENT | Facility: CLINIC | Age: 81
End: 2021-11-04
Payer: MEDICARE

## 2021-11-09 ENCOUNTER — MYC MEDICAL ADVICE (OUTPATIENT)
Dept: INTERNAL MEDICINE | Facility: CLINIC | Age: 81
End: 2021-11-09
Payer: MEDICARE

## 2021-11-12 ENCOUNTER — TELEPHONE (OUTPATIENT)
Dept: INTERNAL MEDICINE | Facility: CLINIC | Age: 81
End: 2021-11-12
Payer: MEDICARE

## 2021-11-12 NOTE — TELEPHONE ENCOUNTER
[facility] Call Center    Phone Message    May a detailed message be left on voicemail: yes     Reason for Call: Other: pt requesting a call back from her care team to discuss her MRI results [facility]. Pt is worried and needs a call back asap.    Action Taken: Message routed to:  [facility] pcc    Travel Screening: Not Applicable

## 2021-12-02 DIAGNOSIS — I10 ESSENTIAL HYPERTENSION: ICD-10-CM

## 2021-12-02 DIAGNOSIS — E78.00 HIGH CHOLESTEROL: ICD-10-CM

## 2021-12-02 DIAGNOSIS — M51.16 LUMBAR DISC HERNIATION WITH RADICULOPATHY: ICD-10-CM

## 2021-12-06 NOTE — TELEPHONE ENCOUNTER
GABAPENTIN 300 MG Capsule  Last Written Prescription Date:  8/30/2021  Last Fill Quantity: 30,   # refills: 0  Last Office Visit : 10/19/2021  Future Office visit: None  Routing refill request to provider for review/approval because:  Routing refill request to provider for review/approval because:  Drug not on the [facility], [facility] or [facility] refill protocol or controlled substance    PRAVASTATIN SODIUM 20 MG Tablet  Last Written Prescription Date:  5/21/2021  Last Fill Quantity: 90,   # refills: 2  Last Office Visit : 10/19/2021  Future Office visit: None  Routing refill request to provider for review/approval because:  Second Request       Over due LIPIDS   Recent Labs   Lab Test 12/18/19  1145   LDL 94       IRBESARTAN 150 MG Tablet  Last Written Prescription Date:  8/24/2021  Last Fill Quantity:  135   # refills: 1  Last Office Visit : 10/19/2021  Future Office visit: None  Routing refill request to provider for review/approval because:  Second Request      Abnormal B/P  Change med?  Change dose?   Add med?  Follow up B/P check      Refer to clinic for review   BP Readings from Last 3 Encounters:   10/19/21 (!) 160/83   06/22/21 (!) 144/81   05/21/21 (!) 148/79

## 2021-12-07 RX ORDER — PRAVASTATIN SODIUM 20 MG
TABLET ORAL
Qty: 90 TABLET | Refills: 2 | Status: SHIPPED | OUTPATIENT
Start: 2021-12-07 | End: 2022-10-10

## 2021-12-07 RX ORDER — IRBESARTAN 150 MG/1
TABLET ORAL
Qty: 135 TABLET | Refills: 1 | Status: SHIPPED | OUTPATIENT
Start: 2021-12-07 | End: 2022-07-28

## 2021-12-07 RX ORDER — GABAPENTIN 300 MG/1
CAPSULE ORAL
Qty: 30 CAPSULE | Refills: 0 | Status: SHIPPED | OUTPATIENT
Start: 2021-12-07 | End: 2022-10-11

## 2021-12-09 ENCOUNTER — TELEPHONE (OUTPATIENT)
Dept: INTERNAL MEDICINE | Facility: CLINIC | Age: 81
End: 2021-12-09

## 2021-12-09 NOTE — TELEPHONE ENCOUNTER
Questions set received. Placed in [facility] folder for completion.     Prior Authorization Retail Medication Request    Medication/Dose: irbesartan (AVAPRO) 150 MG tablet  ICD code (if different than what is on RX):  Essential hypertension [I10]   Previously Tried and Failed:    Rationale:      Insurance Name: [employer]  Insurance ID:  [id number]    Pharmacy Information (if different than what is on RX)  Name:  [facility] - [facility] [address]  Phone:  [phone]

## 2021-12-13 NOTE — TELEPHONE ENCOUNTER
Central Prior Authorization Team   Phone: [phone]    PA Initiation    Medication: irbesartan (AVAPRO) 150 MG tablet  Insurance Company: [employer] - Phone [phone] Fax [phone]  Pharmacy Filling the Rx: [facility] [address] [facility]  Filling Pharmacy Phone: [phone]  Filling Pharmacy Fax: [phone]  Start Date: 12/13/2021

## 2021-12-14 NOTE — TELEPHONE ENCOUNTER
Prior Authorization Approval    Authorization Effective Date: 12/13/2021  Authorization Expiration Date: 12/31/2022  Medication: irbesartan (AVAPRO) 150 MG tablet-PA APPROVED   Approved Dose/Quantity:  Reference #:     Insurance Company: [employer] - Phone [phone] Fax [phone]  Expected CoPay:       CoPay Card Available:      Foundation Assistance Needed:    Which Pharmacy is filling the prescription (Not needed for infusion/clinic administered): [facility] - [address]  Pharmacy Notified: No  Patient Notified: Yes- Called patient and patient will contact their pharmacy to get the order set up/shipped. Patient stated that currently she has enough and may not need a refill yet.

## 2021-12-24 ENCOUNTER — TRANSFERRED RECORDS (OUTPATIENT)
Dept: HEALTH INFORMATION MANAGEMENT | Facility: CLINIC | Age: 81
End: 2021-12-24
Payer: MEDICARE

## 2021-12-30 ENCOUNTER — TRANSFERRED RECORDS (OUTPATIENT)
Dept: HEALTH INFORMATION MANAGEMENT | Facility: CLINIC | Age: 81
End: 2021-12-30
Payer: MEDICARE

## 2022-01-11 ENCOUNTER — TELEPHONE (OUTPATIENT)
Dept: NEUROLOGY | Facility: CLINIC | Age: 82
End: 2022-01-11
Payer: MEDICARE

## 2022-01-11 NOTE — TELEPHONE ENCOUNTER
[facility] Call Center    Phone Message    May a detailed message be left on voicemail: yes     Reason for Call: Other: patient would like a 90 day supply for Carbidopa-levodopa  mg. She is currently taking 1 1/2  3 x day and would like to continue that. Pharmacy would be [facility].    Action Taken: Message routed to:  [facility] neurology    Travel Screening: Not Applicable

## 2022-01-11 NOTE — TELEPHONE ENCOUNTER
Called and spoke with patient and she stated that she will call her primary care clinic and get a refill and will make a follow up appointment with another movement

## 2022-02-10 NOTE — TELEPHONE ENCOUNTER
DIAGNOSIS: (R) Hip / No Imaging / Self.Previous   APPOINTMENT DATE: 3.1.22   NOTES STATUS DETAILS   MEDICATION LIST Internal    MRI Internal 4.2.21 lumbar spine  12.18.19 lumbar spine  3.22.18 lumbar spine, [facility]   DEXA (osteoporosis/bone health) N/A    CT SCAN N/A    XRAYS (IMAGES & REPORTS) Internal 3.16.21 L hip and pelvis  5.31.18 lumbar spine  9.7.17 lumbar spine

## 2022-02-25 ENCOUNTER — PATIENT OUTREACH (OUTPATIENT)
Dept: ONCOLOGY | Facility: CLINIC | Age: 82
End: 2022-02-25

## 2022-02-25 ENCOUNTER — LAB (OUTPATIENT)
Dept: LAB | Facility: CLINIC | Age: 82
End: 2022-02-25
Payer: MEDICARE

## 2022-02-25 DIAGNOSIS — D47.3 ESSENTIAL THROMBOCYTHEMIA (H): Primary | ICD-10-CM

## 2022-02-25 DIAGNOSIS — D47.3 ESSENTIAL THROMBOCYTHEMIA (H): ICD-10-CM

## 2022-02-25 LAB
BASOPHILS # BLD AUTO: 0.1 10E3/UL (ref 0–0.2)
BASOPHILS NFR BLD AUTO: 1 %
EOSINOPHIL # BLD AUTO: 0.2 10E3/UL (ref 0–0.7)
EOSINOPHIL NFR BLD AUTO: 2 %
ERYTHROCYTE [DISTWIDTH] IN BLOOD BY AUTOMATED COUNT: 13.1 % (ref 10–15)
HCT VFR BLD AUTO: 41.4 % (ref 35–47)
HGB BLD-MCNC: 14.9 G/DL (ref 11.7–15.7)
IMM GRANULOCYTES # BLD: 0 10E3/UL
IMM GRANULOCYTES NFR BLD: 0 %
LYMPHOCYTES # BLD AUTO: 3.6 10E3/UL (ref 0.8–5.3)
LYMPHOCYTES NFR BLD AUTO: 42 %
MCH RBC QN AUTO: 35.1 PG (ref 26.5–33)
MCHC RBC AUTO-ENTMCNC: 36 G/DL (ref 31.5–36.5)
MCV RBC AUTO: 98 FL (ref 78–100)
MONOCYTES # BLD AUTO: 0.6 10E3/UL (ref 0–1.3)
MONOCYTES NFR BLD AUTO: 7 %
NEUTROPHILS # BLD AUTO: 4.2 10E3/UL (ref 1.6–8.3)
NEUTROPHILS NFR BLD AUTO: 49 %
PLATELET # BLD AUTO: 414 10E3/UL (ref 150–450)
RBC # BLD AUTO: 4.24 10E6/UL (ref 3.8–5.2)
WBC # BLD AUTO: 8.7 10E3/UL (ref 4–11)

## 2022-02-25 PROCEDURE — 85025 COMPLETE CBC W/AUTO DIFF WBC: CPT

## 2022-02-25 PROCEDURE — 36415 COLL VENOUS BLD VENIPUNCTURE: CPT

## 2022-02-25 NOTE — PROGRESS NOTES
Pt called to inform care team she went to [facility] for labs today but was told no future or standing lab orders by Dr. Dietz. Stated 2 tubes were taken so care team can still order labs, she has a follow-up visit with Dr. Dietz on 3/8.    CBC with platelets diff ordered, called [facility] to run labs.

## 2022-03-01 ENCOUNTER — OFFICE VISIT (OUTPATIENT)
Dept: ORTHOPEDICS | Facility: CLINIC | Age: 82
End: 2022-03-01
Payer: MEDICARE

## 2022-03-01 ENCOUNTER — PRE VISIT (OUTPATIENT)
Dept: ORTHOPEDICS | Facility: CLINIC | Age: 82
End: 2022-03-01

## 2022-03-01 ENCOUNTER — ANCILLARY PROCEDURE (OUTPATIENT)
Dept: GENERAL RADIOLOGY | Facility: CLINIC | Age: 82
End: 2022-03-01
Attending: PREVENTIVE MEDICINE
Payer: MEDICARE

## 2022-03-01 VITALS — HEIGHT: 61 IN | WEIGHT: 142 LBS | BODY MASS INDEX: 26.81 KG/M2 | RESPIRATION RATE: 18 BRPM

## 2022-03-01 DIAGNOSIS — M48.00 SPINAL STENOSIS, UNSPECIFIED SPINAL REGION: ICD-10-CM

## 2022-03-01 DIAGNOSIS — M25.551 RIGHT HIP PAIN: Primary | ICD-10-CM

## 2022-03-01 DIAGNOSIS — M25.551 RIGHT HIP PAIN: ICD-10-CM

## 2022-03-01 DIAGNOSIS — M51.369 DDD (DEGENERATIVE DISC DISEASE), LUMBAR: ICD-10-CM

## 2022-03-01 DIAGNOSIS — M51.16 LUMBAR DISC HERNIATION WITH RADICULOPATHY: Primary | ICD-10-CM

## 2022-03-01 PROCEDURE — 99214 OFFICE O/P EST MOD 30 MIN: CPT | Performed by: PREVENTIVE MEDICINE

## 2022-03-01 PROCEDURE — 73502 X-RAY EXAM HIP UNI 2-3 VIEWS: CPT | Mod: RT | Performed by: RADIOLOGY

## 2022-03-01 RX ORDER — GABAPENTIN 300 MG/1
300 CAPSULE ORAL AT BEDTIME
Qty: 30 CAPSULE | Refills: 3 | Status: ON HOLD | OUTPATIENT
Start: 2022-03-01 | End: 2023-03-03 | Stop reason: DRUGHIGH

## 2022-03-01 NOTE — LETTER
3/1/2022      RE: Catherine Mccollum  [address]  [place] [address]       HISTORY OF PRESENT ILLNESS  Ms. Mccollum is a pleasant 81 year old year old female who presents to clinic today with   Catherine explains that her low back and hips have started to bother her more, had injection in lumbar spine last year that improved her low back and hip pain for about 6 months greater than 50% improvement  Wants to discuss further treatments  Location: low back, hips   Quality:  achy pain    Severity: 7/10 at worst    Duration: worse over past several months  Timing: occurs intermittently  Context: occurs while walking and sitting long periods  Modifying factors:  resting and non-use makes it better, movement and use makes it worse  Associated signs & symptoms: radiation into both legs  MEDICAL HISTORY  Patient Active Problem List   Diagnosis     Essential hypertension, benign     Cervical spondylosis without myelopathy     Glossodynia     Hyperlipidemia, unspecified     Urinary frequency     Essential and other specified forms of tremor     Health Care Home     Transient insomnia     Ankle fracture, left, trimalleolar, 2002     Diverticulitis of colon     Parkinsonism (H)     Osteoarthritis of spine with radiculopathy, lumbosacral region     Left lumbar radiculopathy     Spondylolisthesis of lumbar region     Lumbosacral radiculopathy at S1     Backache     Cervical stenosis of spine     Controlled substance agreement signed     Foot pain     Gastroesophageal reflux disease     HTN (hypertension)     Metatarsalgia     Spinal stenosis     Spinal stenosis of lumbar region     Spinal stenosis, lumbar     Essential thrombocythemia (H)       Current Outpatient Medications   Medication Sig Dispense Refill     Acetaminophen (TYLENOL EXTRA STRENGTH PO) Take 1,000 mg by mouth 2 times daily Muscle and joint pain       calcium carbonate (OS-LALI) 500 MG tablet Take 1 tablet by mouth       CALCIUM PO Take 1 tablet by mouth  every morning       carbidopa-levodopa (SINEMET)  MG tablet Take one and a half tabs tid. 135 tablet 3     chlorthalidone (HYGROTON) 25 MG tablet Take 1/2 tablet a day 45 tablet 3     Cholecalciferol (VITAMIN D) 1000 UNITS capsule Take 2,000 Units by mouth every morning        cyanocobalamin (VITAMIN B-12) 1000 MCG tablet        gabapentin (NEURONTIN) 100 MG capsule Take one to three at night as needed 90 capsule 3     gabapentin (NEURONTIN) 300 MG capsule TAKE 1 CAPSULE AT BEDTIME 30 capsule 0     hydroxyurea (HYDREA) 500 MG capsule Take 1 capsule (500 mg) by mouth daily 90 capsule 4     irbesartan (AVAPRO) 150 MG tablet TAKE 1 AND 1/2 TABLETS EVERY MORNING 135 tablet 1     LORazepam (ATIVAN) 0.5 MG tablet Take 1 tablet (0.5 mg) by mouth nightly as needed for anxiety 30 tablet 0     omeprazole (PRILOSEC) 20 MG DR capsule Take 1 capsule (20 mg) by mouth daily 90 capsule 3     pravastatin (PRAVACHOL) 20 MG tablet TAKE 1 TABLET AT BEDTIME 90 tablet 2       Allergies   Allergen Reactions     Fosamax Nausea and GI Disturbance     Lisinopril Other (See Comments)     cough     Sulfa Drugs Hives       Family History   Problem Relation Age of Onset     C.A.D. Other      Hypertension Other         granddaughter      Cancer Other      Heart Disease Father      Diabetes No family hx of      Social History     Socioeconomic History     Marital status:      Spouse name: None     Number of children: None     Years of education: None     Highest education level: None   Occupational History     None   Tobacco Use     Smoking status: Never Smoker     Smokeless tobacco: Never Used   Substance and Sexual Activity     Alcohol use: No     Alcohol/week: 0.0 standard drinks     Drug use: No     Sexual activity: Not Currently   Other Topics Concern     Parent/sibling w/ CABG, MI or angioplasty before 65F 55M? Not Asked   Social History Narrative     None     Social Determinants of Health     Financial Resource Strain: Not on  "file   Food Insecurity: Not on file   Transportation Needs: Not on file   Physical Activity: Not on file   Stress: Not on file   Social Connections: Not on file   Intimate Partner Violence: Not on file   Housing Stability: Not on file       Additional medical/Social/Surgical histories reviewed in [facility] and updated as appropriate.     REVIEW OF SYSTEMS (3/1/2022)  10 point ROS of systems including Constitutional, Eyes, Respiratory, Cardiovascular, Gastroenterology, Genitourinary, Integumentary, Musculoskeletal, Psychiatric, Allergic/Immunologic were all negative except for pertinent positives noted in my HPI.     PHYSICAL EXAM  Vitals:    03/01/22 1342   Resp: 18   Weight: 64.4 kg (142 lb)   Height: 1.54 m (5' 0.63\")     Vital Signs: Resp 18   Ht 1.54 m (5' 0.63\")   Wt 64.4 kg (142 lb)   BMI 27.16 kg/m   Patient declined being weighed. Body mass index is 27.16 kg/m .    General  - normal appearance, in no obvious distress  HEENT  - conjunctivae not injected, moist mucous membranes, normocephalic/atraumatic head, ears normal appearance, no lesions, mouth normal appearance, no scars, normal dentition and teeth present  CV  - normal peripheral perfusion  Pulm  - normal respiratory pattern, non-labored  Musculoskeletal - lumbar spine  - stance: normal gait without limp, no obvious leg length discrepancy, normal heel and toe walk  - inspection: normal bone and joint alignment, no obvious scoliosis  - palpation: no paravertebral or bony tenderness  - ROM: flexion exacerbates pain, normal extension, sidebending, rotation  - strength: lower extremities 5/5 in all planes  - special tests:  (+) straight leg raise  (+) slump test  Neuro  - patellar and Achilles DTRs 2+ bilaterally,  Some lower extremity sensory deficit throughout L5 distribution, grossly normal coordination, normal muscle tone  Skin  - no ecchymosis, erythema, warmth, or induration, no obvious rash  Psych  - interactive, appropriate, normal mood and " affect  Hips: some ttp over lateral trochanteric bursa, bilaterally, no pain with internal or external rotation of hip joints    ASSESSMENT & PLAN  82 yo female with lumbar ddd, disc herniations, radicular pain, worse    I independently reviewed the following imaging studies:  Lumbar MRI: shows ddd, disc herniations  Discussed and ordered lumbar DENISSE  RX given for gabapentin at bedtime  Refilled  Cont. PT at home  F/u with me after lumbar DENISSE  Appropriate PPE was utilized for prevention of spread of Covid-19.  Kar Griffin MD, CAQSM

## 2022-03-01 NOTE — PROGRESS NOTES
HISTORY OF PRESENT ILLNESS  Ms. Mccollum is a pleasant 81 year old year old female who presents to clinic today with   Catherine explains that her low back and hips have started to bother her more, had injection in lumbar spine last year that improved her low back and hip pain for about 6 months greater than 50% improvement  Wants to discuss further treatments  Location: low back, hips   Quality:  achy pain    Severity: 7/10 at worst    Duration: worse over past several months  Timing: occurs intermittently  Context: occurs while walking and sitting long periods  Modifying factors:  resting and non-use makes it better, movement and use makes it worse  Associated signs & symptoms: radiation into both legs  MEDICAL HISTORY  Patient Active Problem List   Diagnosis     Essential hypertension, benign     Cervical spondylosis without myelopathy     Glossodynia     Hyperlipidemia, unspecified     Urinary frequency     Essential and other specified forms of tremor     Health Care Home     Transient insomnia     Ankle fracture, left, trimalleolar, 2002     Diverticulitis of colon     Parkinsonism (H)     Osteoarthritis of spine with radiculopathy, lumbosacral region     Left lumbar radiculopathy     Spondylolisthesis of lumbar region     Lumbosacral radiculopathy at S1     Backache     Cervical stenosis of spine     Controlled substance agreement signed     Foot pain     Gastroesophageal reflux disease     HTN (hypertension)     Metatarsalgia     Spinal stenosis     Spinal stenosis of lumbar region     Spinal stenosis, lumbar     Essential thrombocythemia (H)       Current Outpatient Medications   Medication Sig Dispense Refill     Acetaminophen (TYLENOL EXTRA STRENGTH PO) Take 1,000 mg by mouth 2 times daily Muscle and joint pain       calcium carbonate (OS-LALI) 500 MG tablet Take 1 tablet by mouth       CALCIUM PO Take 1 tablet by mouth every morning       carbidopa-levodopa (SINEMET)  MG tablet Take one and a half tabs  tid. 135 tablet 3     chlorthalidone (HYGROTON) 25 MG tablet Take 1/2 tablet a day 45 tablet 3     Cholecalciferol (VITAMIN D) 1000 UNITS capsule Take 2,000 Units by mouth every morning        cyanocobalamin (VITAMIN B-12) 1000 MCG tablet        gabapentin (NEURONTIN) 100 MG capsule Take one to three at night as needed 90 capsule 3     gabapentin (NEURONTIN) 300 MG capsule TAKE 1 CAPSULE AT BEDTIME 30 capsule 0     hydroxyurea (HYDREA) 500 MG capsule Take 1 capsule (500 mg) by mouth daily 90 capsule 4     irbesartan (AVAPRO) 150 MG tablet TAKE 1 AND 1/2 TABLETS EVERY MORNING 135 tablet 1     LORazepam (ATIVAN) 0.5 MG tablet Take 1 tablet (0.5 mg) by mouth nightly as needed for anxiety 30 tablet 0     omeprazole (PRILOSEC) 20 MG DR capsule Take 1 capsule (20 mg) by mouth daily 90 capsule 3     pravastatin (PRAVACHOL) 20 MG tablet TAKE 1 TABLET AT BEDTIME 90 tablet 2       Allergies   Allergen Reactions     Fosamax Nausea and GI Disturbance     Lisinopril Other (See Comments)     cough     Sulfa Drugs Hives       Family History   Problem Relation Age of Onset     C.A.D. Other      Hypertension Other         granddaughter      Cancer Other      Heart Disease Father      Diabetes No family hx of      Social History     Socioeconomic History     Marital status:      Spouse name: None     Number of children: None     Years of education: None     Highest education level: None   Occupational History     None   Tobacco Use     Smoking status: Never Smoker     Smokeless tobacco: Never Used   Substance and Sexual Activity     Alcohol use: No     Alcohol/week: 0.0 standard drinks     Drug use: No     Sexual activity: Not Currently   Other Topics Concern     Parent/sibling w/ CABG, MI or angioplasty before 65F 55M? Not Asked   Social History Narrative     None     Social Determinants of Health     Financial Resource Strain: Not on file   Food Insecurity: Not on file   Transportation Needs: Not on file   Physical Activity:  "Not on file   Stress: Not on file   Social Connections: Not on file   Intimate Partner Violence: Not on file   Housing Stability: Not on file       Additional medical/Social/Surgical histories reviewed in [facility] and updated as appropriate.     REVIEW OF SYSTEMS (3/1/2022)  10 point ROS of systems including Constitutional, Eyes, Respiratory, Cardiovascular, Gastroenterology, Genitourinary, Integumentary, Musculoskeletal, Psychiatric, Allergic/Immunologic were all negative except for pertinent positives noted in my HPI.     PHYSICAL EXAM  Vitals:    03/01/22 1342   Resp: 18   Weight: 64.4 kg (142 lb)   Height: 1.54 m (5' 0.63\")     Vital Signs: Resp 18   Ht 1.54 m (5' 0.63\")   Wt 64.4 kg (142 lb)   BMI 27.16 kg/m   Patient declined being weighed. Body mass index is 27.16 kg/m .    General  - normal appearance, in no obvious distress  HEENT  - conjunctivae not injected, moist mucous membranes, normocephalic/atraumatic head, ears normal appearance, no lesions, mouth normal appearance, no scars, normal dentition and teeth present  CV  - normal peripheral perfusion  Pulm  - normal respiratory pattern, non-labored  Musculoskeletal - lumbar spine  - stance: normal gait without limp, no obvious leg length discrepancy, normal heel and toe walk  - inspection: normal bone and joint alignment, no obvious scoliosis  - palpation: no paravertebral or bony tenderness  - ROM: flexion exacerbates pain, normal extension, sidebending, rotation  - strength: lower extremities 5/5 in all planes  - special tests:  (+) straight leg raise  (+) slump test  Neuro  - patellar and Achilles DTRs 2+ bilaterally,  Some lower extremity sensory deficit throughout L5 distribution, grossly normal coordination, normal muscle tone  Skin  - no ecchymosis, erythema, warmth, or induration, no obvious rash  Psych  - interactive, appropriate, normal mood and affect  Hips: some ttp over lateral trochanteric bursa, bilaterally, no pain with internal or external " rotation of hip joints    ASSESSMENT & PLAN  80 yo female with lumbar ddd, disc herniations, radicular pain, worse    I independently reviewed the following imaging studies:  Lumbar MRI: shows ddd, disc herniations  Discussed and ordered lumbar DENISSE  RX given for gabapentin at bedtime  Refilled  Cont. PT at home  F/u with me after lumbar DENISSE  Appropriate PPE was utilized for prevention of spread of Covid-19.  Kar Griffin MD, CAM

## 2022-03-06 ENCOUNTER — HEALTH MAINTENANCE LETTER (OUTPATIENT)
Age: 82
End: 2022-03-06

## 2022-03-08 ENCOUNTER — VIRTUAL VISIT (OUTPATIENT)
Dept: ONCOLOGY | Facility: CLINIC | Age: 82
End: 2022-03-08
Attending: INTERNAL MEDICINE
Payer: MEDICARE

## 2022-03-08 DIAGNOSIS — D47.3 ESSENTIAL THROMBOCYTHEMIA (H): Primary | ICD-10-CM

## 2022-03-08 PROCEDURE — 99215 OFFICE O/P EST HI 40 MIN: CPT | Mod: 95 | Performed by: INTERNAL MEDICINE

## 2022-03-08 PROCEDURE — G0463 HOSPITAL OUTPT CLINIC VISIT: HCPCS | Mod: PN,RTG | Performed by: INTERNAL MEDICINE

## 2022-03-08 NOTE — LETTER
3/8/2022         RE: Catherine Mccollum  [address]  [place] [address]        Dear Colleague,    Thank you for referring your patient, Catherine Mccollum, to the [facility]. Please see a copy of my visit note below.    Catherine is a 81 year old who is being evaluated via a billable video visit.      How would you like to obtain your AVS? MyChart  If the video visit is dropped, the invitation should be resent by: Text to cell phone: [phone]  Will anyone else be joining your video visit? No       Zhanna Khan VF          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  At this point I'll make no changes and continue every 3 month monitoring.    Recommendations:  1. I counseled the patient about my assessment of her current disease control, natural history of MPNs and risk associated with thrombosis, which is the primary rationale for treatment, and our monitoring strategy while on HU.  2. Continue HU 500mg daily  3. CBC and diff every 3 months  4. RTC 6 months with LUCÍA and 1 year with me.  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  No symptoms or thrombotic events.    - Started  mg daily, increased to 1000mg daily in Dec 2020  - She refused ASA due to history of gastric ulcer.  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  She reports no side effects from the HU other than some fatigue and occasional upset stomach.  She has had no thrombotic events.     Medications are reviewed in the EMR and notable for HU 500mg daily    Objective:  Exam:  There is no height or weight on file to calculate BMI.   Constitutional: Appears well, no distress  Eyes: no discharge, injection or icterus  Respiratory: no cough or labored breathing  Skin: no rashes or petechiae  Neurological: no deficits appreciated, speech is fluent  Psych: affect is normal      Labs:  Results for CATHERINE MCCOLLUM (MRN [id number]) as of 3/8/2022 09:44   Ref. Range 8/31/2021 10:41 10/15/2021 09:04 10/19/2021 10:39 2/25/2022 10:20   WBC Latest Ref Range: 4.0 - 11.0 10e3/uL 8.1 8.1 8.1 8.7   Hemoglobin Latest Ref Range: 11.7 - 15.7 g/dL 14.3 14.5 15.0 14.9   Hematocrit Latest Ref Range: 35.0 - 47.0 % 39.1 40.5 40.7 41.4   Platelet Count Latest Ref Range: 150 - 450 10e3/uL 368 397 353 414     Imaging:  none        Again, thank you for allowing me to participate in the care of your patient.      Sincerely,    Bernabe Dietz MD

## 2022-03-08 NOTE — PROGRESS NOTES
Catherine is a 81 year old who is being evaluated via a billable video visit.      How would you like to obtain your AVS? MyChart  If the video visit is dropped, the invitation should be resent by: Text to cell phone: [phone]  Will anyone else be joining your video visit? 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  At this point I'll make no changes and continue every 3 month monitoring.    Recommendations:  1. I counseled the patient about my assessment of her current disease control, natural history of MPNs and risk associated with thrombosis, which is the primary rationale for treatment, and our monitoring strategy while on HU.  2. Continue HU 500mg daily  3. CBC and diff every 3 months  4. RTC 6 months with LUCÍA and 1 year with me.  Discussed MPN research and she is not currently interested    40 minutes spent on the date of the encounter doing chart review, review of test results, interpretation of tests, patient visit and documentation       Bernabe Dietz MD   of Medicine, Division of Hematology, Oncology and Transplantation  [employer]     --------------------------------------------------------  Forward History:  2020 referred for mild thrombocytosis 500-600 since , mild leukocytosis (~10), found to have Jak2 pos MPN (suspected ET).  No symptoms or thrombotic events.    - Started  mg daily, increased to 1000mg daily in Dec 2020  - She refused ASA due to history of gastric  ulcer.  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  She reports no side effects from the HU other than some fatigue and occasional upset stomach.  She has had no thrombotic events.     Medications are reviewed in the EMR and notable for HU 500mg daily    Objective:  Exam:  There is no height or weight on file to calculate BMI.   Constitutional: Appears well, no distress  Eyes: no discharge, injection or icterus  Respiratory: no cough or labored breathing  Skin: no rashes or petechiae  Neurological: no deficits appreciated, speech is fluent  Psych: affect is normal      Labs:  Results for CATHERINE MCCOLLUM (MRN [id number]) as of 3/8/2022 09:44   Ref. Range 8/31/2021 10:41 10/15/2021 09:04 10/19/2021 10:39 2/25/2022 10:20   WBC Latest Ref Range: 4.0 - 11.0 10e3/uL 8.1 8.1 8.1 8.7   Hemoglobin Latest Ref Range: 11.7 - 15.7 g/dL 14.3 14.5 15.0 14.9   Hematocrit Latest Ref Range: 35.0 - 47.0 % 39.1 40.5 40.7 41.4   Platelet Count Latest Ref Range: 150 - 450 10e3/uL 368 397 353 414       Imaging:  none      Video-Visit Details    Type of service:  Video Visit  Video Start Time: 923 AM  Video End Time:9:53 AM    Originating Location (pt. Location): Home    Distant Location (provider location):  [facility]     Platform used for Video Visit: JoeWell

## 2022-03-14 ENCOUNTER — TELEPHONE (OUTPATIENT)
Dept: ORTHOPEDICS | Facility: CLINIC | Age: 82
End: 2022-03-14
Payer: MEDICARE

## 2022-03-14 NOTE — TELEPHONE ENCOUNTER
----- Message from Ofe Lincoln sent at 3/14/2022 11:48 AM CDT -----  Regarding: follow up?  Can you call this patient and offer a follow up after her recent back injection. If she says no, don't worry about it

## 2022-05-15 ASSESSMENT — ACTIVITIES OF DAILY LIVING (ADL)
IN_THE_PAST_7_DAYS,_DID_YOU_NEED_HELP_FROM_OTHERS_TO_PERFORM_EVERYDAY_ACTIVITIES_SUCH_AS_EATING,_GETTING_DRESSED,_GROOMING,_BATHING,_WALKING,_OR_USING_THE_TOILET: N
IN_THE_PAST_7_DAYS,_DID_YOU_NEED_HELP_FROM_OTHERS_TO_TAKE_CARE_OF_THINGS_SUCH_AS_LAUNDRY_AND_HOUSEKEEPING,_BANKING,_SHOPPING,_USING_THE_TELEPHONE,_FOOD_PREPARATION,_TRANSPORTATION,_OR_TAKING_YOUR_OWN_MEDICATIONS?: N

## 2022-05-18 ENCOUNTER — OFFICE VISIT (OUTPATIENT)
Dept: INTERNAL MEDICINE | Facility: CLINIC | Age: 82
End: 2022-05-18
Payer: MEDICARE

## 2022-05-18 ENCOUNTER — LAB (OUTPATIENT)
Dept: LAB | Facility: CLINIC | Age: 82
End: 2022-05-18

## 2022-05-18 VITALS
HEIGHT: 60 IN | HEART RATE: 67 BPM | WEIGHT: 140.3 LBS | SYSTOLIC BLOOD PRESSURE: 164 MMHG | DIASTOLIC BLOOD PRESSURE: 78 MMHG | OXYGEN SATURATION: 97 % | BODY MASS INDEX: 27.55 KG/M2

## 2022-05-18 DIAGNOSIS — F41.9 ANXIETY: ICD-10-CM

## 2022-05-18 DIAGNOSIS — E78.00 HIGH BLOOD CHOLESTEROL: Primary | ICD-10-CM

## 2022-05-18 DIAGNOSIS — I10 BENIGN ESSENTIAL HYPERTENSION: ICD-10-CM

## 2022-05-18 DIAGNOSIS — R25.1 TREMOR: ICD-10-CM

## 2022-05-18 DIAGNOSIS — E78.00 HIGH BLOOD CHOLESTEROL: ICD-10-CM

## 2022-05-18 DIAGNOSIS — G20.A1 PARKINSON DISEASE (H): ICD-10-CM

## 2022-05-18 DIAGNOSIS — R52 PAIN: ICD-10-CM

## 2022-05-18 DIAGNOSIS — Z23 NEED FOR VACCINATION: ICD-10-CM

## 2022-05-18 LAB
ALBUMIN SERPL-MCNC: 4.1 G/DL (ref 3.4–5)
ALP SERPL-CCNC: 56 U/L (ref 40–150)
ALT SERPL W P-5'-P-CCNC: 15 U/L (ref 0–50)
ANION GAP SERPL CALCULATED.3IONS-SCNC: 5 MMOL/L (ref 3–14)
AST SERPL W P-5'-P-CCNC: 19 U/L (ref 0–45)
BASOPHILS # BLD AUTO: 0 10E3/UL (ref 0–0.2)
BASOPHILS NFR BLD AUTO: 0 %
BILIRUB SERPL-MCNC: 0.9 MG/DL (ref 0.2–1.3)
BUN SERPL-MCNC: 13 MG/DL (ref 7–30)
CALCIUM SERPL-MCNC: 10.7 MG/DL (ref 8.5–10.1)
CHLORIDE BLD-SCNC: 99 MMOL/L (ref 94–109)
CHOLEST SERPL-MCNC: 166 MG/DL
CO2 SERPL-SCNC: 32 MMOL/L (ref 20–32)
CREAT SERPL-MCNC: 0.61 MG/DL (ref 0.52–1.04)
EOSINOPHIL # BLD AUTO: 0.1 10E3/UL (ref 0–0.7)
EOSINOPHIL NFR BLD AUTO: 1 %
ERYTHROCYTE [DISTWIDTH] IN BLOOD BY AUTOMATED COUNT: 12.5 % (ref 10–15)
FASTING STATUS PATIENT QL REPORTED: ABNORMAL
GFR SERPL CREATININE-BSD FRML MDRD: 88 ML/MIN/1.73M2
GLUCOSE BLD-MCNC: 115 MG/DL (ref 70–99)
HCT VFR BLD AUTO: 42.8 % (ref 35–47)
HDLC SERPL-MCNC: 44 MG/DL
HGB BLD-MCNC: 15.3 G/DL (ref 11.7–15.7)
IMM GRANULOCYTES # BLD: 0 10E3/UL
IMM GRANULOCYTES NFR BLD: 0 %
LDLC SERPL CALC-MCNC: 80 MG/DL
LYMPHOCYTES # BLD AUTO: 2.4 10E3/UL (ref 0.8–5.3)
LYMPHOCYTES NFR BLD AUTO: 27 %
MCH RBC QN AUTO: 34.6 PG (ref 26.5–33)
MCHC RBC AUTO-ENTMCNC: 35.7 G/DL (ref 31.5–36.5)
MCV RBC AUTO: 97 FL (ref 78–100)
MONOCYTES # BLD AUTO: 0.6 10E3/UL (ref 0–1.3)
MONOCYTES NFR BLD AUTO: 6 %
NEUTROPHILS # BLD AUTO: 5.8 10E3/UL (ref 1.6–8.3)
NEUTROPHILS NFR BLD AUTO: 66 %
NONHDLC SERPL-MCNC: 122 MG/DL
NRBC # BLD AUTO: 0 10E3/UL
NRBC BLD AUTO-RTO: 0 /100
PLATELET # BLD AUTO: 396 10E3/UL (ref 150–450)
POTASSIUM BLD-SCNC: 3.8 MMOL/L (ref 3.4–5.3)
PROT SERPL-MCNC: 7.3 G/DL (ref 6.8–8.8)
RBC # BLD AUTO: 4.42 10E6/UL (ref 3.8–5.2)
SODIUM SERPL-SCNC: 136 MMOL/L (ref 133–144)
TRIGL SERPL-MCNC: 209 MG/DL
WBC # BLD AUTO: 9 10E3/UL (ref 4–11)

## 2022-05-18 PROCEDURE — 85025 COMPLETE CBC W/AUTO DIFF WBC: CPT | Performed by: PATHOLOGY

## 2022-05-18 PROCEDURE — G0009 ADMIN PNEUMOCOCCAL VACCINE: HCPCS | Performed by: INTERNAL MEDICINE

## 2022-05-18 PROCEDURE — 80053 COMPREHEN METABOLIC PANEL: CPT | Performed by: INTERNAL MEDICINE

## 2022-05-18 PROCEDURE — 36415 COLL VENOUS BLD VENIPUNCTURE: CPT | Performed by: PATHOLOGY

## 2022-05-18 PROCEDURE — G0439 PPPS, SUBSEQ VISIT: HCPCS | Performed by: INTERNAL MEDICINE

## 2022-05-18 PROCEDURE — 90677 PCV20 VACCINE IM: CPT | Performed by: INTERNAL MEDICINE

## 2022-05-18 PROCEDURE — 80061 LIPID PANEL: CPT | Performed by: INTERNAL MEDICINE

## 2022-05-18 RX ORDER — GABAPENTIN 100 MG/1
CAPSULE ORAL
Qty: 90 CAPSULE | Refills: 3 | Status: SHIPPED | OUTPATIENT
Start: 2022-05-18 | End: 2023-04-25

## 2022-05-18 RX ORDER — LORAZEPAM 0.5 MG/1
0.5 TABLET ORAL
Qty: 30 TABLET | Refills: 0 | Status: SHIPPED | OUTPATIENT
Start: 2022-05-18 | End: 2022-11-21

## 2022-05-18 RX ORDER — CARBIDOPA AND LEVODOPA 25; 100 MG/1; MG/1
TABLET ORAL
Qty: 135 TABLET | Refills: 3 | Status: SHIPPED | OUTPATIENT
Start: 2022-05-18 | End: 2022-10-10

## 2022-05-18 NOTE — PROGRESS NOTES
HPI;    Ms. Mccollum comes in for physical today. Last visit with us 10/19/2021. Overall doing quite well. She still gets some exercise. She has minor musculoskeletal complaints after exercise. She states her BP is very well controlled. She states her Parkinson's disease is well controlled on current dose of Sinemet. She does not feel she needs to see the Dermatology providers. No other HEENT, cardiopulmonary, abdominal, , GYN, neurological, psychiatric, lymphatic, endocrine, vascular complaints.     Past Medical History:   Diagnosis Date     Essential thrombocythemia (H) 2021     GERD (gastroesophageal reflux disease)      HTN (hypertension)      Osteoporosis      Parkinsonian syndrome (H)     left side tremor     Past Surgical History:   Procedure Laterality Date     ANKLE SURGERY Right      BREAST SURGERY  1987    biopsy     CATARACT IOL, RT/LT        SECTION       GYN SURGERY  ;    2 ceasarean sections     HEMILAMINECTOMY, DISCECTOMY LUMBAR ONE LEVEL, COMBINED Left 2018    Procedure: COMBINED HEMILAMINECTOMY, DISCECTOMY LUMBAR ONE LEVEL;  Open Left Lumbar 5-Sacral 1 Hemilaminectomy, Medial Facetectomy, And Microdiscectomy Attention Transitional Segment Anatomy;  Surgeon: Rosemarie Young MD;  Location: [facility] OR     LUMBAR LAMINECTOMY Right 2017    Procedure:  RIGHT L4-5 AND L5-S1 LATERAL RECESS DECOMPRESSION;  Surgeon: Raudel Jackson MD;  Location: [facility];  Service:      ORTHOPEDIC SURGERY      ankle surgery for trimalleolar frx     LA SPINE SURGERY PROCEDURE UNLISTED       PE:    Vitals noted, gen, nad, cooperative, alert, neck supple nl rom, no B carotid bruits, lungs with good air movement, RRR, S1, S2, no MRG, abdomen, no acute findings. She has fine/mild resting tremor L more than R.     A/P:    1. Immunizations; COVID Moderna x 4. She has completed the Zoster vaccine series. Tdap 2014. Prevnar 13 done 2015. Pneumococcal 23 done  10/22/2014 and she should get the new Prevnar 20 today 5/18/2022  2. Hematology follow up with Ms. Ellison 9/7/2022 for essential thrombocytosis on hydroxyurea. Last seen Dr. Dietz, Hematology 3/8/2022  3. Reflux on omeprazole; no complaints.   4. Increased lipids on Pravastatin; ordered lipids today  5. Parkinson's disease on Sinemet. She saw Dr. Jones, 5/26/2022. Refilled Sinemet today. She will see Neurology if her sxs. Change.   6. HTN; on Chlorthalidone and Avapro  7. Gabapentin for night time pain and sleep.   8. Dermatology; she follows with Dermatology on [address] in [place].   9. See Dr. Griffin, ortho 3/1/2022 for hip pain  10. Low dose ativan for sleep/anxiety and refilled today.

## 2022-05-18 NOTE — PROGRESS NOTES
Travel Screening     Question 5/15/2022  9:07 AM CDT - Filed by Patient    Do you have any of the following new or worsening symptoms? Joint pain    In the last 10 days, have you been in contact with someone who was confirmed or suspected to have Coronavirus / COVID-19? No / Unsure    Have you had a COVID-19 viral test in the last 10 days? No      [facility] (Daily Health Habits )     Question 5/15/2022  9:11 AM CDT - Filed by Patient    Do you have a special diet?  None    Do you have a snack between meals or at bedtime?  Yes    How many servings of fruit do you consume daily? (1 serving = half cup) 0 - 2 Servings    How many servings of vegetables do you consume daily? (1 servings = half cup)  0 - 2 Servings    How many servings of high fiber or whole grain foods do you consume daily? (1 serving = 1 slice of whole wheat bread, 1 cup of whole grain cereal, or   cup brown rice or oatmeal)  0 - 2 Servings    How many servings of fried or high-fat foods do you consume daily? (Examples: fried chicken, potato chips, doughnuts)  0 - 2 servings    How many sugar-sweetened (not diet) beverages do you consume daily?  0 - 2 Servings    Do you get at least 3 servings of foods that have calcium each day (dairy, green leafy vegetables, etc)?   Yes    Do you take a Vitamin D supplement?  Yes    On average, how many days per week do you engage in moderate to strenuous exercise like a brisk walk? 3    Do you have any problems taking medications regularly?  No    How often is stress a problem for you in handling such things as your health, finances, relationships, or work?  Never or rarely    How often do you get the social and emotional support you need?  Sometimes    In a typical week, how many times do you talk on the phone or get together with friends or family? often    In a typical week, how many times do you attend events like [employer] services, club meetings, or other organizational meetings?  sometimes    Do you have sleep apnea, excessive snoring or daytime drowsiness? (select all that apply) No    In the past 7 days, how many days did you drink alcohol? 0    On days when you drank alcohol, how often did you have 4 or more alcoholic drinks on one occasion?       Do you ever drive after drinking, or ride with a  who has been drinking?  No    Have you used a smokeless tobacco product? No    Have you smoked 100 cigarettes or more in your entire life? No    Do you always fasten your seat belt when you are in a car?  Yes    Annual wellness visits     In general, would you say your health is: Fair    How would you describe the condition of your mouth and teeth - including false teeth or dentures? Very good    In the past 7 days, did you need help from others to perform everyday activities such as eating, getting dressed, grooming, bathing, walking, or using the toilet?  No    In the past 7 days, did you need help from others to take care of things such as laundry and housekeeping, banking, shopping, using the telephone, food preparation, transportation, or taking your own medications?  No      Exercise Activity     Question 5/15/2022  9:11 AM CDT - Filed by Patient    How intense is your typical exercise? Light (like stretching or slow walking)    On average how many minutes do you engage in exercise at this level? 30    Minutes per week doing moderate to strenuous exercise: (range: 0 - 55387) 0        Added by Bassem Rebollar EMT at 9:18 AM on 5/18/2022.  Primary Care Clinic: [phone]

## 2022-05-18 NOTE — NURSING NOTE
Chief Complaint   Patient presents with     Medicare Visit       DEXTER Maravilla at 9:33 AM on 5/18/2022.

## 2022-05-19 ENCOUNTER — TELEPHONE (OUTPATIENT)
Dept: INTERNAL MEDICINE | Facility: CLINIC | Age: 82
End: 2022-05-19

## 2022-05-19 DIAGNOSIS — E83.52 CALCIUM BLOOD INCREASED: Primary | ICD-10-CM

## 2022-05-19 NOTE — TELEPHONE ENCOUNTER
Placed future laboratory orders for increased calcium    Dear Catherine your calcium is a little high and I recommend we recheck in a month or so. I placed future lab orders and you can call [phone] to schedule this laboratory test    JUAN MANUEL Alvarez MD

## 2022-06-20 ENCOUNTER — LAB (OUTPATIENT)
Dept: LAB | Facility: CLINIC | Age: 82
End: 2022-06-20
Payer: MEDICARE

## 2022-06-20 DIAGNOSIS — R73.09 INCREASED GLUCOSE LEVEL: ICD-10-CM

## 2022-06-20 DIAGNOSIS — D47.3 ESSENTIAL THROMBOCYTHEMIA (H): ICD-10-CM

## 2022-06-20 DIAGNOSIS — E83.52 CALCIUM BLOOD INCREASED: ICD-10-CM

## 2022-06-20 LAB
BASOPHILS # BLD AUTO: 0.1 10E3/UL (ref 0–0.2)
BASOPHILS NFR BLD AUTO: 1 %
EOSINOPHIL # BLD AUTO: 0.1 10E3/UL (ref 0–0.7)
EOSINOPHIL NFR BLD AUTO: 1 %
ERYTHROCYTE [DISTWIDTH] IN BLOOD BY AUTOMATED COUNT: 12.5 % (ref 10–15)
HCT VFR BLD AUTO: 41.1 % (ref 35–47)
HGB BLD-MCNC: 15 G/DL (ref 11.7–15.7)
IMM GRANULOCYTES # BLD: 0 10E3/UL
IMM GRANULOCYTES NFR BLD: 0 %
LYMPHOCYTES # BLD AUTO: 3.2 10E3/UL (ref 0.8–5.3)
LYMPHOCYTES NFR BLD AUTO: 37 %
MCH RBC QN AUTO: 34.6 PG (ref 26.5–33)
MCHC RBC AUTO-ENTMCNC: 36.5 G/DL (ref 31.5–36.5)
MCV RBC AUTO: 95 FL (ref 78–100)
MONOCYTES # BLD AUTO: 0.6 10E3/UL (ref 0–1.3)
MONOCYTES NFR BLD AUTO: 8 %
NEUTROPHILS # BLD AUTO: 4.6 10E3/UL (ref 1.6–8.3)
NEUTROPHILS NFR BLD AUTO: 54 %
PLATELET # BLD AUTO: 473 10E3/UL (ref 150–450)
RBC # BLD AUTO: 4.34 10E6/UL (ref 3.8–5.2)
WBC # BLD AUTO: 8.6 10E3/UL (ref 4–11)

## 2022-06-20 PROCEDURE — 83036 HEMOGLOBIN GLYCOSYLATED A1C: CPT

## 2022-06-20 PROCEDURE — 85025 COMPLETE CBC W/AUTO DIFF WBC: CPT

## 2022-06-20 PROCEDURE — 80053 COMPREHEN METABOLIC PANEL: CPT

## 2022-06-20 PROCEDURE — 36415 COLL VENOUS BLD VENIPUNCTURE: CPT

## 2022-06-21 ENCOUNTER — TELEPHONE (OUTPATIENT)
Dept: INTERNAL MEDICINE | Facility: CLINIC | Age: 82
End: 2022-06-21
Payer: MEDICARE

## 2022-06-21 NOTE — TELEPHONE ENCOUNTER
Catherine calling abut labs that were drawn yesterday. She just wanted to be sure the CMP panel was done, I let her know this labs was in process should be done by tomorrow .    Zulema Gutierrez RN

## 2022-06-22 LAB
ALBUMIN SERPL-MCNC: 4.3 G/DL (ref 3.4–5)
ALP SERPL-CCNC: 58 U/L (ref 40–150)
ALT SERPL W P-5'-P-CCNC: 34 U/L (ref 0–50)
ANION GAP SERPL CALCULATED.3IONS-SCNC: 7 MMOL/L (ref 3–14)
AST SERPL W P-5'-P-CCNC: 24 U/L (ref 0–45)
BILIRUB SERPL-MCNC: 1 MG/DL (ref 0.2–1.3)
BUN SERPL-MCNC: 14 MG/DL (ref 7–30)
CALCIUM SERPL-MCNC: 9.9 MG/DL (ref 8.5–10.1)
CHLORIDE BLD-SCNC: 98 MMOL/L (ref 94–109)
CO2 SERPL-SCNC: 28 MMOL/L (ref 20–32)
CREAT SERPL-MCNC: 0.68 MG/DL (ref 0.52–1.04)
GFR SERPL CREATININE-BSD FRML MDRD: 85 ML/MIN/1.73M2
GLUCOSE BLD-MCNC: 123 MG/DL (ref 70–99)
POTASSIUM BLD-SCNC: 4.1 MMOL/L (ref 3.4–5.3)
PROT SERPL-MCNC: 7.2 G/DL (ref 6.8–8.8)
SODIUM SERPL-SCNC: 133 MMOL/L (ref 133–144)

## 2022-06-23 LAB — HBA1C MFR BLD: 5.2 % (ref 0–5.6)

## 2022-07-12 DIAGNOSIS — F41.9 ANXIETY: Primary | ICD-10-CM

## 2022-07-12 DIAGNOSIS — I10 ESSENTIAL HYPERTENSION: ICD-10-CM

## 2022-07-12 DIAGNOSIS — E78.00 HIGH CHOLESTEROL: ICD-10-CM

## 2022-07-27 DIAGNOSIS — I10 ESSENTIAL HYPERTENSION: ICD-10-CM

## 2022-07-28 RX ORDER — IRBESARTAN 150 MG/1
TABLET ORAL
Qty: 135 TABLET | Refills: 1 | Status: SHIPPED | OUTPATIENT
Start: 2022-07-28 | End: 2023-04-25

## 2022-07-28 NOTE — TELEPHONE ENCOUNTER
05/18/22 (!) 164/78   10/19/21 (!) 160/83   06/22/21 (!) 144/81     BP reviewed with Dr Alvarez 5/18/22 without changes to medication regimen

## 2022-09-06 ENCOUNTER — LAB (OUTPATIENT)
Dept: LAB | Facility: CLINIC | Age: 82
End: 2022-09-06
Payer: MEDICARE

## 2022-09-06 DIAGNOSIS — D47.3 ESSENTIAL THROMBOCYTHEMIA (H): ICD-10-CM

## 2022-09-06 LAB
BASOPHILS # BLD AUTO: 0.1 10E3/UL (ref 0–0.2)
BASOPHILS NFR BLD AUTO: 1 %
EOSINOPHIL # BLD AUTO: 0.1 10E3/UL (ref 0–0.7)
EOSINOPHIL NFR BLD AUTO: 1 %
ERYTHROCYTE [DISTWIDTH] IN BLOOD BY AUTOMATED COUNT: 13.1 % (ref 10–15)
HCT VFR BLD AUTO: 41.2 % (ref 35–47)
HGB BLD-MCNC: 15.1 G/DL (ref 11.7–15.7)
IMM GRANULOCYTES # BLD: 0 10E3/UL
IMM GRANULOCYTES NFR BLD: 0 %
LYMPHOCYTES # BLD AUTO: 3.4 10E3/UL (ref 0.8–5.3)
LYMPHOCYTES NFR BLD AUTO: 39 %
MCH RBC QN AUTO: 34.8 PG (ref 26.5–33)
MCHC RBC AUTO-ENTMCNC: 36.7 G/DL (ref 31.5–36.5)
MCV RBC AUTO: 95 FL (ref 78–100)
MONOCYTES # BLD AUTO: 0.6 10E3/UL (ref 0–1.3)
MONOCYTES NFR BLD AUTO: 7 %
NEUTROPHILS # BLD AUTO: 4.6 10E3/UL (ref 1.6–8.3)
NEUTROPHILS NFR BLD AUTO: 53 %
PLATELET # BLD AUTO: 454 10E3/UL (ref 150–450)
RBC # BLD AUTO: 4.34 10E6/UL (ref 3.8–5.2)
WBC # BLD AUTO: 8.8 10E3/UL (ref 4–11)

## 2022-09-06 PROCEDURE — 85025 COMPLETE CBC W/AUTO DIFF WBC: CPT

## 2022-09-06 PROCEDURE — 36415 COLL VENOUS BLD VENIPUNCTURE: CPT

## 2022-09-06 NOTE — PROGRESS NOTES
Catherine is a 82 year old who is being evaluated via a billable video visit.      Patient stated she is in the state of [place] for the visit today.    How would you like to obtain your AVS? MyChart  If the video visit is dropped, the invitation should be resent by: Text to cell phone: [phone]  Will anyone else be joining your video visit? No        Video-Visit Details    Video Start Time: 9:55 AM    Type of service:  Video Visit    Video End Time:10:04 AM    Originating Location (pt. 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 We will continue every 3 month lab monitoring without changes to her HU dosing.    Recommendations:  1. Continue HU 500mg daily  2. CBC and diff every 3 months  3. RTC 6 months with Dr. 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  No symptoms or thrombotic events.    - Started  mg daily, increased to 1000mg daily in Dec 2020  - She  refused ASA due to history of gastric ulcer.  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  She has tolerated HU well other than some fatigue and occasional upset stomach.  She has had no thrombotic events.     Medications are reviewed in the EMR and notable for HU 500mg daily    Interval History:  Catherine reports that she has been feeling well with no new concerning symptoms. She confirms she is taking  mg daily without difficulty. Her energy level is stable. She attributes any fatigue to aging. She has occasional loose stools but no significant diarrhea. Appetite is stable. She denies skin lesions, rashes or unusual bruising.     Objective:  Exam:  There is no height or weight on file to calculate BMI.     Video physical exam  General: Patient appears well in no acute distress.   Skin: No visualized rash or lesions on visualized skin  Eyes: EOMI, no erythema, sclera icterus or discharge noted  Resp: Appears to be breathing comfortably without accessory muscle usage, speaking in full sentences, no cough  MSK: Appears to have normal range of motion based on visualized movements  Neurologic: No apparent tremors, facial movements symmetric  Psych: affect pleasant, alert and oriented      Labs:   Latest Reference Range & Units 09/06/22 09:32   WBC 4.0 - 11.0 10e3/uL 8.8   Hemoglobin 11.7 - 15.7 g/dL 15.1   Hematocrit 35.0 - 47.0 % 41.2   Platelet Count 150 - 450 10e3/uL 454 (H)   RBC Count 3.80 - 5.20 10e6/uL 4.34   MCV 78 - 100 fL 95   MCH 26.5 - 33.0 pg 34.8 (H)   MCHC 31.5 - 36.5 g/dL 36.7 (H)   RDW 10.0 - 15.0 % 13.1   % Neutrophils % 53   % Lymphocytes % 39   % Monocytes % 7   % Eosinophils % 1   % Basophils % 1   Absolute Basophils 0.0 - 0.2 10e3/uL 0.1   Absolute Eosinophils 0.0 -  0.7 10e3/uL 0.1   Absolute Immature Granulocytes <=0.4 10e3/uL 0.0   Absolute Lymphocytes 0.8 - 5.3 10e3/uL 3.4   Absolute Monocytes 0.0 - 1.3 10e3/uL 0.6   % Immature Granulocytes % 0   Absolute Neutrophils 1.6 - 8.3 10e3/uL 4.6   The above labs were reviewed today.      Imaging:  None

## 2022-09-07 ENCOUNTER — VIRTUAL VISIT (OUTPATIENT)
Dept: ONCOLOGY | Facility: CLINIC | Age: 82
End: 2022-09-07
Attending: REGISTERED NURSE
Payer: MEDICARE

## 2022-09-07 DIAGNOSIS — D47.1 MYELOPROLIFERATIVE DISORDER (H): Primary | ICD-10-CM

## 2022-09-07 DIAGNOSIS — Z51.81 THERAPEUTIC DRUG MONITORING: ICD-10-CM

## 2022-09-07 DIAGNOSIS — Z15.89 JAK2 V617F MUTATION: ICD-10-CM

## 2022-09-07 PROCEDURE — 99213 OFFICE O/P EST LOW 20 MIN: CPT | Mod: 95 | Performed by: REGISTERED NURSE

## 2022-09-07 PROCEDURE — G0463 HOSPITAL OUTPT CLINIC VISIT: HCPCS | Mod: PN,RTG | Performed by: REGISTERED NURSE

## 2022-09-07 NOTE — NURSING NOTE
Patient verified medications and allergies are correct via eCheck-in. Patient confirms no changes at this time and/or since last reviewed by clinic staff.    Ijeoma Gray, Virtual Facilitator

## 2022-10-05 DIAGNOSIS — R25.1 TREMOR: ICD-10-CM

## 2022-10-05 DIAGNOSIS — G20.A1 PARKINSON DISEASE (H): ICD-10-CM

## 2022-10-05 DIAGNOSIS — E78.00 HIGH CHOLESTEROL: ICD-10-CM

## 2022-10-05 DIAGNOSIS — I10 BENIGN ESSENTIAL HYPERTENSION: ICD-10-CM

## 2022-10-05 DIAGNOSIS — M51.16 LUMBAR DISC HERNIATION WITH RADICULOPATHY: ICD-10-CM

## 2022-10-10 RX ORDER — PRAVASTATIN SODIUM 20 MG
20 TABLET ORAL AT BEDTIME
Qty: 90 TABLET | Refills: 1 | Status: SHIPPED | OUTPATIENT
Start: 2022-10-10 | End: 2023-04-25

## 2022-10-10 RX ORDER — CARBIDOPA AND LEVODOPA 25; 100 MG/1; MG/1
1.5 TABLET ORAL 3 TIMES DAILY
Qty: 405 TABLET | Refills: 1 | Status: SHIPPED | OUTPATIENT
Start: 2022-10-10 | End: 2023-04-25

## 2022-10-10 NOTE — TELEPHONE ENCOUNTER
1. chlorthalidone (HYGROTON) 25 MG tablet      Last Written Prescription Date:  9/3/2021  Last Fill Quantity: 45,   # refills: 3  Last Office Visit : 5/18/2022  Future Office visit:  10/11/2022    Routing refill request to provider for review/approval because:  Failed medication protocol: elevated BP  - BP >140/90    BP Readings from Last 3 Encounters:   05/18/22 (!) 164/78   10/19/21 (!) 160/83   06/22/21 (!) 144/81     2. pravastatin (PRAVACHOL) 20 MG tablet: passed protocol  - refills sent  3. carbidopa-levodopa (SINEMET)  MG tablet: passed protocol  - refills sent

## 2022-10-11 ENCOUNTER — OFFICE VISIT (OUTPATIENT)
Dept: INTERNAL MEDICINE | Facility: CLINIC | Age: 82
End: 2022-10-11
Payer: MEDICARE

## 2022-10-11 VITALS
OXYGEN SATURATION: 98 % | DIASTOLIC BLOOD PRESSURE: 78 MMHG | BODY MASS INDEX: 26.75 KG/M2 | HEART RATE: 63 BPM | HEIGHT: 61 IN | RESPIRATION RATE: 16 BRPM | SYSTOLIC BLOOD PRESSURE: 166 MMHG | WEIGHT: 141.7 LBS

## 2022-10-11 DIAGNOSIS — N64.4 BREAST TENDERNESS: Primary | ICD-10-CM

## 2022-10-11 DIAGNOSIS — Z23 NEEDS FLU SHOT: ICD-10-CM

## 2022-10-11 PROCEDURE — 90662 IIV NO PRSV INCREASED AG IM: CPT | Performed by: NURSE PRACTITIONER

## 2022-10-11 PROCEDURE — 99213 OFFICE O/P EST LOW 20 MIN: CPT | Mod: 25 | Performed by: NURSE PRACTITIONER

## 2022-10-11 PROCEDURE — G0008 ADMIN INFLUENZA VIRUS VAC: HCPCS | Performed by: NURSE PRACTITIONER

## 2022-10-11 RX ORDER — CHLORTHALIDONE 25 MG/1
12.5 TABLET ORAL DAILY
Qty: 45 TABLET | Refills: 1 | Status: ON HOLD | OUTPATIENT
Start: 2022-10-11 | End: 2023-03-05

## 2022-10-11 NOTE — PROGRESS NOTES
S: Catherine Mccollum is a 82 year old female who developed left breast discomfort 2 weeks ago.  It is intermittent. She has not had a mammo since before Covid. Her past mammograms have been at [facility].       Patient Active Problem List   Diagnosis     Essential hypertension, benign     Cervical spondylosis without myelopathy     Glossodynia     Hyperlipidemia, unspecified     Urinary frequency     Essential and other specified forms of tremor     Health Care Home     Transient insomnia     Ankle fracture, left, trimalleolar, 2002     Diverticulitis of colon     Parkinsonism (H)     Osteoarthritis of spine with radiculopathy, lumbosacral region     Left lumbar radiculopathy     Spondylolisthesis of lumbar region     Lumbosacral radiculopathy at S1     Backache     Cervical stenosis of spine     Controlled substance agreement signed     Foot pain     Gastroesophageal reflux disease     HTN (hypertension)     Metatarsalgia     Spinal stenosis     Spinal stenosis of lumbar region     Spinal stenosis, lumbar     Essential thrombocythemia (H)            Past Medical History:   Diagnosis Date     Essential thrombocythemia (H) 2021     GERD (gastroesophageal reflux disease)      HTN (hypertension)      Osteoporosis 2000     Parkinsonian syndrome (H)     left side tremor            Past Surgical History:   Procedure Laterality Date     ANKLE SURGERY Right 2002     BREAST SURGERY  1987    biopsy     CATARACT IOL, RT/LT        SECTION       GYN SURGERY  ;    2 ceasarean sections     HEMILAMINECTOMY, DISCECTOMY LUMBAR ONE LEVEL, COMBINED Left 2018    Procedure: COMBINED HEMILAMINECTOMY, DISCECTOMY LUMBAR ONE LEVEL;  Open Left Lumbar 5-Sacral 1 Hemilaminectomy, Medial Facetectomy, And Microdiscectomy Attention Transitional Segment Anatomy;  Surgeon: Rosemarie Young MD;  Location: [facility] OR     LUMBAR LAMINECTOMY Right 2017    Procedure:  RIGHT L4-5 AND L5-S1 LATERAL RECESS  DECOMPRESSION;  Surgeon: Raudel Jackson MD;  Location: [facility] Main OR;  Service:      ORTHOPEDIC SURGERY      ankle surgery for trimalleolar frx     [place] SPINE SURGERY PROCEDURE UNLISTED              Social History     Tobacco Use     Smoking status: Never     Smokeless tobacco: Never   Substance Use Topics     Alcohol use: No     Alcohol/week: 0.0 standard drinks            Family History   Problem Relation Age of Onset     C.A.D. Other      Hypertension Other         granddaughter      Cancer Other      Heart Disease Father      Diabetes No family hx of                Allergies   Allergen Reactions     Fosamax Nausea and GI Disturbance     Lisinopril Other (See Comments)     cough     Sulfa Drugs Hives            Current Outpatient Medications   Medication Sig Dispense Refill     Acetaminophen (TYLENOL EXTRA STRENGTH PO) Take 1,000 mg by mouth 2 times daily Muscle and joint pain       calcium carbonate (OS-LALI) 500 MG tablet Take 1 tablet by mouth       CALCIUM PO Take 1 tablet by mouth every morning       carbidopa-levodopa (SINEMET)  MG tablet Take 1.5 tablets by mouth 3 times daily 405 tablet 1     chlorthalidone (HYGROTON) 25 MG tablet Take 0.5 tablets (12.5 mg) by mouth daily 45 tablet 1     Cholecalciferol (VITAMIN D) 1000 UNITS capsule Take 2,000 Units by mouth every morning        cyanocobalamin (VITAMIN B-12) 1000 MCG tablet        gabapentin (NEURONTIN) 100 MG capsule Take one to three at night as needed 90 capsule 3     gabapentin (NEURONTIN) 300 MG capsule Take 1 capsule (300 mg) by mouth At Bedtime 30 capsule 3     gabapentin (NEURONTIN) 300 MG capsule TAKE 1 CAPSULE AT BEDTIME 30 capsule 0     hydroxyurea (HYDREA) 500 MG capsule Take 1 capsule (500 mg) by mouth daily 90 capsule 4     irbesartan (AVAPRO) 150 MG tablet TAKE 1 AND 1/2 TABLETS EVERY MORNING 135 tablet 1     LORazepam (ATIVAN) 0.5 MG tablet Take 1 tablet (0.5 mg) by mouth nightly as needed for anxiety 30 tablet 0      "omeprazole (PRILOSEC) 20 MG DR capsule Take 1 capsule (20 mg) by mouth daily 90 capsule 3     pravastatin (PRAVACHOL) 20 MG tablet Take 1 tablet (20 mg) by mouth At Bedtime 90 tablet 1       REVIEW OF SYSTEMS:  See above.    O:   BP (!) 162/80 (BP Location: Right arm, Patient Position: Sitting, Cuff Size: Adult Regular)   Pulse 68   Resp 16   Ht 1.537 m (5' 0.5\")   Wt 64.3 kg (141 lb 11.2 oz)   SpO2 98%   Breastfeeding No   BMI 27.22 kg/m    GENERAL APPEARANCE: healthy, alert and no distress  EYES: Grossly normal  RESP: no distress.  CV: see VS   NEURO: alert and oriented.  MUSK: ambulatory.  BREASTS: no masses. Some tenderness to palpation at 12:00 o 'clock position.   LYMPH: negative axillary nodes. No supra or infra clavicular nosed.  PSYCHE: normal.    A/P:  Catherine was seen today for breast exam, breast pain, mammogram and flu shot.    Diagnoses and all orders for this visit:    Breast tenderness  -     Mammogram, diagnostic; Future. She will call and schedule at [facility].    Needs flu shot  -     INFLUENZA, QUAD, HIGH DOSE, PF, 65YR + (FLUZONE HD)      The patient voiced understanding of the information discussed and all questions were answered.     I spent a total of 25  minutes in the care of this pt during today's office visit. This time includes reviewing the patient's chart and prior history, obtaining a history, performing an examination and evaluation and counseling the patient. This time also includes ordering medications or tests necessary in addition to communication to other member's of the patient's health care team. Time spent in documentation and care coordination is included.     Re VILLANUEVA, CNP                "

## 2022-10-11 NOTE — PROGRESS NOTES
At the request of Re Covarrubias, Catherine Mccollum received the Influenza Vaccine Fluzone High-Dose Quadrivalent [id number] Formula for 65 yrs of age and older vaccine today in clinic. The flu shot was given under the supervision of Re Covarrubias if assistance was needed. The immunization site was cleaned with an alcohol prep wipe. The flu shot was given without incident--see immunization list for administration details. No swelling or redness was observed at the site of injection after the immunization was given. The patient was advised to remain in fourth floor lobby of the [facility] for fifteen minutes after the injection in case of an adverse reaction. Lastly, at the request of the patient, I faxed her mammogram order to [facility] at [phone].     DEXTER Valles at 11:40 AM on 10/11/2022

## 2022-10-11 NOTE — PATIENT INSTRUCTIONS
If possible, please complete the mammogram at [facility] by calling [phone]. If they are unable to completed the imaging study, [facility] is happy to help--please call radiology scheduling at [phone] to schedule the mammogram.

## 2022-10-11 NOTE — NURSING NOTE
"Catherine Mccollum is a 82 year old female patient that presents today in clinic for the following:    Chief Complaint   Patient presents with     Breast Exam     Breast Pain     Mammogram     Flu Shot     The patient's allergies and medications were reviewed as noted. A set of vitals were recorded as noted without incident: BP (!) 162/80 (BP Location: Right arm, Patient Position: Sitting, Cuff Size: Adult Regular)   Pulse 68   Resp 16   Ht 1.537 m (5' 0.5\")   Wt 64.3 kg (141 lb 11.2 oz)   SpO2 98%   Breastfeeding No   BMI 27.22 kg/m  . The patient does not have any other questions for the provider.    Jeyson Blankenship, EMT at 11:00 AM on 10/11/2022  "

## 2022-10-20 DIAGNOSIS — D47.3 ESSENTIAL THROMBOCYTHEMIA (H): ICD-10-CM

## 2022-10-20 RX ORDER — HYDROXYUREA 500 MG/1
500 CAPSULE ORAL DAILY
Qty: 90 CAPSULE | Refills: 4 | Status: SHIPPED | OUTPATIENT
Start: 2022-10-20 | End: 2023-04-11

## 2022-10-20 NOTE — TELEPHONE ENCOUNTER
Hydroxyurea Refill   Last prescribing provider: Dr Dietz     Last clinic visit date: 9/7/22 Riana Ellison     Recommendations for requested medication (if none, N/A): N/A    Any other pertinent information (if none, N/A): N/A    Refilled: Y/N, if NO, why?

## 2022-11-20 NOTE — PROGRESS NOTES
HPI:    Last seen 22. Having some bl upper leg/hip pain when sleeping on her sides. Wants new PT referral. No neuro symptoms in legs. Also wants new referral to a neurologist, her last one left and she wants to talk about a new treatment she heard about. Leaving for [place] tomorrow with her family. BP runs 120-130s at home, higher in clinic. Tolerating HU ok, getting labs done again with heme next month. No other complaints, remainder ROS negative.    Past Medical History:   Diagnosis Date     Essential thrombocythemia (H) 2021     GERD (gastroesophageal reflux disease)      HTN (hypertension)      Osteoporosis      Parkinsonian syndrome (H)     left side tremor     Past Surgical History:   Procedure Laterality Date     ANKLE SURGERY Right      BREAST SURGERY  1987    biopsy     CATARACT IOL, RT/LT        SECTION       GYN SURGERY  ;    2 ceasarean sections     HEMILAMINECTOMY, DISCECTOMY LUMBAR ONE LEVEL, COMBINED Left 2018    Procedure: COMBINED HEMILAMINECTOMY, DISCECTOMY LUMBAR ONE LEVEL;  Open Left Lumbar 5-Sacral 1 Hemilaminectomy, Medial Facetectomy, And Microdiscectomy Attention Transitional Segment Anatomy;  Surgeon: Rosemarie Young MD;  Location:  OR     LUMBAR LAMINECTOMY Right 2017    Procedure:  RIGHT L4-5 AND L5-S1 LATERAL RECESS DECOMPRESSION;  Surgeon: Raudel Jackson MD;  Location: [facility];  Service:      ORTHOPEDIC SURGERY      ankle surgery for trimalleolar frx     [place] SPINE SURGERY PROCEDURE UNLISTED       PE:    Vitals noted    General:  Alert, NAD  HEENT: atraumatic  Lungs:  CTAB, no wheezes, rhonchi or rales. No increased work of breathing.  C/V:  RRR, no m/r/g.  Neuro: Alert and oriented, face symmetric. Faint resting tremor most notable left hand  Skin: no rashes on exposed skin surfaces  Psych:  Alert and oriented. Appropriate affect.  No signs of anxiety or agitation.    A/P:     1. Immunizations; COVID Moderna  x 4. She has completed the Zoster vaccine series. Tdap 2/1/2014. Prevnar 13 done 12/8/2015. Pneumococcal 23 done 10/22/2014. Will do Prevnar 20 today 11/21/22  2. Hematology follow up with Ms. Ellison 9/7/2022 for essential thrombocytosis on hydroxyurea. Last seen Dr. Dietz, Hematology 3/8/2022. She saw Ms. Ellison, Hematology 9/7/2022 and next 3/7/2023 with Dr. Dietz. CBC 9/6/2022 Hgb 15.1 and platelets 454  3. Reflux on omeprazole; no complaints.   4. Increased lipids on Pravastatin; Lipids 5/18/2022; TG's 209, HDL 44 and LDL 80.   5. Parkinson's disease on Sinemet. She saw Dr. Jones, 5/26/2020. Wants new referral since last neurologist left.  6. HTN; on Chlorthalidone and Avapro; Electrolytes and Creatine checked 6/20/2022  7. Gabapentin for night time pain and sleep.   8. Dermatology; she follows with Dermatology on [address] in [place].   9. See Dr. Griffin, ortho 3/1/2022 for hip pain  10. She remains on low dose ativan for sleep/anxiety.  Refilled today.  11. Chronic LBP and trochanteric bursitis - PT referral.    30 minutes spent on the date of the encounter doing chart review, history and exam, documentation and further activities as noted above exclusive of procedures and other billable interpretations

## 2022-11-21 ENCOUNTER — OFFICE VISIT (OUTPATIENT)
Dept: INTERNAL MEDICINE | Facility: CLINIC | Age: 82
End: 2022-11-21
Payer: MEDICARE

## 2022-11-21 VITALS
SYSTOLIC BLOOD PRESSURE: 157 MMHG | BODY MASS INDEX: 27.7 KG/M2 | WEIGHT: 141.1 LBS | DIASTOLIC BLOOD PRESSURE: 77 MMHG | HEART RATE: 64 BPM | HEIGHT: 60 IN | OXYGEN SATURATION: 98 %

## 2022-11-21 DIAGNOSIS — F41.9 ANXIETY: ICD-10-CM

## 2022-11-21 DIAGNOSIS — G20.A1 PARKINSON'S DISEASE (H): Primary | ICD-10-CM

## 2022-11-21 DIAGNOSIS — M54.50 CHRONIC BILATERAL LOW BACK PAIN WITHOUT SCIATICA: ICD-10-CM

## 2022-11-21 DIAGNOSIS — Z23 NEED FOR VACCINATION FOR PNEUMOCOCCUS: ICD-10-CM

## 2022-11-21 DIAGNOSIS — G89.29 CHRONIC BILATERAL LOW BACK PAIN WITHOUT SCIATICA: ICD-10-CM

## 2022-11-21 PROCEDURE — G0009 ADMIN PNEUMOCOCCAL VACCINE: HCPCS | Performed by: INTERNAL MEDICINE

## 2022-11-21 PROCEDURE — 90677 PCV20 VACCINE IM: CPT | Performed by: INTERNAL MEDICINE

## 2022-11-21 PROCEDURE — 99214 OFFICE O/P EST MOD 30 MIN: CPT | Mod: 25 | Performed by: INTERNAL MEDICINE

## 2022-11-21 RX ORDER — LORAZEPAM 0.5 MG/1
0.5 TABLET ORAL
Qty: 30 TABLET | Refills: 0 | Status: SHIPPED | OUTPATIENT
Start: 2022-11-21 | End: 2023-04-25

## 2022-11-21 NOTE — NURSING NOTE
Catherine Mccollum is a 82 year old female that presents in clinic today for the following:     Chief Complaint   Patient presents with     Follow Up     Follow up with provider       The patient's allergies and medications were reviewed. The patient's vitals were obtained without incident. The patient does not have any other questions for the provider.     Shelia Rodrigues, EMT at 10:14 AM on 11/21/2022.  Primary Care Clinic: [phone]

## 2022-12-14 ENCOUNTER — LAB (OUTPATIENT)
Dept: LAB | Facility: CLINIC | Age: 82
End: 2022-12-14
Payer: MEDICARE

## 2022-12-14 DIAGNOSIS — D47.3 ESSENTIAL THROMBOCYTHEMIA (H): ICD-10-CM

## 2022-12-14 LAB
BASOPHILS # BLD AUTO: 0.1 10E3/UL (ref 0–0.2)
BASOPHILS NFR BLD AUTO: 1 %
EOSINOPHIL # BLD AUTO: 0.1 10E3/UL (ref 0–0.7)
EOSINOPHIL NFR BLD AUTO: 1 %
ERYTHROCYTE [DISTWIDTH] IN BLOOD BY AUTOMATED COUNT: 13 % (ref 10–15)
HCT VFR BLD AUTO: 40.6 % (ref 35–47)
HGB BLD-MCNC: 14.7 G/DL (ref 11.7–15.7)
IMM GRANULOCYTES # BLD: 0 10E3/UL
IMM GRANULOCYTES NFR BLD: 0 %
LYMPHOCYTES # BLD AUTO: 3.8 10E3/UL (ref 0.8–5.3)
LYMPHOCYTES NFR BLD AUTO: 32 %
MCH RBC QN AUTO: 34.8 PG (ref 26.5–33)
MCHC RBC AUTO-ENTMCNC: 36.2 G/DL (ref 31.5–36.5)
MCV RBC AUTO: 96 FL (ref 78–100)
MONOCYTES # BLD AUTO: 0.9 10E3/UL (ref 0–1.3)
MONOCYTES NFR BLD AUTO: 7 %
NEUTROPHILS # BLD AUTO: 7 10E3/UL (ref 1.6–8.3)
NEUTROPHILS NFR BLD AUTO: 59 %
PLATELET # BLD AUTO: 451 10E3/UL (ref 150–450)
RBC # BLD AUTO: 4.22 10E6/UL (ref 3.8–5.2)
WBC # BLD AUTO: 11.8 10E3/UL (ref 4–11)

## 2022-12-14 PROCEDURE — 36415 COLL VENOUS BLD VENIPUNCTURE: CPT

## 2022-12-14 PROCEDURE — 85025 COMPLETE CBC W/AUTO DIFF WBC: CPT

## 2023-01-17 ENCOUNTER — TRANSFERRED RECORDS (OUTPATIENT)
Dept: HEALTH INFORMATION MANAGEMENT | Facility: CLINIC | Age: 83
End: 2023-01-17

## 2023-02-06 ENCOUNTER — TELEPHONE (OUTPATIENT)
Dept: INTERNAL MEDICINE | Facility: CLINIC | Age: 83
End: 2023-02-06
Payer: MEDICARE

## 2023-02-10 ENCOUNTER — VIRTUAL VISIT (OUTPATIENT)
Dept: FAMILY MEDICINE | Facility: CLINIC | Age: 83
End: 2023-02-10
Payer: MEDICARE

## 2023-02-10 DIAGNOSIS — I10 ESSENTIAL HYPERTENSION, BENIGN: ICD-10-CM

## 2023-02-10 DIAGNOSIS — U07.1 INFECTION DUE TO 2019 NOVEL CORONAVIRUS: Primary | ICD-10-CM

## 2023-02-10 DIAGNOSIS — E78.5 HYPERLIPIDEMIA, UNSPECIFIED HYPERLIPIDEMIA TYPE: ICD-10-CM

## 2023-02-10 DIAGNOSIS — G20.A1 PARKINSON'S DISEASE (H): ICD-10-CM

## 2023-02-10 PROCEDURE — 99441 PR PHYSICIAN TELEPHONE EVALUATION 5-10 MIN: CPT | Mod: CS | Performed by: FAMILY MEDICINE

## 2023-02-10 NOTE — PROGRESS NOTES
Catherine is a 82 year old who is being evaluated via a billable telephone visit.      What phone number would you like to be contacted at? [phone]  How would you like to obtain your AVS? [place]    Distant Location (provider location):  On-site    Assessment & Plan     Infection due to 2019 novel coronavirus  We discussed treatment options and she was given a prescription for Paxlovid.  Her kidney function historically has been normal.  I recommended she stop taking pravastatin while she is on the antiviral.  We discussed symptomatic cares that may also help.  I encouraged her to get plenty of rest and stay well-hydrated.  If she develops any signs or symptoms of respiratory distress she will seek medical attention right away.  - nirmatrelvir and ritonavir (PAXLOVID) therapy pack; Take 3 tablets by mouth 2 times daily for 5 days (Take 2 Nirmatrelvir tablets and 1 Ritonavir tablet twice daily for 5 days)    Parkinson's disease (H)  She may continue the Sinemet.    Hyperlipidemia, unspecified hyperlipidemia type  I recommended she stop taking the pravastatin while on Paxlovid.    Essential hypertension, benign  She may continue her current blood pressure medications chlorthalidone and irbesartan.  She reports that her blood pressure this morning was 132/78.                     Return in about 2 weeks (around 2/24/2023) for Follow up if symptoms not improving..    Kar Byers, [facility]   Catherine is a 82 year old, presenting for the following health issues:  Covid Concern      History of Present Illness       Reason for visit:  Covid  Symptoms include:  Sore throat, headache chills  Symptom intensity:  Moderate  Symptom progression:  Worsening    She eats 2-3 servings of fruits and vegetables daily.She consumes 0 sweetened beverage(s) daily.She exercises with enough effort to increase her heart rate 10 to 19 minutes per day.  She exercises with enough effort to  increase her heart rate 4 days per week.   She is taking medications regularly.             She describes coming down with an illness about 3 days ago and today she tested positive for COVID.  She reports that a friend of hers recently tested +4 days ago.  She describes having headaches, and a scratchy sore throat, chills, low-grade fever.  Today it was 100.2.  She has quite a bit of head congestion symptoms, but she denies chest congestion or feeling short of breath.  She has a history of Parkinson's, hyperlipidemia and hypertension.  These issues have been stable on her current medications.    Review of Systems   Constitutional, HEENT, cardiovascular, pulmonary, GI, , musculoskeletal, neuro, skin, endocrine and psych systems are negative, except as otherwise noted.      Objective    Vitals - Patient Reported  Weight (Patient Reported): 61.2 kg (135 lb)  Height (Patient Reported): 152.4 cm (5')  BMI (Based on Pt Reported Ht/Wt): 26.37  Pain Score: No Pain (0)      Vitals:  No vitals were obtained today due to virtual visit.    Physical Exam   alert, no distress and fatigued  PSYCH: Alert and oriented times 3; coherent speech, normal   rate and volume, able to articulate logical thoughts, able   to abstract reason, no tangential thoughts, no hallucinations   or delusions  Her affect is normal  RESP: No cough, no audible wheezing, able to talk in full sentences  Remainder of exam unable to be completed due to telephone visits                Phone call duration: 7 minutes

## 2023-02-15 ENCOUNTER — VIRTUAL VISIT (OUTPATIENT)
Dept: INTERNAL MEDICINE | Facility: CLINIC | Age: 83
End: 2023-02-15
Payer: MEDICARE

## 2023-02-15 ENCOUNTER — MYC MEDICAL ADVICE (OUTPATIENT)
Dept: INTERNAL MEDICINE | Facility: CLINIC | Age: 83
End: 2023-02-15
Payer: MEDICARE

## 2023-02-15 DIAGNOSIS — R07.0 THROAT PAIN: Primary | ICD-10-CM

## 2023-02-15 DIAGNOSIS — Z86.16 PERSONAL HISTORY OF COVID-19: ICD-10-CM

## 2023-02-15 DIAGNOSIS — K13.79 SORE MOUTH: ICD-10-CM

## 2023-02-15 DIAGNOSIS — R11.0 NAUSEA: ICD-10-CM

## 2023-02-15 PROCEDURE — 99214 OFFICE O/P EST MOD 30 MIN: CPT | Mod: VID | Performed by: NURSE PRACTITIONER

## 2023-02-15 RX ORDER — ONDANSETRON 4 MG/1
4 TABLET, FILM COATED ORAL EVERY 8 HOURS PRN
Qty: 20 TABLET | Refills: 0 | Status: ON HOLD | OUTPATIENT
Start: 2023-02-15 | End: 2023-03-03

## 2023-02-15 NOTE — PROGRESS NOTES
"Catherine is a 82 year old who is being evaluated via a billable video visit.      How would you like to obtain your AVS? MyChart  If the video visit is dropped, the invitation should be resent by: Send to e-mail at: [email or web address]  Will anyone else be joining your video visit? No          Assessment & Plan     Throat pain  Sore mouth  Throat pain and mouth sores, can use magic mouthwash for symptomatic management. If no improvement or if worsening, be seen in clinic for further evaluation (strep test, swab for thrush, etc.). Continue working on staying well-hydrated, rest.   - magic mouthwash suspension (diphenhydrAMINE, lidocaine, aluminum-magnesium & simethicone); Swish and swallow 10 mLs in mouth every 6 hours as needed for mouth sores, mild pain or sore throat    Nausea  Suspect mild nausea will improve now that she has completed the Paxlovid, but may use Zofran TID prn for nausea if needed. Gradually advance diet as tolerated, starting with bland, easy-to-digest foods.  - ondansetron (ZOFRAN) 4 MG tablet; Take 1 tablet (4 mg) by mouth every 8 hours as needed for nausea    Personal history of COVID-19  Mild cough, no acute respiratory symptoms, reviewed OTC med and symptomatic management--rest, push fluids, tea, steam/humidifier if needed, etc. She agrees with the plan.      MDM: 2 problem visit, prescription drug management       Return if symptoms worsen or fail to improve.    KAY Mac [facility]   Catherine is a 82 year old, presenting for the following health issues:  Video Visit      HPI     COVID+ on 2/10/23, with symptom onset 2/7/23. She was seen for a virtual visit on 2/10/23, and complained of headaches, sore throat, head congestion, chills and fever (100.2). She completed her final dose of Paxlovid today.     Paxlovid made her mouth sore. Finished final dose last night. Throat and mouth is sore. Tongue is kind of \"white,\" " has tried brushing the tongue and an oral rinse, no improvement, just sore. Has had headache off and on all day.   No appetite, upsets her stomach when she eats.  Perhaps reduced taste, no altered sense of smell. Able to drink liquids.  Yesterday felt some nausea, but not as much today.   Taken Tylenol to help manage symptoms. Ate a couple crackers with tea this evening, did okay but harder to swallow.   Mild cough, no SOB. Nose is still running  Had diarrhea the first day, resolved with imodium.   She is fully vaccinated/boosted for COVID.    Review of Systems   Constitutional, HEENT, cardiovascular, pulmonary, gi and gu systems are negative, except as otherwise noted.      Objective             Physical Exam   GENERAL: Healthy, alert and no distress  EYES: Eyes grossly normal to inspection.  No discharge or erythema, or obvious scleral/conjunctival abnormalities.  RESP: No audible wheeze, cough, or visible cyanosis.  No visible retractions or increased work of breathing.    SKIN: Visible skin clear. No significant rash, abnormal pigmentation or lesions.  NEURO: Cranial nerves grossly intact.  Mentation and speech appropriate for age.  PSYCH: Mentation appears normal, affect normal/bright, judgement and insight intact, normal speech and appearance well-groomed.              Video-Visit Details    Type of service:  Video Visit    Video Start Time (time video started): 6:20 PM    Video End Time (time video stopped): 6:32 PM    Originating Location (pt. Location): Home        Distant Location (provider location):  Off-site    Mode of Communication:  Video Conference via Donal GREER

## 2023-02-15 NOTE — NURSING NOTE
Is the patient currently in the state of [place]? YES    Visit mode:VIDEO    If the visit is dropped, the patient can be reconnected by: VIDEO VISIT: Text to cell phone: [phone]    Will anyone else be joining the visit? NO      How would you like to obtain your AVS? MyChart    Are changes needed to the allergy or medication list? NO - checked a few days ago, pt says still all the same.    Comments or concerns regarding today's visit: Wanted to discuss mouth and throat soreness, has not been able to eat much.    FELI GREER

## 2023-02-20 ENCOUNTER — VIRTUAL VISIT (OUTPATIENT)
Dept: INTERNAL MEDICINE | Facility: CLINIC | Age: 83
End: 2023-02-20
Payer: MEDICARE

## 2023-02-20 ENCOUNTER — DOCUMENTATION ONLY (OUTPATIENT)
Dept: INTERNAL MEDICINE | Facility: CLINIC | Age: 83
End: 2023-02-20

## 2023-02-20 ENCOUNTER — LAB (OUTPATIENT)
Dept: LAB | Facility: CLINIC | Age: 83
End: 2023-02-20
Payer: MEDICARE

## 2023-02-20 DIAGNOSIS — R30.0 DYSURIA: Primary | ICD-10-CM

## 2023-02-20 DIAGNOSIS — R30.0 DYSURIA: ICD-10-CM

## 2023-02-20 LAB
ALBUMIN UR-MCNC: ABNORMAL MG/DL
APPEARANCE UR: ABNORMAL
BACTERIA #/AREA URNS HPF: ABNORMAL /HPF
BILIRUB UR QL STRIP: NEGATIVE
COLOR UR AUTO: YELLOW
GLUCOSE UR STRIP-MCNC: NEGATIVE MG/DL
HGB UR QL STRIP: ABNORMAL
KETONES UR STRIP-MCNC: NEGATIVE MG/DL
LEUKOCYTE ESTERASE UR QL STRIP: ABNORMAL
NITRATE UR QL: POSITIVE
PH UR STRIP: 7 [PH] (ref 5–7)
RBC #/AREA URNS AUTO: ABNORMAL /HPF
SP GR UR STRIP: 1.01 (ref 1–1.03)
UROBILINOGEN UR STRIP-ACNC: 0.2 E.U./DL
WBC #/AREA URNS AUTO: >100 /HPF

## 2023-02-20 PROCEDURE — 87086 URINE CULTURE/COLONY COUNT: CPT

## 2023-02-20 PROCEDURE — 81001 URINALYSIS AUTO W/SCOPE: CPT

## 2023-02-20 PROCEDURE — 99213 OFFICE O/P EST LOW 20 MIN: CPT | Mod: VID | Performed by: INTERNAL MEDICINE

## 2023-02-20 PROCEDURE — 87186 SC STD MICRODIL/AGAR DIL: CPT

## 2023-02-20 RX ORDER — NITROFURANTOIN 25; 75 MG/1; MG/1
100 CAPSULE ORAL 2 TIMES DAILY
Qty: 14 CAPSULE | Refills: 0 | Status: SHIPPED | OUTPATIENT
Start: 2023-02-20 | End: 2023-02-27

## 2023-02-20 NOTE — TELEPHONE ENCOUNTER
[facility].    Jyoti Mayfield on 2/20/2023 at 2:13 PM

## 2023-02-20 NOTE — PROGRESS NOTES
Type of Form Received: PT eval    Form Received (Date) 2/20/23   Form Filled out No   Placed in provider folder Yes

## 2023-02-20 NOTE — PROGRESS NOTES
"  Catherine Mccollum is a 82 year old female who is being evaluated via a billable video visit.      The patient has consented to a video visit and informed that video and telephone visits are being performed during the COVID-19 pandemic in order to mitigate the risk of an in office visit for appropriate candidates/issues. If during the course of the call the physician/provider feels a video visit is not appropriate, you will not be charged for this service. If the provider feels that they are unable to assess your concerns without an in person visit, you will be advised of this limitation and depending on the nature of the concern, advised to seek in person care if your provider feels you need urgent evaluation.      Subjective     Catherine Mccollum is a 82 year old female who presents to clinic today for the following health issues:    Chief Complaint   Patient presents with     Video Visit       HPI    PMH notable for ET, PD, HLD, HTN    Getting over COVID  Yesterday night, pressure in bladder, pain with urination, has frequency, drinking water, small volume urination  Mouth is very dry  She is still tired, lack of appetite  Denies fevers, no flank pain, no blood in urine, no nausea      Review of external notes as documented above                         Review of Systems   A detailed ROS was performed and was negative unless indicated in the HPI above.        Physical Exam   There were no vitals taken for this visit.  Wt Readings from Last 2 Encounters:   11/21/22 64 kg (141 lb 1.6 oz)   10/11/22 64.3 kg (141 lb 11.2 oz)      Ht Readings from Last 2 Encounters:   11/21/22 1.525 m (5' 0.05\")   10/11/22 1.537 m (5' 0.5\")     GENERAL: healthy, alert and no distress  HEAD: Normocephalic, atraumatic  EYES: Eyes grossly normal to inspection, EOMI and conjunctivae and sclerae normal  RESP: Speaking in full sentences, unlabored, no audible wheezes or cough  SKIN: no suspicious lesions or rashes, no jaundice  NEURO: oriented, " and speech normal  PSYCH: mentation appears normal, affect normal/bright          Diagnostic Test Results:  Labs reviewed in Epic            Assessment and Plan:  Catherine was seen today for video visit.    Diagnoses and all orders for this visit:    Dysuria  Concerning for UTI. Will advise UA if possible and treat accordingly. Patient has no systemic symptoms concerning for need for escalation of care.  -     UA Macro with Reflex to Micro and Culture - lab collect; Future          Video-Visit Details    Type of service:  Video Visit    Video Start/End Time: 12:56 PM 1:10 PM    Originating Location (pt. Location): Home    Distant Location (provider location):  [facility]     Mode of Communication:  Video Conference via  [place] or  [employer]        Coty Hodges MD  Internal Medicine

## 2023-02-20 NOTE — NURSING NOTE
Is the patient currently in the state of [place]? YES    Visit mode:TELEPHONE    If the visit is dropped, the patient can be reconnected by: VIDEO VISIT: Text to cell phone: [phone]    Will anyone else be joining the visit? NO      How would you like to obtain your AVS? MyChart    Are changes needed to the allergy or medication list? NO - pt says allergies and medications are the same.    Comments or concerns regarding today's visit: Pt woke up in the middle of the night with bladder pressure and discomfort. Pt currently has covid.    FELI GREER

## 2023-02-21 ENCOUNTER — DOCUMENTATION ONLY (OUTPATIENT)
Dept: INTERNAL MEDICINE | Facility: CLINIC | Age: 83
End: 2023-02-21
Payer: MEDICARE

## 2023-02-21 NOTE — PROGRESS NOTES
Type of Form Received: PT eval    Form Received (Date) 2/21/23   Form Filled out Yes 2/21/23   Placed in provider folder Yes

## 2023-02-22 LAB — BACTERIA UR CULT: ABNORMAL

## 2023-02-23 ENCOUNTER — NURSE TRIAGE (OUTPATIENT)
Dept: NURSING | Facility: CLINIC | Age: 83
End: 2023-02-23
Payer: MEDICARE

## 2023-02-23 DIAGNOSIS — N30.00 ACUTE CYSTITIS WITHOUT HEMATURIA: Primary | ICD-10-CM

## 2023-02-23 DIAGNOSIS — R82.90 ABNORMAL URINE: Primary | ICD-10-CM

## 2023-02-23 NOTE — TELEPHONE ENCOUNTER
Called patient- complains of bladder pressure. Says she has been hydrating, was able to urinate this morning but concerned she hasn't gone since then. RN recommended that she stay the course on abx, is on her 4th day. Patient did offer that she felt Cipro worked better for her in the past. Will consult provider for next steps.     Ld Rodriguez RN on 2/23/2023 at 1:02 PM

## 2023-02-23 NOTE — TELEPHONE ENCOUNTER
Calling with pain with urination and frequency. She is on Macrobid x4 days. She is afebrile. She reports a poor appetite.  Care advice to be seen within 24 hours. No appt available. She will go to urgent care in [place] tomorrow if she is still having symptoms.    Yolanda Taylor RN on 2/23/2023 at 6:10 PM      Reason for Disposition    [1] Taking antibiotic > 72 hours (3 days) for UTI AND [2] painful urination or frequency not improved    Additional Information    Negative: Shock suspected (e.g., cold/pale/clammy skin, too weak to stand, low BP, rapid pulse)    Negative: Sounds like a life-threatening emergency to the triager    Negative: [1] Unable to urinate (or only a few drops) > 4 hours AND [2] bladder feels very full (e.g., palpable bladder or strong urge to urinate)    Negative: Passing pure blood or large blood clots (i.e., size > a dime) (Exceptions: flecks, small strands, or pinkish-red color)    Negative: Fever > 103 F (39.4 C)    Negative: Patient sounds very sick or weak to the triager    Negative: [1] SEVERE pain (e.g., excruciating) AND [2] no improvement 2 hours after pain medications    Negative: [1] Fever > 100.0 F (37.8 C) AND [2] new-onset since starting antibiotics    Negative: [1] Side (flank) or lower back pain AND [2] new-onset since starting antibiotics    Negative: [1] Taking antibiotic > 24 hours for UTI AND [2] flank or lower back pain worsening    Negative: [1] Vomiting 2 or more times AND [2] interferes with taking oral antibiotic    Negative: [1] Taking antibiotic > 24 hours for UTI (urinary tract or bladder infection) AND [2] fever persists    Protocols used: URINARY TRACT INFECTION ON ANTIBIOTIC FOLLOW-UP CALL - FEMALE-ERICKA

## 2023-02-23 NOTE — TELEPHONE ENCOUNTER
[facility] Call Center    Phone Message    May a detailed message be left on voicemail: yes     Reason for Call: Symptoms or Concerns     If patient has red-flag symptoms, warm transfer to triage line    Current symptom or concern: pressure on bladder, trouble urinating       Are there any new or worsening symptoms? Yes: Pt called and stated she has been on antibiotics for 4 days and it does not seem to be helping

## 2023-02-23 NOTE — TELEPHONE ENCOUNTER
Is she having fevers, pain with urination, frequency/urgency?     If those symptoms are improving, I would stay the course on macrobid.      If not, we can consider changing but macrobid was chosen to minimize possible side effects.    Thanks,  Coty Hodges MD  Internal Medicine

## 2023-02-23 NOTE — TELEPHONE ENCOUNTER
Patient calling checking the status of below and wanting to let the care team know that it is getting worse

## 2023-02-24 RX ORDER — CIPROFLOXACIN 250 MG/1
250 TABLET, FILM COATED ORAL 2 TIMES DAILY
Qty: 10 TABLET | Refills: 0 | Status: SHIPPED | OUTPATIENT
Start: 2023-02-24 | End: 2023-03-03

## 2023-02-24 RX ORDER — CIPROFLOXACIN 500 MG/1
500 TABLET, FILM COATED ORAL 2 TIMES DAILY
Qty: 10 TABLET | Refills: 0 | Status: ON HOLD | OUTPATIENT
Start: 2023-02-24 | End: 2023-03-03

## 2023-02-24 NOTE — TELEPHONE ENCOUNTER
Okay, happy to send some cipro but sounds like urgent care is reasonable if still having worrisome symptoms.  ML

## 2023-02-24 NOTE — TELEPHONE ENCOUNTER
Can you let her know I sent in cipro for UTI but if she is not feeling better or gets new/worsening symptoms, she should be evaluated in person.  Thanks,  Coty Hodges MD  Internal Medicine

## 2023-02-24 NOTE — TELEPHONE ENCOUNTER
Ld,  Can you call Ms. Mccollum to inquire what symptoms she's having:  -Is she in retention?  -N/V, fevers?  -Pain with urination?  -Other new symptoms?    Thanks,  Coty Hodges MD  Internal Medicine

## 2023-02-24 NOTE — TELEPHONE ENCOUNTER
Called patient- symptoms have not gotten better, will send Cipro to pharmacy and patient will be eval'd over weekend in UC if not improved.   Ld Rodriguez RN on 2/24/2023 at 11:40 AM

## 2023-02-24 NOTE — TELEPHONE ENCOUNTER
[facility] Call Center    Phone Message    May a detailed message be left on voicemail: yes     Reason for Call: Other: Patient states she is not done with the antibiotics and doesn't think they are working. Patient states she is still in a lot of pain still. Please call back to discuss further.     Action Taken: Message routed to:  [facility]    Travel Screening: Not Applicable

## 2023-03-01 ENCOUNTER — OFFICE VISIT (OUTPATIENT)
Dept: INTERNAL MEDICINE | Facility: CLINIC | Age: 83
End: 2023-03-01
Payer: MEDICARE

## 2023-03-01 ENCOUNTER — HOSPITAL ENCOUNTER (INPATIENT)
Facility: CLINIC | Age: 83
LOS: 4 days | Discharge: HOME | DRG: 641 | End: 2023-03-06
Attending: EMERGENCY MEDICINE | Admitting: STUDENT IN AN ORGANIZED HEALTH CARE EDUCATION/TRAINING PROGRAM
Payer: MEDICARE

## 2023-03-01 ENCOUNTER — LAB (OUTPATIENT)
Dept: LAB | Facility: CLINIC | Age: 83
End: 2023-03-01
Payer: MEDICARE

## 2023-03-01 VITALS — HEART RATE: 69 BPM | SYSTOLIC BLOOD PRESSURE: 172 MMHG | DIASTOLIC BLOOD PRESSURE: 76 MMHG | OXYGEN SATURATION: 95 %

## 2023-03-01 DIAGNOSIS — N39.0 URINARY TRACT INFECTION WITHOUT HEMATURIA, SITE UNSPECIFIED: ICD-10-CM

## 2023-03-01 DIAGNOSIS — N39.0 URINARY TRACT INFECTION WITHOUT HEMATURIA, SITE UNSPECIFIED: Primary | ICD-10-CM

## 2023-03-01 DIAGNOSIS — M62.81 GENERALIZED MUSCLE WEAKNESS: Primary | ICD-10-CM

## 2023-03-01 DIAGNOSIS — D47.3 ESSENTIAL THROMBOCYTHEMIA (H): ICD-10-CM

## 2023-03-01 DIAGNOSIS — R25.1 TREMOR: ICD-10-CM

## 2023-03-01 DIAGNOSIS — R13.19 ESOPHAGEAL DYSPHAGIA: ICD-10-CM

## 2023-03-01 DIAGNOSIS — G20.A1 PARKINSON DISEASE (H): ICD-10-CM

## 2023-03-01 DIAGNOSIS — E87.1 HYPONATREMIA: ICD-10-CM

## 2023-03-01 LAB
ALBUMIN UR-MCNC: 20 MG/DL
ANION GAP SERPL CALCULATED.3IONS-SCNC: 13 MMOL/L (ref 7–15)
APPEARANCE UR: CLEAR
BASOPHILS # BLD AUTO: 0 10E3/UL (ref 0–0.2)
BASOPHILS NFR BLD AUTO: 0 %
BILIRUB UR QL STRIP: NEGATIVE
BUN SERPL-MCNC: 7.9 MG/DL (ref 8–23)
CALCIUM SERPL-MCNC: 9.5 MG/DL (ref 8.8–10.2)
CHLORIDE SERPL-SCNC: 78 MMOL/L (ref 98–107)
COLOR UR AUTO: ABNORMAL
CREAT SERPL-MCNC: 0.68 MG/DL (ref 0.51–0.95)
DEPRECATED HCO3 PLAS-SCNC: 28 MMOL/L (ref 22–29)
EOSINOPHIL # BLD AUTO: 0 10E3/UL (ref 0–0.7)
EOSINOPHIL NFR BLD AUTO: 0 %
ERYTHROCYTE [DISTWIDTH] IN BLOOD BY AUTOMATED COUNT: 11.8 % (ref 10–15)
GFR SERPL CREATININE-BSD FRML MDRD: 85 ML/MIN/1.73M2
GLUCOSE SERPL-MCNC: 130 MG/DL (ref 70–99)
GLUCOSE UR STRIP-MCNC: NEGATIVE MG/DL
HCT VFR BLD AUTO: 36.7 % (ref 35–47)
HGB BLD-MCNC: 14 G/DL (ref 11.7–15.7)
HGB UR QL STRIP: NEGATIVE
IMM GRANULOCYTES # BLD: 0.1 10E3/UL
IMM GRANULOCYTES NFR BLD: 1 %
KETONES UR STRIP-MCNC: NEGATIVE MG/DL
LEUKOCYTE ESTERASE UR QL STRIP: ABNORMAL
LYMPHOCYTES # BLD AUTO: 2.5 10E3/UL (ref 0.8–5.3)
LYMPHOCYTES NFR BLD AUTO: 20 %
MCH RBC QN AUTO: 34.3 PG (ref 26.5–33)
MCHC RBC AUTO-ENTMCNC: 38.1 G/DL (ref 31.5–36.5)
MCV RBC AUTO: 90 FL (ref 78–100)
MONOCYTES # BLD AUTO: 0.9 10E3/UL (ref 0–1.3)
MONOCYTES NFR BLD AUTO: 7 %
NEUTROPHILS # BLD AUTO: 9 10E3/UL (ref 1.6–8.3)
NEUTROPHILS NFR BLD AUTO: 72 %
NITRATE UR QL: NEGATIVE
NRBC # BLD AUTO: 0 10E3/UL
NRBC BLD AUTO-RTO: 0 /100
PH UR STRIP: 7.5 [PH] (ref 5–7)
PLATELET # BLD AUTO: 491 10E3/UL (ref 150–450)
POTASSIUM SERPL-SCNC: 3 MMOL/L (ref 3.4–5.3)
RBC # BLD AUTO: 4.08 10E6/UL (ref 3.8–5.2)
RBC URINE: 3 /HPF
SODIUM SERPL-SCNC: 119 MMOL/L (ref 136–145)
SP GR UR STRIP: 1.01 (ref 1–1.03)
SQUAMOUS EPITHELIAL: 2 /HPF
TRANSITIONAL EPI: <1 /HPF
UROBILINOGEN UR STRIP-MCNC: NORMAL MG/DL
WBC # BLD AUTO: 12.6 10E3/UL (ref 4–11)
WBC URINE: 11 /HPF

## 2023-03-01 PROCEDURE — 99213 OFFICE O/P EST LOW 20 MIN: CPT | Mod: GC | Performed by: INTERNAL MEDICINE

## 2023-03-01 PROCEDURE — 36415 COLL VENOUS BLD VENIPUNCTURE: CPT | Performed by: EMERGENCY MEDICINE

## 2023-03-01 PROCEDURE — 99285 EMERGENCY DEPT VISIT HI MDM: CPT | Mod: 25 | Performed by: EMERGENCY MEDICINE

## 2023-03-01 PROCEDURE — 80053 COMPREHEN METABOLIC PANEL: CPT | Performed by: INTERNAL MEDICINE

## 2023-03-01 PROCEDURE — 93010 ELECTROCARDIOGRAM REPORT: CPT | Performed by: EMERGENCY MEDICINE

## 2023-03-01 PROCEDURE — 87086 URINE CULTURE/COLONY COUNT: CPT | Performed by: INTERNAL MEDICINE

## 2023-03-01 PROCEDURE — 80050 GENERAL HEALTH PANEL: CPT | Performed by: PATHOLOGY

## 2023-03-01 PROCEDURE — 93005 ELECTROCARDIOGRAM TRACING: CPT

## 2023-03-01 PROCEDURE — 99285 EMERGENCY DEPT VISIT HI MDM: CPT | Mod: 25

## 2023-03-01 PROCEDURE — 85025 COMPLETE CBC W/AUTO DIFF WBC: CPT | Performed by: EMERGENCY MEDICINE

## 2023-03-01 PROCEDURE — 83690 ASSAY OF LIPASE: CPT | Performed by: EMERGENCY MEDICINE

## 2023-03-01 PROCEDURE — 36415 COLL VENOUS BLD VENIPUNCTURE: CPT | Performed by: PATHOLOGY

## 2023-03-01 PROCEDURE — 81001 URINALYSIS AUTO W/SCOPE: CPT | Performed by: PATHOLOGY

## 2023-03-01 PROCEDURE — 84443 ASSAY THYROID STIM HORMONE: CPT | Performed by: EMERGENCY MEDICINE

## 2023-03-01 RX ORDER — ESTRADIOL 10 UG/1
10 INSERT VAGINAL
Qty: 24 TABLET | Refills: 3 | Status: SHIPPED | OUTPATIENT
Start: 2023-03-02 | End: 2023-06-15

## 2023-03-01 NOTE — NURSING NOTE
Catherine Mccollum is a 82 year old female patient that presents today in clinic for the following:    Chief Complaint   Patient presents with     UTI     The patient's allergies and medications were reviewed as noted. A set of vitals were recorded as noted without incident: BP (!) 172/76 (BP Location: Right arm, Patient Position: Sitting, Cuff Size: Adult Regular)   Pulse 69   SpO2 95% . The patient does not have any other questions for the provider.    Melissa Walker, EMT at 2:25 PM on 3/1/2023

## 2023-03-01 NOTE — PROGRESS NOTES
PRIMARY CARE CENTER           HPI:   Catherine Mccollum is a 82 year old female who presents for the following:  - UTI    She reports that she has been having trouble with her urinary frequency since past few weeks. She gets a feeling that she has to urinate but feels backed up and feeling of emptying her bladder inadequately. Her burning micturition has improved since she started her course of ciprofloxacin.     Today, while getting out of her car in front of the clinic, she slipped and fell on her lower back. Does not report that she hit her head although her granddaughter states that she did lightly touch her head by the side. Denies any loss of consciousness or nausea or vomiting or headache. No vision changes or loss of motor fx or neurological changes.     Patient denies any chest pain, SOB, LE edema, exertional dyspnea/orthopnea/PND, dizziness, lightheadedness, nausea, vomiting or diarrhea.       ROS:  10 point ROS neg other than the symptoms noted above in the HPI.     Past Medical History:   Diagnosis Date     Arthritis      Essential thrombocythemia (H) 2021     GERD (gastroesophageal reflux disease)      HTN (hypertension)      Osteoporosis      Parkinsonian syndrome (H)     left side tremor     Past Surgical History:   Procedure Laterality Date     ANKLE SURGERY Right 2002     BREAST SURGERY  1987    biopsy     CATARACT IOL, RT/LT        SECTION       COLONOSCOPY  2013    normal     GYN SURGERY  1965;1967    2 ceasarean sections     HEMILAMINECTOMY, DISCECTOMY LUMBAR ONE LEVEL, COMBINED Left 2018    Procedure: COMBINED HEMILAMINECTOMY, DISCECTOMY LUMBAR ONE LEVEL;  Open Left Lumbar 5-Sacral 1 Hemilaminectomy, Medial Facetectomy, And Microdiscectomy Attention Transitional Segment Anatomy;  Surgeon: Rosemarie Young MD;  Location: [facility] OR     LUMBAR LAMINECTOMY Right 2017    Procedure:  RIGHT L4-5 AND L5-S1 LATERAL RECESS DECOMPRESSION;  Surgeon: Raudel  Nikunj Jackson MD;  Location: [facility] OR;  Service:      ORTHOPEDIC SURGERY      ankle surgery for trimalleolar frx     MS SPINE SURGERY PROCEDURE UNLISTED       Family History   Problem Relation Age of Onset     C.A.D. Other      Hypertension Other         granddaughter      Cancer Other      Heart Disease Father      Hypertension Father      Diabetes No family hx of      Social History     Tobacco Use     Smoking status: Never     Smokeless tobacco: Never   Vaping Use     Vaping Use: Never used   Substance Use Topics     Alcohol use: No     Drug use: No     Current Outpatient Medications   Medication Sig Dispense Refill     Acetaminophen (TYLENOL EXTRA STRENGTH PO) Take 1,000 mg by mouth 2 times daily Muscle and joint pain       calcium carbonate (OS-LALI) 500 MG tablet Take 1 tablet by mouth       CALCIUM PO Take 1 tablet by mouth every morning       carbidopa-levodopa (SINEMET)  MG tablet Take 1.5 tablets by mouth 3 times daily 405 tablet 1     chlorthalidone (HYGROTON) 25 MG tablet Take 0.5 tablets (12.5 mg) by mouth daily 45 tablet 1     Cholecalciferol (VITAMIN D) 1000 UNITS capsule Take 2,000 Units by mouth every morning        ciprofloxacin (CIPRO) 250 MG tablet Take 1 tablet (250 mg) by mouth 2 times daily for 5 days 10 tablet 0     ciprofloxacin (CIPRO) 500 MG tablet Take 1 tablet (500 mg) by mouth 2 times daily 10 tablet 0     cyanocobalamin (VITAMIN B-12) 1000 MCG tablet        [START ON 3/2/2023] estradiol (VAGIFEM) 10 MCG TABS vaginal tablet Place 1 tablet (10 mcg) vaginally twice a week 24 tablet 3     gabapentin (NEURONTIN) 100 MG capsule Take one to three at night as needed 90 capsule 3     gabapentin (NEURONTIN) 300 MG capsule Take 1 capsule (300 mg) by mouth At Bedtime 30 capsule 3     hydroxyurea (HYDREA) 500 MG capsule Take 1 capsule (500 mg) by mouth daily 90 capsule 4     irbesartan (AVAPRO) 150 MG tablet TAKE 1 AND 1/2 TABLETS EVERY MORNING 135 tablet 1     LORazepam (ATIVAN)  0.5 MG tablet Take 1 tablet (0.5 mg) by mouth nightly as needed for anxiety 30 tablet 0     magic mouthwash suspension (diphenhydrAMINE, lidocaine, aluminum-magnesium & simethicone) Swish and swallow 10 mLs in mouth every 6 hours as needed for mouth sores, mild pain or sore throat 120 mL 1     omeprazole (PRILOSEC) 20 MG DR capsule Take 1 capsule (20 mg) by mouth daily 90 capsule 3     ondansetron (ZOFRAN) 4 MG tablet Take 1 tablet (4 mg) by mouth every 8 hours as needed for nausea 20 tablet 0     pravastatin (PRAVACHOL) 20 MG tablet Take 1 tablet (20 mg) by mouth At Bedtime 90 tablet 1        Allergies   Allergen Reactions     Fosamax Nausea and GI Disturbance     Lisinopril Other (See Comments)     cough     Sulfa Drugs Hives         BP (!) 172/76 (BP Location: Right arm, Patient Position: Sitting, Cuff Size: Adult Regular)   Pulse 69   SpO2 95%     Physical Examination:    General:  Conversant, generally healthy appearing, no acute distress  Head: atraumatic  Eyes:  Pupils 2-3 mm, sclera white, EOM's full, conjunctiva moist, no periorbital swelling    Ears:  TM's normal, EAC's patent, clear of cerumen  Nose:  Nasal passages clear, turbinates not swollen  Throat/Mouth:  No pharyngeal erythema, exudate, ulcers, oral mucosa and tongue moist, normal hard and soft palate  Neck:  Trachea midline, Full AROM, supple, thyroid smooth, symmetric, not enlarged, no nodules, no neck lymphadenopathy  Lungs:  Clear to auscultation throughout, no wheezes, rhonchi or rales. Normal respiratory effort and no intercostal retractions.  C/V:  Regular rate and rhythm, no murmurs, rubs or gallops.  No JVD, no carotid bruits. Radial and DP pulses 2+, equal and regular.  Abdomen:  Not distended.  Bowel sounds active.  No tenderness, no hepatosplenomegaly or masses.  No CVA tenderness or masses.  Lymph:  No cervical lymph nodes.  Neuro: Alert and oriented, face symmetric. Able to get on/off exam table without assistance.  Strength grossly  intact. No tremor.  Gait steady.   M/S:   No joint deformities noted.  No joint swelling.  Skin:   Normal temperature., turgor and texture. No rashes, suspicious lesions, or jaundice on exposed skin surfaces.   Extremities:  No peripheral edema, no digital cyanosis  Psych:  Alert and oriented. Appropriate affect.  Not psychomotor slowed.  No signs of anxiety or agitation.      Assessment and Plan:  Catherine was seen today for uti.    Diagnoses and all orders for this visit:    Urinary tract infection without hematuria, site unspecified  -     Basic metabolic panel  (Ca, Cl, CO2, Creat, Gluc, K, Na, BUN); Future  -     UA Macro with Reflex to Micro and Culture - lab collect; Future  -     estradiol (VAGIFEM) 10 MCG TABS vaginal tablet; Place 1 tablet (10 mcg) vaginally twice a week      Advised that course of ciprofloxacin should have resolved her UTI and she would benefit from estradiol vaginal suppository. If she still has persistent sxs after 2 weeks will consider USG to look for any obstructive lesion.         Options for treatment and follow-up care were reviewed with the patient.       Catherine Mccollum engaged in the decision making process and verbalized understanding of the options discussed and agreed with the final plan.  This patient was discussed and seen with Dr. Slava Summers    Mar 1, 2023  KASANDRA Miranda  Internal Medicine Resident (PGY1)  [facility]  Pager: [phone]

## 2023-03-02 ENCOUNTER — TRANSFERRED RECORDS (OUTPATIENT)
Dept: HEALTH INFORMATION MANAGEMENT | Facility: CLINIC | Age: 83
End: 2023-03-02

## 2023-03-02 ENCOUNTER — APPOINTMENT (OUTPATIENT)
Dept: PHYSICAL THERAPY | Facility: CLINIC | Age: 83
DRG: 641 | End: 2023-03-02
Payer: MEDICARE

## 2023-03-02 ENCOUNTER — TELEPHONE (OUTPATIENT)
Dept: INTERNAL MEDICINE | Facility: CLINIC | Age: 83
End: 2023-03-02
Payer: MEDICARE

## 2023-03-02 ENCOUNTER — APPOINTMENT (OUTPATIENT)
Dept: CT IMAGING | Facility: CLINIC | Age: 83
DRG: 641 | End: 2023-03-02
Payer: MEDICARE

## 2023-03-02 PROBLEM — E87.1 HYPONATREMIA: Status: ACTIVE | Noted: 2023-03-02

## 2023-03-02 LAB
ALBUMIN SERPL BCG-MCNC: 4.4 G/DL (ref 3.5–5.2)
ALP SERPL-CCNC: 66 U/L (ref 35–104)
ALT SERPL W P-5'-P-CCNC: 12 U/L (ref 10–35)
ANION GAP SERPL CALCULATED.3IONS-SCNC: 11 MMOL/L (ref 7–15)
ANION GAP SERPL CALCULATED.3IONS-SCNC: 17 MMOL/L (ref 7–15)
AST SERPL W P-5'-P-CCNC: 40 U/L (ref 10–35)
ATRIAL RATE - MUSE: 68 BPM
BASOPHILS # BLD AUTO: 0.1 10E3/UL (ref 0–0.2)
BASOPHILS NFR BLD AUTO: 0 %
BILIRUB SERPL-MCNC: 1.4 MG/DL
BUN SERPL-MCNC: 5.9 MG/DL (ref 8–23)
BUN SERPL-MCNC: 8.9 MG/DL (ref 8–23)
CALCIUM SERPL-MCNC: 9.1 MG/DL (ref 8.8–10.2)
CALCIUM SERPL-MCNC: 9.8 MG/DL (ref 8.8–10.2)
CHLORIDE SERPL-SCNC: 74 MMOL/L (ref 98–107)
CHLORIDE SERPL-SCNC: 88 MMOL/L (ref 98–107)
CORTIS SERPL-MCNC: 20 UG/DL
CREAT SERPL-MCNC: 0.46 MG/DL (ref 0.51–0.95)
CREAT SERPL-MCNC: 0.53 MG/DL (ref 0.51–0.95)
CREAT SERPL-MCNC: 0.62 MG/DL (ref 0.51–0.95)
DEPRECATED HCO3 PLAS-SCNC: 24 MMOL/L (ref 22–29)
DEPRECATED HCO3 PLAS-SCNC: 26 MMOL/L (ref 22–29)
DIASTOLIC BLOOD PRESSURE - MUSE: NORMAL MMHG
EOSINOPHIL # BLD AUTO: 0.1 10E3/UL (ref 0–0.7)
EOSINOPHIL NFR BLD AUTO: 1 %
ERYTHROCYTE [DISTWIDTH] IN BLOOD BY AUTOMATED COUNT: 11.7 % (ref 10–15)
ERYTHROCYTE [DISTWIDTH] IN BLOOD BY AUTOMATED COUNT: 11.8 % (ref 10–15)
GFR SERPL CREATININE-BSD FRML MDRD: 87 ML/MIN/1.73M2
GFR SERPL CREATININE-BSD FRML MDRD: >90 ML/MIN/1.73M2
GFR SERPL CREATININE-BSD FRML MDRD: >90 ML/MIN/1.73M2
GLUCOSE SERPL-MCNC: 122 MG/DL (ref 70–99)
GLUCOSE SERPL-MCNC: 139 MG/DL (ref 70–99)
HCT VFR BLD AUTO: 37.4 % (ref 35–47)
HCT VFR BLD AUTO: 37.9 % (ref 35–47)
HGB BLD-MCNC: 14 G/DL (ref 11.7–15.7)
HGB BLD-MCNC: 14.2 G/DL (ref 11.7–15.7)
HOLD SPECIMEN: NORMAL
IMM GRANULOCYTES # BLD: 0.1 10E3/UL
IMM GRANULOCYTES NFR BLD: 1 %
INTERPRETATION ECG - MUSE: NORMAL
LIPASE SERPL-CCNC: 49 U/L (ref 13–60)
LYMPHOCYTES # BLD AUTO: 2.3 10E3/UL (ref 0.8–5.3)
LYMPHOCYTES NFR BLD AUTO: 18 %
MCH RBC QN AUTO: 34.5 PG (ref 26.5–33)
MCH RBC QN AUTO: 34.5 PG (ref 26.5–33)
MCHC RBC AUTO-ENTMCNC: 36.9 G/DL (ref 31.5–36.5)
MCHC RBC AUTO-ENTMCNC: 38 G/DL (ref 31.5–36.5)
MCV RBC AUTO: 91 FL (ref 78–100)
MCV RBC AUTO: 93 FL (ref 78–100)
MONOCYTES # BLD AUTO: 1 10E3/UL (ref 0–1.3)
MONOCYTES NFR BLD AUTO: 7 %
NEUTROPHILS # BLD AUTO: 9.8 10E3/UL (ref 1.6–8.3)
NEUTROPHILS NFR BLD AUTO: 73 %
NRBC # BLD AUTO: 0 10E3/UL
NRBC BLD AUTO-RTO: 0 /100
OSMOLALITY SERPL: 245 MMOL/KG (ref 280–301)
OSMOLALITY UR: 275 MMOL/KG (ref 100–1200)
P AXIS - MUSE: 60 DEGREES
PLATELET # BLD AUTO: 476 10E3/UL (ref 150–450)
PLATELET # BLD AUTO: 488 10E3/UL (ref 150–450)
POTASSIUM SERPL-SCNC: 2.7 MMOL/L (ref 3.4–5.3)
POTASSIUM SERPL-SCNC: 3.8 MMOL/L (ref 3.4–5.3)
PR INTERVAL - MUSE: 158 MS
PROT SERPL-MCNC: 6.6 G/DL (ref 6.4–8.3)
QRS DURATION - MUSE: 110 MS
QT - MUSE: 436 MS
QTC - MUSE: 463 MS
R AXIS - MUSE: -40 DEGREES
RBC # BLD AUTO: 4.06 10E6/UL (ref 3.8–5.2)
RBC # BLD AUTO: 4.12 10E6/UL (ref 3.8–5.2)
SODIUM SERPL-SCNC: 117 MMOL/L (ref 136–145)
SODIUM SERPL-SCNC: 119 MMOL/L (ref 136–145)
SODIUM SERPL-SCNC: 121 MMOL/L (ref 136–145)
SODIUM SERPL-SCNC: 123 MMOL/L (ref 136–145)
SODIUM SERPL-SCNC: 124 MMOL/L (ref 136–145)
SODIUM SERPL-SCNC: 125 MMOL/L (ref 136–145)
SODIUM UR-SCNC: 47 MMOL/L
SYSTOLIC BLOOD PRESSURE - MUSE: NORMAL MMHG
T AXIS - MUSE: -26 DEGREES
TSH SERPL DL<=0.005 MIU/L-ACNC: 1.52 UIU/ML (ref 0.3–4.2)
VENTRICULAR RATE- MUSE: 68 BPM
WBC # BLD AUTO: 10.9 10E3/UL (ref 4–11)
WBC # BLD AUTO: 13.3 10E3/UL (ref 4–11)

## 2023-03-02 PROCEDURE — 97161 PT EVAL LOW COMPLEX 20 MIN: CPT | Mod: GP

## 2023-03-02 PROCEDURE — 250N000013 HC RX MED GY IP 250 OP 250 PS 637

## 2023-03-02 PROCEDURE — 120N000002 HC R&B MED SURG/OB UMMC

## 2023-03-02 PROCEDURE — 99223 1ST HOSP IP/OBS HIGH 75: CPT | Mod: AI | Performed by: STUDENT IN AN ORGANIZED HEALTH CARE EDUCATION/TRAINING PROGRAM

## 2023-03-02 PROCEDURE — 84300 ASSAY OF URINE SODIUM: CPT | Performed by: EMERGENCY MEDICINE

## 2023-03-02 PROCEDURE — G1010 CDSM STANSON: HCPCS | Performed by: RADIOLOGY

## 2023-03-02 PROCEDURE — 36415 COLL VENOUS BLD VENIPUNCTURE: CPT

## 2023-03-02 PROCEDURE — 97530 THERAPEUTIC ACTIVITIES: CPT | Mod: GP

## 2023-03-02 PROCEDURE — 83930 ASSAY OF BLOOD OSMOLALITY: CPT | Performed by: EMERGENCY MEDICINE

## 2023-03-02 PROCEDURE — 258N000003 HC RX IP 258 OP 636: Performed by: EMERGENCY MEDICINE

## 2023-03-02 PROCEDURE — 84295 ASSAY OF SERUM SODIUM: CPT

## 2023-03-02 PROCEDURE — 999N000111 HC STATISTIC OT IP EVAL DEFER: Performed by: OCCUPATIONAL THERAPIST

## 2023-03-02 PROCEDURE — 36415 COLL VENOUS BLD VENIPUNCTURE: CPT | Performed by: EMERGENCY MEDICINE

## 2023-03-02 PROCEDURE — 85027 COMPLETE CBC AUTOMATED: CPT

## 2023-03-02 PROCEDURE — 70450 CT HEAD/BRAIN W/O DYE: CPT | Mod: 26 | Performed by: RADIOLOGY

## 2023-03-02 PROCEDURE — G1010 CDSM STANSON: HCPCS

## 2023-03-02 PROCEDURE — 250N000011 HC RX IP 250 OP 636

## 2023-03-02 PROCEDURE — 80048 BASIC METABOLIC PNL TOTAL CA: CPT

## 2023-03-02 PROCEDURE — 82310 ASSAY OF CALCIUM: CPT

## 2023-03-02 PROCEDURE — 82533 TOTAL CORTISOL: CPT

## 2023-03-02 PROCEDURE — 258N000003 HC RX IP 258 OP 636

## 2023-03-02 PROCEDURE — 83935 ASSAY OF URINE OSMOLALITY: CPT | Performed by: EMERGENCY MEDICINE

## 2023-03-02 RX ORDER — GABAPENTIN 100 MG/1
100-300 CAPSULE ORAL AT BEDTIME
Status: DISCONTINUED | OUTPATIENT
Start: 2023-03-02 | End: 2023-03-06 | Stop reason: HOSPADM

## 2023-03-02 RX ORDER — AMOXICILLIN 250 MG
2 CAPSULE ORAL 2 TIMES DAILY PRN
Status: DISCONTINUED | OUTPATIENT
Start: 2023-03-02 | End: 2023-03-06 | Stop reason: HOSPADM

## 2023-03-02 RX ORDER — AMOXICILLIN 250 MG
1 CAPSULE ORAL 2 TIMES DAILY PRN
Status: DISCONTINUED | OUTPATIENT
Start: 2023-03-02 | End: 2023-03-06 | Stop reason: HOSPADM

## 2023-03-02 RX ORDER — POTASSIUM CHLORIDE 20MEQ/15ML
60 LIQUID (ML) ORAL DAILY
Status: DISCONTINUED | OUTPATIENT
Start: 2023-03-02 | End: 2023-03-02

## 2023-03-02 RX ORDER — CARBIDOPA AND LEVODOPA 25; 100 MG/1; MG/1
2 TABLET ORAL 3 TIMES DAILY
Status: DISCONTINUED | OUTPATIENT
Start: 2023-03-02 | End: 2023-03-06 | Stop reason: HOSPADM

## 2023-03-02 RX ORDER — ONDANSETRON 4 MG/1
4 TABLET, ORALLY DISINTEGRATING ORAL EVERY 6 HOURS PRN
Status: DISCONTINUED | OUTPATIENT
Start: 2023-03-02 | End: 2023-03-06 | Stop reason: HOSPADM

## 2023-03-02 RX ORDER — SODIUM CHLORIDE 9 MG/ML
INJECTION, SOLUTION INTRAVENOUS CONTINUOUS
Status: DISCONTINUED | OUTPATIENT
Start: 2023-03-02 | End: 2023-03-05

## 2023-03-02 RX ORDER — ONDANSETRON 2 MG/ML
4 INJECTION INTRAMUSCULAR; INTRAVENOUS EVERY 6 HOURS PRN
Status: DISCONTINUED | OUTPATIENT
Start: 2023-03-02 | End: 2023-03-06 | Stop reason: HOSPADM

## 2023-03-02 RX ORDER — SODIUM CHLORIDE 9 MG/ML
INJECTION, SOLUTION INTRAVENOUS CONTINUOUS
Status: DISCONTINUED | OUTPATIENT
Start: 2023-03-02 | End: 2023-03-02

## 2023-03-02 RX ORDER — PRAVASTATIN SODIUM 20 MG
20 TABLET ORAL AT BEDTIME
Status: DISCONTINUED | OUTPATIENT
Start: 2023-03-02 | End: 2023-03-06 | Stop reason: HOSPADM

## 2023-03-02 RX ORDER — DIPHENHYDRAMINE HYDROCHLORIDE AND LIDOCAINE HYDROCHLORIDE AND ALUMINUM HYDROXIDE AND MAGNESIUM HYDRO
10 KIT EVERY 6 HOURS PRN
Status: DISCONTINUED | OUTPATIENT
Start: 2023-03-02 | End: 2023-03-06 | Stop reason: HOSPADM

## 2023-03-02 RX ORDER — ENOXAPARIN SODIUM 100 MG/ML
40 INJECTION SUBCUTANEOUS EVERY 24 HOURS
Status: DISCONTINUED | OUTPATIENT
Start: 2023-03-02 | End: 2023-03-06 | Stop reason: HOSPADM

## 2023-03-02 RX ORDER — CALCIUM CARBONATE 500(1250)
1 TABLET ORAL DAILY
Status: DISCONTINUED | OUTPATIENT
Start: 2023-03-02 | End: 2023-03-06 | Stop reason: HOSPADM

## 2023-03-02 RX ORDER — HYDROXYUREA 500 MG/1
500 CAPSULE ORAL DAILY
Status: DISCONTINUED | OUTPATIENT
Start: 2023-03-02 | End: 2023-03-06 | Stop reason: HOSPADM

## 2023-03-02 RX ORDER — LIDOCAINE 40 MG/G
CREAM TOPICAL
Status: DISCONTINUED | OUTPATIENT
Start: 2023-03-02 | End: 2023-03-06 | Stop reason: HOSPADM

## 2023-03-02 RX ORDER — PANTOPRAZOLE SODIUM 40 MG/1
40 TABLET, DELAYED RELEASE ORAL
Status: DISCONTINUED | OUTPATIENT
Start: 2023-03-02 | End: 2023-03-06 | Stop reason: HOSPADM

## 2023-03-02 RX ORDER — ACETAMINOPHEN 325 MG/1
975 TABLET ORAL EVERY 8 HOURS
Status: DISCONTINUED | OUTPATIENT
Start: 2023-03-02 | End: 2023-03-06 | Stop reason: HOSPADM

## 2023-03-02 RX ORDER — IRBESARTAN 150 MG/1
150 TABLET ORAL DAILY
Status: DISCONTINUED | OUTPATIENT
Start: 2023-03-02 | End: 2023-03-02

## 2023-03-02 RX ORDER — POLYETHYLENE GLYCOL 3350 17 G/17G
17 POWDER, FOR SOLUTION ORAL DAILY
Status: DISCONTINUED | OUTPATIENT
Start: 2023-03-02 | End: 2023-03-06 | Stop reason: HOSPADM

## 2023-03-02 RX ORDER — POTASSIUM CHLORIDE 20MEQ/15ML
60 LIQUID (ML) ORAL ONCE
Status: COMPLETED | OUTPATIENT
Start: 2023-03-02 | End: 2023-03-02

## 2023-03-02 RX ORDER — VITAMIN B COMPLEX
25 TABLET ORAL EVERY MORNING
Status: DISCONTINUED | OUTPATIENT
Start: 2023-03-02 | End: 2023-03-06 | Stop reason: HOSPADM

## 2023-03-02 RX ORDER — ESTRADIOL 10 UG/1
10 INSERT VAGINAL
Status: DISCONTINUED | OUTPATIENT
Start: 2023-03-02 | End: 2023-03-06 | Stop reason: HOSPADM

## 2023-03-02 RX ADMIN — CARBIDOPA AND LEVODOPA 2 TABLET: 25; 100 TABLET ORAL at 07:50

## 2023-03-02 RX ADMIN — ESTRADIOL 10 MCG: 10 TABLET, FILM COATED VAGINAL at 07:50

## 2023-03-02 RX ADMIN — IRBESARTAN 225 MG: 150 TABLET ORAL at 10:05

## 2023-03-02 RX ADMIN — POTASSIUM CHLORIDE 60 MEQ: 20 SOLUTION ORAL at 04:50

## 2023-03-02 RX ADMIN — HYDROXYUREA 500 MG: 500 CAPSULE ORAL at 07:52

## 2023-03-02 RX ADMIN — SODIUM CHLORIDE: 9 INJECTION, SOLUTION INTRAVENOUS at 03:40

## 2023-03-02 RX ADMIN — ACETAMINOPHEN 975 MG: 325 TABLET ORAL at 14:53

## 2023-03-02 RX ADMIN — ENOXAPARIN SODIUM 40 MG: 40 INJECTION SUBCUTANEOUS at 09:34

## 2023-03-02 RX ADMIN — CALCIUM 500 MG: 500 TABLET ORAL at 07:51

## 2023-03-02 RX ADMIN — PANTOPRAZOLE SODIUM 40 MG: 40 TABLET, DELAYED RELEASE ORAL at 07:51

## 2023-03-02 RX ADMIN — SODIUM CHLORIDE: 9 INJECTION, SOLUTION INTRAVENOUS at 21:44

## 2023-03-02 RX ADMIN — SODIUM CHLORIDE 1000 ML: 9 INJECTION, SOLUTION INTRAVENOUS at 02:08

## 2023-03-02 RX ADMIN — GABAPENTIN 100 MG: 100 CAPSULE ORAL at 21:46

## 2023-03-02 RX ADMIN — ACETAMINOPHEN 975 MG: 325 TABLET ORAL at 21:46

## 2023-03-02 RX ADMIN — CARBIDOPA AND LEVODOPA 2 TABLET: 25; 100 TABLET ORAL at 14:53

## 2023-03-02 RX ADMIN — PRAVASTATIN SODIUM 20 MG: 20 TABLET ORAL at 21:46

## 2023-03-02 ASSESSMENT — ACTIVITIES OF DAILY LIVING (ADL)
ADLS_ACUITY_SCORE: 35

## 2023-03-02 NOTE — ED PROVIDER NOTES
[place] EMERGENCY DEPARTMENT ([facility])  March 2, 2023     History     Chief Complaint   Patient presents with     Abnormal Labs     HPI  Catherine Mccollum is a 82 year old female with a past medical history significant for essential HTN, HLD, essential thrombocythemia, parkinsonsism who presents to the Emergency Department for evaluation of hyponatremia. The patient had labs drawn earlier today (3/1/23) which revealed sodium of 119. Her physician called 911 and she was transferred here via EMS.  She has been having dysuria as well as urinary urgency (with poor flow) for at least a couple of weeks.  She was initially treated for a UTI outpatient with Macrobid which she completed and had persistent symptoms.  She was started on ciprofloxacin which she recently completed the entire course.  She notes that she still has the exact same symptoms, however had UA which was negative for signs of infection.  They obtained lab work, and sent her to the emergency department for sodium of 119.    Today she felt lightheaded and when walking outside of the car to enter her doctor's appointment, she landed backwards on her butt.  She denies any loss of consciousness.  She did not hit her head or injure any other part of her body.  She denies any chest pain, shortness of breath, presyncope.    She had COVID-19 approximately 3 weeks ago.  She has persistent cough as well as persistent decreased appetite since then.  She did take Paxlovid for this which resulted in abdominal pain which has been persistent.  She does have nausea and will have vomiting intermittently after eating due to her abdominal pain.  She states that she has been very thirsty recently and drinking more fluids.  However not eating much food due to GI upset and poor appetite.          Past Medical History  Past Medical History:   Diagnosis Date     Arthritis 2000     Essential thrombocythemia (H) 06/08/2021     GERD (gastroesophageal reflux disease)       HTN (hypertension)      Osteoporosis      Parkinsonian syndrome (H)     left side tremor     Past Surgical History:   Procedure Laterality Date     ANKLE SURGERY Right 2002     BREAST SURGERY  1987    biopsy     CATARACT IOL, RT/LT        SECTION       COLONOSCOPY  2013    normal     GYN SURGERY  1965;1967    2 ceasarean sections     HEMILAMINECTOMY, DISCECTOMY LUMBAR ONE LEVEL, COMBINED Left 2018    Procedure: COMBINED HEMILAMINECTOMY, DISCECTOMY LUMBAR ONE LEVEL;  Open Left Lumbar 5-Sacral 1 Hemilaminectomy, Medial Facetectomy, And Microdiscectomy Attention Transitional Segment Anatomy;  Surgeon: Rosemarie Young MD;  Location: [facility] OR     LUMBAR LAMINECTOMY Right 2017    Procedure:  RIGHT L4-5 AND L5-S1 LATERAL RECESS DECOMPRESSION;  Surgeon: Raudel Jackson MD;  Location: [facility];  Service:      ORTHOPEDIC SURGERY      ankle surgery for trimalleolar frx     [place] SPINE SURGERY PROCEDURE UNLISTED       Acetaminophen (TYLENOL EXTRA STRENGTH PO)  calcium carbonate (OS-LALI) 500 MG tablet  CALCIUM PO  carbidopa-levodopa (SINEMET)  MG tablet  chlorthalidone (HYGROTON) 25 MG tablet  Cholecalciferol (VITAMIN D) 1000 UNITS capsule  ciprofloxacin (CIPRO) 500 MG tablet  cyanocobalamin (VITAMIN B-12) 1000 MCG tablet  estradiol (VAGIFEM) 10 MCG TABS vaginal tablet  gabapentin (NEURONTIN) 100 MG capsule  gabapentin (NEURONTIN) 300 MG capsule  hydroxyurea (HYDREA) 500 MG capsule  irbesartan (AVAPRO) 150 MG tablet  LORazepam (ATIVAN) 0.5 MG tablet  magic mouthwash suspension (diphenhydrAMINE, lidocaine, aluminum-magnesium & simethicone)  omeprazole (PRILOSEC) 20 MG DR capsule  ondansetron (ZOFRAN) 4 MG tablet  pravastatin (PRAVACHOL) 20 MG tablet      Allergies   Allergen Reactions     Fosamax Nausea and GI Disturbance     Lisinopril Other (See Comments)     cough     Sulfa Drugs Hives     Family History  Family History   Problem Relation Age of Onset     C.A.D.  Other      Hypertension Other         granddaughter      Cancer Other      Heart Disease Father      Hypertension Father      Diabetes No family hx of      Social History   Social History     Tobacco Use     Smoking status: Never     Smokeless tobacco: Never   Vaping Use     Vaping Use: Never used   Substance Use Topics     Alcohol use: No     Drug use: No      Past medical history, past surgical history, medications, allergies, family history, and social history were reviewed with the patient. No additional pertinent items.      A medically appropriate review of systems was performed with pertinent positives and negatives noted in the HPI, and all other systems negative.    Physical Exam   BP: (!) 191/78  Pulse: 70  Temp: 97.8  F (36.6  C)  Resp: 20  SpO2: 97 %  Physical Exam  General: no acute distress.  HENT: Normocephalic and atraumatic.  No oropharyngeal exudate.   Eyes: EOMI, conjunctivae normal.   Cardiovascular:  Normal rate and regular rhythm.   No murmur heard.  Pulmonary:  No respiratory distress. Normal breath sounds.   Abdominal: no distension.  Abdomen is soft. There is no mass. There is no abdominal tenderness.   Musculoskeletal:    No swelling or tenderness.  Moving all extremities spontaneously.    Skin: warm and dry  Neurological:  No focal deficit present.    Psychiatric:    normal affect        ED Course, Procedures, & Data      Procedures       ED Course Selections:        EKG Interpretation:      Interpreted by Carlita Mccarty MD  Time reviewed: 12:40 AM  Symptoms at time of EKG: Had lightheadedness earlier  Rhythm: normal sinus   Rate: normal  Axis: Left  Ectopy: none  Conduction: Left anterior fascicular block  ST Segments/ T Waves: No ST-T wave changes  Q Waves: none  Comparison to prior: Unchanged from 10/19/2021    Clinical Impression: normal EKG                     Results for orders placed or performed during the hospital encounter of 03/01/23   [place] Draw     Status: None ()     Narrative    The following orders were created for panel order [place] Draw.  Procedure                               Abnormality         Status                     ---------                               -----------         ------                     Extra Blue Top Tube[[id number]]                                                         Extra Red Top Tube[[id number]]                                                          Extra Serum Separator Tu...[[id number]]                                                 Extra Serum Separator Tu...[[id number]]                                                 Extra Green Top (Lithium...[[id number]]                                                 Extra Purple Top Tube[[id number]]                                                         Please view results for these tests on the individual orders.   CBC with platelets differential     Status: None ()    Narrative    The following orders were created for panel order CBC with platelets differential.  Procedure                               Abnormality         Status                     ---------                               -----------         ------                     CBC with platelets and d...[[id number]]                                                   Please view results for these tests on the individual orders.     Medications - No data to display  Labs Ordered and Resulted from Time of ED Arrival to Time of ED Departure - No data to display  No orders to display          Critical care was not performed.     Medical Decision Making  The patient's presentation was of moderate complexity (an acute illness with systemic symptoms).    The patient's evaluation involved:  review of external note(s) from 3+ sources (see separate area of note for details)  ordering and/or review of 3+ test(s) in this encounter (see separate area of note for details)  review of 3+ test result(s) ordered prior to this encounter (see separate area of note for  details)    The patient's management necessitated high risk (a decision regarding hospitalization).      Assessment & Plan    Patient arrives to the emergency department for chief complaint of acute new onset hyponatremia with sodium levels of 119 outpatient lab.  On exam she is in no acute distress and has no neurologic abnormalities.  She appears euvolemic.  Differential diagnosis includes but is not limited to hyponatremia secondary to kidney injury, water intoxication, SIADH, prerenal secondary to diarrhea.    ECG unchanged from 2021.  CBC returns with leukocytosis of 13.3.  Urine sodium 47, urine osmolality 275.  Lipase, TSH, and CMP and serum osmolality pending.  Patient was admitted to Dr. Mcdaniels hospitalist.   Formal sodium is 117.  Based on history of vomiting and decreased PO intake, will treat as hypovolemic and provide 1L NS in the ED.      I have reviewed the nursing notes. I have reviewed the findings, diagnosis, plan and need for follow up with the patient.    New Prescriptions    No medications on file       Final diagnoses:   None       Carlita Mccarty DO  [facility] EMERGENCY DEPARTMENT  3/1/2023       Carlita Mccarty MD  03/02/23 0212

## 2023-03-02 NOTE — PLAN OF CARE
Goal Outcome Evaluation:  Status: hyponatremia   Vitals: BP (!) 156/74 (BP Location: Right arm, Patient Position: Sitting, Cuff Size: Adult Small)   Pulse 72   Temp 97.8  F (36.6  C) (Oral)   Resp 18   Ht 1.524 m (5')   Wt 65.8 kg (145 lb)   SpO2 98%   BMI 28.32 kg/m     hypertensive within the parameter.   Neuros: Alert and oriented X4.   IV: PIV right SL   Labs/Electrolytes: Na  Q4hrs, current sodium level is 119. team updated.    Resp/trach: RA, denies SOB, chest pain. Maintaining sats 90s   Diet: Reg diet, poor oral intake, encouraging oral intake.   Bowel status: x1 BM soft.   : voiding spontaneously   Skin: Ricardo and flaky.   Pain: Denies pain   Activity: SBA with walker and GB   Plan: continue to monitor         Plan of Care Reviewed With: patient    Overall Patient Progress: no changeOverall Patient Progress: no change

## 2023-03-02 NOTE — PROGRESS NOTES
"Brief progress note      Major hospital problems addressed today    Catherine Mccollum is a 82 year old female with a past medical history significant for essential HTN, HLD, essential thrombocythemia, parkinsonsism who is currently being managed for hyponatremia.     Changes today  - Continue to monitor Na   - Regular diet, no need for fluid restriction  - Correction goal 125 by 5 am on 3/3/2023    # Hyponatremia likely related low solute diet and chlorthalidone  Hx suggestive of polydipsia and poor solute intake in the past 2 weeks. Clinical response to IVF supports this theory. UA studies not reliable given pt is on chlorthalidone. TSH and ACTH levels;   - Correction goal 125 by 5 am on 3/3/2023  - Q4H Na checks  - if Na<120 at next check start NS at 75 ml/hr; if above 125, give free water based on free water correction to sodium goal of 125.   - Regular diet, no need for fluid restriction    # Unwitnessed fall  # Headache, reesolved  Pt reporting having fallen and \"bumping\" her head on the ground when she was on her way to her PCP clinic. Denies LOC though endorses headache which she is unsure if occurring since fall or due to sleep deprived in the ED hallway. CT w/o contrast unremarkable for acute pathology.  - CTM    # Abdominal Pain, resolved  # Nausea/vomiting, resolved  Pt reporting abdominal pain since starting Paxlovid and continued with antibiotics. Notes occasional vomiting and attributes this to poor oral intake. Slightly improved in the ED. Possibly food aversion.  - CTM     # Hypokalemia, resolved  - Replaced with 60mEq; repeat  3.8    -----------------------------------------------------------     Physical exam    Vital signs:  Temp: 97.8  F (36.6  C) Temp src: Oral BP: (!) 156/74 Pulse: 72   Resp: 18 SpO2: 98 % O2 Device: None (Room air)   Height: 152.4 cm (5') Weight: 65.8 kg (145 lb)  Estimated body mass index is 28.32 kg/m  as calculated from the following:    Height as of this encounter: 1.524 m " (5').    Weight as of this encounter: 65.8 kg (145 lb).       General: Pt NAD;   HEENT: PERRLA,atraumatic  Neck: Supple   Card: RRR, normal S1 and S2, no murmurs  Lung: Clear; equal BS bilaterally  Abdomen:  soft and nontender (mild discomfort with deep p[palpation in the umbilical area), nondistended, no organomegaly, BS+  Neuro: Alert and oriented X 3  Psych: Normal mood, affect and response  Skin: No rashes, skin warm and dry, no erythematous areas  Extremities: No edema      Data   Reviewed  -----------------------------------------------------------      Pt care was discussed with MD Pari Corral MD  Internal Medicine PGY 1  [facility]

## 2023-03-02 NOTE — PLAN OF CARE
OT: after conversation with interdisciplinary team as well as observation of this pt it is concluded that she has no acute OT needs. Pt endorsing no need for ADL education and PT to follow for mobility is appropriate. Defer acute OT at this time.

## 2023-03-02 NOTE — H&P
"[facility]    History and Physical - Medicine Service, [place] TEAM        Date of Admission:  3/1/2023    Assessment & Plan      Catherine Mccollum is a 82 year old female with a past medical history significant for essential HTN, HLD, essential thrombocythemia, parkinsonsism who presents to the Emergency Department for evaluation of hyponatremia.     # Hypovolemic Hyponatremia  # c/f Potomania/Polydipsia vs SIADH  Pt presented to outpatient clinic with concerns of ongoing urinary symptoms (incomplete voiding) which she had been previously treated for UTI (Macrobid then Cipro). Na was found to be low at 119 and was advised to present to the ED were it was again found low at 119 then 117 on recheck. Per pt she has had very poor PO intake since she had Covid 3 weeks ago attributed at first to Paxlovid treatment, only eating at most a hard boiled egg or soup. Drinking roughly 8 glasses of water since she had Covid which she reports as very abnormal for her though very thirsty. BMP with Na of 119 worsened to 117 and K of 3.0. CBC significant for an elevated WBC at 12.6 on repeat 13.3. UA with small leuk esterase though positive for squamous cells. pH elevated at 7.5. TSH normal at 1.52. Urine Na at 47, Urine Osm 275, Serum Osm 245. Pt does not appear to be fluid up on exam. Labs consistent with Euvolemic/hypovolemic hyponatremia possibly SIADH though given poor PO intake and recent GI losses given mIVF which improved Na to 124.  - Fluid Restriction (<1.5L)  - Repeat Na 124, disoncontinued mIVF  - Na repeat q6hr  - Cortisol- wnL  - Nutrition consult    # Unwitnessed fall  # Headache  Pt reporting having fallen and \"bumping\" her head on the ground when she was on her way to her PCP clinic. Denies LOC though endorses headache which she is unsure if occurring since fall or due to sleep deprived in the ED hallway.   - CT w/o contrast    # UTI, resolved  # c/f urinary retention  Pt " previously with dysuria with micturition though now noting this has resolved. UA overall unremarkable. Currently only symptom is incomplete voiding. Will hold off abx as pt has completed 7 day course of emperic Cipro with resolution of symptoms and unremarkable UA. Given history of Parkinsonism will plan to have nursing staff perform bladder scans.  - Bladder scan with intermittent straight cath    # Abdominal Pain  # Nausea/vomiting  Pt reporting abdominal pain since starting Paxlovid and continued with antibiotics. Notes occasional vomiting and attributes this to poor oral intake. Slightly improved in the ED. Possibly food aversion.  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 Follows with Onc as outpatient.  - Continue Hydroxyurea 500mg every day    # HTN  - Hold PTA Chlorthalidone and irbesartan    # HLD  - Continue pravastatin 20mg QHS    # GERD  - Continue Omeprazole 20mg every day    # Anxiety  # Insomnia  - Continue PTA Gabapentin  - Holding Lorazepam       Diet:  NPO, Fluid restriction (1.5)  DVT Prophylaxis: Enoxaparin (Lovenox) subcutaneous, hold prior to imaging  Soler Catheter: Not present  Fluids: None  Lines: None     Cardiac Monitoring: None  Code Status:   Full Code    Clinically Significant Risk Factors Present on Admission        # Hypokalemia: Lowest K = 2.7 mmol/L in last 2 days, will replace as needed  # Hyponatremia: Lowest Na = 117 mmol/L in last 2 days, will monitor as appropriate          # Hypertension: home medication list includes antihypertensive(s)      # Overweight: Estimated body mass index is 28.32 kg/m  as calculated from the following:    Height as of this encounter: 1.524 m (5').    Weight as of this encounter: 65.8 kg (145 lb).           Disposition Plan      Expected Discharge  "Date: 03/04/2023                Patient will be formally staffed in the AM.    Royce Caldwell MD  Medicine Service, [facility]  Securely message with [employer] (more info)  Text page via [employer] Paging/Directory   See signed in provider for up to date coverage information  ______________________________________________________________________    Chief Complaint   Hyponatremia    History is obtained from the patient    History of Present Illness   Catherine Mccollum is a 82 year old female with a past medical history significant for essential HTN, HLD, essential thrombocythemia, parkinsonsism who presents to the Emergency Department for evaluation of hyponatremia.     Per ED note: \"The patient had labs drawn earlier today (3/1/23) which revealed sodium of 119. Her physician called 911 and she was transferred here via EMS. She has been having dysuria as well as urinary urgency (with poor flow) for at least a couple of weeks. She was initially treated for a UTI outpatient with Macrobid which she completed and had persistent symptoms. She was started on ciprofloxacin which she recently completed the entire course. She notes that she still has the exact same symptoms, however had UA which was negative for signs of infection. They obtained lab work, and sent her to the emergency department for sodium of 119.    She had COVID-19 approximately 3 weeks ago. She has persistent cough as well as persistent decreased appetite since then. She did take Paxlovid for this which resulted in abdominal pain which has been persistent. She does have nausea and will have vomiting intermittently after eating due to her abdominal pain. She states that she has been very thirsty recently and drinking more fluids [roughly 8 glasses of water a day]. However not eating much food due to GI upset and poor appetite [at most a hard boiled egg or soup].      Today she felt lightheaded and when " "walking outside of the car to enter her doctor's appointment, she landed backwards on her butt. She denies any loss of consciousness. She did not hit her head or injure any other part of her body. She denies any chest pain, shortness of breath, presyncope.\" Discussed with pt who agrees with history reviewed above.    In the ED: BMP with Na of 119 worsened to 117 and K of 3.0. CBC significant for an elevated WBC at 12.6 on repeat 13.3. UA with small leuk esterase though positive for squamous cells. pH elevated at 7.5. TSH normal at 1.52. Urine Na at 47, Urine Osm 275, Serum Osm 245.              Past Medical History    Past Medical History:   Diagnosis Date     Arthritis      Essential thrombocythemia (H) 2021     GERD (gastroesophageal reflux disease)      HTN (hypertension)      Osteoporosis      Parkinsonian syndrome (H)     left side tremor       Past Surgical History   Past Surgical History:   Procedure Laterality Date     ANKLE SURGERY Right 2002     BREAST SURGERY  1987    biopsy     CATARACT IOL, RT/LT        SECTION       COLONOSCOPY      normal     GYN SURGERY  1965;1967    2 ceasarean sections     HEMILAMINECTOMY, DISCECTOMY LUMBAR ONE LEVEL, COMBINED Left 2018    Procedure: COMBINED HEMILAMINECTOMY, DISCECTOMY LUMBAR ONE LEVEL;  Open Left Lumbar 5-Sacral 1 Hemilaminectomy, Medial Facetectomy, And Microdiscectomy Attention Transitional Segment Anatomy;  Surgeon: Rosemarie Young MD;  Location: [facility] OR     LUMBAR LAMINECTOMY Right 2017    Procedure:  RIGHT L4-5 AND L5-S1 LATERAL RECESS DECOMPRESSION;  Surgeon: Raudel Jackson MD;  Location: [facility];  Service:      ORTHOPEDIC SURGERY      ankle surgery for trimalleolar frx     NM SPINE SURGERY PROCEDURE UNLISTED         Prior to Admission Medications   Prior to Admission Medications   Prescriptions Last Dose Informant Patient Reported? Taking?   Acetaminophen (TYLENOL EXTRA STRENGTH " PO)   Yes No   Sig: Take 1,000 mg by mouth 2 times daily Muscle and joint pain   CALCIUM PO   Yes No   Sig: Take 1 tablet by mouth every morning   Cholecalciferol (VITAMIN D) 1000 UNITS capsule   Yes No   Sig: Take 2,000 Units by mouth every morning    LORazepam (ATIVAN) 0.5 MG tablet   No No   Sig: Take 1 tablet (0.5 mg) by mouth nightly as needed for anxiety   calcium carbonate (OS-LALI) 500 MG tablet   Yes No   Sig: Take 1 tablet by mouth   carbidopa-levodopa (SINEMET)  MG tablet   No No   Sig: Take 1.5 tablets by mouth 3 times daily   chlorthalidone (HYGROTON) 25 MG tablet   No No   Sig: Take 0.5 tablets (12.5 mg) by mouth daily   ciprofloxacin (CIPRO) 250 MG tablet   No No   Sig: Take 1 tablet (250 mg) by mouth 2 times daily for 5 days   ciprofloxacin (CIPRO) 500 MG tablet   No No   Sig: Take 1 tablet (500 mg) by mouth 2 times daily   cyanocobalamin (VITAMIN B-12) 1000 MCG tablet   Yes No   estradiol (VAGIFEM) 10 MCG TABS vaginal tablet   No No   Sig: Place 1 tablet (10 mcg) vaginally twice a week   gabapentin (NEURONTIN) 100 MG capsule   No No   Sig: Take one to three at night as needed   gabapentin (NEURONTIN) 300 MG capsule   No No   Sig: Take 1 capsule (300 mg) by mouth At Bedtime   hydroxyurea (HYDREA) 500 MG capsule   No No   Sig: Take 1 capsule (500 mg) by mouth daily   irbesartan (AVAPRO) 150 MG tablet   No No   Sig: TAKE 1 AND 1/2 TABLETS EVERY MORNING   magic mouthwash suspension (diphenhydrAMINE, lidocaine, aluminum-magnesium & simethicone)   No No   Sig: Swish and swallow 10 mLs in mouth every 6 hours as needed for mouth sores, mild pain or sore throat   omeprazole (PRILOSEC) 20 MG DR capsule   No No   Sig: Take 1 capsule (20 mg) by mouth daily   ondansetron (ZOFRAN) 4 MG tablet   No No   Sig: Take 1 tablet (4 mg) by mouth every 8 hours as needed for nausea   pravastatin (PRAVACHOL) 20 MG tablet   No No   Sig: Take 1 tablet (20 mg) by mouth At Bedtime      Facility-Administered Medications Last  Administration Doses Remaining   lidocaine (PF) (XYLOCAINE) 1 % injection 3 mL 11/21/2019  4:29 PM    triamcinolone (KENALOG-40) injection 40 mg 11/21/2019  4:29 PM            Review of Systems    The 10 point Review of Systems is negative other than noted in the HPI      Physical Exam   Vital Signs: Temp: 97.8  F (36.6  C) Temp src: Oral BP: (!) 152/58 Pulse: 68   Resp: 16 SpO2: 97 % O2 Device: None (Room air)    Weight: 145 lbs 0 oz    General: Elderly female, resting comfortably in bed, in No acute distress.  HENT: Normocephalic and atraumatic. No oropharyngeal exudate.   Eyes: EOMI, conjunctivae normal.   Cardiovascular:  Normal rate and regular rhythm.   No murmur heard.  Pulmonary:  No respiratory distress. Normal breath sounds.   Abdominal: No distension.  Abdomen is soft. There is no mass. There is no abdominal tenderness.   Musculoskeletal:  No swelling or tenderness.  Moving all extremities spontaneously.    Skin: warm and dry  Neurological:  No focal deficit present.    Psychiatric:  Normal affect    Medical Decision Making             Data   ------------------------- PAST 24 HR DATA REVIEWED -----------------------------------------------    I have personally reviewed the following data over the past 24 hrs:    13.3 (H)  \   14.2   / 488 (H)     117 (LL) 74 (L) 8.9 /  139 (H)   2.7 (L) 26 0.62 \       ALT: 12 AST: 40 (H) AP: 66 TBILI: 1.4 (H)   ALB: 4.4 TOT PROTEIN: 6.6 LIPASE: 49       TSH: 1.52 T4: N/A A1C: N/A       Imaging results reviewed over the past 24 hrs:   No results found for this or any previous visit (from the past 24 hour(s)).

## 2023-03-02 NOTE — ED TRIAGE NOTES
Patient presents to the ER via EMS after her doctor called 911 because she had low sodium.      Triage Assessment     Row Name 03/01/23 8468       Triage Assessment (Adult)    Airway WDL WDL       Respiratory WDL    Respiratory WDL WDL       Skin Circulation/Temperature WDL    Skin Circulation/Temperature WDL WDL       Cardiac WDL    Cardiac WDL WDL       Peripheral/Neurovascular WDL    Peripheral Neurovascular WDL WDL       Cognitive/Neuro/Behavioral WDL    Cognitive/Neuro/Behavioral WDL WDL

## 2023-03-02 NOTE — PROGRESS NOTES
"CLINICAL NUTRITION SERVICES - ASSESSMENT NOTE     Nutrition Prescription    RECOMMENDATIONS FOR MDs/PROVIDERS TO ORDER:  Check magnesium and phos labs. Monitor lytes (Phos, Mg++, and K+) for refeeding syndrome. If lytes trend low, aggressively replace. Ensure the lyte Replacement ADULT order set/s is/are implemented and select the option for high replacement. Monitor need for thiamine supplementation.      Malnutrition Status:    Patient does not meet two of the established criteria necessary for diagnosing malnutrition but is at risk for malnutrition    Recommendations already ordered by Registered Dietitian (RD):  Snacks    Future/Additional Recommendations:  1. Rec continue current diet, as ordered. Encourage small, frequent meals and intake of snacks.  Also, rec pt self-select tolerated foods/beverages.  2. Order a multivitamin with minerals, if inadequate oral intake continues, to help meet micronutrient needs.   3. Consider scheduling antiemetics/antinauseants ~20 minutes prior to meals, if N/V worsens, to help optimize nausea control. GERD hx, monitor sx.   4. Consider checking vitamin D status as pt was ordered to take vitamin D supplementation PTA. Consider checking methylmalonic acid. Could check a CRP lab.  5. Consider Imodium if infectious etiologies are ruled out. Change scheduled miralax to prn (miralax has been held so far).      REASON FOR ASSESSMENT  Catherine Mccollum is a/an 82 year old female assessed by the dietitian for Admission Nutrition Risk Screen pending and Provider Order - \"Poor PO intake for the past three weeks.\"    NUTRITION/ADDITIONAL HISTORY  Pt not known to this service PTA.   Per H & P, \"Past medical history significant for essential HTN, HLD, essential thrombocythemia, parkinsonsism who presents to the Emergency Department for evaluation of hyponatremia. Per pt she has had very poor PO intake since she had Covid 3 weeks ago attributed at first to Paxlovid treatment, only eating at " "most a hard boiled egg or soup. Drinking roughly 8 glasses of water since she had Covid which she reports as very abnormal for her though very thirsty. Pt reporting abdominal pain since starting Paxlovid and continued with antibiotics. Notes occasional vomiting and attributes this to poor oral intake. Slightly improved in the ED. Possibly food aversion. She does have nausea and will have vomiting intermittently after eating due to her abdominal pain\" GERD. Pt was ordered to take, noting, calcium carbonate, calcium, vitamin B12, magic mouthwash, zofran, prilosec, and vitamin D PTA.   Per pt, decreased appetite and eating much less for weeks (bites or skipping meals) following illness from COVID. Pt reported similar to above hx that abdominal pain caused her to have N/V and then subsequent decrease in appetite. Admitted she has had some diarrhea recently. She noted this has improved. Is aware of oral supplements but not generally a fan of these. States that sometimes food is more difficult to get down, such as an English muffin.     CURRENT NUTRITION ORDERS  Diet: Regular  Intake/Tolerance: Lack of data so far as pt was just admitted 3/1.     LABS  Labs reviewed  Cr 0.46, low (suspect may indicate lower muscle mass)    MEDICATIONS  Medications reviewed    ANTHROPOMETRICS  Height: 152.4 cm (5' 0\")  Most Recent Weight: 65.8 kg (145 lb) - Admit wt. Suspect wt was estimated.  IBW: 45.5 kg   BMI: Overweight BMI 25-29.9  Weight History: 64.4 kg (3/1/2022), 63.6 kg (5/18/2022), 64.3 kg (10/11/2022), 64 kg (11/21/2022), 61.2 kg (2/10/2023) - No significant/severe wt loss but suspect wt on admit was estimated.  Dosing Weight: 50 kg (adjusted, based on only wt so far this admission of 65.8 kg on 3/2)    ASSESSED NUTRITION NEEDS (for inpatient hospital stay)  Estimated Energy Needs: 4193-9131 kcals/day (25 - 30 kcals/kg)  Justification: Maintenance needs  Estimated Protein Needs: 50-60 grams protein/day (1 - 1.2 grams of " "pro/kg)  Justification: Increased needs, rec high end of this range  Estimated Fluid Needs: 2406-0786 mL/day (25 - 30 mL/kg)   Justification: Maintenance needs or per provider, pending fluid status    PHYSICAL FINDINGS/OTHER FINDINGS  See malnutrition section below.  GI: Per RN, pt had two episodes of loose stools today (3/2).   PT: \"Largely limited by generalized weakness. Per pt, currently resides in senior [facility] living apartment and has access to in-house PT services. Has assist w/ household duties and meals.\"    MALNUTRITION  % Intake: </= 50% for >/= 5 days (severe)  % Weight Loss: None noted; however, admission wt may have been estimated. Monitor ongoing wt trends as weights are available.   Subcutaneous Fat Loss: None observed  Muscle Loss: Age-related changes, but not consistent with malnutrition at this time.   Fluid Accumulation/Edema: None noted  Malnutrition Diagnosis: Patient does not meet two of the established criteria necessary for diagnosing malnutrition but is at risk for malnutrition    NUTRITION DIAGNOSIS  Inadequate oral intake related to decreased appetite, abdominal pain, and N/V as evidenced by pt eating bites at meals or skipping some meals over the last three weeks.     INTERVENTIONS  Implementation  Nutrition Education: Importance of adequate nutrition intake discussed with pt. Rec small, frequent meals to help increase oral intake. Encouraged high protein options as tolerated. Discussed choosing foods with softer textures or moistening. Explained foods that may worsen/increase stooling and discussed foods/beverages that may help with firming up loose stools. Explained to notify RN if pt having GI sx as medications may be available to help with sx.   Collaboration with other providers: Discussed pt with RN.   Medical food supplement therapy: Offered a variety of oral supplements. Pt politely declined at this time.    Modify composition of meals/snacks: Discussed various snack options. Pt " agreeable to receiving snacks. Ordered yogurt at 10:00 and bananas (or peaches or pears) at 14:00.      Goals  Patient to consume % of nutritionally adequate meal trays TID, or the equivalent with supplements/snacks.     Monitoring/Evaluation  Progress toward goals will be monitored and evaluated per protocol.       Nutrition will continue to follow.     Velia Yo, MS, RD, LD, [facility]   Pgr: [phone]

## 2023-03-02 NOTE — PROGRESS NOTES
"ED PT Eval     03/02/23 1000   Appointment Info   Signing Clinician's Name / Credentials (PT) ROBINSON Graves   Student Supervision Direct Patient Contact Provided;Therapy services provided with the co-signing licensed therapist guiding and directing the services, and providing the skilled judgement and assessment throughout the session   Living Environment   People in Home alone   Current Living Arrangements independent living facility   Home Accessibility no concerns   Transportation Anticipated health plan transportation   Living Environment Comments Pt is an ind living resident at [facility], where she's resided for the past 6 years. No concerns about apartment accessibility.   Self-Care   Usual Activity Tolerance good   Current Activity Tolerance moderate   Regular Exercise Yes   Activity/Exercise Type walking;strength training   Exercise Amount/Frequency 45 mins;3-5 times/wk   Equipment Currently Used at Home walker, rolling;walker, standard   Fall history within last six months yes   Number of times patient has fallen within last six months 1   Activity/Exercise/Self-Care Comment Pt attends in-facility PT appts 2x weekly and goes on daily walks around her facility and the surrounding neighborhood. Per pt, unable to exercise in last week d/t weakness. Reports her most recent fall occurred while exiting a vehicle after a medical appointment. Before falling, pt reports she felt weak, lightheaded, and dizzy.   General Information   Onset of Illness/Injury or Date of Surgery 03/01/23   Referring Physician Royce Caldwell MD   Patient/Family Therapy Goals Statement (PT) To return home safely   Pertinent History of Current Problem (include personal factors and/or comorbidities that impact the POC) Per EMR, \"Catherine Mccollum is a 82 year old female with a past medical history significant for essential HTN, HLD, essential thrombocythemia, parkinsonsism who presents to the Emergency Department for " "evaluation of hyponatremia. The patient had labs drawn earlier today (3/1/23) which revealed sodium of 119. Her physician called 911 and she was transferred here via EMS.  She has been having dysuria as well as urinary urgency (with poor flow) for at least a couple of weeks.  She was initially treated for a UTI outpatient with Macrobid which she completed and had persistent symptoms.  She was started on ciprofloxacin which she recently completed the entire course.  She notes that she still has the exact same symptoms, however had UA which was negative for signs of infection.  They obtained lab work, and sent her to the emergency department for sodium of 119.\"   General Observations verbally confirmed in person with MD that patient is safe to mobilize, activity orders to follow   Cognition   Affect/Mental Status (Cognition) WNL   Orientation Status (Cognition) oriented x 4   Follows Commands (Cognition) WNL   Pain Assessment   Patient Currently in Pain No   Integumentary/Edema   Integumentary/Edema no deficits were identifed   Posture    Posture Protracted shoulders;Forward head position;Kyphosis   Range of Motion (ROM)   ROM Comment B LE/UE ROM is WFL   Strength (Manual Muscle Testing)   Strength (Manual Muscle Testing) strength is WFL   Strength Comments ~3/5 strength B LE   Bed Mobility   Bed Mobility rolling left;scooting/bridging;supine-sit;sit-supine   Rolling Left [place] (Bed Mobility) modified independence   Scooting/Bridging [place] (Bed Mobility) modified independence   Supine-Sit [place] (Bed Mobility) modified independence   Sit-Supine [place] (Bed Mobility) modified independence   Impairments Contributing to Impaired Bed Mobility decreased strength   Assistive Device (Bed Mobility) bed rails   Transfers   Transfers sit-stand transfer;toilet transfer   Sit-Stand Transfer   Sit-Stand [place] (Transfers) supervision   Assistive Device (Sit-Stand Transfers) walker, standard "   Comment, (Sit-Stand Transfer) Utilizes UE assist from bed/walker   Toilet Transfer   [place] (Toilet Transfer) supervision   Assistive Device (Toilet Transfer) walker, front-wheeled;grab bars/safety frame   Type (Toilet Transfer) sit-stand   Comment, (Toilet Transfer) Utilizes UE assist from grab bar/walker   Gait/Stairs (Locomotion)   [place] (Gait) supervision   Assistive Device (Gait) walker, front-wheeled   Distance in Feet 100'   Pattern (Gait) step-through   Deviations/Abnormal Patterns (Gait) gait speed decreased;stride length decreased   Comment, (Gait/Stairs) Slow but steady pace. Poor foot clearance/heel strike. No LOB w/ pivoting or navigating around obstacles. Utilizes AD appropriately for stability.   Balance   Balance other (describe)   Balance Comments Depends on UE support w/ transfers and amb.   Sensory Examination   Sensory Perception patient reports no sensory changes   Clinical Impression   Criteria for Skilled Therapeutic Intervention Yes, treatment indicated   PT Diagnosis (PT) Deconditioning   Influenced by the following impairments Impaired LE strength; balance impairments; low activity tolerance   Functional limitations due to impairments Gait over extended distances   Clinical Presentation (PT Evaluation Complexity) Stable/Uncomplicated   Clinical Presentation Rationale Clinical reasoning   Clinical Decision Making (Complexity) low complexity   Planned Therapy Interventions (PT) gait training;home exercise program;balance training;strengthening;progressive activity/exercise   Anticipated Equipment Needs at Discharge (PT)   (TBD)   Risk & Benefits of therapy have been explained evaluation/treatment results reviewed;care plan/treatment goals reviewed;risks/benefits reviewed;current/potential barriers reviewed;participants voiced agreement with care plan;participants included;patient   Clinical Impression Comments Catherine Mccollum is a 82 year old female with a past  medical history significant for essential HTN, HLD, essential thrombocythemia, parkinsonsism who presents with hyponatremia. On evaluation, pts presents with LE strength/balance impairments, gait abnormalities, and low activity tolerance. These impairments affect pt's ability to amb and perform regular exercise. Pt would benefit from skilled PT services to improve functional strength and return to previous level of activity.   PT Total Evaluation Time   PT Eval, Low Complexity Minutes (37204) 15   Physical Therapy Goals   PT Frequency 3x/week   PT Predicted Duration/Target Date for Goal Attainment 03/09/23   PT Goals Gait;Aerobic Activity;PT Goal 1   PT: Gait Independent;Standard walker;Greater than 200 feet   PT: Perform aerobic activity with stable cardiovascular response continuous activity;15 minutes;ambulation   PT: Goal 1 Pt will demonstrate understanding of home exercise program to progress LE strength for improved functional mobility.   PT Discharge Planning   PT Plan Progress gait w/ 2WW; proximal LE strenghtening   PT Discharge Recommendation (DC Rec) home with assist;home with outpatient physical therapy   PT Rationale for DC Rec Pt is below baseline with regards to functional strength and actiivty tolerance. Largely limited by generalized weakness. Per pt, currently resides in [facility] apartment and has access to in-house PT services. Has assist w/ household duties and meals and owns multiple gait aids. Anticipate that pt will be able to discharge home to [facility] w/ resumption of OP PT services as medical status improves.   PT Brief overview of current status SBA gait w/ 2WW   Total Session Time   Total Session Time (sum of timed and untimed services) 15       Mynor Mccabe, PT, DPT  Pager #[phone]

## 2023-03-02 NOTE — PLAN OF CARE
Goal Outcome Evaluation:      Plan of Care Reviewed With: patient    Overall Patient Progress:  (initial assessment)Overall Patient Progress:  (initial assessment)    Outcome Evaluation: Encourage small, frequent meals of tolerated foods/beverages. Encourage intake of snacks. Manage GI sx. Pt mentioned she would prefer not to receive oral supplements at this time.

## 2023-03-03 LAB
ANION GAP SERPL CALCULATED.3IONS-SCNC: 10 MMOL/L (ref 7–15)
BACTERIA UR CULT: NORMAL
BILIRUB SERPL-MCNC: 0.9 MG/DL
BUN SERPL-MCNC: 4.8 MG/DL (ref 8–23)
CALCIUM SERPL-MCNC: 9.1 MG/DL (ref 8.8–10.2)
CHLORIDE SERPL-SCNC: 91 MMOL/L (ref 98–107)
CREAT SERPL-MCNC: 0.45 MG/DL (ref 0.51–0.95)
DEPRECATED HCO3 PLAS-SCNC: 26 MMOL/L (ref 22–29)
ERYTHROCYTE [DISTWIDTH] IN BLOOD BY AUTOMATED COUNT: 12 % (ref 10–15)
GFR SERPL CREATININE-BSD FRML MDRD: >90 ML/MIN/1.73M2
GLUCOSE SERPL-MCNC: 118 MG/DL (ref 70–99)
HCT VFR BLD AUTO: 35.4 % (ref 35–47)
HGB BLD-MCNC: 13.2 G/DL (ref 11.7–15.7)
MCH RBC QN AUTO: 34.6 PG (ref 26.5–33)
MCHC RBC AUTO-ENTMCNC: 37.3 G/DL (ref 31.5–36.5)
MCV RBC AUTO: 93 FL (ref 78–100)
PLATELET # BLD AUTO: 426 10E3/UL (ref 150–450)
POTASSIUM SERPL-SCNC: 3 MMOL/L (ref 3.4–5.3)
POTASSIUM SERPL-SCNC: 3.1 MMOL/L (ref 3.4–5.3)
POTASSIUM SERPL-SCNC: 3.1 MMOL/L (ref 3.4–5.3)
RBC # BLD AUTO: 3.82 10E6/UL (ref 3.8–5.2)
SODIUM SERPL-SCNC: 123 MMOL/L (ref 136–145)
SODIUM SERPL-SCNC: 124 MMOL/L (ref 136–145)
SODIUM SERPL-SCNC: 124 MMOL/L (ref 136–145)
SODIUM SERPL-SCNC: 127 MMOL/L (ref 136–145)
SODIUM SERPL-SCNC: 127 MMOL/L (ref 136–145)
WBC # BLD AUTO: 8.4 10E3/UL (ref 4–11)

## 2023-03-03 PROCEDURE — 250N000013 HC RX MED GY IP 250 OP 250 PS 637

## 2023-03-03 PROCEDURE — 84295 ASSAY OF SERUM SODIUM: CPT

## 2023-03-03 PROCEDURE — 85027 COMPLETE CBC AUTOMATED: CPT

## 2023-03-03 PROCEDURE — 120N000002 HC R&B MED SURG/OB UMMC

## 2023-03-03 PROCEDURE — 250N000011 HC RX IP 250 OP 636

## 2023-03-03 PROCEDURE — 999N000127 HC STATISTIC PERIPHERAL IV START W US GUIDANCE

## 2023-03-03 PROCEDURE — 258N000003 HC RX IP 258 OP 636

## 2023-03-03 PROCEDURE — 36415 COLL VENOUS BLD VENIPUNCTURE: CPT

## 2023-03-03 PROCEDURE — 84132 ASSAY OF SERUM POTASSIUM: CPT

## 2023-03-03 PROCEDURE — 84132 ASSAY OF SERUM POTASSIUM: CPT | Performed by: STUDENT IN AN ORGANIZED HEALTH CARE EDUCATION/TRAINING PROGRAM

## 2023-03-03 PROCEDURE — 82247 BILIRUBIN TOTAL: CPT

## 2023-03-03 PROCEDURE — 99232 SBSQ HOSP IP/OBS MODERATE 35: CPT | Mod: GC | Performed by: STUDENT IN AN ORGANIZED HEALTH CARE EDUCATION/TRAINING PROGRAM

## 2023-03-03 PROCEDURE — 250N000013 HC RX MED GY IP 250 OP 250 PS 637: Performed by: STUDENT IN AN ORGANIZED HEALTH CARE EDUCATION/TRAINING PROGRAM

## 2023-03-03 PROCEDURE — 250N000011 HC RX IP 250 OP 636: Performed by: STUDENT IN AN ORGANIZED HEALTH CARE EDUCATION/TRAINING PROGRAM

## 2023-03-03 RX ORDER — HYDRALAZINE HYDROCHLORIDE 20 MG/ML
10 INJECTION INTRAMUSCULAR; INTRAVENOUS EVERY 6 HOURS PRN
Status: DISCONTINUED | OUTPATIENT
Start: 2023-03-03 | End: 2023-03-06 | Stop reason: HOSPADM

## 2023-03-03 RX ORDER — POTASSIUM CHLORIDE 750 MG/1
20 TABLET, EXTENDED RELEASE ORAL ONCE
Status: COMPLETED | OUTPATIENT
Start: 2023-03-04 | End: 2023-03-04

## 2023-03-03 RX ORDER — POTASSIUM CHLORIDE 750 MG/1
30 TABLET, EXTENDED RELEASE ORAL ONCE
Status: COMPLETED | OUTPATIENT
Start: 2023-03-03 | End: 2023-03-03

## 2023-03-03 RX ORDER — POTASSIUM CHLORIDE 7.45 MG/ML
10 INJECTION INTRAVENOUS
Status: DISPENSED | OUTPATIENT
Start: 2023-03-03 | End: 2023-03-04

## 2023-03-03 RX ORDER — POTASSIUM CHLORIDE 750 MG/1
40 TABLET, EXTENDED RELEASE ORAL ONCE
Status: COMPLETED | OUTPATIENT
Start: 2023-03-03 | End: 2023-03-03

## 2023-03-03 RX ADMIN — ACETAMINOPHEN 975 MG: 325 TABLET ORAL at 21:48

## 2023-03-03 RX ADMIN — CARBIDOPA AND LEVODOPA 2 TABLET: 25; 100 TABLET ORAL at 15:05

## 2023-03-03 RX ADMIN — POTASSIUM CHLORIDE 30 MEQ: 750 TABLET, EXTENDED RELEASE ORAL at 07:59

## 2023-03-03 RX ADMIN — HYDROXYUREA 500 MG: 500 CAPSULE ORAL at 07:59

## 2023-03-03 RX ADMIN — IRBESARTAN 225 MG: 150 TABLET ORAL at 07:59

## 2023-03-03 RX ADMIN — ACETAMINOPHEN 975 MG: 325 TABLET ORAL at 07:59

## 2023-03-03 RX ADMIN — POTASSIUM CHLORIDE 10 MEQ: 7.46 INJECTION, SOLUTION INTRAVENOUS at 21:48

## 2023-03-03 RX ADMIN — GABAPENTIN 100 MG: 100 CAPSULE ORAL at 21:49

## 2023-03-03 RX ADMIN — PANTOPRAZOLE SODIUM 40 MG: 40 TABLET, DELAYED RELEASE ORAL at 07:59

## 2023-03-03 RX ADMIN — CALCIUM 500 MG: 500 TABLET ORAL at 08:00

## 2023-03-03 RX ADMIN — CARBIDOPA AND LEVODOPA 2 TABLET: 25; 100 TABLET ORAL at 07:59

## 2023-03-03 RX ADMIN — Medication 25 MCG: at 07:59

## 2023-03-03 RX ADMIN — POTASSIUM CHLORIDE 40 MEQ: 750 TABLET, EXTENDED RELEASE ORAL at 22:24

## 2023-03-03 RX ADMIN — ENOXAPARIN SODIUM 40 MG: 40 INJECTION SUBCUTANEOUS at 07:59

## 2023-03-03 RX ADMIN — Medication 1 MG: at 21:49

## 2023-03-03 RX ADMIN — PRAVASTATIN SODIUM 20 MG: 20 TABLET ORAL at 21:49

## 2023-03-03 RX ADMIN — SODIUM CHLORIDE: 9 INJECTION, SOLUTION INTRAVENOUS at 16:06

## 2023-03-03 ASSESSMENT — ACTIVITIES OF DAILY LIVING (ADL)
ADLS_ACUITY_SCORE: 34
DIFFICULTY_EATING/SWALLOWING: YES
ADLS_ACUITY_SCORE: 34
TOILETING_ISSUES: NO
ADLS_ACUITY_SCORE: 35
ADLS_ACUITY_SCORE: 35
WALKING_OR_CLIMBING_STAIRS: STAIR CLIMBING DIFFICULTY, REQUIRES EQUIPMENT
WALKING_OR_CLIMBING_STAIRS_DIFFICULTY: YES
TRANSFERRING: 1-->ASSISTANCE (EQUIPMENT/PERSON) NEEDED (NOT DEVELOPMENTALLY APPROPRIATE)
WEAR_GLASSES_OR_BLIND: YES
VISION_MANAGEMENT: GLASSES
SWALLOWING: 2-->DIFFICULTY SWALLOWING FOODS
ADLS_ACUITY_SCORE: 34
EATING/SWALLOWING: SWALLOWING SOLID FOOD
ADLS_ACUITY_SCORE: 34
TRANSFERRING: 1-->ASSISTANCE (EQUIPMENT/PERSON) NEEDED
ADLS_ACUITY_SCORE: 34
ADLS_ACUITY_SCORE: 27
EATING: 0-->INDEPENDENT
ADLS_ACUITY_SCORE: 34
ADLS_ACUITY_SCORE: 34
EQUIPMENT_CURRENTLY_USED_AT_HOME: WALKER, ROLLING;WALKER, STANDARD
NUMBER_OF_TIMES_PATIENT_HAS_FALLEN_WITHIN_LAST_SIX_MONTHS: 1
FALL_HISTORY_WITHIN_LAST_SIX_MONTHS: YES
ADLS_ACUITY_SCORE: 34
DRESSING/BATHING_DIFFICULTY: NO
DOING_ERRANDS_INDEPENDENTLY_DIFFICULTY: YES
EATING: 0-->INDEPENDENT
CHANGE_IN_FUNCTIONAL_STATUS_SINCE_ONSET_OF_CURRENT_ILLNESS/INJURY: YES
CONCENTRATING,_REMEMBERING_OR_MAKING_DECISIONS_DIFFICULTY: NO
SWALLOWING: 2-->DIFFICULTY SWALLOWING FOODS
ADLS_ACUITY_SCORE: 34

## 2023-03-03 NOTE — PHARMACY-ADMISSION MEDICATION HISTORY
Admission Medication History Completed by Pharmacy    See [facility] Admission Navigator for allergy information, preferred outpatient pharmacy, prior to admission medications and immunization status.     Medication History Sources:     External med dispense report    Patient interview    Changes made to PTA medication list (reason):    Added: None    Deleted:   o Duplicate calcium  o Ciprofloxacin - completed 5 day course for UTI on 3/1  o Gabapentin 300 mg capsules - takes 100 mg capsules, matches fill records  o Lidocaine injection - last done in 2019  o Magic mouthwash - completed, took during COVID infection (2/2023)  o Ondansetron - completed, took during COVID infection (2/2023)  o Triamcinolone injection - last done in 2019    Changed:   o Acetaminophen from twice daily to twice daily as needed  o Added frequency for calcium  o Carbidopa-levodopa to twice daily per patient (AM and afternoon) to avoid stomach upset  o Added directions for vitamin B12  o Hydroxyurea from daily to daily at bedtime - patient does this to avoid stomach upset, states she has been given the doses in the morning here and she has tolerated it okay  o Cholecalciferol from 2,000 units daily to 1,000 units at bedtime    Additional Information:    Patient manages medications independently and knew all medications, doses, frequencies well.    Patient reports lorazepam #30 tablets typically lasts her ~6 months. Does wish to have this available in the hospital as she has had trouble with sleeping.    Patient was recently prescribed estradiol tablets, but she has not been able to fill this at the pharmacy yet due to insurance issues.    Patient typically takes 100 mg of gabapentin at night, will take more depending on how achy she is feeling.    Prior to Admission medications    Medication Sig Last Dose   acetaminophen (TYLENOL) 500 MG tablet Take 1,000 mg by mouth 2 times daily as needed Muscle and joint pain 3/1/2023 at    calcium carbonate  (OS-LALI) 500 MG tablet Take 1 tablet by mouth daily 3/1/2023 at AM   carbidopa-levodopa (SINEMET)  MG tablet Take 1.5 tablets by mouth 3 times daily  Patient taking differently: Take 1.5 tablets by mouth 2 times daily Takes in morning and afternoon with meals as it causes stomach upset. 3/1/2023 at PM   chlorthalidone (HYGROTON) 25 MG tablet Take 0.5 tablets (12.5 mg) by mouth daily 3/1/2023 at AM   Cholecalciferol (VITAMIN D) 1000 UNITS capsule Take 1 capsule by mouth At Bedtime 3/1/2023 at hs   cyanocobalamin (VITAMIN B-12) 1000 MCG tablet Take 1,000 mcg by mouth Every Mon, Wed, Fri Morning 3/1/2023 at am   estradiol (VAGIFEM) 10 MCG TABS vaginal tablet Place 1 tablet (10 mcg) vaginally twice a week Unknown at hasn't started   gabapentin (NEURONTIN) 100 MG capsule Take one to three at night as needed 3/1/2023 at hs   hydroxyurea (HYDREA) 500 MG capsule Take 1 capsule (500 mg) by mouth daily  Patient taking differently: Take 500 mg by mouth At Bedtime Past Month at hs   irbesartan (AVAPRO) 150 MG tablet TAKE 1 AND 1/2 TABLETS EVERY MORNING 3/1/2023 at am   LORazepam (ATIVAN) 0.5 MG tablet Take 1 tablet (0.5 mg) by mouth nightly as needed for anxiety Past Week at hs   omeprazole (PRILOSEC) 20 MG DR capsule Take 1 capsule (20 mg) by mouth daily 3/1/2023 at am   pravastatin (PRAVACHOL) 20 MG tablet Take 1 tablet (20 mg) by mouth At Bedtime 3/1/2023 at hs       Date completed: 03/03/23    Medication history completed by: Lynn Chew, PharmD

## 2023-03-03 NOTE — PROGRESS NOTES
"[facility]    Progress Note - Medicine Service, MARKARLOS TEAM 2       Date of Admission:  3/1/2023    Assessment & Plan   Catherine Mccollum is a 82 year old female with a past medical history significant for essential HTN, HLD, essential thrombocythemia, parkinsonsism who is currently being managed for hyponatremia.    Changes today  - Continue to monitor Na Q6H  - Hold irbesartan  - IV hydralazine for   - Correction goal 132 by 5 am on 3/3/2023     # Hyponatremia likely related low solute diet and chlorthalidone  Hx suggestive of polydipsia and poor solute intake in the past 2 weeks. Clinical response to IVF supports this theory. UA studies not reliable given pt is on chlorthalidone. TSH and ACTH levels were normal.  - Q6H Na checks (recent check 123 at ~11AM, on NS since 1300 3/3)  - if Na<127 continue NS at 75 ml/hr  - if Na 128+ stop NS and monitor next lab  - if Na >140 stop NS IV, and give free water based on free water correction to sodium goal of 133.   - Regular diet, no need for fluid restriction    #Hypertension  - hold chorthalidone given low sodium  - hold irbesartan given continued low sodium   - IV hydralazine for SBP>180    # Unwitnessed fall  # Headache, reesolved  Pt reporting having fallen and \"bumping\" her head on the ground when she was on her way to her PCP clinic. Denies LOC though endorses headache which she is unsure if occurring since fall or due to sleep deprived in the ED hallway. CT w/o contrast unremarkable for acute pathology.  - CTM     # Abdominal Pain, resolved  # Nausea/vomiting, resolved  Pt reporting abdominal pain since starting Paxlovid and continued with antibiotics. Notes occasional vomiting and attributes this to poor oral intake. Slightly improved in the ED. Possibly food aversion.  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 Follows with Onc as outpatient.  - Continue Hydroxyurea 500mg every day    # HLD  - Continue pravastatin 20mg QHS     # GERD  - Continue Omeprazole 20mg every day     # Anxiety  # Insomnia  - Continue PTA Gabapentin  - Holding Lorazepam           Diet: Regular Diet Adult  Snacks/Supplements Adult: Other; yogurt @ 10 and bananas (peaches or pears if out) @ 2; Between Meals    DVT Prophylaxis: Pneumatic Compression Devices  Soler Catheter: Not present  Fluids: changing based on sodium correction  Lines: None     Cardiac Monitoring: None  Code Status: Full Code      Clinically Significant Risk Factors        # Hypokalemia: Lowest K = 2.7 mmol/L in last 2 days, will replace as needed  # Hyponatremia: Lowest Na = 117 mmol/L in last 2 days, will monitor as appropriate                # Overweight: Estimated body mass index is 28.36 kg/m  as calculated from the following:    Height as of this encounter: 1.524 m (5').    Weight as of this encounter: 65.9 kg (145 lb 3.2 oz)., PRESENT ON ADMISSION         Disposition Plan  Independent living facility from where patient came from     Expected Discharge Date: 03/05/2023      Destination: assisted living          The patient's care was discussed with the Attending Physician, Dr. Manuel MD.    DINORA HERNANDES MD  Medicine Service, [address]  Securely message with [employer] (more info)  Text page via [email or web address] Paging/Directory   See signed in provider for up to date coverage information  ______________________________________________________________________    Interval History   No acute events overnight; nursing note reviewed; mentaing well; no headache, abdominal pain, nausea or vomiting.    Physical Exam   Vital Signs: Temp: 98  F (36.7  C) Temp src: Oral BP:  (!) 164/68 Pulse: 63   Resp: 17 SpO2: 96 % O2 Device: None (Room air)    Weight: 145 lbs 3.2 oz     General: Pt NAD;   HEENT: PERRLA,atraumatic  Neck: Supple   Card: RRR, normal S1 and S2, no murmurs  Lung: Clear; equal BS bilaterally  Abdomen:  soft and nontender, nondistended, no organomegaly, BS+  Neuro: Alert and oriented X 3  Psych: Normal mood, affect and response  Skin: No rashes, skin warm and dry, no erythematous areas  Extremities: No edema      Medical Decision Making       Please see A&P for additional details of medical decision making.      Data   Reviewed

## 2023-03-03 NOTE — PLAN OF CARE
Goal Outcome Evaluation:      Plan of Care Reviewed With: patient    Overall Patient Progress: no change    Time: Pt arrived from ED around 0252  Status: currently being managed for hyponatremia  Neuros: A&Ox4, calm and cooperative, able to verbalize needs.   CMS: Intact, denies numbness or tingling.   Cardiac: HR 63-68, HTN, denies cardiac chest pain.   Respiratory: Sats >95% on RA. LS clear, denies SOB.   Pain: Denies pain this shift.   Nausea: Denies N/V  Diet: Regular diet  GI/: Voiding adequate amount. +BS, +flatus, LBM on 3/2 per pt.   Lines: L PIV SL.   Incisions/Drains: N/A  Labs: Lab draw scheduled for am.   Activity: Up ambulated to BR with walker, gait belt, Ax1. Bed alarm on overnight.   New Changes this Shift: Admit for ED   Plan: Continue POC    Admitted/transferred from: ED  2 RN skin assessment completed by Rahul Cordova RN and Joselin Sharma RN.  Skin assessment finding: Intact, dry flaky skin, cracked lips, slight blanchable redness on right foot.   Interventions/actions: Turning and repositioning encouraged while in bed. Lotion and carmax for dry lips.     Bedside Emergency Equipment Present:  Suction Regulator: YES  Suction Canister: YES  Tubing between Regulator and Canister: YES  O2 Regulator with Tree: YES  Ambu Bag: YES

## 2023-03-03 NOTE — PROVIDER NOTIFICATION
Provider Notification:     Team [place] 2: Pari Martinez MD paged at [phone]. Labs at 0607 Low Na 127 and K 3.1. Awaiting call back, report given to Emeli RN morning nurse.

## 2023-03-04 ENCOUNTER — APPOINTMENT (OUTPATIENT)
Dept: SPEECH THERAPY | Facility: CLINIC | Age: 83
DRG: 641 | End: 2023-03-04
Payer: MEDICARE

## 2023-03-04 LAB
ANION GAP SERPL CALCULATED.3IONS-SCNC: 9 MMOL/L (ref 7–15)
BUN SERPL-MCNC: 5.5 MG/DL (ref 8–23)
CALCIUM SERPL-MCNC: 8.7 MG/DL (ref 8.8–10.2)
CHLORIDE SERPL-SCNC: 96 MMOL/L (ref 98–107)
CREAT SERPL-MCNC: 0.52 MG/DL (ref 0.51–0.95)
DEPRECATED HCO3 PLAS-SCNC: 23 MMOL/L (ref 22–29)
GFR SERPL CREATININE-BSD FRML MDRD: >90 ML/MIN/1.73M2
GLUCOSE SERPL-MCNC: 108 MG/DL (ref 70–99)
POTASSIUM SERPL-SCNC: 4 MMOL/L (ref 3.4–5.3)
POTASSIUM SERPL-SCNC: 4 MMOL/L (ref 3.4–5.3)
SODIUM SERPL-SCNC: 126 MMOL/L (ref 136–145)
SODIUM SERPL-SCNC: 128 MMOL/L (ref 136–145)
SODIUM SERPL-SCNC: 128 MMOL/L (ref 136–145)

## 2023-03-04 PROCEDURE — 120N000002 HC R&B MED SURG/OB UMMC

## 2023-03-04 PROCEDURE — 80048 BASIC METABOLIC PNL TOTAL CA: CPT

## 2023-03-04 PROCEDURE — 84295 ASSAY OF SERUM SODIUM: CPT

## 2023-03-04 PROCEDURE — 250N000013 HC RX MED GY IP 250 OP 250 PS 637: Performed by: STUDENT IN AN ORGANIZED HEALTH CARE EDUCATION/TRAINING PROGRAM

## 2023-03-04 PROCEDURE — 99232 SBSQ HOSP IP/OBS MODERATE 35: CPT | Mod: GC | Performed by: STUDENT IN AN ORGANIZED HEALTH CARE EDUCATION/TRAINING PROGRAM

## 2023-03-04 PROCEDURE — 250N000013 HC RX MED GY IP 250 OP 250 PS 637

## 2023-03-04 PROCEDURE — 92610 EVALUATE SWALLOWING FUNCTION: CPT | Mod: GN | Performed by: SPEECH-LANGUAGE PATHOLOGIST

## 2023-03-04 PROCEDURE — 92526 ORAL FUNCTION THERAPY: CPT | Mod: GN | Performed by: SPEECH-LANGUAGE PATHOLOGIST

## 2023-03-04 PROCEDURE — 36415 COLL VENOUS BLD VENIPUNCTURE: CPT

## 2023-03-04 PROCEDURE — 258N000003 HC RX IP 258 OP 636

## 2023-03-04 PROCEDURE — 250N000011 HC RX IP 250 OP 636

## 2023-03-04 RX ORDER — CALCIUM CARBONATE 500 MG/1
500 TABLET, CHEWABLE ORAL DAILY PRN
Status: DISCONTINUED | OUTPATIENT
Start: 2023-03-04 | End: 2023-03-06 | Stop reason: HOSPADM

## 2023-03-04 RX ORDER — SODIUM CHLORIDE 1 G/1
1 TABLET ORAL
Status: DISCONTINUED | OUTPATIENT
Start: 2023-03-04 | End: 2023-03-06

## 2023-03-04 RX ADMIN — HYDROXYUREA 500 MG: 500 CAPSULE ORAL at 10:08

## 2023-03-04 RX ADMIN — POLYETHYLENE GLYCOL 3350 17 G: 17 POWDER, FOR SOLUTION ORAL at 07:40

## 2023-03-04 RX ADMIN — CARBIDOPA AND LEVODOPA 2 TABLET: 25; 100 TABLET ORAL at 10:08

## 2023-03-04 RX ADMIN — GABAPENTIN 100 MG: 100 CAPSULE ORAL at 21:31

## 2023-03-04 RX ADMIN — PRAVASTATIN SODIUM 20 MG: 20 TABLET ORAL at 21:31

## 2023-03-04 RX ADMIN — SODIUM CHLORIDE: 9 INJECTION, SOLUTION INTRAVENOUS at 17:36

## 2023-03-04 RX ADMIN — ACETAMINOPHEN 975 MG: 325 TABLET ORAL at 16:59

## 2023-03-04 RX ADMIN — SODIUM CHLORIDE 1 G: 1 TABLET ORAL at 19:55

## 2023-03-04 RX ADMIN — ACETAMINOPHEN 975 MG: 325 TABLET ORAL at 07:39

## 2023-03-04 RX ADMIN — POTASSIUM CHLORIDE 20 MEQ: 750 TABLET, EXTENDED RELEASE ORAL at 00:52

## 2023-03-04 RX ADMIN — CALCIUM 500 MG: 500 TABLET ORAL at 07:39

## 2023-03-04 RX ADMIN — SODIUM CHLORIDE 1 G: 1 TABLET ORAL at 15:48

## 2023-03-04 RX ADMIN — PANTOPRAZOLE SODIUM 40 MG: 40 TABLET, DELAYED RELEASE ORAL at 07:39

## 2023-03-04 RX ADMIN — ENOXAPARIN SODIUM 40 MG: 40 INJECTION SUBCUTANEOUS at 07:40

## 2023-03-04 RX ADMIN — Medication 25 MCG: at 07:41

## 2023-03-04 RX ADMIN — Medication 1 MG: at 19:55

## 2023-03-04 RX ADMIN — CALCIUM CARBONATE (ANTACID) CHEW TAB 500 MG 500 MG: 500 CHEW TAB at 14:13

## 2023-03-04 RX ADMIN — CARBIDOPA AND LEVODOPA 2 TABLET: 25; 100 TABLET ORAL at 16:56

## 2023-03-04 ASSESSMENT — ACTIVITIES OF DAILY LIVING (ADL)
ADLS_ACUITY_SCORE: 35
ADLS_ACUITY_SCORE: 34
ADLS_ACUITY_SCORE: 35
ADLS_ACUITY_SCORE: 34
ADLS_ACUITY_SCORE: 35

## 2023-03-04 NOTE — PROGRESS NOTES
"[facility]    Progress Note - Medicine Service, MARKARL[facility] TEAM 2       Date of Admission:  3/1/2023    Assessment & Plan   Catherine Mccollum is a 82 year old female with a past medical history significant for essential HTN, HLD, essential thrombocythemia, parkinsonsism who is currently being managed for hyponatremia.    Changes today  - Continue to monitor Na Q8H  - speech consult  - salt tabs  - Continue to hold irbesartan and chlorthalidone    # Hyponatremia likely related low solute diet and chlorthalidone  Hx suggestive of polydipsia and poor solute intake in the past 2 weeks. Clinical response to IVF supports this theory. UA studies not reliable given pt is on chlorthalidone. TSH and ACTH levels were normal.  - Q8H Na checks   - Stop IVF once Na >130   - encourage PO intake   - salt tabs TID  - Regular diet, no need for fluid restriction  - Continue to hold irbesartan and chlorthalidone    #Hypertension  - hold chorthalidone given low sodium  - hold irbesartan given continued low sodium   - IV hydralazine for SBP>180    #Dysphagia likely due to esophageal dysmotility  Complaining of food getting stuck in the throat. Also has some distaste in mouth after covid.  - Speech consulted - okay for regular diet and thin liquids; suggested outpatient esophagram with or without video swallow study to assess for esophageal motility.     # Unwitnessed fall  # Headache, reesolved  Pt reporting having fallen and \"bumping\" her head on the ground when she was on her way to her PCP clinic. Denies LOC though endorses headache which she is unsure if occurring since fall or due to sleep deprived in the ED hallway. CT w/o contrast unremarkable for acute pathology.  - CTM     # Abdominal Pain, resolved  # Nausea/vomiting, resolved  Pt reporting abdominal pain since starting Paxlovid and continued with antibiotics. Notes occasional vomiting and attributes this to poor oral intake. Slightly " improved in the ED. Possibly food aversion.  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 Follows with Onc as outpatient.  - Continue Hydroxyurea 500mg every day    # HLD  - Continue pravastatin 20mg QHS     # GERD  - Continue Omeprazole 20mg every day     # Anxiety  # Insomnia  - Continue PTA Gabapentin  - Holding Lorazepam         Diet: Snacks/Supplements Adult: Other; yogurt @ 10 and bananas (peaches or pears if out) @ 2; Between Meals  Soft & Bite Sized Diet (level 6) Thin Liquids (level 0)    DVT Prophylaxis: Pneumatic Compression Devices  Soler Catheter: Not present  Fluids: changing based on sodium correction  Lines: None     Cardiac Monitoring: None  Code Status: Full Code      Clinically Significant Risk Factors        # Hypokalemia: Lowest K = 3 mmol/L in last 2 days, will replace as needed  # Hyponatremia: Lowest Na = 119 mmol/L in last 2 days, will monitor as appropriate                # Overweight: Estimated body mass index is 28.36 kg/m  as calculated from the following:    Height as of this encounter: 1.524 m (5').    Weight as of this encounter: 65.9 kg (145 lb 3.2 oz)., PRESENT ON ADMISSION         Disposition Plan  Independent living facility from where patient came from    Expected Discharge Date: 03/05/2023      Destination: assisted living          The patient's care was discussed with the Attending Physician, Dr. Manuel MD.    DINORA HERNANDES MD  Medicine Service, [facility] TEAM [address] [place] [address]  Securely message with [employer] (more info)  Text page via [facility] Paging/Directory   See signed in provider for up to date coverage information  ______________________________________________________________________    Interval  History   No acute events overnight; nursing note reviewed; mentaing well; no headache, abdominal pain, nausea or vomiting.    Physical Exam   Vital Signs: Temp: 98.2  F (36.8  C) Temp src: Oral BP: (!) 146/52 Pulse: 67   Resp: 16 SpO2: 97 % O2 Device: None (Room air)    Weight: 145 lbs 3.2 oz     General: Pt NAD;   HEENT: PERRLA,atraumatic  Neck: Supple   Card: RRR, normal S1 and S2, no murmurs  Lung: Clear; equal BS bilaterally  Abdomen:  soft and nontender, nondistended, no organomegaly, BS+  Neuro: Alert and oriented X 3  Psych: Normal mood, affect and response  Skin: No rashes, skin warm and dry, no erythematous areas  Extremities: No edema      Medical Decision Making       Please see A&P for additional details of medical decision making.      Data   Reviewed

## 2023-03-04 NOTE — PLAN OF CARE
/58 (BP Location: Right arm)   Pulse 71   Temp 97.9  F (36.6  C) (Oral)   Resp 18   Ht 1.524 m (5')   Wt 65.9 kg (145 lb 3.2 oz)   SpO2 95%   BMI 28.36 kg/m      Status: currently being managed for hyponatremia  Neuros: A&Ox4, calm and cooperative, able to verbalize needs. Forgetful. q4 neuro check  CMS: Intact, denies numbness or tingling.   Cardiac: HTN, denies cardiac chest pain.   Respiratory: Sats >95% on RA. LS clear, denies SOB.   Pain/nausea: Denies pain.nausea   Diet: Regular diet, tolerating   GI/: Voiding adequate amount. +BS, +flatus, LBM on 3/2 per pt.   Lines: R PIV infusing NS @ 75ml/hr.   Labs: K+ replaced, NAq6h  Activity: Up ambulated to BR with walker, gait belt, Ax1.   Plan: Continue POC

## 2023-03-04 NOTE — PROGRESS NOTES
03/04/23 1020   Appointment Info   Signing Clinician's Name / Credentials (SLP) Nicol Quijano MA, CCC-SLP   General Information   Onset of Illness/Injury or Date of Surgery 03/01/23   Patient/Family Therapy Goal Statement (SLP) To swallow without solids feeling stuck   Pertinent History of Current Problem Catherine Mccollum is a 82 year old female with a past medical history significant for essential HTN, HLD, essential thrombocythemia, parkinsonsism who is currently being managed for hyponatremia. Clinical swallow evaluation completed per MD orders at 1020am. Per RN, pt complaining of trouble swallowing with solids getting stuck.   General Observations Pt laying reclined in bed. Agreeable to working with SLP. Sat upright. On room air.   Type of Evaluation   Type of Evaluation Swallow Evaluation   Oral Motor   Oral Musculature generally intact   Structural Abnormalities none present   Mucosal Quality dry   Dentition (Oral Motor)   Dentition (Oral Motor) natural dentition   Facial Symmetry (Oral Motor)   Facial Symmetry (Oral Motor) WNL   Lip Function (Oral Motor)   Lip Range of Motion (Oral Motor) WNL   Tongue Function (Oral Motor)   Tongue ROM (Oral Motor) WNL   Jaw Function (Oral Motor)   Jaw Function (Oral Motor) WNL   Cough/Swallow/Gag Reflex (Oral Motor)   Soft Palate/Velum (Oral Motor) WNL   Volitional Throat Clear/Cough (Oral Motor) WNL   Volitional Swallow (Oral Motor) WNL   Vocal Quality/Secretion Management (Oral Motor)   Vocal Quality (Oral Motor) WNL   General Swallowing Observations   Past History of Dysphagia Per chart review, no past history of dysphagia. Pt did participate in eval with SLP in 2019 for LOUD to target dysphonia. Pt reports she has trouble with solids feeling stuck. She localizes it to her throat verbally, but then points to right below her collarbone when asked to point to where it get stuck. Reports it occasionally makes her cough. Otherwise, per her report she tolerated regular  "textures/thin liquids without other perceived difficulty.   Respiratory Support (General Swallowing Observations) none   Current Diet/Method of Nutritional Intake (General Swallowing Observations, NIS) regular diet;thin liquids (level 0)   Swallowing Evaluation Clinical swallow evaluation   Clinical Swallow Evaluation   Feeding Assistance no assistance needed   Clinical Swallow Evaluation Textures Trialed thin liquids;pureed;solid foods   Clinical Swallow Eval: Thin Liquid Texture Trial   Mode of Presentation, Thin Liquids cup;straw;self-fed   Volume of Liquid or Food Presented 4oz   Oral Phase of Swallow WFL   Pharyngeal Phase of Swallow intact   Diagnostic Statement No overt clinical s/sx of penetration/aspiration. Pt took 3 pills with liquid without clinical s/sx of penetration/aspiration. Pt denied feeling of pharyngeal stasis.   Clinical Swallow Evaluation: Puree Solid Texture Trial   Mode of Presentation, Puree spoon;self-fed   Volume of Puree Presented 3oz   Oral Phase, Puree WFL   Pharyngeal Phase, Puree intact   Diagnostic Statement No clinical s/sx of penetration/aspiration. Pt denied feeling of pharyngeal stasis.   Clinical Swallow Evaluation: Solid Food Texture Trial   Mode of Presentation self-fed   Volume Presented 1 cosmo cracker   Oral Phase WFL   Pharyngeal Phase other (see comments)  (pt reports feeling stasis in her chest)   Diagnostic Statement No clinical s/sx of penetration/aspiration. Pt endorses feeling of stasis reporting \"uncomfortable like it's stuck\" pointing to just below her collarbone. Clinician cued sips of liquid are slightly relieving to the sensation per her report.   Esophageal Phase of Swallow   Patient reports or presents with symptoms of esophageal dysphagia Yes   Esophageal comments Recommend consideration of esophagram/EGD after discharge as an outpatient   Swallowing Recommendations   Diet Consistency Recommendations regular diet;thin liquids (level 0)   Supervision Level " "for Intake patient independent   Mode of Delivery Recommendations bolus size, small;food moistened;slow rate of intake   Swallowing Maneuver Recommendations alternate food and liquid intake   Monitoring/Assistance Required (Eating/Swallowing) monitor for cough or change in vocal quality with intake   Recommended Feeding/Eating Techniques (Swallow Eval) maintain upright sitting position for eating;maintain upright posture during/after eating for 30 minutes   Medication Administration Recommendations, Swallowing (SLP) With thin liquids as tolerated   Instrumental Assessment Recommendations instrumental evaluation not recommended at this time   Comment, Swallowing Recommendations Recommend consideration of outpatient esophagram with or w/o video swallow study to further assess suspected esophageal dysmotility.   Clinical Impression   Criteria for Skilled Therapeutic Interventions Met (SLP Eval) Yes, treatment indicated   SLP Diagnosis Safe, functional oropharyngeal swallow  (suspect esophageal dysphagia)   Clinical Impression Comments Pt presents today with a safe, functional oropharyngeal swallow response; suspect esophageal dysphagia. Oral motor examination reveals dry oral cavity; otherwise WFL. Pt on room air and sat upright. Pt assessed with thin liquid, puree and solid trials with no overt clinical s/sx of penetration/aspiration. With solid trials, pt endorses feeling of stasis reporting \"uncomfortable like it's stuck\" pointing to just below her collarbone. Clinician cued sips of liquid are slightly relieving to the sensation per her report.     At this time, from an oropharyngeal standpoint pt is appropriate to continue regular textures/thin liquids with use of general safe swallow strategies and esophageal dysphagia strategies. Pills whole with thin liquids as tolerated. Suspect esophageal dysphagia d/t esophageal dysmotility. Consider outpatient esophagram with or without video swallow study to assess for " esophageal motility. One time treatment session following evaluation. Then ST to sign off.   SLP Total Evaluation Time   Eval: oral/pharyngeal swallow function, clinical swallow Minutes (48621) 15   SLP Goals   Therapy Frequency (SLP Eval) one time eval and treatment only   SLP Predicted Duration/Target Date for Goal Attainment 03/04/23   SLP Goals Swallow   SLP: Safely tolerate diet without signs/symptoms of aspiration Regular diet;Thin liquids;With use of swallow precautions;Independently;Goal Met   Interventions   Interventions Quick Adds Swallowing Dysfunction   Swallowing Dysfunction &/or Oral Function for Feeding   Treatment of Swallowing Dysfunction &/or Oral Function for Feeding Minutes (08238) 10   Symptoms Noted During/After Treatment None   Treatment Detail/Skilled Intervention Trained pt on results of evaluation and diet recommendations. Trained pt on general oropharyngeal safe swallow strategies. Trained pt on anatomy/physiology of swallowing and how the esophageal phase of swallowing can be affected. Discussed suspected dysmotility. Trained pt on esophageal dysphagia strategies and recommended outpatient follow up with esophagram with or without VFSS. Pt verbalized understanding and appreciative of information.   SLP Discharge Planning   SLP Plan s/o   SLP Discharge Recommendation Long term care facility  (assisted living)   SLP Rationale for DC Rec Baseline oropharyngeal swallowing skills; suspect esophageal dysphagia   SLP Brief overview of current status  At this time, from an oropharyngeal standpoint pt is appropriate to continue regular textures/thin liquids with use of general safe swallow strategies and esophageal dysphagia strategies. Pills whole with thin liquids as tolerated. Suspect esophageal dysphagia d/t esophageal dysmotility. Consider outpatient esophagram with or without video swallow study to assess for esophageal dysmotility. One time treatment session following evaluation. ST to s/o    Total Session Time   Total Session Time (sum of timed and untimed services) 25

## 2023-03-04 NOTE — PLAN OF CARE
Goal Outcome Evaluation:    Time: Day shift 3/4/2023  Status: Stable.  NEURO: Alert and oriented x4.  RESPIRATORY: Lungs clear. O2 sats 97% on RA.  CARDIAC: HR 60s and regular.  GI/: Abdomen soft. Small BM this AM. Voiding without difficulty in adequate amounts.  DIET: Tolerating regular diet. Poor appetite at lunch time. Ache in esophageal area. Swallowing evaluated by speech threrapist.  PAIN/NAUSEA: Sacral pain of fall and dull ache in esophageal area.   IV ACCESS: PIV right arm.  ACTIVITY: Up to BR with assist of one, gait belt and walker. Ambulated long distance in hurd with walker and gait belt and one assist.  LAB: Na=128 x2 this shift.  PLAN: New order for sodium chloride 1 g with meals. Na lab draw every 8 hours.

## 2023-03-04 NOTE — PLAN OF CARE
"[id number]    BP (!) 148/71 (BP Location: Right arm)   Pulse 76   Temp 97.9  F (36.6  C) (Oral)   Resp 16   Ht 1.524 m (5')   Wt 65.9 kg (145 lb 3.2 oz)   SpO2 95%   BMI 28.36 kg/m        Activity: bedrest with bathroom privileges.  Up to bathroom with SBA    Neuros: alert and orientated x 4.  Forgetful at times.  Bed alarm in place   Cardiac: WNL   Respiratory: O2 sats mid 90's on RA, unlabored    GI/: abdomen soft/non-tender.  No BM this shift.  Voiding spont (adequate amounts)   Diet: regular as tolerated   Skin: intact   Lines: PIV   Incisions/Drains: n/a    Labs: Na 126.  Potassium 3.0.  Pt unable to tolerate IV potassium due to IV burning.  replaced per prot with PO potassium,  recheck this AM   Pain/nausea: scheduled tylenol for \"generalized\" pain with partial relief.  No nausea    New changes this shift: none    Plan: continue POC        "

## 2023-03-05 LAB
ANION GAP SERPL CALCULATED.3IONS-SCNC: 11 MMOL/L (ref 7–15)
ANION GAP SERPL CALCULATED.3IONS-SCNC: 13 MMOL/L (ref 7–15)
BUN SERPL-MCNC: 7.8 MG/DL (ref 8–23)
BUN SERPL-MCNC: 8 MG/DL (ref 8–23)
CALCIUM SERPL-MCNC: 9 MG/DL (ref 8.8–10.2)
CALCIUM SERPL-MCNC: 9.5 MG/DL (ref 8.8–10.2)
CHLORIDE SERPL-SCNC: 95 MMOL/L (ref 98–107)
CHLORIDE SERPL-SCNC: 96 MMOL/L (ref 98–107)
CREAT SERPL-MCNC: 0.41 MG/DL (ref 0.51–0.95)
CREAT SERPL-MCNC: 0.48 MG/DL (ref 0.51–0.95)
DEPRECATED HCO3 PLAS-SCNC: 20 MMOL/L (ref 22–29)
DEPRECATED HCO3 PLAS-SCNC: 23 MMOL/L (ref 22–29)
ERYTHROCYTE [DISTWIDTH] IN BLOOD BY AUTOMATED COUNT: 12.4 % (ref 10–15)
GFR SERPL CREATININE-BSD FRML MDRD: >90 ML/MIN/1.73M2
GFR SERPL CREATININE-BSD FRML MDRD: >90 ML/MIN/1.73M2
GLUCOSE SERPL-MCNC: 110 MG/DL (ref 70–99)
GLUCOSE SERPL-MCNC: 112 MG/DL (ref 70–99)
HCT VFR BLD AUTO: 35.8 % (ref 35–47)
HGB BLD-MCNC: 12.7 G/DL (ref 11.7–15.7)
MCH RBC QN AUTO: 34 PG (ref 26.5–33)
MCHC RBC AUTO-ENTMCNC: 35.5 G/DL (ref 31.5–36.5)
MCV RBC AUTO: 96 FL (ref 78–100)
OSMOLALITY UR: 150 MMOL/KG (ref 100–1200)
PLATELET # BLD AUTO: 395 10E3/UL (ref 150–450)
POTASSIUM SERPL-SCNC: 3.8 MMOL/L (ref 3.4–5.3)
POTASSIUM SERPL-SCNC: 3.9 MMOL/L (ref 3.4–5.3)
POTASSIUM UR-SCNC: 10.7 MMOL/L
RBC # BLD AUTO: 3.73 10E6/UL (ref 3.8–5.2)
SODIUM SERPL-SCNC: 128 MMOL/L (ref 136–145)
SODIUM SERPL-SCNC: 129 MMOL/L (ref 136–145)
SODIUM SERPL-SCNC: 130 MMOL/L (ref 136–145)
SODIUM SERPL-SCNC: 133 MMOL/L (ref 136–145)
SODIUM SERPL-SCNC: 134 MMOL/L (ref 136–145)
SODIUM UR-SCNC: 30 MMOL/L
WBC # BLD AUTO: 7.4 10E3/UL (ref 4–11)

## 2023-03-05 PROCEDURE — 99232 SBSQ HOSP IP/OBS MODERATE 35: CPT | Mod: GC | Performed by: STUDENT IN AN ORGANIZED HEALTH CARE EDUCATION/TRAINING PROGRAM

## 2023-03-05 PROCEDURE — 36415 COLL VENOUS BLD VENIPUNCTURE: CPT | Performed by: STUDENT IN AN ORGANIZED HEALTH CARE EDUCATION/TRAINING PROGRAM

## 2023-03-05 PROCEDURE — 250N000013 HC RX MED GY IP 250 OP 250 PS 637

## 2023-03-05 PROCEDURE — 84300 ASSAY OF URINE SODIUM: CPT

## 2023-03-05 PROCEDURE — 36415 COLL VENOUS BLD VENIPUNCTURE: CPT

## 2023-03-05 PROCEDURE — 85048 AUTOMATED LEUKOCYTE COUNT: CPT

## 2023-03-05 PROCEDURE — 250N000011 HC RX IP 250 OP 636

## 2023-03-05 PROCEDURE — 83935 ASSAY OF URINE OSMOLALITY: CPT

## 2023-03-05 PROCEDURE — 84133 ASSAY OF URINE POTASSIUM: CPT

## 2023-03-05 PROCEDURE — 84295 ASSAY OF SERUM SODIUM: CPT

## 2023-03-05 PROCEDURE — 120N000002 HC R&B MED SURG/OB UMMC

## 2023-03-05 PROCEDURE — 84295 ASSAY OF SERUM SODIUM: CPT | Performed by: STUDENT IN AN ORGANIZED HEALTH CARE EDUCATION/TRAINING PROGRAM

## 2023-03-05 RX ORDER — SODIUM CHLORIDE 1 G/1
1 TABLET ORAL 3 TIMES DAILY
Qty: 90 TABLET | Refills: 3 | Status: SHIPPED | OUTPATIENT
Start: 2023-03-05 | End: 2023-03-06

## 2023-03-05 RX ADMIN — SODIUM CHLORIDE 1 G: 1 TABLET ORAL at 18:57

## 2023-03-05 RX ADMIN — IRBESARTAN 225 MG: 150 TABLET ORAL at 11:31

## 2023-03-05 RX ADMIN — PRAVASTATIN SODIUM 20 MG: 20 TABLET ORAL at 21:37

## 2023-03-05 RX ADMIN — ACETAMINOPHEN 975 MG: 325 TABLET ORAL at 21:38

## 2023-03-05 RX ADMIN — ACETAMINOPHEN 975 MG: 325 TABLET ORAL at 09:33

## 2023-03-05 RX ADMIN — Medication 1 MG: at 21:37

## 2023-03-05 RX ADMIN — ENOXAPARIN SODIUM 40 MG: 40 INJECTION SUBCUTANEOUS at 09:14

## 2023-03-05 RX ADMIN — Medication 25 MCG: at 09:25

## 2023-03-05 RX ADMIN — HYDROXYUREA 500 MG: 500 CAPSULE ORAL at 09:16

## 2023-03-05 RX ADMIN — CARBIDOPA AND LEVODOPA 2 TABLET: 25; 100 TABLET ORAL at 09:15

## 2023-03-05 RX ADMIN — SODIUM CHLORIDE 1 G: 1 TABLET ORAL at 09:16

## 2023-03-05 RX ADMIN — SODIUM CHLORIDE 1 G: 1 TABLET ORAL at 12:54

## 2023-03-05 RX ADMIN — POLYETHYLENE GLYCOL 3350 17 G: 17 POWDER, FOR SOLUTION ORAL at 09:27

## 2023-03-05 RX ADMIN — CALCIUM 500 MG: 500 TABLET ORAL at 09:18

## 2023-03-05 RX ADMIN — PANTOPRAZOLE SODIUM 40 MG: 40 TABLET, DELAYED RELEASE ORAL at 07:21

## 2023-03-05 RX ADMIN — GABAPENTIN 100 MG: 100 CAPSULE ORAL at 21:37

## 2023-03-05 RX ADMIN — CARBIDOPA AND LEVODOPA 2 TABLET: 25; 100 TABLET ORAL at 13:55

## 2023-03-05 ASSESSMENT — ACTIVITIES OF DAILY LIVING (ADL)
ADLS_ACUITY_SCORE: 35
ADLS_ACUITY_SCORE: 34
ADLS_ACUITY_SCORE: 35
ADLS_ACUITY_SCORE: 34
ADLS_ACUITY_SCORE: 34
ADLS_ACUITY_SCORE: 35
ADLS_ACUITY_SCORE: 34
ADLS_ACUITY_SCORE: 34
ADLS_ACUITY_SCORE: 35
ADLS_ACUITY_SCORE: 34
ADLS_ACUITY_SCORE: 35
ADLS_ACUITY_SCORE: 35

## 2023-03-05 NOTE — PROGRESS NOTES
"[facility]    Progress Note - Medicine Service, [facility] TEAM 2       Date of Admission:  3/1/2023    Assessment & Plan   Catherine Mccollum is a 82 year old female with a past medical history significant for essential HTN, HLD, essential thrombocythemia, parkinsonsism who is currently being managed for hyponatremia.    Changes today  - Continue to monitor Na Q8H  - Curb-sided nephro - suggested urine studies and BMP check  - Restart irbesartan    # Hyponatremia likely related low solute diet and chlorthalidone  Hx suggestive of polydipsia and poor solute intake in the past 2 weeks. Clinical response to IVF supports this theory. UA studies not reliable given pt is on chlorthalidone. TSH and ACTH levels were normal.  - Q8H Na checks   - Stop IVF once Na >130   - encourage PO intake   - salt tabs TID  - Regular diet; can consider fluid restriction based on the new urine labs  - Continue to hold chlorthalidone  - Curb-sided nephro - suggested urine studies and BMP check    #Hypertension  - hold chorthalidone given low sodium  - hold irbesartan given continued low sodium   - IV hydralazine for SBP>180    #Dysphagia likely due to esophageal dysmotility  Complaining of food getting stuck in the throat. Also has some distaste in mouth after covid.  - Speech consulted - okay for regular diet and thin liquids; suggested outpatient esophagram with or without video swallow study to assess for esophageal motility.     # Unwitnessed fall  # Headache, resolved  Pt reporting having fallen and \"bumping\" her head on the ground when she was on her way to her PCP clinic. Denies LOC though endorses headache which she is unsure if occurring since fall or due to sleep deprived in the ED hallway. CT w/o contrast unremarkable for acute pathology.  - CTM     # Abdominal Pain, resolved  # Nausea/vomiting, resolved  Pt reporting abdominal pain since starting Paxlovid and continued with antibiotics. " Notes occasional vomiting and attributes this to poor oral intake. Slightly improved in the ED. Possibly food aversion.  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 Follows with Onc as outpatient.  - Continue Hydroxyurea 500mg every day    # HLD  - Continue pravastatin 20mg QHS     # GERD  - Continue Omeprazole 20mg every day     # Anxiety  # Insomnia  - Continue PTA Gabapentin  - Holding Lorazepam         Diet: Snacks/Supplements Adult: Other; yogurt @ 10 and bananas (peaches or pears if out) @ 2; Between Meals  Regular Diet Adult  Diet    DVT Prophylaxis: Pneumatic Compression Devices  Soler Catheter: Not present  Fluids: changing based on sodium correction  Lines: None     Cardiac Monitoring: None  Code Status: Full Code      Clinically Significant Risk Factors        # Hypokalemia: Lowest K = 3 mmol/L in last 2 days, will replace as needed  # Hyponatremia: Lowest Na = 124 mmol/L in last 2 days, will monitor as appropriate                # Overweight: Estimated body mass index is 28.36 kg/m  as calculated from the following:    Height as of this encounter: 1.524 m (5').    Weight as of this encounter: 65.9 kg (145 lb 3.2 oz)., PRESENT ON ADMISSION         Disposition Plan  Independent living facility from where patient came from     Expected Discharge Date: 03/06/2023,  9:00 AM    Destination: assisted living  Discharge Comments: Awaiting sodium stabilization, Coming from indepenent care in [facility], recs are for home w/ assist and outpt PT        The patient's care was discussed with the Attending Physician, Dr. Manuel MD.    DINORA HERNANDES MD  Medicine Service, [address]  Securely message with [employer] (more info)  Text  page via [email or web address] Paging/Directory   See signed in provider for up to date coverage information  ______________________________________________________________________    Interval History   No acute events overnight; nursing note reviewed; mentaing well; no headache, abdominal pain, nausea or vomiting.    Physical Exam   Vital Signs: Temp: 98.2  F (36.8  C) Temp src: Oral BP: (!) 163/73 Pulse: 67   Resp: 16 SpO2: 97 % O2 Device: None (Room air)    Weight: 145 lbs 3.2 oz     General: Pt NAD   Neck: Supple   Card: RRR, normal S1 and S2, no murmurs  Lung: Clear; equal BS bilaterally  Abdomen:  soft and nontender, nondistended, no organomegaly, BS+  Neuro: Alert and oriented X 3  Psych: Normal mood, affect and response  Skin: No rashes, skin warm and dry, no erythematous areas  Extremities: No edema      Medical Decision Making       Please see A&P for additional details of medical decision making.      Data   Reviewed

## 2023-03-05 NOTE — PLAN OF CARE
Goal Outcome Evaluation:  Time: Day shift 3/5/2023  Status: Stable.  NEURO: Alert and oriented x4.  RESPIRATORY: Lungs clear. O2 sats 97% on RA.  CARDIAC:  HR 60s and regular.  GI/: Abdomen soft and non-tender. LBM 3/4. Voiding without difficulty.  DIET: Tolerating regular diet. No nausea. Fair appetite. Sinemet makes her stomach upset.  PAIN/NAUSEA: Pain 1-2/10. No nausea.  IV ACCESS: S.L. removed due to discomfort. Not replaced.  ACTIVITY: Up in room, showered with assist of NA.  LAB: Na 134 this AM, redraw 129.  decided against discharging today due to drop in Na.  PLAN: Continue to monitor Na. Irbesartan reordered for elevated b/p.

## 2023-03-05 NOTE — PLAN OF CARE
[id number]    BP (!) 142/65 (BP Location: Right arm)   Pulse 68   Temp 98.2  F (36.8  C) (Oral)   Resp 16   Ht 1.524 m (5')   Wt 65.9 kg (145 lb 3.2 oz)   SpO2 96%   BMI 28.36 kg/m        Activity:  Up to bathroom with SBA    Neuros: alert and orientated x 4.  Forgetful at times.  Bed alarm in place   Cardiac: WNL   Respiratory: O2 sats mid 90's on RA, unlabored    GI/: abdomen soft/non-tender.  Last BM 3/4.   Voiding spont   Diet: regular as tolerated   Skin: intact   Lines: PIV   Incisions/Drains: n/a    Labs: Na 133   Pain/nausea: denies having pain,  No nausea   New changes this shift: none    Plan: continue POC

## 2023-03-06 VITALS
WEIGHT: 145.2 LBS | TEMPERATURE: 97.6 F | OXYGEN SATURATION: 96 % | RESPIRATION RATE: 16 BRPM | HEIGHT: 60 IN | DIASTOLIC BLOOD PRESSURE: 64 MMHG | SYSTOLIC BLOOD PRESSURE: 156 MMHG | BODY MASS INDEX: 28.51 KG/M2 | HEART RATE: 65 BPM

## 2023-03-06 LAB
ANION GAP SERPL CALCULATED.3IONS-SCNC: 12 MMOL/L (ref 7–15)
BUN SERPL-MCNC: 7.1 MG/DL (ref 8–23)
CALCIUM SERPL-MCNC: 9.5 MG/DL (ref 8.8–10.2)
CHLORIDE SERPL-SCNC: 97 MMOL/L (ref 98–107)
CREAT SERPL-MCNC: 0.49 MG/DL (ref 0.51–0.95)
DEPRECATED HCO3 PLAS-SCNC: 21 MMOL/L (ref 22–29)
ERYTHROCYTE [DISTWIDTH] IN BLOOD BY AUTOMATED COUNT: 12.6 % (ref 10–15)
GFR SERPL CREATININE-BSD FRML MDRD: >90 ML/MIN/1.73M2
GLUCOSE SERPL-MCNC: 112 MG/DL (ref 70–99)
HCT VFR BLD AUTO: 37.4 % (ref 35–47)
HGB BLD-MCNC: 13.3 G/DL (ref 11.7–15.7)
MCH RBC QN AUTO: 34.6 PG (ref 26.5–33)
MCHC RBC AUTO-ENTMCNC: 35.6 G/DL (ref 31.5–36.5)
MCV RBC AUTO: 97 FL (ref 78–100)
PLAT MORPH BLD: ABNORMAL
PLATELET # BLD AUTO: ABNORMAL 10*3/UL
POTASSIUM SERPL-SCNC: 4.5 MMOL/L (ref 3.4–5.3)
RBC # BLD AUTO: 3.84 10E6/UL (ref 3.8–5.2)
RBC MORPH BLD: ABNORMAL
SODIUM SERPL-SCNC: 130 MMOL/L (ref 136–145)
WBC # BLD AUTO: 7.4 10E3/UL (ref 4–11)

## 2023-03-06 PROCEDURE — 82310 ASSAY OF CALCIUM: CPT

## 2023-03-06 PROCEDURE — 250N000011 HC RX IP 250 OP 636

## 2023-03-06 PROCEDURE — 36415 COLL VENOUS BLD VENIPUNCTURE: CPT

## 2023-03-06 PROCEDURE — 85048 AUTOMATED LEUKOCYTE COUNT: CPT

## 2023-03-06 PROCEDURE — 250N000013 HC RX MED GY IP 250 OP 250 PS 637

## 2023-03-06 PROCEDURE — 99239 HOSP IP/OBS DSCHRG MGMT >30: CPT | Mod: GC | Performed by: STUDENT IN AN ORGANIZED HEALTH CARE EDUCATION/TRAINING PROGRAM

## 2023-03-06 RX ORDER — SODIUM CHLORIDE 1 G/1
2 TABLET ORAL 2 TIMES DAILY WITH MEALS
Status: DISCONTINUED | OUTPATIENT
Start: 2023-03-06 | End: 2023-03-06 | Stop reason: HOSPADM

## 2023-03-06 RX ORDER — SODIUM CHLORIDE 1 G/1
2 TABLET ORAL 2 TIMES DAILY
Qty: 90 TABLET | Refills: 3 | Status: SHIPPED | OUTPATIENT
Start: 2023-03-06 | End: 2023-03-13

## 2023-03-06 RX ADMIN — CARBIDOPA AND LEVODOPA 2 TABLET: 25; 100 TABLET ORAL at 07:46

## 2023-03-06 RX ADMIN — HYDROXYUREA 500 MG: 500 CAPSULE ORAL at 07:47

## 2023-03-06 RX ADMIN — Medication 25 MCG: at 07:46

## 2023-03-06 RX ADMIN — PANTOPRAZOLE SODIUM 40 MG: 40 TABLET, DELAYED RELEASE ORAL at 07:46

## 2023-03-06 RX ADMIN — ESTRADIOL 10 MCG: 10 TABLET, FILM COATED VAGINAL at 07:48

## 2023-03-06 RX ADMIN — SODIUM CHLORIDE 2 G: 1 TABLET ORAL at 07:46

## 2023-03-06 RX ADMIN — IRBESARTAN 225 MG: 150 TABLET ORAL at 07:45

## 2023-03-06 RX ADMIN — CALCIUM 500 MG: 500 TABLET ORAL at 07:47

## 2023-03-06 RX ADMIN — ACETAMINOPHEN 975 MG: 325 TABLET ORAL at 07:45

## 2023-03-06 RX ADMIN — ENOXAPARIN SODIUM 40 MG: 40 INJECTION SUBCUTANEOUS at 07:47

## 2023-03-06 ASSESSMENT — ACTIVITIES OF DAILY LIVING (ADL)
ADLS_ACUITY_SCORE: 35

## 2023-03-06 NOTE — PLAN OF CARE
[id number]    BP (!) 152/76 (BP Location: Left arm)   Pulse 98   Temp 98.2  F (36.8  C) (Oral)   Resp 16   Ht 1.524 m (5')   Wt 65.9 kg (145 lb 3.2 oz)   SpO2 97%   BMI 28.36 kg/m        Activity:  Up to bathroom with SBA    Neuros: alert and orientated x 4.  Calm and cooperative   Cardiac: WNL   Respiratory: O2 sats mid 90's on RA, unlabored    GI/: abdomen soft/non-tender.  Last BM 3/5.   Voiding spont   Diet: regular as tolerated   Skin: intact   Lines: no access   Incisions/Drains: n/a    Labs: Na 130  Pain/nausea: denies having pain,  No nausea   New changes this shift: none    Plan: continue POC

## 2023-03-06 NOTE — DISCHARGE SUMMARY
"[facility]  Discharge Summary - Medicine & Pediatrics       Date of Admission:  3/1/2023  Date of Discharge:  3/6/2023  Discharging Provider: DONNA DUNHAM MD  Discharge Service: Medicine Service, [place] TEAM 2    Discharge Diagnoses   Hyponatremia likely related low solute diet and chlorthalidone  Hypertension  Dysphagia likely due to esophageal dysmotility  Fall, no acute concerns  Abdominal Pain, resolved  Nausea/vomiting, resolved  Hypokalemia, resolved  Parkinsons disease  Essential Thrombocythemia   HLD  GERD  Anxiety  Insomnia    Follow-ups Needed After Discharge   Follow-up Appointments     Follow Up ([facility])      Follow up with primary care provider, Kar Alvarez or other   available primary care providers within 7 days to evaluate medication   change, to evaluate treatment change, and for hospital follow- up.  The   following labs/tests are recommended: BMP.      Appointments on [place] and/or [facility] (with [facility] or [facility]   provider or service). Call [phone] if you haven't heard regarding   these appointments within 7 days of discharge.            Also check if blood pressure control is adequate. May not be appropriate to go back on chlorthalidone given persistent hyponatremia and concern for solute deficiency.    Will also coordinate speech evaluation for possible esophageal dysmotility    Unresulted Labs Ordered in the Past 30 Days of this Admission     No orders found from 1/30/2023 to 3/2/2023.      These results will be followed up by PCP/nephro    Discharge Disposition   Discharged to home  Condition at discharge: Stable    Hospital Course   Catherine Mccollum is a 82 year old female with a past medical history significant for essential HTN, HLD, essential thrombocythemia, parkinsonsism who is currently being managed for hyponatremia.    Admission Hx: \"The patient had labs drawn earlier today (3/1/23) which revealed sodium of 119. " "Her physician called 911 and she was transferred here via EMS. She has been having dysuria as well as urinary urgency (with poor flow) for at least a couple of weeks. She was initially treated for a UTI outpatient with Macrobid which she completed and had persistent symptoms. She was started on ciprofloxacin which she recently completed the entire course. Notably, she had COVID-19 approximately 3 weeks ago. She has persistent cough as well as persistent decreased appetite since then. She did take Paxlovid for this which resulted in abdominal pain which has been persistent. She does have nausea and will have vomiting intermittently after eating due to her abdominal pain. She states that she has been very thirsty recently and drinking more fluids [roughly 8 glasses of water a day]. However not eating much food due to GI upset and poor appetite [at most a hard boiled egg or soup]. \"     The following problems were addressed    # Hyponatremia likely related low solute diet and chlorthalidone  Hx suggestive of polydipsia and poor solute intake in the past 2 weeks. Clinical response to IVF supports this theory. UA studies not reliable given pt is on chlorthalidone. TSH and ACTH levels were normal. Urine studies suggestive of solute depletion. Talked to nephrology, thinks this is likely due to chlorthalidone related in combination with COVID induced renal changes. They would like to see patient in the clinic  - Regular diet  - Continue to hold chlorthalidone  - nephro referral placed for outpatient follow up in 2-4 weeks  - BMP check in 2 days  - PCP visit in 1 week      #Hypertension  - stop chorthalidone given hyponatremia; may need to consider adding second agent for better blood pressure control as outpatient  - continued irbesartan given continued low sodium      # Unwitnessed fall  # Headache, reesolved  Pt reporting having fallen and \"bumping\" her head on the ground when she was on her way to her PCP clinic. Denies " LOC though endorses headache which she is unsure if occurring since fall or due to sleep deprived in the ED hallway. CT w/o contrast unremarkable for acute pathology.  - CTM     # Abdominal Pain, resolved  # Nausea/vomiting, resolved  Pt reporting abdominal pain since starting Paxlovid and continued with antibiotics. Notes occasional vomiting and attributes this to poor oral intake. Slightly improved in the ED. Possibly food aversion.  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 Follows with Onc as outpatient.  - Continue Hydroxyurea 500mg every day     # HLD  - Continue pravastatin 20mg QHS     # GERD  - Continue Omeprazole 20mg every day     # Anxiety  # Insomnia  - Continue PTA Gabapentin  - Holding Lorazepam       Consultations This Hospital Stay   NUTRITION SERVICES ADULT IP CONSULT  PHYSICAL THERAPY ADULT IP CONSULT  OCCUPATIONAL THERAPY ADULT IP CONSULT  NURSING TO CONSULT FOR VASCULAR ACCESS CARE IP CONSULT  SPEECH LANGUAGE PATH ADULT IP CONSULT    Code Status   Full Code       The patient was discussed with MD DINORA Hopkins MD [place] [facility] [address] [address]  Phone: [phone]  ______________________________________________________________________    Physical Exam   Vital Signs: Temp: 97.6  F (36.4  C) Temp src: Oral BP: (!) 156/64 Pulse: 65   Resp: 16 SpO2: 96 % O2 Device: None (Room air)    Weight: 145 lbs 3.2 oz     General: Pt NAD   Neck: Supple   Card: RRR, normal S1 and S2, no murmurs  Lung: Clear; equal BS bilaterally  Abdomen:  soft and nontender, nondistended, no organomegaly, BS+  Neuro: Alert and oriented X 3  Psych: Normal mood, affect and response  Skin: No rashes, skin warm and dry, no erythematous areas  Extremities: No edema      Primary Care Physician   Kar BUTCHER  Kim    Discharge Orders      Basic metabolic panel     Physical Therapy Referral      Speech Therapy Referral      Adult Nephrology  Referral      Reason for your hospital stay    You were admitted due to low sodium. We gave you IV fluids, stopped your chlorthalidone medication, and salt tablets to improve sodium. Also encouraged better food intake to help keep sodium up. At discharge  1. Please see primary care doctor (any primary care) in 1 week - video visit ok; Please get blood work done before the visit.  2. Blood check this Tuesday  3. Continue to eat regular food  4. Stop chlorthalidone; continue other medications  5. May need to see speech therapy to evaluate difficulty with swallowing     Activity    Your activity upon discharge: activity as tolerated     Resume Home Care Services     Follow Up ([facility])    Follow up with primary care provider, Kar Alvarez or other available primary care providers within 7 days to evaluate medication change, to evaluate treatment change, and for hospital follow- up.  The following labs/tests are recommended: BMP.      Appointments on [place] and/or [facility] (with [facility] or [facility] provider or service). Call [phone] if you haven't heard regarding these appointments within 7 days of discharge.     Diet    Follow this diet upon discharge: Orders Placed This Encounter      Snacks/Supplements Adult: Other; yogurt @ 10 and bananas (peaches or pears if out) @ 2; Between Meals      Regular Diet Adult       Significant Results and Procedures   Most Recent 3 CBC's:Recent Labs   Lab Test 03/06/23  0701 03/05/23  0623 03/03/23  0607 03/02/23  0646   WBC 7.4 7.4 8.4 10.9   HGB 13.3 12.7 13.2 14.0   MCV 97 96 93 93   PLT  --  395 426 476*     Most Recent 3 BMP's:Recent Labs   Lab Test 03/06/23  0701 03/05/23  2121 03/05/23  1124   * 130* 128*  129*   POTASSIUM 4.5 3.9 3.8   CHLORIDE 97* 96* 95*   CO2 21* 23 20*   BUN 7.1* 7.8* 8.0   CR 0.49* 0.48*  0.41*   ANIONGAP 12 11 13   LALI 9.5 9.5 9.0   * 112* 110*   ,   Results for orders placed or performed during the hospital encounter of 03/01/23   CT Head w/o Contrast    Narrative    EXAM: CT HEAD W/O CONTRAST  LOCATION: [facility]  DATE/TIME: 3/2/2023 5:54 AM    INDICATION: Pt with unwitnessed fall prior to arrival to ED head injury  COMPARISON: 01/28/2015  TECHNIQUE: Routine CT Head without IV contrast. Multiplanar reformats. Dose reduction techniques were used.    FINDINGS:  INTRACRANIAL CONTENTS: No intracranial hemorrhage, extraaxial collection, or mass effect.  No CT evidence of acute infarct. Mild presumed chronic small vessel ischemic changes. Mild generalized volume loss. No hydrocephalus.     VISUALIZED ORBITS/SINUSES/MASTOIDS: No intraorbital abnormality. No paranasal sinus mucosal disease. No middle ear or mastoid effusion.    BONES/SOFT TISSUES: No acute abnormality.      Impression    IMPRESSION:  1.  No acute intracranial hemorrhage or calvarial fracture.  2.  Mild age-related changes.       Discharge Medications   Current Discharge Medication List      START taking these medications    Details   sodium chloride 1 GM tablet Take 2 tablets (2 g) by mouth 2 times daily  Qty: 90 tablet, Refills: 3    Associated Diagnoses: Hyponatremia         CONTINUE these medications which have NOT CHANGED    Details   acetaminophen (TYLENOL) 500 MG tablet Take 1,000 mg by mouth 2 times daily as needed Muscle and joint pain      calcium carbonate (OS-LALI) 500 MG tablet Take 1 tablet by mouth daily      carbidopa-levodopa (SINEMET)  MG tablet Take 1.5 tablets by mouth 3 times daily  Qty: 405 tablet, Refills: 1    Associated Diagnoses: Parkinson disease (H); Tremor      Cholecalciferol (VITAMIN D) 1000 UNITS capsule Take 1 capsule by mouth At Bedtime      cyanocobalamin (VITAMIN B-12) 1000 MCG tablet Take 1,000 mcg by mouth Every Mon, Wed, Fri Morning       estradiol (VAGIFEM) 10 MCG TABS vaginal tablet Place 1 tablet (10 mcg) vaginally twice a week  Qty: 24 tablet, Refills: 3    Associated Diagnoses: Urinary tract infection without hematuria, site unspecified      gabapentin (NEURONTIN) 100 MG capsule Take one to three at night as needed  Qty: 90 capsule, Refills: 3    Associated Diagnoses: Pain      hydroxyurea (HYDREA) 500 MG capsule Take 1 capsule (500 mg) by mouth daily  Qty: 90 capsule, Refills: 4    Associated Diagnoses: Essential thrombocythemia (H)      irbesartan (AVAPRO) 150 MG tablet TAKE 1 AND 1/2 TABLETS EVERY MORNING  Qty: 135 tablet, Refills: 1    Associated Diagnoses: Essential hypertension      LORazepam (ATIVAN) 0.5 MG tablet Take 1 tablet (0.5 mg) by mouth nightly as needed for anxiety  Qty: 30 tablet, Refills: 0    Associated Diagnoses: Anxiety      omeprazole (PRILOSEC) 20 MG DR capsule Take 1 capsule (20 mg) by mouth daily  Qty: 90 capsule, Refills: 3    Associated Diagnoses: Anxiety; Essential hypertension; High cholesterol      pravastatin (PRAVACHOL) 20 MG tablet Take 1 tablet (20 mg) by mouth At Bedtime  Qty: 90 tablet, Refills: 1    Associated Diagnoses: High cholesterol         STOP taking these medications       chlorthalidone (HYGROTON) 25 MG tablet Comments:   Reason for Stopping:         magic mouthwash suspension (diphenhydrAMINE, lidocaine, aluminum-magnesium & simethicone) Comments:   Reason for Stopping:             Allergies   Allergies   Allergen Reactions     Fosamax Nausea and GI Disturbance     Lisinopril Other (See Comments)     cough     Sulfa Drugs Hives

## 2023-03-06 NOTE — TELEPHONE ENCOUNTER
Prior Authorization Approval      Authorization Effective Date: 1/1/2023  Authorization Expiration Date: 12/31/2023  Medication: estradiol (VAGIFEM) 10 MCG TABS vaginal tablet-PA APPROVED   Approved Dose/Quantity:   Reference #:     Insurance Company: [employer] [phone] Fax [phone]  Expected CoPay:       CoPay Card Available:      Foundation Assistance Needed:    Which Pharmacy is filling the prescription (Not needed for infusion/clinic administered): [id number] - [place], [place] - [address] AT [facility] [address]  Pharmacy Notified: Yes- **Instructed pharmacy to notify patient when script is ready to /ship.**  Patient Notified: Yes

## 2023-03-06 NOTE — TELEPHONE ENCOUNTER
Central Prior Authorization Team   Phone: [phone]    PA Initiation    Medication: estradiol (VAGIFEM) 10 MCG TABS vaginal tablet  Insurance Company: [employer] - Phone [phone] Fax [phone]  Pharmacy Filling the Rx: [facility] #[id number] - [place], [place] - [address] AT [facility] [address]  Filling Pharmacy Phone: [phone]  Filling Pharmacy Fax:    Start Date: 3/6/2023

## 2023-03-06 NOTE — PLAN OF CARE
"Patient discharging. Discharge paperwork was reviewed with patient. Patient expressed understanding. A copy was given to patient. Pt discharging home with home care and follow up appointments. Patient's friend Mary will be transport. Discharge medications available in pharmacy; pt was advised to pickup medications before they leave. All patient belongings were taken with patient. Patient taken to [facility] by Community Healthcare Worker and student nurse via wheel chair.     BP (!) 156/64 (BP Location: Left arm)   Pulse 65   Temp 97.6  F (36.4  C) (Oral)   Resp 16   Ht 1.524 m (5')   Wt 65.9 kg (145 lb 3.2 oz)   SpO2 96%   BMI 28.36 kg/m        Problem: Plan of Care - These are the overarching goals to be used throughout the patient stay.    Goal: Plan of Care Review  Description: The Plan of Care Review/Shift note should be completed every shift.  The Outcome Evaluation is a brief statement about your assessment that the patient is improving, declining, or no change.  This information will be displayed automatically on your shift note.  Outcome: Adequate for Care Transition  Goal: Patient-Specific Goal (Individualized)  Description: You can add care plan individualizations to a care plan. Examples of Individualization might be:  \"Parent requests to be called daily at 9am for status\", \"I have a hard time hearing out of my right ear\", or \"Do not touch me to wake me up as it startles me\".  Outcome: Adequate for Care Transition  Goal: Absence of Hospital-Acquired Illness or Injury  Outcome: Adequate for Care Transition  Intervention: Identify and Manage Fall Risk  Recent Flowsheet Documentation  Taken 3/6/2023 0745 by JAYY PEACOCK  Safety Promotion/Fall Prevention:   activity supervised   assistive device/personal items within reach   clutter free environment maintained   fall prevention program maintained   nonskid shoes/slippers when out of bed   patient and family education   supervised " activity  Intervention: Prevent and Manage VTE (Venous Thromboembolism) Risk  Recent Flowsheet Documentation  Taken 3/6/2023 0745 by JAYY PEACOCK  VTE Prevention/Management: SCDs (sequential compression devices) off  Goal: Optimal Comfort and Wellbeing  Outcome: Adequate for Care Transition  Goal: Readiness for Transition of Care  Outcome: Adequate for Care Transition     Problem: Oral Intake Inadequate  Goal: Improved Oral Intake  Outcome: Adequate for Care Transition

## 2023-03-07 ENCOUNTER — TELEPHONE (OUTPATIENT)
Dept: NEPHROLOGY | Facility: CLINIC | Age: 83
End: 2023-03-07
Payer: MEDICARE

## 2023-03-07 DIAGNOSIS — E87.1 HYPONATREMIA: Primary | ICD-10-CM

## 2023-03-07 NOTE — PLAN OF CARE
Physical Therapy Discharge Summary    Reason for therapy discharge:    Discharged to home with home therapy.    Progress towards therapy goal(s). See goals on Care Plan in [facility] electronic health record for goal details.  Goals partially met.  Barriers to achieving goals:   discharge from facility.    Therapy recommendation(s):    Continued therapy is recommended.  Rationale/Recommendations:  [facility].

## 2023-03-07 NOTE — TELEPHONE ENCOUNTER
[facility] Call Center    Phone Message    May a detailed message be left on voicemail: yes     Reason for Call: Orders  Date needed: before 3/20/23  Provider: Madelaine    Action Taken: Message routed to:  [facility] Nephrology    Travel Screening: Not Applicable

## 2023-03-08 ENCOUNTER — PATIENT OUTREACH (OUTPATIENT)
Dept: CARE COORDINATION | Facility: CLINIC | Age: 83
End: 2023-03-08
Payer: MEDICARE

## 2023-03-08 NOTE — PROGRESS NOTES
Clinic Care Coordination Contact  [facility]/Voicemail       Clinical Data: Care Coordinator Outreach  Outreach attempted x 2.  Left message on patient's voicemail with call back information and requested return call.  Plan:  Care Coordinator will do no further outreaches at this time.    Selene CHAO Community Health Worker  Clinic Care Coordination  [facility]  Phone: [phone]

## 2023-03-09 ENCOUNTER — TRANSFERRED RECORDS (OUTPATIENT)
Dept: HEALTH INFORMATION MANAGEMENT | Facility: CLINIC | Age: 83
End: 2023-03-09
Payer: MEDICARE

## 2023-03-13 ENCOUNTER — LAB (OUTPATIENT)
Dept: LAB | Facility: CLINIC | Age: 83
End: 2023-03-13
Payer: MEDICARE

## 2023-03-13 ENCOUNTER — OFFICE VISIT (OUTPATIENT)
Dept: INTERNAL MEDICINE | Facility: CLINIC | Age: 83
End: 2023-03-13
Payer: MEDICARE

## 2023-03-13 VITALS
OXYGEN SATURATION: 95 % | HEART RATE: 70 BPM | HEIGHT: 60 IN | WEIGHT: 135.2 LBS | SYSTOLIC BLOOD PRESSURE: 163 MMHG | DIASTOLIC BLOOD PRESSURE: 87 MMHG | BODY MASS INDEX: 26.55 KG/M2

## 2023-03-13 DIAGNOSIS — H61.21 IMPACTED CERUMEN OF RIGHT EAR: ICD-10-CM

## 2023-03-13 DIAGNOSIS — E87.1 HYPONATREMIA: ICD-10-CM

## 2023-03-13 DIAGNOSIS — E87.1 HYPONATREMIA: Primary | ICD-10-CM

## 2023-03-13 DIAGNOSIS — I10 HYPERTENSION, UNSPECIFIED TYPE: ICD-10-CM

## 2023-03-13 LAB
ANION GAP SERPL CALCULATED.3IONS-SCNC: 9 MMOL/L (ref 7–15)
BUN SERPL-MCNC: 9.5 MG/DL (ref 8–23)
CALCIUM SERPL-MCNC: 9.6 MG/DL (ref 8.8–10.2)
CHLORIDE SERPL-SCNC: 98 MMOL/L (ref 98–107)
CREAT SERPL-MCNC: 0.57 MG/DL (ref 0.51–0.95)
DEPRECATED HCO3 PLAS-SCNC: 29 MMOL/L (ref 22–29)
GFR SERPL CREATININE-BSD FRML MDRD: 89 ML/MIN/1.73M2
GLUCOSE SERPL-MCNC: 106 MG/DL (ref 70–99)
POTASSIUM SERPL-SCNC: 3.6 MMOL/L (ref 3.4–5.3)
SODIUM SERPL-SCNC: 136 MMOL/L (ref 136–145)

## 2023-03-13 PROCEDURE — 36415 COLL VENOUS BLD VENIPUNCTURE: CPT | Performed by: PATHOLOGY

## 2023-03-13 PROCEDURE — 80048 BASIC METABOLIC PNL TOTAL CA: CPT | Performed by: PATHOLOGY

## 2023-03-13 PROCEDURE — 99496 TRANSJ CARE MGMT HIGH F2F 7D: CPT | Performed by: HOSPITALIST

## 2023-03-13 RX ORDER — AMLODIPINE BESYLATE 2.5 MG/1
2.5 TABLET ORAL DAILY
Qty: 30 TABLET | Refills: 2 | Status: SHIPPED | OUTPATIENT
Start: 2023-03-13 | End: 2023-06-15

## 2023-03-13 RX ORDER — SODIUM CHLORIDE 1 G/1
2 TABLET ORAL DAILY
Qty: 90 TABLET | Refills: 3 | COMMUNITY
Start: 2023-03-13 | End: 2023-05-15

## 2023-03-13 ASSESSMENT — ENCOUNTER SYMPTOMS
SHORTNESS OF BREATH: 0
CONSTIPATION: 0
HEADACHES: 1
FEVER: 0
HEARTBURN: 0
DYSURIA: 0
CHILLS: 0
ABDOMINAL PAIN: 1
DIARRHEA: 0

## 2023-03-13 NOTE — ASSESSMENT & PLAN NOTE
Hospitalized for hyponatremia, likely from poor solute intake. Lowest was at 117, improved slowly to 130 at discharge.   - Recheck BMP today. If sodium is 130 or above, will have you just take 2 salt tabs once a day. If under 130, will continue taking tabs as you have been.   - Patient to have labs and follow up with nephrology again on 3/20/23.     Addendum:  Sodium improved to 136. Will recommend decrease of sodium tablets to 2gm daily for now.

## 2023-03-13 NOTE — PROGRESS NOTES
Assessment/Plan  Problem List Items Addressed This Visit        Nervous and Auditory    Impacted cerumen of right ear     - For right ear, would use DEBROX over the counter earwax removal several times.             Endocrine    Hyponatremia - Primary     Hospitalized for hyponatremia, likely from poor solute intake. Lowest was at 117, improved slowly to 130 at discharge.   - Recheck BMP today. If sodium is 130 or above, will have you just take 2 salt tabs once a day. If under 130, will continue taking tabs as you have been.   - Patient to have labs and follow up with nephrology again on 3/20/23.     Addendum:  Sodium improved to 136. Will recommend decrease of sodium tablets to 2gm daily for now.         Relevant Orders    Basic metabolic panel  (Ca, Cl, CO2, Creat, Gluc, K, Na, BUN) (Completed)       Circulatory    HTN (hypertension)     Was discontinued off chlorthalidone.   - Continue on Irbaesartan.   - Will add Amlodipine 2.5mg daily to start.   - Patient to keep follow up with nephrology on 3/20/23.         Relevant Medications    amLODIPine (NORVASC) 2.5 MG tablet       No results found for any visits on 03/13/23.    Health Maintenance Due   Topic Date Due     ADVANCE CARE PLANNING  08/05/2019       Hospital Follow-up Visit:    Hospital/Nursing Home/IP Rehab Facility: [facility]  Date of Admission: 3/1/23  Date of Discharge: 3/6/23  Reason(s) for Admission: Hyponatremia    Was your hospitalization related to COVID-19? No   Problems taking medications regularly:  None  Medication changes since discharge: None  Problems adhering to non-medication therapy:  None    Summary of hospitalization:  [facility] hospital discharge summary reviewed  Diagnostic Tests/Treatments reviewed.  Follow up needed: Follow up with nephrology, follow up with PCP with BMP.  Other Healthcare Providers Involved in Patient s Care:         None  Update since discharge: stable.          Plan of care communicated with patient               Subjective  Patient mentions that she doesn't have a big change in her appetite since her hospital stay. Mentions that she does have some upper upset stomach. No heartburn. No fevers or chills. Is having BMs twice a day. No shortness of breath. Has had some occasional headaches, none today. Mentions some swelling in her ankles. Mentions she is still taking 2 salt tablets twice a day but unpleasant to take.      Review of Systems   Constitutional: Negative for chills and fever.   Respiratory: Negative for shortness of breath.    Cardiovascular: Positive for peripheral edema. Negative for chest pain.   Gastrointestinal: Positive for abdominal pain. Negative for constipation, diarrhea and heartburn.   Genitourinary: Negative for dysuria.   Neurological: Positive for headaches.   Psychiatric/Behavioral: Negative for mood changes.       History  Past Medical History:   Diagnosis Date     Arthritis      Essential thrombocythemia (H) 2021     GERD (gastroesophageal reflux disease)      HTN (hypertension)      Osteoporosis      Parkinsonian syndrome (H)     left side tremor       Past Surgical History:   Procedure Laterality Date     ANKLE SURGERY Right 2002     BREAST SURGERY  1987    biopsy     CATARACT IOL, RT/LT        SECTION       COLONOSCOPY  2013    normal     GYN SURGERY  1965;1967    2 ceasarean sections     HEMILAMINECTOMY, DISCECTOMY LUMBAR ONE LEVEL, COMBINED Left 2018    Procedure: COMBINED HEMILAMINECTOMY, DISCECTOMY LUMBAR ONE LEVEL;  Open Left Lumbar 5-Sacral 1 Hemilaminectomy, Medial Facetectomy, And Microdiscectomy Attention Transitional Segment Anatomy;  Surgeon: Rosemarie Young MD;  Location: [facility]     LUMBAR LAMINECTOMY Right 2017    Procedure:  RIGHT L4-5 AND L5-S1 LATERAL RECESS DECOMPRESSION;  Surgeon: Raudel Jackson MD;  Location: [facility];  Service:      ORTHOPEDIC  SURGERY      ankle surgery for trimalleolar frx     SD SPINE SURGERY PROCEDURE UNLISTED         Family History   Problem Relation Age of Onset     C.A.D. Other      Hypertension Other         granddaughter      Cancer Other      Heart Disease Father      Hypertension Father      Diabetes No family hx of        Social History     Tobacco Use     Smoking status: Never     Smokeless tobacco: Never   Substance Use Topics     Alcohol use: No        Objective  BP (!) 163/87 (BP Location: Right arm, Patient Position: Sitting, Cuff Size: Adult Regular)   Pulse 70   Ht 1.524 m (5')   Wt 61.3 kg (135 lb 3.2 oz)   SpO2 95%   BMI 26.40 kg/m    Vitals taken by Dario Evans MD    Physical Exam  Constitutional:       General: She is not in acute distress.     Appearance: She is not ill-appearing or toxic-appearing.   HENT:      Head: Normocephalic and atraumatic.      Right Ear: There is impacted cerumen.      Left Ear: Tympanic membrane, ear canal and external ear normal.   Eyes:      Conjunctiva/sclera: Conjunctivae normal.   Cardiovascular:      Rate and Rhythm: Regular rhythm.      Heart sounds: Normal heart sounds. No murmur heard.    No friction rub. No gallop.   Pulmonary:      Effort: Pulmonary effort is normal. No respiratory distress.      Breath sounds: Normal breath sounds. No wheezing, rhonchi or rales.   Abdominal:      General: Bowel sounds are normal. There is no distension.      Palpations: Abdomen is soft.      Tenderness: There is abdominal tenderness. There is no guarding or rebound.   Musculoskeletal:      Right lower leg: Edema present.      Left lower leg: Edema present.      Comments: Mild edema of both ankles   Skin:     General: Skin is warm and dry.   Neurological:      Mental Status: She is alert.   Psychiatric:         Mood and Affect: Mood normal.         Thought Content: Thought content normal.       20 minutes spent on the date of the encounter doing chart review, history and exam,  documentation and further activities per the note.      Return in about 1 month (around 4/13/2023).      Dario Evans MD  [facility] INTERNAL MEDICINE [place]

## 2023-03-13 NOTE — NURSING NOTE
Catherine Mccollum is a 82 year old female patient that presents today in clinic for the following:    Chief Complaint   Patient presents with     Hospital F/U     Pt was admitted to ED 3/1 - 3/6 for Hyponatremia. Pt reports not feeling much better. Would like to know how her sodium is doing currently. Wondering how long she'll have to take sodium tablets.     Vomiting     Pt reports stomach upset, vomited some this morning     The patient's allergies and medications were reviewed as noted. A set of vitals were recorded as noted without incident: BP (!) 163/87 (BP Location: Right arm, Patient Position: Sitting, Cuff Size: Adult Regular)   Pulse 70   Ht 1.524 m (5')   Wt 61.3 kg (135 lb 3.2 oz)   SpO2 95%   BMI 26.40 kg/m  . The patient does not have any other questions for the provider.    Jose Rafael Fields, Visit Facilitator 1:27 PM on 3/13/2023

## 2023-03-13 NOTE — ASSESSMENT & PLAN NOTE
Was discontinued off chlorthalidone.   - Continue on Irbaesartan.   - Will add Amlodipine 2.5mg daily to start.   - Patient to keep follow up with nephrology on 3/20/23.

## 2023-03-13 NOTE — PATIENT INSTRUCTIONS
- Start on Amlodipine 2.5mg daily.   - Continue on your Irbasartan.   - Recheck BMP today. If sodium is 130 or above, will have you just take 2 salt tabs once a day. If under 130, will continue taking tabs as you have been.     - For right ear, would use DEBROX over the counter earwax removal several times.       Follow up again with Dr. Alvarez in 1 month.

## 2023-03-16 NOTE — TELEPHONE ENCOUNTER
DIAGNOSIS: Hyponatremia    DATE RECEIVED: 03.20.203    NOTES STATUS DETAILS   OFFICE NOTE from referring provider Internal 03.01.2023 Pari Martinez MD   OFFICE NOTE from other specialist  Internal 03.13.2023 Dario Evans MD   *Only VASCULITIS or LUPUS gather office notes for the following     *PULMONARY       *ENT     *DERMATOLOGY     *RHEUMATOLOGY     DISCHARGE SUMMARY from hospital     DISCHARGE REPORT from the ER Internal 03.01.2023 Pari Martinez MD   MEDICATION LIST Internal    IMAGING  (NEED IMAGES AND REPORTS)     KIDNEY CT SCAN     KIDNEY ULTRASOUND     MR ABDOMEN     NUCLEAR MEDICINE RENAL     LABS     CBC Internal 03.06.2023   CMP Internal 03.06.2023   BMP Internal 03.06.2023   UA Internal 03.01.2023   URINE PROTEIN Internal 03.01.2023   RENAL PANEL     BIOPSY     KIDNEY BIOPSY

## 2023-03-20 ENCOUNTER — LAB (OUTPATIENT)
Dept: LAB | Facility: CLINIC | Age: 83
End: 2023-03-20
Payer: MEDICARE

## 2023-03-20 ENCOUNTER — PRE VISIT (OUTPATIENT)
Dept: NEPHROLOGY | Facility: CLINIC | Age: 83
End: 2023-03-20

## 2023-03-20 ENCOUNTER — OFFICE VISIT (OUTPATIENT)
Dept: NEPHROLOGY | Facility: CLINIC | Age: 83
End: 2023-03-20
Attending: INTERNAL MEDICINE
Payer: MEDICARE

## 2023-03-20 VITALS
WEIGHT: 134.5 LBS | DIASTOLIC BLOOD PRESSURE: 83 MMHG | HEART RATE: 71 BPM | TEMPERATURE: 97.7 F | BODY MASS INDEX: 26.27 KG/M2 | SYSTOLIC BLOOD PRESSURE: 179 MMHG | OXYGEN SATURATION: 98 %

## 2023-03-20 DIAGNOSIS — E87.1 HYPONATREMIA: ICD-10-CM

## 2023-03-20 LAB
ALBUMIN SERPL BCG-MCNC: 4.6 G/DL (ref 3.5–5.2)
ALBUMIN UR-MCNC: NEGATIVE MG/DL
ANION GAP SERPL CALCULATED.3IONS-SCNC: 11 MMOL/L (ref 7–15)
APPEARANCE UR: CLEAR
BILIRUB UR QL STRIP: NEGATIVE
BUN SERPL-MCNC: 9.2 MG/DL (ref 8–23)
CALCIUM SERPL-MCNC: 10 MG/DL (ref 8.8–10.2)
CHLORIDE SERPL-SCNC: 102 MMOL/L (ref 98–107)
COLOR UR AUTO: NORMAL
CREAT SERPL-MCNC: 0.56 MG/DL (ref 0.51–0.95)
DEPRECATED HCO3 PLAS-SCNC: 29 MMOL/L (ref 22–29)
GFR SERPL CREATININE-BSD FRML MDRD: 89 ML/MIN/1.73M2
GLUCOSE SERPL-MCNC: 131 MG/DL (ref 70–99)
GLUCOSE UR STRIP-MCNC: NEGATIVE MG/DL
HGB UR QL STRIP: NEGATIVE
KETONES UR STRIP-MCNC: NEGATIVE MG/DL
LEUKOCYTE ESTERASE UR QL STRIP: NEGATIVE
NITRATE UR QL: NEGATIVE
OSMOLALITY SERPL: 287 MMOL/KG (ref 280–301)
OSMOLALITY UR: 171 MMOL/KG (ref 100–1200)
PH UR STRIP: 7 [PH] (ref 5–7)
PHOSPHATE SERPL-MCNC: 2.8 MG/DL (ref 2.5–4.5)
POTASSIUM SERPL-SCNC: 3.6 MMOL/L (ref 3.4–5.3)
RBC URINE: 0 /HPF
SODIUM SERPL-SCNC: 142 MMOL/L (ref 136–145)
SODIUM UR-SCNC: 40 MMOL/L
SP GR UR STRIP: 1 (ref 1–1.03)
SQUAMOUS EPITHELIAL: <1 /HPF
UROBILINOGEN UR STRIP-MCNC: NORMAL MG/DL
WBC URINE: 1 /HPF

## 2023-03-20 PROCEDURE — 80069 RENAL FUNCTION PANEL: CPT | Performed by: PATHOLOGY

## 2023-03-20 PROCEDURE — 36415 COLL VENOUS BLD VENIPUNCTURE: CPT | Performed by: PATHOLOGY

## 2023-03-20 PROCEDURE — 83930 ASSAY OF BLOOD OSMOLALITY: CPT | Performed by: INTERNAL MEDICINE

## 2023-03-20 PROCEDURE — 83935 ASSAY OF URINE OSMOLALITY: CPT | Performed by: INTERNAL MEDICINE

## 2023-03-20 PROCEDURE — 81001 URINALYSIS AUTO W/SCOPE: CPT | Performed by: PATHOLOGY

## 2023-03-20 PROCEDURE — 84300 ASSAY OF URINE SODIUM: CPT | Performed by: INTERNAL MEDICINE

## 2023-03-20 PROCEDURE — G0463 HOSPITAL OUTPT CLINIC VISIT: HCPCS | Performed by: INTERNAL MEDICINE

## 2023-03-20 PROCEDURE — 99203 OFFICE O/P NEW LOW 30 MIN: CPT | Performed by: INTERNAL MEDICINE

## 2023-03-20 ASSESSMENT — PAIN SCALES - GENERAL: PAINLEVEL: NO PAIN (0)

## 2023-03-20 NOTE — NURSING NOTE
Chief Complaint   Patient presents with     Consult     New pt consult.     Blood pressure (!) 179/83, pulse 71, temperature 97.7  F (36.5  C), weight 61 kg (134 lb 8 oz), SpO2 98 %, not currently breastfeeding.    SHAHBAZ LAO

## 2023-03-20 NOTE — PROGRESS NOTES
Nephrology Clinic    Catherine Mccollum MRN:[id number] YOB: 1940  Date of Service: 03/20/2023  Primary care provider: Kar Alvarez  Requesting physician: Pari Martinez MD      REASON FOR CONSULT: Hyponatremia    HISTORY OF PRESENT ILLNESS:   Catherine Mccollum is a 82 year old female who presents for follow up of hyponatremia  The past medical history is significant for  essential HTN, HLD, essential thrombocythemia, parkinsonism and recently hyponatremia for which she was admitted on 3/1. She had labs drawn on 3/1 which revealed a sodium level at 119. She had been having dysuria as well as urinary urgency for the prior two weeks and was initially treated for a UTI as an outpatient using macrobid and was thereafter switched to ciprofloxacin. She also had had a Covid-19 infection 3 weeks prior and had been having very poor solute intake as a result. The patient received IVF, had chlorthalidone stopped and her sodium level normalized. She was discharged on 4g of sodium daily and this was decreased to 2g daily whe her sodium level was found to be 136 on 3/13. She was also started on 3/13 on amlodipine 2.5 mg daily as her BP was found to be elevated. Today her sodium level remains normal at 142. She still reports suboptimal solute intake.  Her current medications are the following: pravastatin 20 mg daily, omeprazole 20 mg daily, irbesartan 150 mg daily, hydroxyurea 500 mg at bedtime, gabapentin 100 mg daily, carbidopa-levidopa  mg 1.5 tablets twice daily, amlodipine 2.5 mg daily.   Her renal function is intact with a creatinine level at 0.56 mg/dL. Her UA shows no evidence of proteinuria  The following portions of the patient's history were reviewed and updated as appropriate: allergies, current medications, past family history, past medical history, past social history, past surgical history and problem list.    PAST MEDICAL HISTORY:  Past Medical History:   Diagnosis Date     Arthritis 2000      Essential thrombocythemia (H) 2021     GERD (gastroesophageal reflux disease)      HTN (hypertension)      Osteoporosis      Parkinsonian syndrome (H)     left side tremor     PAST SURGICAL HISTORY:  Past Surgical History:   Procedure Laterality Date     ANKLE SURGERY Right 2002     BREAST SURGERY  1987    biopsy     CATARACT IOL, RT/LT        SECTION       COLONOSCOPY  2013    normal     GYN SURGERY  1965;1967    2 ceasarean sections     HEMILAMINECTOMY, DISCECTOMY LUMBAR ONE LEVEL, COMBINED Left 2018    Procedure: COMBINED HEMILAMINECTOMY, DISCECTOMY LUMBAR ONE LEVEL;  Open Left Lumbar 5-Sacral 1 Hemilaminectomy, Medial Facetectomy, And Microdiscectomy Attention Transitional Segment Anatomy;  Surgeon: Rosemarie Young MD;  Location: [facility] OR     LUMBAR LAMINECTOMY Right 2017    Procedure:  RIGHT L4-5 AND L5-S1 LATERAL RECESS DECOMPRESSION;  Surgeon: Raudel Jackson MD;  Location: [facility];  Service:      ORTHOPEDIC SURGERY      ankle surgery for trimalleolar frx     [place] SPINE SURGERY PROCEDURE UNLISTED       MEDICATIONS:  Prescription Medications as of 3/20/2023       Rx Number Disp Refills Start End Last Dispensed Date Next Fill Date Owning Pharmacy    acetaminophen (TYLENOL) 500 MG tablet            Sig: Take 1,000 mg by mouth 2 times daily as needed Muscle and joint pain    Class: Historical    Route: Oral    amLODIPine (NORVASC) 2.5 MG tablet  30 tablet 2 3/13/2023    [id number] - [place], [place] - [address] AT [facility] [address]    Sig: Take 1 tablet (2.5 mg) by mouth daily    Class: E-Prescribe    Route: Oral    calcium carbonate (OS-LALI) 500 MG tablet            Sig: Take 1 tablet by mouth daily    Class: Historical    Route: Oral    carbidopa-levodopa (SINEMET)  MG tablet  405 tablet 1 10/10/2022    [facility] - [place], [place] - [address]    Sig: Take 1.5 tablets by mouth  3 times daily    Class: E-Prescribe    Route: Oral    Cholecalciferol (VITAMIN D) 1000 UNITS capsule            Sig: Take 1 capsule by mouth At Bedtime    Class: Historical    Route: Oral    cyanocobalamin (VITAMIN B-12) 1000 MCG tablet    12/9/2019    [facility][id number] - [place], [place] - [address] AT [facility] [address]    Sig: Take 1,000 mcg by mouth Every Mon, Wed, Fri Morning    Class: Historical    Route: Oral    estradiol (VAGIFEM) 10 MCG TABS vaginal tablet  24 tablet 3 3/2/2023    [id number] - [place], [place] - [address] AT [facility] [address]    Sig: Place 1 tablet (10 mcg) vaginally twice a week    Class: E-Prescribe    Route: Vaginal    gabapentin (NEURONTIN) 100 MG capsule  90 capsule 3 5/18/2022    [facility] - [place] [address]    Sig: Take one to three at night as needed    Class: E-Prescribe    hydroxyurea (HYDREA) 500 MG capsule  90 capsule 4 10/20/2022    [facility] [place] [address]    Sig: Take 1 capsule (500 mg) by mouth daily    Class: E-Prescribe    Route: Oral    irbesartan (AVAPRO) 150 MG tablet  135 tablet 1 7/28/2022    [facility] [place] [id number]    Sig: TAKE 1 AND 1/2 TABLETS EVERY MORNING    Class: E-Prescribe    LORazepam (ATIVAN) 0.5 MG tablet  30 tablet 0 11/21/2022        Sig: Take 1 tablet (0.5 mg) by mouth nightly as needed for anxiety    Class: Local Print    Route: Oral    omeprazole (PRILOSEC) 20 MG DR capsule  90 capsule 3 7/15/2022    [facility] [place] [address]    Sig: Take 1 capsule (20 mg) by mouth daily    Class: E-Prescribe    Route: Oral    pravastatin (PRAVACHOL) 20 MG tablet  90 tablet 1 10/10/2022    [facility] [place] [address]    Sig: Take 1 tablet (20 mg) by mouth At Bedtime    Class: E-Prescribe     Route: Oral    sodium chloride 1 GM tablet  90 tablet 3 3/13/2023        Sig: Take 2 tablets (2 g) by mouth daily    Class: Historical    Route: Oral         ALLERGIES:    Allergies   Allergen Reactions     Fosamax Nausea and GI Disturbance     Lisinopril Other (See Comments)     cough     Sulfa Drugs Hives     REVIEW OF SYSTEMS:  Review Of Systems  Skin: negative for, pigmentation, acne, rash, scaling, itching, bruising, lumps or bumps  Eyes: negative for, visual blurring, double vision, glaucoma, cataracts, eye pain, color blindness, glasses, contacts  Ears/Nose/Throat: negative for, nasal congestion, sneezing, postnasal drainage, hearing loss, deafness, tinnitus, vertigo, epistaxis  Respiratory: No shortness of breath, dyspnea on exertion, cough, or hemoptysis  Cardiovascular: negative for, palpitations, tachycardia, irregular heart beat, chest pain, exertional chest pain or pressure, paroxysmal nocturnal dyspnea and dyspnea on exertion  Gastrointestinal: negative for, poor appetite, dysphagia, nausea, vomiting, heartburn, dyspepsia, reflux and hematemesis  Genitourinary: negative for, nocturia, dysuria, frequency, urgency, hesitancy, hematuria and retention  Musculoskeletal: negative for, fracture, back pain, neck pain, arthritis, joint pain, joint swelling and joint stiffness  Neurologic: negative for, headaches, syncope, stroke, seizures, paralysis, local weakness and numbness or tingling of hands    A comprehensive review of systems was performed and found to be negative except as described here or above.  SOCIAL HISTORY:   Social History     Socioeconomic History     Marital status:      Spouse name: Not on file     Number of children: Not on file     Years of education: Not on file     Highest education level: Not on file   Occupational History     Not on file   Tobacco Use     Smoking status: Never     Smokeless tobacco: Never   Vaping Use     Vaping Use: Never used   Substance and Sexual  Activity     Alcohol use: No     Drug use: No     Sexual activity: Not Currently   Other Topics Concern     Parent/sibling w/ CABG, MI or angioplasty before 65F 55M? No   Social History Narrative     Not on file     Social Determinants of Health     Financial Resource Strain: Not on file   Food Insecurity: Not on file   Transportation Needs: Not on file   Physical Activity: Not on file   Stress: Not on file   Social Connections: Not on file   Intimate Partner Violence: Not on file   Housing Stability: Not on file     FAMILY MEDICAL HISTORY:   Family History   Problem Relation Age of Onset     C.A.D. Other      Hypertension Other         granddaughter      Cancer Other      Heart Disease Father      Hypertension Father      Diabetes No family hx of      PHYSICAL EXAM:   There were no vitals taken for this visit.  GENERAL APPEARANCE: alert and no distress  EYES: nonicteric  HENT: mouth without ulcers or lesions  NECK: supple, no adenopathy  RESP: lungs clear to auscultation   CV: regular rhythm, normal rate, no rub  ABDOMEN: soft, nontender, normal bowel sounds, no HSM   Extremities: no clubbing, cyanosis, or edema  MS: no evidence of inflammation in joints, no muscle tenderness  SKIN: no rash  NEURO: mentation intact and speech normal  PSYCH: affect normal/bright   LABS:   Recent Results (from the past 672 hour(s))   UA Macro with Reflex to Micro and Culture - lab collect    Collection Time: 02/20/23  4:19 PM    Specimen: Urine, Clean Catch   Result Value Ref Range    Color Urine Yellow Colorless, Straw, Light Yellow, Yellow    Appearance Urine Cloudy (A) Clear    Glucose Urine Negative Negative mg/dL    Bilirubin Urine Negative Negative    Ketones Urine Negative Negative mg/dL    Specific Gravity Urine 1.010 1.003 - 1.035    Blood Urine Moderate (A) Negative    pH Urine 7.0 5.0 - 7.0    Protein Albumin Urine Trace (A) Negative mg/dL    Urobilinogen Urine 0.2 0.2, 1.0 E.U./dL    Nitrite Urine Positive (A) Negative     Leukocyte Esterase Urine Large (A) Negative   Urine Microscopic Exam    Collection Time: 02/20/23  4:19 PM   Result Value Ref Range    Bacteria Urine Many (A) None Seen /HPF    RBC Urine 10-25 (A) 0-2 /HPF /HPF    WBC Urine >100 (A) 0-5 /HPF /HPF   Urine Culture    Collection Time: 02/20/23  4:19 PM    Specimen: Urine, Clean Catch   Result Value Ref Range    Culture >100,000 CFU/mL Escherichia coli (A)        Susceptibility    Escherichia coli - EDUARDO     Ampicillin >=32 Resistant ug/mL     Ampicillin/ Sulbactam 16 Intermediate ug/mL     Piperacillin/Tazobactam <=4 Susceptible ug/mL     Cefazolin* <=4 Susceptible ug/mL      * Cefazolin EDUARDO breakpoints are for the treatment of uncomplicated urinary tract infections. For the treatment of systemic infections, please contact the laboratory for additional testing.     Cefoxitin <=4 Susceptible ug/mL     Ceftazidime <=1 Susceptible ug/mL     Ceftriaxone <=1 Susceptible ug/mL     Cefepime <=1 Susceptible ug/mL     Gentamicin <=1 Susceptible ug/mL     Tobramycin <=1 Susceptible ug/mL     Ciprofloxacin <=0.25 Susceptible ug/mL     Levofloxacin <=0.12 Susceptible ug/mL     Nitrofurantoin <=16 Susceptible ug/mL     Trimethoprim/Sulfamethoxazole <=1/19 Susceptible ug/mL   Basic metabolic panel  (Ca, Cl, CO2, Creat, Gluc, K, Na, BUN)    Collection Time: 03/01/23  3:04 PM   Result Value Ref Range    Sodium 119 (LL) 136 - 145 mmol/L    Potassium 3.0 (L) 3.4 - 5.3 mmol/L    Chloride 78 (L) 98 - 107 mmol/L    Carbon Dioxide (CO2) 28 22 - 29 mmol/L    Anion Gap 13 7 - 15 mmol/L    Urea Nitrogen 7.9 (L) 8.0 - 23.0 mg/dL    Creatinine 0.68 0.51 - 0.95 mg/dL    Calcium 9.5 8.8 - 10.2 mg/dL    Glucose 130 (H) 70 - 99 mg/dL    GFR Estimate 85 >60 mL/min/1.73m2   CBC with platelets and differential    Collection Time: 03/01/23  3:04 PM   Result Value Ref Range    WBC Count 12.6 (H) 4.0 - 11.0 10e3/uL    RBC Count 4.08 3.80 - 5.20 10e6/uL    Hemoglobin 14.0 11.7 - 15.7 g/dL    Hematocrit 36.7  35.0 - 47.0 %    MCV 90 78 - 100 fL    MCH 34.3 (H) 26.5 - 33.0 pg    MCHC 38.1 (H) 31.5 - 36.5 g/dL    RDW 11.8 10.0 - 15.0 %    Platelet Count 491 (H) 150 - 450 10e3/uL    % Neutrophils 72 %    % Lymphocytes 20 %    % Monocytes 7 %    % Eosinophils 0 %    % Basophils 0 %    % Immature Granulocytes 1 %    NRBCs per 100 WBC 0 <1 /100    Absolute Neutrophils 9.0 (H) 1.6 - 8.3 10e3/uL    Absolute Lymphocytes 2.5 0.8 - 5.3 10e3/uL    Absolute Monocytes 0.9 0.0 - 1.3 10e3/uL    Absolute Eosinophils 0.0 0.0 - 0.7 10e3/uL    Absolute Basophils 0.0 0.0 - 0.2 10e3/uL    Absolute Immature Granulocytes 0.1 <=0.4 10e3/uL    Absolute NRBCs 0.0 10e3/uL   UA Macro with Reflex to Micro and Culture - lab collect    Collection Time: 03/01/23  3:36 PM    Specimen: Urine, Midstream   Result Value Ref Range    Color Urine Light Yellow Colorless, Straw, Light Yellow, Yellow    Appearance Urine Clear Clear    Glucose Urine Negative Negative mg/dL    Bilirubin Urine Negative Negative    Ketones Urine Negative Negative mg/dL    Specific Gravity Urine 1.008 1.003 - 1.035    Blood Urine Negative Negative    pH Urine 7.5 (H) 5.0 - 7.0    Protein Albumin Urine 20 (A) Negative mg/dL    Urobilinogen Urine Normal Normal, 2.0 mg/dL    Nitrite Urine Negative Negative    Leukocyte Esterase Urine Small (A) Negative    RBC Urine 3 (H) <=2 /HPF    WBC Urine 11 (H) <=5 /HPF    Squamous Epithelials Urine 2 (H) <=1 /HPF    Transitional Epithelials Urine <1 <=1 /HPF   Urine Culture    Collection Time: 03/01/23  3:36 PM    Specimen: Urine, Midstream   Result Value Ref Range    Culture 10,000-50,000 CFU/mL Mixture of urogenital calos    Extra Blue Top Tube    Collection Time: 03/01/23 11:42 PM   Result Value Ref Range    Hold Specimen [facility]    Extra Red Top Tube    Collection Time: 03/01/23 11:42 PM   Result Value Ref Range    Hold Specimen [facility]    Extra Green Top (Lithium Heparin) Tube    Collection Time: 03/01/23 11:42 PM   Result Value Ref Range    Hold  Specimen [facility]    Extra Purple Top Tube    Collection Time: 03/01/23 11:42 PM   Result Value Ref Range    Hold Specimen [facility]    Comprehensive metabolic panel    Collection Time: 03/01/23 11:42 PM   Result Value Ref Range    Sodium 117 (LL) 136 - 145 mmol/L    Potassium 2.7 (L) 3.4 - 5.3 mmol/L    Chloride 74 (L) 98 - 107 mmol/L    Carbon Dioxide (CO2) 26 22 - 29 mmol/L    Anion Gap 17 (H) 7 - 15 mmol/L    Urea Nitrogen 8.9 8.0 - 23.0 mg/dL    Creatinine 0.62 0.51 - 0.95 mg/dL    Calcium 9.8 8.8 - 10.2 mg/dL    Glucose 139 (H) 70 - 99 mg/dL    Alkaline Phosphatase 66 35 - 104 U/L    AST 40 (H) 10 - 35 U/L    ALT 12 10 - 35 U/L    Protein Total 6.6 6.4 - 8.3 g/dL    Albumin 4.4 3.5 - 5.2 g/dL    Bilirubin Total 1.4 (H) <=1.2 mg/dL    GFR Estimate 87 >60 mL/min/1.73m2   CBC with platelets and differential    Collection Time: 03/01/23 11:42 PM   Result Value Ref Range    WBC Count 13.3 (H) 4.0 - 11.0 10e3/uL    RBC Count 4.12 3.80 - 5.20 10e6/uL    Hemoglobin 14.2 11.7 - 15.7 g/dL    Hematocrit 37.4 35.0 - 47.0 %    MCV 91 78 - 100 fL    MCH 34.5 (H) 26.5 - 33.0 pg    MCHC 38.0 (H) 31.5 - 36.5 g/dL    RDW 11.8 10.0 - 15.0 %    Platelet Count 488 (H) 150 - 450 10e3/uL    % Neutrophils 73 %    % Lymphocytes 18 %    % Monocytes 7 %    % Eosinophils 1 %    % Basophils 0 %    % Immature Granulocytes 1 %    NRBCs per 100 WBC 0 <1 /100    Absolute Neutrophils 9.8 (H) 1.6 - 8.3 10e3/uL    Absolute Lymphocytes 2.3 0.8 - 5.3 10e3/uL    Absolute Monocytes 1.0 0.0 - 1.3 10e3/uL    Absolute Eosinophils 0.1 0.0 - 0.7 10e3/uL    Absolute Basophils 0.1 0.0 - 0.2 10e3/uL    Absolute Immature Granulocytes 0.1 <=0.4 10e3/uL    Absolute NRBCs 0.0 10e3/uL   Lipase    Collection Time: 03/01/23 11:42 PM   Result Value Ref Range    Lipase 49 13 - 60 U/L   TSH with free T4 reflex    Collection Time: 03/01/23 11:42 PM   Result Value Ref Range    TSH 1.52 0.30 - 4.20 uIU/mL   EKG 12 lead    Collection Time: 03/02/23 12:33 AM   Result Value Ref Range     Systolic Blood Pressure  mmHg    Diastolic Blood Pressure  mmHg    Ventricular Rate 68 BPM    Atrial Rate 68 BPM    MI Interval 158 ms    QRS Duration 110 ms     ms    QTc 463 ms    P Axis 60 degrees    R AXIS -40 degrees    T Axis -26 degrees    Interpretation ECG       Sinus rhythm  Left axis deviation  Left ventricular hypertrophy with repolarization abnormality  Cannot rule out Septal infarct , age undetermined  Abnormal ECG  Unconfirmed report - interpretation of this ECG is computer generated - see medical record for final interpretation  Confirmed by - EMERGENCY ROOM, PHYSICIAN ([id number]),  BENI MOSHER ([id number]) on 3/2/2023 9:51:39 AM     Sodium random urine    Collection Time: 03/02/23  1:21 AM   Result Value Ref Range    Sodium Urine mmol/L 47 mmol/L   Osmolality urine    Collection Time: 03/02/23  1:21 AM   Result Value Ref Range    Osmolality Urine 275 100 - 1,200 mmol/kg   Osmolality    Collection Time: 03/02/23  1:28 AM   Result Value Ref Range    Osmolality Blood 245 (LL) 280 - 301 mmol/kg   Sodium    Collection Time: 03/02/23  4:56 AM   Result Value Ref Range    Sodium 124 (L) 136 - 145 mmol/L   Cortisol    Collection Time: 03/02/23  4:56 AM   Result Value Ref Range    Cortisol 20.0   ug/dL   Creatinine    Collection Time: 03/02/23  4:56 AM   Result Value Ref Range    Creatinine 0.53 0.51 - 0.95 mg/dL    GFR Estimate >90 >60 mL/min/1.73m2   Basic metabolic panel    Collection Time: 03/02/23  6:46 AM   Result Value Ref Range    Sodium 123 (L) 136 - 145 mmol/L    Potassium 3.8 3.4 - 5.3 mmol/L    Chloride 88 (L) 98 - 107 mmol/L    Carbon Dioxide (CO2) 24 22 - 29 mmol/L    Anion Gap 11 7 - 15 mmol/L    Urea Nitrogen 5.9 (L) 8.0 - 23.0 mg/dL    Creatinine 0.46 (L) 0.51 - 0.95 mg/dL    Calcium 9.1 8.8 - 10.2 mg/dL    Glucose 122 (H) 70 - 99 mg/dL    GFR Estimate >90 >60 mL/min/1.73m2   CBC with platelets    Collection Time: 03/02/23  6:46 AM   Result Value Ref Range    WBC Count 10.9 4.0 -  11.0 10e3/uL    RBC Count 4.06 3.80 - 5.20 10e6/uL    Hemoglobin 14.0 11.7 - 15.7 g/dL    Hematocrit 37.9 35.0 - 47.0 %    MCV 93 78 - 100 fL    MCH 34.5 (H) 26.5 - 33.0 pg    MCHC 36.9 (H) 31.5 - 36.5 g/dL    RDW 11.7 10.0 - 15.0 %    Platelet Count 476 (H) 150 - 450 10e3/uL   Sodium    Collection Time: 03/02/23  6:46 AM   Result Value Ref Range    Sodium 123 (L) 136 - 145 mmol/L   Sodium    Collection Time: 03/02/23  9:56 AM   Result Value Ref Range    Sodium 123 (L) 136 - 145 mmol/L   Sodium    Collection Time: 03/02/23  2:27 PM   Result Value Ref Range    Sodium 121 (L) 136 - 145 mmol/L   Sodium    Collection Time: 03/02/23  5:42 PM   Result Value Ref Range    Sodium 119 (LL) 136 - 145 mmol/L   Sodium    Collection Time: 03/02/23  9:38 PM   Result Value Ref Range    Sodium 125 (L) 136 - 145 mmol/L   Sodium    Collection Time: 03/03/23  1:49 AM   Result Value Ref Range    Sodium 124 (L) 136 - 145 mmol/L   Sodium    Collection Time: 03/03/23  6:07 AM   Result Value Ref Range    Sodium 127 (L) 136 - 145 mmol/L   CBC with platelets    Collection Time: 03/03/23  6:07 AM   Result Value Ref Range    WBC Count 8.4 4.0 - 11.0 10e3/uL    RBC Count 3.82 3.80 - 5.20 10e6/uL    Hemoglobin 13.2 11.7 - 15.7 g/dL    Hematocrit 35.4 35.0 - 47.0 %    MCV 93 78 - 100 fL    MCH 34.6 (H) 26.5 - 33.0 pg    MCHC 37.3 (H) 31.5 - 36.5 g/dL    RDW 12.0 10.0 - 15.0 %    Platelet Count 426 150 - 450 10e3/uL   Basic metabolic panel    Collection Time: 03/03/23  6:07 AM   Result Value Ref Range    Sodium 127 (L) 136 - 145 mmol/L    Potassium 3.1 (L) 3.4 - 5.3 mmol/L    Chloride 91 (L) 98 - 107 mmol/L    Carbon Dioxide (CO2) 26 22 - 29 mmol/L    Anion Gap 10 7 - 15 mmol/L    Urea Nitrogen 4.8 (L) 8.0 - 23.0 mg/dL    Creatinine 0.45 (L) 0.51 - 0.95 mg/dL    Calcium 9.1 8.8 - 10.2 mg/dL    Glucose 118 (H) 70 - 99 mg/dL    GFR Estimate >90 >60 mL/min/1.73m2   Bilirubin  total    Collection Time: 03/03/23  6:07 AM   Result Value Ref Range     Bilirubin Total 0.9 <=1.2 mg/dL   Sodium    Collection Time: 03/03/23  9:54 AM   Result Value Ref Range    Sodium 123 (L) 136 - 145 mmol/L   Potassium    Collection Time: 03/03/23  9:54 AM   Result Value Ref Range    Potassium 3.1 (L) 3.4 - 5.3 mmol/L   Sodium    Collection Time: 03/03/23  4:59 PM   Result Value Ref Range    Sodium 124 (L) 136 - 145 mmol/L   Potassium    Collection Time: 03/03/23  7:55 PM   Result Value Ref Range    Potassium 3.0 (L) 3.4 - 5.3 mmol/L   Sodium    Collection Time: 03/04/23 12:42 AM   Result Value Ref Range    Sodium 126 (L) 136 - 145 mmol/L   Basic metabolic panel    Collection Time: 03/04/23  6:23 AM   Result Value Ref Range    Sodium 128 (L) 136 - 145 mmol/L    Potassium 4.0 3.4 - 5.3 mmol/L    Chloride 96 (L) 98 - 107 mmol/L    Carbon Dioxide (CO2) 23 22 - 29 mmol/L    Anion Gap 9 7 - 15 mmol/L    Urea Nitrogen 5.5 (L) 8.0 - 23.0 mg/dL    Creatinine 0.52 0.51 - 0.95 mg/dL    Calcium 8.7 (L) 8.8 - 10.2 mg/dL    Glucose 108 (H) 70 - 99 mg/dL    GFR Estimate >90 >60 mL/min/1.73m2   Potassium    Collection Time: 03/04/23  6:23 AM   Result Value Ref Range    Potassium 4.0 3.4 - 5.3 mmol/L   Sodium    Collection Time: 03/04/23  3:26 PM   Result Value Ref Range    Sodium 128 (L) 136 - 145 mmol/L   Sodium    Collection Time: 03/04/23 11:36 PM   Result Value Ref Range    Sodium 133 (L) 136 - 145 mmol/L   Sodium    Collection Time: 03/05/23  6:23 AM   Result Value Ref Range    Sodium 134 (L) 136 - 145 mmol/L   CBC with platelets    Collection Time: 03/05/23  6:23 AM   Result Value Ref Range    WBC Count 7.4 4.0 - 11.0 10e3/uL    RBC Count 3.73 (L) 3.80 - 5.20 10e6/uL    Hemoglobin 12.7 11.7 - 15.7 g/dL    Hematocrit 35.8 35.0 - 47.0 %    MCV 96 78 - 100 fL    MCH 34.0 (H) 26.5 - 33.0 pg    MCHC 35.5 31.5 - 36.5 g/dL    RDW 12.4 10.0 - 15.0 %    Platelet Count 395 150 - 450 10e3/uL   Sodium    Collection Time: 03/05/23 11:24 AM   Result Value Ref Range    Sodium 129 (L) 136 - 145 mmol/L    Basic metabolic panel    Collection Time: 03/05/23 11:24 AM   Result Value Ref Range    Sodium 128 (L) 136 - 145 mmol/L    Potassium 3.8 3.4 - 5.3 mmol/L    Chloride 95 (L) 98 - 107 mmol/L    Carbon Dioxide (CO2) 20 (L) 22 - 29 mmol/L    Anion Gap 13 7 - 15 mmol/L    Urea Nitrogen 8.0 8.0 - 23.0 mg/dL    Creatinine 0.41 (L) 0.51 - 0.95 mg/dL    Calcium 9.0 8.8 - 10.2 mg/dL    Glucose 110 (H) 70 - 99 mg/dL    GFR Estimate >90 >60 mL/min/1.73m2   Osmolality urine    Collection Time: 03/05/23  3:41 PM   Result Value Ref Range    Osmolality Urine 150 100 - 1,200 mmol/kg   Sodium random urine    Collection Time: 03/05/23  3:44 PM   Result Value Ref Range    Sodium Urine mmol/L 30 mmol/L   Potassium random urine    Collection Time: 03/05/23  3:44 PM   Result Value Ref Range    Potassium Urine 10.7 mmol/L   Basic metabolic panel    Collection Time: 03/05/23  9:21 PM   Result Value Ref Range    Sodium 130 (L) 136 - 145 mmol/L    Potassium 3.9 3.4 - 5.3 mmol/L    Chloride 96 (L) 98 - 107 mmol/L    Carbon Dioxide (CO2) 23 22 - 29 mmol/L    Anion Gap 11 7 - 15 mmol/L    Urea Nitrogen 7.8 (L) 8.0 - 23.0 mg/dL    Creatinine 0.48 (L) 0.51 - 0.95 mg/dL    Calcium 9.5 8.8 - 10.2 mg/dL    Glucose 112 (H) 70 - 99 mg/dL    GFR Estimate >90 >60 mL/min/1.73m2   CBC with platelets    Collection Time: 03/06/23  7:01 AM   Result Value Ref Range    WBC Count 7.4 4.0 - 11.0 10e3/uL    RBC Count 3.84 3.80 - 5.20 10e6/uL    Hemoglobin 13.3 11.7 - 15.7 g/dL    Hematocrit 37.4 35.0 - 47.0 %    MCV 97 78 - 100 fL    MCH 34.6 (H) 26.5 - 33.0 pg    MCHC 35.6 31.5 - 36.5 g/dL    RDW 12.6 10.0 - 15.0 %    Platelet Count     Basic metabolic panel    Collection Time: 03/06/23  7:01 AM   Result Value Ref Range    Sodium 130 (L) 136 - 145 mmol/L    Potassium 4.5 3.4 - 5.3 mmol/L    Chloride 97 (L) 98 - 107 mmol/L    Carbon Dioxide (CO2) 21 (L) 22 - 29 mmol/L    Anion Gap 12 7 - 15 mmol/L    Urea Nitrogen 7.1 (L) 8.0 - 23.0 mg/dL    Creatinine 0.49 (L)  0.51 - 0.95 mg/dL    Calcium 9.5 8.8 - 10.2 mg/dL    Glucose 112 (H) 70 - 99 mg/dL    GFR Estimate >90 >60 mL/min/1.73m2   RBC and Platelet Morphology    Collection Time: 03/06/23  7:01 AM   Result Value Ref Range    Platelet Assessment Platelets Clumped (A) Automated Count Confirmed. Platelet morphology is normal.    RBC Morphology Confirmed RBC Indices    Basic metabolic panel  (Ca, Cl, CO2, Creat, Gluc, K, Na, BUN)    Collection Time: 03/13/23  2:24 PM   Result Value Ref Range    Sodium 136 136 - 145 mmol/L    Potassium 3.6 3.4 - 5.3 mmol/L    Chloride 98 98 - 107 mmol/L    Carbon Dioxide (CO2) 29 22 - 29 mmol/L    Anion Gap 9 7 - 15 mmol/L    Urea Nitrogen 9.5 8.0 - 23.0 mg/dL    Creatinine 0.57 0.51 - 0.95 mg/dL    Calcium 9.6 8.8 - 10.2 mg/dL    Glucose 106 (H) 70 - 99 mg/dL    GFR Estimate 89 >60 mL/min/1.73m2   Renal panel    Collection Time: 03/20/23 12:17 PM   Result Value Ref Range    Sodium 142 136 - 145 mmol/L    Potassium 3.6 3.4 - 5.3 mmol/L    Chloride 102 98 - 107 mmol/L    Carbon Dioxide (CO2) 29 22 - 29 mmol/L    Anion Gap 11 7 - 15 mmol/L    Glucose 131 (H) 70 - 99 mg/dL    Urea Nitrogen 9.2 8.0 - 23.0 mg/dL    Creatinine 0.56 0.51 - 0.95 mg/dL    GFR Estimate 89 >60 mL/min/1.73m2    Calcium 10.0 8.8 - 10.2 mg/dL    Albumin 4.6 3.5 - 5.2 g/dL    Phosphorus 2.8 2.5 - 4.5 mg/dL   UA with Microscopic    Collection Time: 03/20/23 12:27 PM   Result Value Ref Range    Color Urine Straw Colorless, Straw, Light Yellow, Yellow    Appearance Urine Clear Clear    Glucose Urine Negative Negative mg/dL    Bilirubin Urine Negative Negative    Ketones Urine Negative Negative mg/dL    Specific Gravity Urine 1.005 1.003 - 1.035    Blood Urine Negative Negative    pH Urine 7.0 5.0 - 7.0    Protein Albumin Urine Negative Negative mg/dL    Urobilinogen Urine Normal Normal, 2.0 mg/dL    Nitrite Urine Negative Negative    Leukocyte Esterase Urine Negative Negative    RBC Urine 0 <=2 /HPF    WBC Urine 1 <=5 /HPF     Squamous Epithelials Urine <1 <=1 /HPF     CMP  Recent Labs   Lab Test 03/20/23  1217 03/13/23  1424 03/06/23  0701 03/05/23  2121 03/03/23  0954 03/03/23  0607 03/02/23  0456 03/01/23  2342 03/01/23  1504 06/20/22  0904 05/18/22  1031 10/19/21  1039 07/14/21  1059 06/01/21  1052 05/08/21  1610 02/26/21  0942 01/27/21  1038    136 130* 130*   < > 127*  127*   < > 117*   < > 133 136 135   < > 135 133 135 132*   POTASSIUM 3.6 3.6 4.5 3.9   < > 3.1*   < > 2.7*   < > 4.1 3.8 3.4   < > 3.5 4.2 3.8 3.4   CHLORIDE 102 98 97* 96*   < > 91*   < > 74*   < > 98 99 96   < > 99 96 100 94   CO2 29 29 21* 23   < > 26   < > 26   < > 28 32 32   < > 30 30 29 31   ANIONGAP 11 9 12 11   < > 10   < > 17*   < > 7 5 7   < > 6 7 6 7   * 106* 112* 112*   < > 118*   < > 139*   < > 123* 115* 120*   < > 108* 110* 113* 129*   BUN 9.2 9.5 7.1* 7.8*   < > 4.8*   < > 8.9   < > 14 13 13   < > 12 10 17 14   CR 0.56 0.57 0.49* 0.48*   < > 0.45*   < > 0.62   < > 0.68 0.61 0.67   < > 0.62 0.65 0.68 0.65   GFRESTIMATED 89 89 >90 >90   < > >90   < > 87   < > 85 88 82   < > 83 82 81 82   GFRESTBLACK  --   --   --   --   --   --   --   --   --   --   --   --   --  >90 >90 >90 >90   LALI 10.0 9.6 9.5 9.5   < > 9.1   < > 9.8   < > 9.9 10.7* 10.0   < > 9.3 9.6 10.5* 10.2*   PHOS 2.8  --   --   --   --   --   --   --   --   --   --   --   --   --   --   --   --    PROTTOTAL  --   --   --   --   --   --   --  6.6  --  7.2 7.3 7.3   < > 7.1 7.0 7.5 7.2   ALBUMIN 4.6  --   --   --   --   --   --  4.4  --  4.3 4.1 4.0   < > 4.2 4.0 4.2 4.0   BILITOTAL  --   --   --   --   --  0.9  --  1.4*  --  1.0 0.9 0.8   < > 0.9 0.8 0.8 0.8   ALKPHOS  --   --   --   --   --   --   --  66  --  58 56 63   < > 60 56 65 61   AST  --   --   --   --   --   --   --  40*  --  24 19 18   < > 23 32 23 23   ALT  --   --   --   --   --   --   --  12  --  34 15 11   < > 16 18 10 11    < > = values in this interval not displayed.     CBC  Recent Labs   Lab Test 03/06/23  0701  03/05/23  0623 03/03/23  0607 03/02/23  0646 03/01/23  2342   HGB 13.3 12.7 13.2 14.0 14.2   WBC 7.4 7.4 8.4 10.9 13.3*   RBC 3.84 3.73* 3.82 4.06 4.12   HCT 37.4 35.8 35.4 37.9 37.4   MCV 97 96 93 93 91   MCH 34.6* 34.0* 34.6* 34.5* 34.5*   MCHC 35.6 35.5 37.3* 36.9* 38.0*   RDW 12.6 12.4 12.0 11.7 11.8   PLT  --  395 426 476* 488*     INR  Recent Labs   Lab Test 06/20/18  0959   INR 0.95   PTT 29     ABGNo lab results found.   URINE STUDIES  Recent Labs   Lab Test 03/20/23  1227 03/01/23  1536 02/20/23  1619 10/19/21  1102 05/08/21  1522   COLOR Straw Light Yellow Yellow Straw Yellow   APPEARANCE Clear Clear Cloudy* Clear Clear   URINEGLC Negative Negative Negative Negative Negative   URINEBILI Negative Negative Negative Negative Negative   URINEKETONE Negative Negative Negative Negative Negative   SG 1.005 1.008 1.010 1.009 1.015   UBLD Negative Negative Moderate* Small* Trace*   URINEPH 7.0 7.5* 7.0 6.0 7.0   PROTEIN Negative 20* Trace* Negative Negative   UROBILINOGEN  --   --  0.2  --  0.2   NITRITE Negative Negative Positive* Negative Negative   LEUKEST Negative Small* Large* Negative Negative   RBCU 0 3* 10-25* <1 O - 2   WBCU 1 11* >100* <1 0 - 5     No lab results found.    ASSESSMENT AND PLAN:     #Hyponatremia likely secondary to poor solute intake and exacerbated by chlorthalidone and ADH secretion induced by Covid. Adrenal and thyroid disorders were ruled out.  Will decrease sodium supplementation to 1 g daily as her solute intake remains suboptimal and recheck a sodium level in two weeks. If remains above 135 she could stop sodium supplementation. Encouraged solute intake.    #HTN  Primary and secondary to CKD. The patient was instructed to keep a BP diary and to communicate the results. Management per her PCP     #Blood count  Hemoglobin 13.3 normal    #Acid-base status  CO2 level 28 no acute issues    #Electrolytes  K 3.6 no acute issues      The total time of this encounter amounted to 40 minutes  on the day of the encounter. This time included time spent with the patient, reviewing records, ordering tests, and performing post visit documentation.       The patient will return to follow up as needed    Noemi Burkett MD  Division of Renal Disease and Hypertension  March 20, 2023  12:43 PM

## 2023-03-20 NOTE — LETTER
3/20/2023       RE: Catherine Mccollum  [address]  [place] [address]     Dear Colleague,    Thank you for referring your patient, Catherine Mccollum, to the [facility] [place] at [facility]. Please see a copy of my visit note below.    Nephrology Clinic    Catherine Mccollum MRN:[id number] YOB: 1940  Date of Service: 03/20/2023  Primary care provider: Kar Alvarez  Requesting physician: Pari Martinez MD      REASON FOR CONSULT: Hyponatremia    HISTORY OF PRESENT ILLNESS:   Catherine Mccollum is a 82 year old female who presents for follow up of hyponatremia  The past medical history is significant for  essential HTN, HLD, essential thrombocythemia, parkinsonism and recently hyponatremia for which she was admitted on 3/1. She had labs drawn on 3/1 which revealed a sodium level at 119. She had been having dysuria as well as urinary urgency for the prior two weeks and was initially treated for a UTI as an outpatient using macrobid and was thereafter switched to ciprofloxacin. She also had had a Covid-19 infection 3 weeks prior and had been having very poor solute intake as a result. The patient received IVF, had chlorthalidone stopped and her sodium level normalized. She was discharged on 4g of sodium daily and this was decreased to 2g daily whe her sodium level was found to be 136 on 3/13. She was also started on 3/13 on amlodipine 2.5 mg daily as her BP was found to be elevated. Today her sodium level remains normal at 142. She still reports suboptimal solute intake.  Her current medications are the following: pravastatin 20 mg daily, omeprazole 20 mg daily, irbesartan 150 mg daily, hydroxyurea 500 mg at bedtime, gabapentin 100 mg daily, carbidopa-levidopa  mg 1.5 tablets twice daily, amlodipine 2.5 mg daily.   Her renal function is intact with a creatinine level at 0.56 mg/dL. Her UA shows no evidence  of proteinuria  The following portions of the patient's history were reviewed and updated as appropriate: allergies, current medications, past family history, past medical history, past social history, past surgical history and problem list.    PAST MEDICAL HISTORY:  Past Medical History:   Diagnosis Date     Arthritis      Essential thrombocythemia (H) 2021     GERD (gastroesophageal reflux disease)      HTN (hypertension)      Osteoporosis      Parkinsonian syndrome (H)     left side tremor     PAST SURGICAL HISTORY:  Past Surgical History:   Procedure Laterality Date     ANKLE SURGERY Right 2002     BREAST SURGERY  1987    biopsy     CATARACT IOL, RT/LT        SECTION       COLONOSCOPY  2013    normal     GYN SURGERY  1965;1967    2 ceasarean sections     HEMILAMINECTOMY, DISCECTOMY LUMBAR ONE LEVEL, COMBINED Left 2018    Procedure: COMBINED HEMILAMINECTOMY, DISCECTOMY LUMBAR ONE LEVEL;  Open Left Lumbar 5-Sacral 1 Hemilaminectomy, Medial Facetectomy, And Microdiscectomy Attention Transitional Segment Anatomy;  Surgeon: Rosemarie Young MD;  Location: [facility] OR     LUMBAR LAMINECTOMY Right 2017    Procedure:  RIGHT L4-5 AND L5-S1 LATERAL RECESS DECOMPRESSION;  Surgeon: Raudel Jackson MD;  Location: [facility];  Service:      ORTHOPEDIC SURGERY      ankle surgery for trimalleolar frx     MI SPINE SURGERY PROCEDURE UNLISTED       MEDICATIONS:  Prescription Medications as of 3/20/2023       Rx Number Disp Refills Start End Last Dispensed Date Next Fill Date Owning Pharmacy    acetaminophen (TYLENOL) 500 MG tablet            Sig: Take 1,000 mg by mouth 2 times daily as needed Muscle and joint pain    Class: Historical    Route: Oral    amLODIPine (NORVASC) 2.5 MG tablet  30 tablet 2 3/13/2023    [id number] [place], [place] - [address] AT [facility] [address]    Sig: Take 1 tablet (2.5 mg) by mouth daily    Class:  E-Prescribe    Route: Oral    calcium carbonate (OS-LALI) 500 MG tablet            Sig: Take 1 tablet by mouth daily    Class: Historical    Route: Oral    carbidopa-levodopa (SINEMET)  MG tablet  405 tablet 1 10/10/2022    [facility] - [facility] [address]    Sig: Take 1.5 tablets by mouth 3 times daily    Class: E-Prescribe    Route: Oral    Cholecalciferol (VITAMIN D) 1000 UNITS capsule            Sig: Take 1 capsule by mouth At Bedtime    Class: Historical    Route: Oral    cyanocobalamin (VITAMIN B-12) 1000 MCG tablet    12/9/2019    [id number] - [place], [place] - [address] AT [facility] [address]    Sig: Take 1,000 mcg by mouth Every Mon, Wed, Fri Morning    Class: Historical    Route: Oral    estradiol (VAGIFEM) 10 MCG TABS vaginal tablet  24 tablet 3 3/2/2023    [id number] [place], [place] - [address] AT [facility] [address]    Sig: Place 1 tablet (10 mcg) vaginally twice a week    Class: E-Prescribe    Route: Vaginal    gabapentin (NEURONTIN) 100 MG capsule  90 capsule 3 5/18/2022    [facility] - [place] [id number]    Sig: Take one to three at night as needed    Class: E-Prescribe    hydroxyurea (HYDREA) 500 MG capsule  90 capsule 4 10/20/2022    [facility] [address]    Sig: Take 1 capsule (500 mg) by mouth daily    Class: E-Prescribe    Route: Oral    irbesartan (AVAPRO) 150 MG tablet  135 tablet 1 7/28/2022    [facility] - [facility] [address]    Sig: TAKE 1 AND 1/2 TABLETS EVERY MORNING    Class: E-Prescribe    LORazepam (ATIVAN) 0.5 MG tablet  30 tablet 0 11/21/2022        Sig: Take 1 tablet (0.5 mg) by mouth nightly as needed for anxiety    Class: Local Print    Route: Oral    omeprazole (PRILOSEC) 20 MG DR capsule  90 capsule 3 7/15/2022    [facility]  Delivery - [facility] [address]    Sig: Take 1 capsule (20 mg) by mouth daily    Class: E-Prescribe    Route: Oral    pravastatin (PRAVACHOL) 20 MG tablet  90 tablet 1 10/10/2022    [facility] - [facility] [address]     Sig: Take 1 tablet (20 mg) by mouth At Bedtime    Class: E-Prescribe    Route: Oral    sodium chloride 1 GM tablet  90 tablet 3 3/13/2023        Sig: Take 2 tablets (2 g) by mouth daily    Class: Historical    Route: Oral         ALLERGIES:    Allergies   Allergen Reactions     Fosamax Nausea and GI Disturbance     Lisinopril Other (See Comments)     cough     Sulfa Drugs Hives     REVIEW OF SYSTEMS:  Review Of Systems  Skin: negative for, pigmentation, acne, rash, scaling, itching, bruising, lumps or bumps  Eyes: negative for, visual blurring, double vision, glaucoma, cataracts, eye pain, color blindness, glasses, contacts  Ears/Nose/Throat: negative for, nasal congestion, sneezing, postnasal drainage, hearing loss, deafness, tinnitus, vertigo, epistaxis  Respiratory: No shortness of breath, dyspnea on exertion, cough, or hemoptysis  Cardiovascular: negative for, palpitations, tachycardia, irregular heart beat, chest pain, exertional chest pain or pressure, paroxysmal nocturnal dyspnea and dyspnea on exertion  Gastrointestinal: negative for, poor appetite, dysphagia, nausea, vomiting, heartburn, dyspepsia, reflux and hematemesis  Genitourinary: negative for, nocturia, dysuria, frequency, urgency, hesitancy, hematuria and retention  Musculoskeletal: negative for, fracture, back pain, neck pain, arthritis, joint pain, joint swelling and joint stiffness  Neurologic: negative for, headaches, syncope, stroke, seizures, paralysis, local weakness and numbness or tingling of hands    A comprehensive review of systems was performed and found to be negative except as described here or above.  SOCIAL HISTORY:   Social History     Socioeconomic History      Marital status:      Spouse name: Not on file     Number of children: Not on file     Years of education: Not on file     Highest education level: Not on file   Occupational History     Not on file   Tobacco Use     Smoking status: Never     Smokeless tobacco: Never   Vaping Use     Vaping Use: Never used   Substance and Sexual Activity     Alcohol use: No     Drug use: No     Sexual activity: Not Currently   Other Topics Concern     Parent/sibling w/ CABG, MI or angioplasty before 65F 55M? No   Social History Narrative     Not on file     Social Determinants of Health     Financial Resource Strain: Not on file   Food Insecurity: Not on file   Transportation Needs: Not on file   Physical Activity: Not on file   Stress: Not on file   Social Connections: Not on file   Intimate Partner Violence: Not on file   Housing Stability: Not on file     FAMILY MEDICAL HISTORY:   Family History   Problem Relation Age of Onset     C.A.D. Other      Hypertension Other         granddaughter      Cancer Other      Heart Disease Father      Hypertension Father      Diabetes No family hx of      PHYSICAL EXAM:   There were no vitals taken for this visit.  GENERAL APPEARANCE: alert and no distress  EYES: nonicteric  HENT: mouth without ulcers or lesions  NECK: supple, no adenopathy  RESP: lungs clear to auscultation   CV: regular rhythm, normal rate, no rub  ABDOMEN: soft, nontender, normal bowel sounds, no HSM   Extremities: no clubbing, cyanosis, or edema  MS: no evidence of inflammation in joints, no muscle tenderness  SKIN: no rash  NEURO: mentation intact and speech normal  PSYCH: affect normal/bright   LABS:   Recent Results (from the past 672 hour(s))   UA Macro with Reflex to Micro and Culture - lab collect    Collection Time: 02/20/23  4:19 PM    Specimen: Urine, Clean Catch   Result Value Ref Range    Color Urine Yellow Colorless, Straw, Light Yellow, Yellow    Appearance Urine Cloudy (A) Clear    Glucose Urine Negative  Negative mg/dL    Bilirubin Urine Negative Negative    Ketones Urine Negative Negative mg/dL    Specific Gravity Urine 1.010 1.003 - 1.035    Blood Urine Moderate (A) Negative    pH Urine 7.0 5.0 - 7.0    Protein Albumin Urine Trace (A) Negative mg/dL    Urobilinogen Urine 0.2 0.2, 1.0 E.U./dL    Nitrite Urine Positive (A) Negative    Leukocyte Esterase Urine Large (A) Negative   Urine Microscopic Exam    Collection Time: 02/20/23  4:19 PM   Result Value Ref Range    Bacteria Urine Many (A) None Seen /HPF    RBC Urine 10-25 (A) 0-2 /HPF /HPF    WBC Urine >100 (A) 0-5 /HPF /HPF   Urine Culture    Collection Time: 02/20/23  4:19 PM    Specimen: Urine, Clean Catch   Result Value Ref Range    Culture >100,000 CFU/mL Escherichia coli (A)        Susceptibility    Escherichia coli - EDUARDO     Ampicillin >=32 Resistant ug/mL     Ampicillin/ Sulbactam 16 Intermediate ug/mL     Piperacillin/Tazobactam <=4 Susceptible ug/mL     Cefazolin* <=4 Susceptible ug/mL      * Cefazolin EDUARDO breakpoints are for the treatment of uncomplicated urinary tract infections. For the treatment of systemic infections, please contact the laboratory for additional testing.     Cefoxitin <=4 Susceptible ug/mL     Ceftazidime <=1 Susceptible ug/mL     Ceftriaxone <=1 Susceptible ug/mL     Cefepime <=1 Susceptible ug/mL     Gentamicin <=1 Susceptible ug/mL     Tobramycin <=1 Susceptible ug/mL     Ciprofloxacin <=0.25 Susceptible ug/mL     Levofloxacin <=0.12 Susceptible ug/mL     Nitrofurantoin <=16 Susceptible ug/mL     Trimethoprim/Sulfamethoxazole <=1/19 Susceptible ug/mL   Basic metabolic panel  (Ca, Cl, CO2, Creat, Gluc, K, Na, BUN)    Collection Time: 03/01/23  3:04 PM   Result Value Ref Range    Sodium 119 (LL) 136 - 145 mmol/L    Potassium 3.0 (L) 3.4 - 5.3 mmol/L    Chloride 78 (L) 98 - 107 mmol/L    Carbon Dioxide (CO2) 28 22 - 29 mmol/L    Anion Gap 13 7 - 15 mmol/L    Urea Nitrogen 7.9 (L) 8.0 - 23.0 mg/dL    Creatinine 0.68 0.51 - 0.95 mg/dL     Calcium 9.5 8.8 - 10.2 mg/dL    Glucose 130 (H) 70 - 99 mg/dL    GFR Estimate 85 >60 mL/min/1.73m2   CBC with platelets and differential    Collection Time: 03/01/23  3:04 PM   Result Value Ref Range    WBC Count 12.6 (H) 4.0 - 11.0 10e3/uL    RBC Count 4.08 3.80 - 5.20 10e6/uL    Hemoglobin 14.0 11.7 - 15.7 g/dL    Hematocrit 36.7 35.0 - 47.0 %    MCV 90 78 - 100 fL    MCH 34.3 (H) 26.5 - 33.0 pg    MCHC 38.1 (H) 31.5 - 36.5 g/dL    RDW 11.8 10.0 - 15.0 %    Platelet Count 491 (H) 150 - 450 10e3/uL    % Neutrophils 72 %    % Lymphocytes 20 %    % Monocytes 7 %    % Eosinophils 0 %    % Basophils 0 %    % Immature Granulocytes 1 %    NRBCs per 100 WBC 0 <1 /100    Absolute Neutrophils 9.0 (H) 1.6 - 8.3 10e3/uL    Absolute Lymphocytes 2.5 0.8 - 5.3 10e3/uL    Absolute Monocytes 0.9 0.0 - 1.3 10e3/uL    Absolute Eosinophils 0.0 0.0 - 0.7 10e3/uL    Absolute Basophils 0.0 0.0 - 0.2 10e3/uL    Absolute Immature Granulocytes 0.1 <=0.4 10e3/uL    Absolute NRBCs 0.0 10e3/uL   UA Macro with Reflex to Micro and Culture - lab collect    Collection Time: 03/01/23  3:36 PM    Specimen: Urine, Midstream   Result Value Ref Range    Color Urine Light Yellow Colorless, Straw, Light Yellow, Yellow    Appearance Urine Clear Clear    Glucose Urine Negative Negative mg/dL    Bilirubin Urine Negative Negative    Ketones Urine Negative Negative mg/dL    Specific Gravity Urine 1.008 1.003 - 1.035    Blood Urine Negative Negative    pH Urine 7.5 (H) 5.0 - 7.0    Protein Albumin Urine 20 (A) Negative mg/dL    Urobilinogen Urine Normal Normal, 2.0 mg/dL    Nitrite Urine Negative Negative    Leukocyte Esterase Urine Small (A) Negative    RBC Urine 3 (H) <=2 /HPF    WBC Urine 11 (H) <=5 /HPF    Squamous Epithelials Urine 2 (H) <=1 /HPF    Transitional Epithelials Urine <1 <=1 /HPF   Urine Culture    Collection Time: 03/01/23  3:36 PM    Specimen: Urine, Midstream   Result Value Ref Range    Culture 10,000-50,000 CFU/mL Mixture of urogenital  calos    Extra Blue Top Tube    Collection Time: 03/01/23 11:42 PM   Result Value Ref Range    Hold Specimen JIC    Extra Red Top Tube    Collection Time: 03/01/23 11:42 PM   Result Value Ref Range    Hold Specimen JIC    Extra Green Top (Lithium Heparin) Tube    Collection Time: 03/01/23 11:42 PM   Result Value Ref Range    Hold Specimen JIC    Extra Purple Top Tube    Collection Time: 03/01/23 11:42 PM   Result Value Ref Range    Hold Specimen JIC    Comprehensive metabolic panel    Collection Time: 03/01/23 11:42 PM   Result Value Ref Range    Sodium 117 (LL) 136 - 145 mmol/L    Potassium 2.7 (L) 3.4 - 5.3 mmol/L    Chloride 74 (L) 98 - 107 mmol/L    Carbon Dioxide (CO2) 26 22 - 29 mmol/L    Anion Gap 17 (H) 7 - 15 mmol/L    Urea Nitrogen 8.9 8.0 - 23.0 mg/dL    Creatinine 0.62 0.51 - 0.95 mg/dL    Calcium 9.8 8.8 - 10.2 mg/dL    Glucose 139 (H) 70 - 99 mg/dL    Alkaline Phosphatase 66 35 - 104 U/L    AST 40 (H) 10 - 35 U/L    ALT 12 10 - 35 U/L    Protein Total 6.6 6.4 - 8.3 g/dL    Albumin 4.4 3.5 - 5.2 g/dL    Bilirubin Total 1.4 (H) <=1.2 mg/dL    GFR Estimate 87 >60 mL/min/1.73m2   CBC with platelets and differential    Collection Time: 03/01/23 11:42 PM   Result Value Ref Range    WBC Count 13.3 (H) 4.0 - 11.0 10e3/uL    RBC Count 4.12 3.80 - 5.20 10e6/uL    Hemoglobin 14.2 11.7 - 15.7 g/dL    Hematocrit 37.4 35.0 - 47.0 %    MCV 91 78 - 100 fL    MCH 34.5 (H) 26.5 - 33.0 pg    MCHC 38.0 (H) 31.5 - 36.5 g/dL    RDW 11.8 10.0 - 15.0 %    Platelet Count 488 (H) 150 - 450 10e3/uL    % Neutrophils 73 %    % Lymphocytes 18 %    % Monocytes 7 %    % Eosinophils 1 %    % Basophils 0 %    % Immature Granulocytes 1 %    NRBCs per 100 WBC 0 <1 /100    Absolute Neutrophils 9.8 (H) 1.6 - 8.3 10e3/uL    Absolute Lymphocytes 2.3 0.8 - 5.3 10e3/uL    Absolute Monocytes 1.0 0.0 - 1.3 10e3/uL    Absolute Eosinophils 0.1 0.0 - 0.7 10e3/uL    Absolute Basophils 0.1 0.0 - 0.2 10e3/uL    Absolute Immature Granulocytes 0.1 <=0.4  10e3/uL    Absolute NRBCs 0.0 10e3/uL   Lipase    Collection Time: 03/01/23 11:42 PM   Result Value Ref Range    Lipase 49 13 - 60 U/L   TSH with free T4 reflex    Collection Time: 03/01/23 11:42 PM   Result Value Ref Range    TSH 1.52 0.30 - 4.20 uIU/mL   EKG 12 lead    Collection Time: 03/02/23 12:33 AM   Result Value Ref Range    Systolic Blood Pressure  mmHg    Diastolic Blood Pressure  mmHg    Ventricular Rate 68 BPM    Atrial Rate 68 BPM    MT Interval 158 ms    QRS Duration 110 ms     ms    QTc 463 ms    P Axis 60 degrees    R AXIS -40 degrees    T Axis -26 degrees    Interpretation ECG       Sinus rhythm  Left axis deviation  Left ventricular hypertrophy with repolarization abnormality  Cannot rule out Septal infarct , age undetermined  Abnormal ECG  Unconfirmed report - interpretation of this ECG is computer generated - see medical record for final interpretation  Confirmed by - EMERGENCY ROOM, PHYSICIAN ([id number]),  BENI MOSHER ([id number]) on 3/2/2023 9:51:39 AM     Sodium random urine    Collection Time: 03/02/23  1:21 AM   Result Value Ref Range    Sodium Urine mmol/L 47 mmol/L   Osmolality urine    Collection Time: 03/02/23  1:21 AM   Result Value Ref Range    Osmolality Urine 275 100 - 1,200 mmol/kg   Osmolality    Collection Time: 03/02/23  1:28 AM   Result Value Ref Range    Osmolality Blood 245 (LL) 280 - 301 mmol/kg   Sodium    Collection Time: 03/02/23  4:56 AM   Result Value Ref Range    Sodium 124 (L) 136 - 145 mmol/L   Cortisol    Collection Time: 03/02/23  4:56 AM   Result Value Ref Range    Cortisol 20.0   ug/dL   Creatinine    Collection Time: 03/02/23  4:56 AM   Result Value Ref Range    Creatinine 0.53 0.51 - 0.95 mg/dL    GFR Estimate >90 >60 mL/min/1.73m2   Basic metabolic panel    Collection Time: 03/02/23  6:46 AM   Result Value Ref Range    Sodium 123 (L) 136 - 145 mmol/L    Potassium 3.8 3.4 - 5.3 mmol/L    Chloride 88 (L) 98 - 107 mmol/L    Carbon Dioxide (CO2) 24 22 - 29  mmol/L    Anion Gap 11 7 - 15 mmol/L    Urea Nitrogen 5.9 (L) 8.0 - 23.0 mg/dL    Creatinine 0.46 (L) 0.51 - 0.95 mg/dL    Calcium 9.1 8.8 - 10.2 mg/dL    Glucose 122 (H) 70 - 99 mg/dL    GFR Estimate >90 >60 mL/min/1.73m2   CBC with platelets    Collection Time: 03/02/23  6:46 AM   Result Value Ref Range    WBC Count 10.9 4.0 - 11.0 10e3/uL    RBC Count 4.06 3.80 - 5.20 10e6/uL    Hemoglobin 14.0 11.7 - 15.7 g/dL    Hematocrit 37.9 35.0 - 47.0 %    MCV 93 78 - 100 fL    MCH 34.5 (H) 26.5 - 33.0 pg    MCHC 36.9 (H) 31.5 - 36.5 g/dL    RDW 11.7 10.0 - 15.0 %    Platelet Count 476 (H) 150 - 450 10e3/uL   Sodium    Collection Time: 03/02/23  6:46 AM   Result Value Ref Range    Sodium 123 (L) 136 - 145 mmol/L   Sodium    Collection Time: 03/02/23  9:56 AM   Result Value Ref Range    Sodium 123 (L) 136 - 145 mmol/L   Sodium    Collection Time: 03/02/23  2:27 PM   Result Value Ref Range    Sodium 121 (L) 136 - 145 mmol/L   Sodium    Collection Time: 03/02/23  5:42 PM   Result Value Ref Range    Sodium 119 (LL) 136 - 145 mmol/L   Sodium    Collection Time: 03/02/23  9:38 PM   Result Value Ref Range    Sodium 125 (L) 136 - 145 mmol/L   Sodium    Collection Time: 03/03/23  1:49 AM   Result Value Ref Range    Sodium 124 (L) 136 - 145 mmol/L   Sodium    Collection Time: 03/03/23  6:07 AM   Result Value Ref Range    Sodium 127 (L) 136 - 145 mmol/L   CBC with platelets    Collection Time: 03/03/23  6:07 AM   Result Value Ref Range    WBC Count 8.4 4.0 - 11.0 10e3/uL    RBC Count 3.82 3.80 - 5.20 10e6/uL    Hemoglobin 13.2 11.7 - 15.7 g/dL    Hematocrit 35.4 35.0 - 47.0 %    MCV 93 78 - 100 fL    MCH 34.6 (H) 26.5 - 33.0 pg    MCHC 37.3 (H) 31.5 - 36.5 g/dL    RDW 12.0 10.0 - 15.0 %    Platelet Count 426 150 - 450 10e3/uL   Basic metabolic panel    Collection Time: 03/03/23  6:07 AM   Result Value Ref Range    Sodium 127 (L) 136 - 145 mmol/L    Potassium 3.1 (L) 3.4 - 5.3 mmol/L    Chloride 91 (L) 98 - 107 mmol/L    Carbon  Dioxide (CO2) 26 22 - 29 mmol/L    Anion Gap 10 7 - 15 mmol/L    Urea Nitrogen 4.8 (L) 8.0 - 23.0 mg/dL    Creatinine 0.45 (L) 0.51 - 0.95 mg/dL    Calcium 9.1 8.8 - 10.2 mg/dL    Glucose 118 (H) 70 - 99 mg/dL    GFR Estimate >90 >60 mL/min/1.73m2   Bilirubin  total    Collection Time: 03/03/23  6:07 AM   Result Value Ref Range    Bilirubin Total 0.9 <=1.2 mg/dL   Sodium    Collection Time: 03/03/23  9:54 AM   Result Value Ref Range    Sodium 123 (L) 136 - 145 mmol/L   Potassium    Collection Time: 03/03/23  9:54 AM   Result Value Ref Range    Potassium 3.1 (L) 3.4 - 5.3 mmol/L   Sodium    Collection Time: 03/03/23  4:59 PM   Result Value Ref Range    Sodium 124 (L) 136 - 145 mmol/L   Potassium    Collection Time: 03/03/23  7:55 PM   Result Value Ref Range    Potassium 3.0 (L) 3.4 - 5.3 mmol/L   Sodium    Collection Time: 03/04/23 12:42 AM   Result Value Ref Range    Sodium 126 (L) 136 - 145 mmol/L   Basic metabolic panel    Collection Time: 03/04/23  6:23 AM   Result Value Ref Range    Sodium 128 (L) 136 - 145 mmol/L    Potassium 4.0 3.4 - 5.3 mmol/L    Chloride 96 (L) 98 - 107 mmol/L    Carbon Dioxide (CO2) 23 22 - 29 mmol/L    Anion Gap 9 7 - 15 mmol/L    Urea Nitrogen 5.5 (L) 8.0 - 23.0 mg/dL    Creatinine 0.52 0.51 - 0.95 mg/dL    Calcium 8.7 (L) 8.8 - 10.2 mg/dL    Glucose 108 (H) 70 - 99 mg/dL    GFR Estimate >90 >60 mL/min/1.73m2   Potassium    Collection Time: 03/04/23  6:23 AM   Result Value Ref Range    Potassium 4.0 3.4 - 5.3 mmol/L   Sodium    Collection Time: 03/04/23  3:26 PM   Result Value Ref Range    Sodium 128 (L) 136 - 145 mmol/L   Sodium    Collection Time: 03/04/23 11:36 PM   Result Value Ref Range    Sodium 133 (L) 136 - 145 mmol/L   Sodium    Collection Time: 03/05/23  6:23 AM   Result Value Ref Range    Sodium 134 (L) 136 - 145 mmol/L   CBC with platelets    Collection Time: 03/05/23  6:23 AM   Result Value Ref Range    WBC Count 7.4 4.0 - 11.0 10e3/uL    RBC Count 3.73 (L) 3.80 - 5.20  10e6/uL    Hemoglobin 12.7 11.7 - 15.7 g/dL    Hematocrit 35.8 35.0 - 47.0 %    MCV 96 78 - 100 fL    MCH 34.0 (H) 26.5 - 33.0 pg    MCHC 35.5 31.5 - 36.5 g/dL    RDW 12.4 10.0 - 15.0 %    Platelet Count 395 150 - 450 10e3/uL   Sodium    Collection Time: 03/05/23 11:24 AM   Result Value Ref Range    Sodium 129 (L) 136 - 145 mmol/L   Basic metabolic panel    Collection Time: 03/05/23 11:24 AM   Result Value Ref Range    Sodium 128 (L) 136 - 145 mmol/L    Potassium 3.8 3.4 - 5.3 mmol/L    Chloride 95 (L) 98 - 107 mmol/L    Carbon Dioxide (CO2) 20 (L) 22 - 29 mmol/L    Anion Gap 13 7 - 15 mmol/L    Urea Nitrogen 8.0 8.0 - 23.0 mg/dL    Creatinine 0.41 (L) 0.51 - 0.95 mg/dL    Calcium 9.0 8.8 - 10.2 mg/dL    Glucose 110 (H) 70 - 99 mg/dL    GFR Estimate >90 >60 mL/min/1.73m2   Osmolality urine    Collection Time: 03/05/23  3:41 PM   Result Value Ref Range    Osmolality Urine 150 100 - 1,200 mmol/kg   Sodium random urine    Collection Time: 03/05/23  3:44 PM   Result Value Ref Range    Sodium Urine mmol/L 30 mmol/L   Potassium random urine    Collection Time: 03/05/23  3:44 PM   Result Value Ref Range    Potassium Urine 10.7 mmol/L   Basic metabolic panel    Collection Time: 03/05/23  9:21 PM   Result Value Ref Range    Sodium 130 (L) 136 - 145 mmol/L    Potassium 3.9 3.4 - 5.3 mmol/L    Chloride 96 (L) 98 - 107 mmol/L    Carbon Dioxide (CO2) 23 22 - 29 mmol/L    Anion Gap 11 7 - 15 mmol/L    Urea Nitrogen 7.8 (L) 8.0 - 23.0 mg/dL    Creatinine 0.48 (L) 0.51 - 0.95 mg/dL    Calcium 9.5 8.8 - 10.2 mg/dL    Glucose 112 (H) 70 - 99 mg/dL    GFR Estimate >90 >60 mL/min/1.73m2   CBC with platelets    Collection Time: 03/06/23  7:01 AM   Result Value Ref Range    WBC Count 7.4 4.0 - 11.0 10e3/uL    RBC Count 3.84 3.80 - 5.20 10e6/uL    Hemoglobin 13.3 11.7 - 15.7 g/dL    Hematocrit 37.4 35.0 - 47.0 %    MCV 97 78 - 100 fL    MCH 34.6 (H) 26.5 - 33.0 pg    MCHC 35.6 31.5 - 36.5 g/dL    RDW 12.6 10.0 - 15.0 %    Platelet Count      Basic metabolic panel    Collection Time: 03/06/23  7:01 AM   Result Value Ref Range    Sodium 130 (L) 136 - 145 mmol/L    Potassium 4.5 3.4 - 5.3 mmol/L    Chloride 97 (L) 98 - 107 mmol/L    Carbon Dioxide (CO2) 21 (L) 22 - 29 mmol/L    Anion Gap 12 7 - 15 mmol/L    Urea Nitrogen 7.1 (L) 8.0 - 23.0 mg/dL    Creatinine 0.49 (L) 0.51 - 0.95 mg/dL    Calcium 9.5 8.8 - 10.2 mg/dL    Glucose 112 (H) 70 - 99 mg/dL    GFR Estimate >90 >60 mL/min/1.73m2   RBC and Platelet Morphology    Collection Time: 03/06/23  7:01 AM   Result Value Ref Range    Platelet Assessment Platelets Clumped (A) Automated Count Confirmed. Platelet morphology is normal.    RBC Morphology Confirmed RBC Indices    Basic metabolic panel  (Ca, Cl, CO2, Creat, Gluc, K, Na, BUN)    Collection Time: 03/13/23  2:24 PM   Result Value Ref Range    Sodium 136 136 - 145 mmol/L    Potassium 3.6 3.4 - 5.3 mmol/L    Chloride 98 98 - 107 mmol/L    Carbon Dioxide (CO2) 29 22 - 29 mmol/L    Anion Gap 9 7 - 15 mmol/L    Urea Nitrogen 9.5 8.0 - 23.0 mg/dL    Creatinine 0.57 0.51 - 0.95 mg/dL    Calcium 9.6 8.8 - 10.2 mg/dL    Glucose 106 (H) 70 - 99 mg/dL    GFR Estimate 89 >60 mL/min/1.73m2   Renal panel    Collection Time: 03/20/23 12:17 PM   Result Value Ref Range    Sodium 142 136 - 145 mmol/L    Potassium 3.6 3.4 - 5.3 mmol/L    Chloride 102 98 - 107 mmol/L    Carbon Dioxide (CO2) 29 22 - 29 mmol/L    Anion Gap 11 7 - 15 mmol/L    Glucose 131 (H) 70 - 99 mg/dL    Urea Nitrogen 9.2 8.0 - 23.0 mg/dL    Creatinine 0.56 0.51 - 0.95 mg/dL    GFR Estimate 89 >60 mL/min/1.73m2    Calcium 10.0 8.8 - 10.2 mg/dL    Albumin 4.6 3.5 - 5.2 g/dL    Phosphorus 2.8 2.5 - 4.5 mg/dL   UA with Microscopic    Collection Time: 03/20/23 12:27 PM   Result Value Ref Range    Color Urine Straw Colorless, Straw, Light Yellow, Yellow    Appearance Urine Clear Clear    Glucose Urine Negative Negative mg/dL    Bilirubin Urine Negative Negative    Ketones Urine Negative Negative mg/dL     Specific Gravity Urine 1.005 1.003 - 1.035    Blood Urine Negative Negative    pH Urine 7.0 5.0 - 7.0    Protein Albumin Urine Negative Negative mg/dL    Urobilinogen Urine Normal Normal, 2.0 mg/dL    Nitrite Urine Negative Negative    Leukocyte Esterase Urine Negative Negative    RBC Urine 0 <=2 /HPF    WBC Urine 1 <=5 /HPF    Squamous Epithelials Urine <1 <=1 /HPF     CMP  Recent Labs   Lab Test 03/20/23  1217 03/13/23  1424 03/06/23  0701 03/05/23  2121 03/03/23  0954 03/03/23  0607 03/02/23  0456 03/01/23  2342 03/01/23  1504 06/20/22  0904 05/18/22  1031 10/19/21  1039 07/14/21  1059 06/01/21  1052 05/08/21  1610 02/26/21  0942 01/27/21  1038    136 130* 130*   < > 127*  127*   < > 117*   < > 133 136 135   < > 135 133 135 132*   POTASSIUM 3.6 3.6 4.5 3.9   < > 3.1*   < > 2.7*   < > 4.1 3.8 3.4   < > 3.5 4.2 3.8 3.4   CHLORIDE 102 98 97* 96*   < > 91*   < > 74*   < > 98 99 96   < > 99 96 100 94   CO2 29 29 21* 23   < > 26   < > 26   < > 28 32 32   < > 30 30 29 31   ANIONGAP 11 9 12 11   < > 10   < > 17*   < > 7 5 7   < > 6 7 6 7   * 106* 112* 112*   < > 118*   < > 139*   < > 123* 115* 120*   < > 108* 110* 113* 129*   BUN 9.2 9.5 7.1* 7.8*   < > 4.8*   < > 8.9   < > 14 13 13   < > 12 10 17 14   CR 0.56 0.57 0.49* 0.48*   < > 0.45*   < > 0.62   < > 0.68 0.61 0.67   < > 0.62 0.65 0.68 0.65   GFRESTIMATED 89 89 >90 >90   < > >90   < > 87   < > 85 88 82   < > 83 82 81 82   GFRESTBLACK  --   --   --   --   --   --   --   --   --   --   --   --   --  >90 >90 >90 >90   LALI 10.0 9.6 9.5 9.5   < > 9.1   < > 9.8   < > 9.9 10.7* 10.0   < > 9.3 9.6 10.5* 10.2*   PHOS 2.8  --   --   --   --   --   --   --   --   --   --   --   --   --   --   --   --    PROTTOTAL  --   --   --   --   --   --   --  6.6  --  7.2 7.3 7.3   < > 7.1 7.0 7.5 7.2   ALBUMIN 4.6  --   --   --   --   --   --  4.4  --  4.3 4.1 4.0   < > 4.2 4.0 4.2 4.0   BILITOTAL  --   --   --   --   --  0.9  --  1.4*  --  1.0 0.9 0.8   < > 0.9 0.8 0.8 0.8    ALKPHOS  --   --   --   --   --   --   --  66  --  58 56 63   < > 60 56 65 61   AST  --   --   --   --   --   --   --  40*  --  24 19 18   < > 23 32 23 23   ALT  --   --   --   --   --   --   --  12  --  34 15 11   < > 16 18 10 11    < > = values in this interval not displayed.     CBC  Recent Labs   Lab Test 03/06/23  0701 03/05/23  0623 03/03/23  0607 03/02/23  0646 03/01/23  2342   HGB 13.3 12.7 13.2 14.0 14.2   WBC 7.4 7.4 8.4 10.9 13.3*   RBC 3.84 3.73* 3.82 4.06 4.12   HCT 37.4 35.8 35.4 37.9 37.4   MCV 97 96 93 93 91   MCH 34.6* 34.0* 34.6* 34.5* 34.5*   MCHC 35.6 35.5 37.3* 36.9* 38.0*   RDW 12.6 12.4 12.0 11.7 11.8   PLT  --  395 426 476* 488*     INR  Recent Labs   Lab Test 06/20/18  0959   INR 0.95   PTT 29     ABGNo lab results found.   URINE STUDIES  Recent Labs   Lab Test 03/20/23  1227 03/01/23  1536 02/20/23  1619 10/19/21  1102 05/08/21  1522   COLOR Straw Light Yellow Yellow Straw Yellow   APPEARANCE Clear Clear Cloudy* Clear Clear   URINEGLC Negative Negative Negative Negative Negative   URINEBILI Negative Negative Negative Negative Negative   URINEKETONE Negative Negative Negative Negative Negative   SG 1.005 1.008 1.010 1.009 1.015   UBLD Negative Negative Moderate* Small* Trace*   URINEPH 7.0 7.5* 7.0 6.0 7.0   PROTEIN Negative 20* Trace* Negative Negative   UROBILINOGEN  --   --  0.2  --  0.2   NITRITE Negative Negative Positive* Negative Negative   LEUKEST Negative Small* Large* Negative Negative   RBCU 0 3* 10-25* <1 O - 2   WBCU 1 11* >100* <1 0 - 5     No lab results found.    ASSESSMENT AND PLAN:     #Hyponatremia likely secondary to poor solute intake and exacerbated by chlorthalidone and ADH secretion induced by Covid. Adrenal and thyroid disorders were ruled out.  Will decrease sodium supplementation to 1 g daily as her solute intake remains suboptimal and recheck a sodium level in two weeks. If remains above 135 she could stop sodium supplementation. Encouraged solute  intake.    #HTN  Primary and secondary to CKD. The patient was instructed to keep a BP diary and to communicate the results. Management per her PCP     #Blood count  Hemoglobin 13.3 normal    #Acid-base status  CO2 level 28 no acute issues    #Electrolytes  K 3.6 no acute issues      The total time of this encounter amounted to 40 minutes on the day of the encounter. This time included time spent with the patient, reviewing records, ordering tests, and performing post visit documentation.       The patient will return to follow up as needed    oNemi Burkett MD  Division of Renal Disease and Hypertension  March 20, 2023  12:43 PM

## 2023-03-29 ENCOUNTER — TELEPHONE (OUTPATIENT)
Dept: INTERNAL MEDICINE | Facility: CLINIC | Age: 83
End: 2023-03-29
Payer: MEDICARE

## 2023-03-29 NOTE — TELEPHONE ENCOUNTER
[facility] Call Center    Phone Message    May a detailed message be left on voicemail: yes     Reason for Call: Other: Jacqueline calling from [facility] calling to check on the status of the orders faxed over for signature on 03/25/23. Fax number is [phone]      Action Taken: Message routed to:  [facility]    Travel Screening: Not Applicable

## 2023-03-31 NOTE — TELEPHONE ENCOUNTER
We never received the form, so they will fax the form again today.    Jyoti Mayfield on 3/31/2023 at 8:38 AM

## 2023-04-03 ENCOUNTER — LAB (OUTPATIENT)
Dept: LAB | Facility: CLINIC | Age: 83
End: 2023-04-03
Payer: MEDICARE

## 2023-04-03 DIAGNOSIS — E87.1 HYPONATREMIA: ICD-10-CM

## 2023-04-03 LAB
ALBUMIN SERPL BCG-MCNC: 4.6 G/DL (ref 3.5–5.2)
ALP SERPL-CCNC: 63 U/L (ref 35–104)
ALT SERPL W P-5'-P-CCNC: 19 U/L (ref 10–35)
ANION GAP SERPL CALCULATED.3IONS-SCNC: 16 MMOL/L (ref 7–15)
AST SERPL W P-5'-P-CCNC: 29 U/L (ref 10–35)
BILIRUB SERPL-MCNC: 0.7 MG/DL
BUN SERPL-MCNC: 11 MG/DL (ref 8–23)
CALCIUM SERPL-MCNC: 10.2 MG/DL (ref 8.8–10.2)
CHLORIDE SERPL-SCNC: 99 MMOL/L (ref 98–107)
CREAT SERPL-MCNC: 0.61 MG/DL (ref 0.51–0.95)
DEPRECATED HCO3 PLAS-SCNC: 23 MMOL/L (ref 22–29)
GFR SERPL CREATININE-BSD FRML MDRD: 88 ML/MIN/1.73M2
GLUCOSE SERPL-MCNC: 116 MG/DL (ref 70–99)
POTASSIUM SERPL-SCNC: 3.7 MMOL/L (ref 3.4–5.3)
PROT SERPL-MCNC: 7.3 G/DL (ref 6.4–8.3)
SODIUM SERPL-SCNC: 138 MMOL/L (ref 136–145)

## 2023-04-03 PROCEDURE — 36415 COLL VENOUS BLD VENIPUNCTURE: CPT

## 2023-04-03 PROCEDURE — 80053 COMPREHEN METABOLIC PANEL: CPT

## 2023-04-04 ENCOUNTER — DOCUMENTATION ONLY (OUTPATIENT)
Dept: INTERNAL MEDICINE | Facility: CLINIC | Age: 83
End: 2023-04-04
Payer: MEDICARE

## 2023-04-04 NOTE — PROGRESS NOTES
Type of Form Received: PT pOC    Form Received (Date) 3/31/23   Form Filled out Yes, date 4/3/23   Placed in provider folder Yes

## 2023-04-06 NOTE — ADDENDUM NOTE
Addended by: MANJEET RIVERS on: 6/4/2018 04:12 PM     Modules accepted: Orders     Dr Fajardo came to bedside and discussed findings. No N/V, no abdominal pain, no GI bleeding, and vitals stable. Pt discharged from unit.

## 2023-04-11 ENCOUNTER — APPOINTMENT (OUTPATIENT)
Dept: LAB | Facility: CLINIC | Age: 83
End: 2023-04-11
Payer: MEDICARE

## 2023-04-11 ENCOUNTER — ONCOLOGY VISIT (OUTPATIENT)
Dept: ONCOLOGY | Facility: CLINIC | Age: 83
End: 2023-04-11
Attending: INTERNAL MEDICINE
Payer: MEDICARE

## 2023-04-11 VITALS
WEIGHT: 136 LBS | HEART RATE: 68 BPM | BODY MASS INDEX: 26.56 KG/M2 | SYSTOLIC BLOOD PRESSURE: 176 MMHG | RESPIRATION RATE: 16 BRPM | OXYGEN SATURATION: 98 % | TEMPERATURE: 98 F | DIASTOLIC BLOOD PRESSURE: 77 MMHG

## 2023-04-11 DIAGNOSIS — D47.3 ESSENTIAL THROMBOCYTHEMIA (H): ICD-10-CM

## 2023-04-11 LAB
BASOPHILS # BLD AUTO: 0.1 10E3/UL (ref 0–0.2)
BASOPHILS NFR BLD AUTO: 1 %
EOSINOPHIL # BLD AUTO: 0.2 10E3/UL (ref 0–0.7)
EOSINOPHIL NFR BLD AUTO: 2 %
ERYTHROCYTE [DISTWIDTH] IN BLOOD BY AUTOMATED COUNT: 13.5 % (ref 10–15)
HCT VFR BLD AUTO: 41.1 % (ref 35–47)
HGB BLD-MCNC: 14.5 G/DL (ref 11.7–15.7)
IMM GRANULOCYTES # BLD: 0 10E3/UL
IMM GRANULOCYTES NFR BLD: 0 %
LYMPHOCYTES # BLD AUTO: 2.3 10E3/UL (ref 0.8–5.3)
LYMPHOCYTES NFR BLD AUTO: 27 %
MCH RBC QN AUTO: 34 PG (ref 26.5–33)
MCHC RBC AUTO-ENTMCNC: 35.3 G/DL (ref 31.5–36.5)
MCV RBC AUTO: 97 FL (ref 78–100)
MONOCYTES # BLD AUTO: 0.6 10E3/UL (ref 0–1.3)
MONOCYTES NFR BLD AUTO: 7 %
NEUTROPHILS # BLD AUTO: 5.4 10E3/UL (ref 1.6–8.3)
NEUTROPHILS NFR BLD AUTO: 63 %
NRBC # BLD AUTO: 0 10E3/UL
NRBC BLD AUTO-RTO: 0 /100
PLATELET # BLD AUTO: 359 10E3/UL (ref 150–450)
RBC # BLD AUTO: 4.26 10E6/UL (ref 3.8–5.2)
WBC # BLD AUTO: 8.6 10E3/UL (ref 4–11)

## 2023-04-11 PROCEDURE — 99213 OFFICE O/P EST LOW 20 MIN: CPT | Performed by: INTERNAL MEDICINE

## 2023-04-11 PROCEDURE — 36415 COLL VENOUS BLD VENIPUNCTURE: CPT | Performed by: INTERNAL MEDICINE

## 2023-04-11 PROCEDURE — 85025 COMPLETE CBC W/AUTO DIFF WBC: CPT | Performed by: INTERNAL MEDICINE

## 2023-04-11 PROCEDURE — G0463 HOSPITAL OUTPT CLINIC VISIT: HCPCS | Performed by: INTERNAL MEDICINE

## 2023-04-11 RX ORDER — HYDROXYUREA 500 MG/1
500 CAPSULE ORAL DAILY
Qty: 90 CAPSULE | Refills: 4 | Status: SHIPPED | OUTPATIENT
Start: 2023-04-11

## 2023-04-11 ASSESSMENT — PAIN SCALES - GENERAL: PAINLEVEL: MILD PAIN (2)

## 2023-04-11 NOTE — NURSING NOTE
Oncology Rooming Note    April 11, 2023 11:34 AM   Catherine Mccollum is a 83 year old female who presents for:    Chief Complaint   Patient presents with     Blood Draw     Labs drawn by RN via , vitals taken.     Oncology Clinic Visit     RTN: Elevated Platelets      Initial Vitals: BP (!) 176/77   Pulse 68   Temp 98  F (36.7  C)   Resp 16   Wt 61.7 kg (136 lb)   SpO2 98%   BMI 26.56 kg/m   Estimated body mass index is 26.56 kg/m  as calculated from the following:    Height as of 3/13/23: 1.524 m (5').    Weight as of this encounter: 61.7 kg (136 lb). Body surface area is 1.62 meters squared.  Mild Pain (2) Comment: Data Unavailable   No LMP recorded. Patient is postmenopausal.  Allergies reviewed: Yes  Medications reviewed: Yes    Medications: Medication refills not needed today.  Pharmacy name entered into [facility]:    [facility] - [place], [place] - [address] [id number] - [place], [place] - [address] AT [facility] [address]    Clinical concerns: none     Jose Page

## 2023-04-11 NOTE — LETTER
2023         RE: Catherine Mccollum  [address]  [place] [address]        Dear Colleague,    Thank you for referring your patient, Catherine Mccollum, to the [facility]. Please see a copy of my visit note below.        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      Recommendations:  I counseled the patient about my assessment of her current disease control, natural history of MPNs and risk associated with thrombosis, which is the primary rationale for treatment, and our monitoring strategy while on HU.  Continue HU 500mg daily  CBC and diff every 3 months  RTC 6 months with LUCÍA and 1 year with me.  Discussed MPN research and she is not currently interested    20 minutes spent on the date of the encounter doing chart review, review of test results, interpretation of tests, patient visit and documentation       Bernabe Dietz MD   of Medicine, Division of Hematology, Oncology and Transplantation  [employer]     --------------------------------------------------------  Forward History:  2020 referred for mild thrombocytosis 500-600 since , mild leukocytosis (~10), found to have Jak2 pos MPN (suspected ET).  No symptoms or thrombotic events.    - Started  mg daily, increased to 1000mg daily in Dec 2020  - She refused ASA due to history of gastric ulcer.  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  She reports no side effects from the HU other than some fatigue and occasional upset stomach.  She has had no thrombotic events.  She was briefly hospitalized after COVID for hyponatremia but is feeling much better.     Medications are reviewed in the EMR and notable for HU 500mg daily    Objective:  Exam:  Body mass index is 26.56 kg/m .   Constitutional: Appears well, no distress  Eyes: no discharge, injection or icterus  Respiratory: no cough or labored breathing  Skin: no rashes or petechiae  Neurological: no deficits appreciated, speech is fluent  Psych: affect is normal      Labs:   Latest Reference Range & Units 03/03/23 06:07 03/05/23 06:23 03/06/23 07:01 04/11/23 11:08   WBC 4.0 - 11.0 10e3/uL 8.4 7.4 7.4 8.6   Hemoglobin 11.7 - 15.7 g/dL 13.2 12.7 13.3 14.5   Hematocrit 35.0 - 47.0 % 35.4 35.8 37.4 41.1   Platelet Count 150 - 450 10e3/uL 426 395 See Comment 359       Imaging:  none        Again, thank you for allowing me to participate in the care of your patient.        Sincerely,        Bernabe Dietz MD

## 2023-04-11 NOTE — NURSING NOTE
Chief Complaint   Patient presents with     Blood Draw     Labs drawn by RN via , vitals taken.     Labs collected from venipuncture by RN. Vitals taken. Checked in for appointment(s).    Coreen Shin RN

## 2023-04-11 NOTE — PROGRESS NOTES
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      Recommendations:  1. I counseled the patient about my assessment of her current disease control, natural history of MPNs and risk associated with thrombosis, which is the primary rationale for treatment, and our monitoring strategy while on HU.  2. Continue HU 500mg daily  3. CBC and diff every 3 months  4. RTC 6 months with LUCÍA and 1 year with me.  Discussed MPN research and she is not currently interested    20 minutes spent on the date of the encounter doing chart review, review of test results, interpretation of tests, patient visit and documentation       Bernabe Dietz MD   of Medicine, Division of Hematology, Oncology and Transplantation  [employer]     --------------------------------------------------------  Forward History:  2020 referred for mild thrombocytosis 500-600 since 2018, mild leukocytosis (~10), found to have Jak2 pos MPN (suspected ET).  No symptoms or thrombotic events.    - Started  mg daily, increased to 1000mg daily in Dec 2020  - She refused ASA due to history of gastric ulcer.  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  She reports no side effects from the HU other than some fatigue and occasional upset stomach.  She  has had no thrombotic events.  She was briefly hospitalized after COVID for hyponatremia but is feeling much better.     Medications are reviewed in the EMR and notable for HU 500mg daily    Objective:  Exam:  Body mass index is 26.56 kg/m .   Constitutional: Appears well, no distress  Eyes: no discharge, injection or icterus  Respiratory: no cough or labored breathing  Skin: no rashes or petechiae  Neurological: no deficits appreciated, speech is fluent  Psych: affect is normal      Labs:   Latest Reference Range & Units 03/03/23 06:07 03/05/23 06:23 03/06/23 07:01 04/11/23 11:08   WBC 4.0 - 11.0 10e3/uL 8.4 7.4 7.4 8.6   Hemoglobin 11.7 - 15.7 g/dL 13.2 12.7 13.3 14.5   Hematocrit 35.0 - 47.0 % 35.4 35.8 37.4 41.1   Platelet Count 150 - 450 10e3/uL 426 395 See Comment 359       Imaging:  none

## 2023-04-25 ENCOUNTER — OFFICE VISIT (OUTPATIENT)
Dept: INTERNAL MEDICINE | Facility: CLINIC | Age: 83
End: 2023-04-25
Payer: MEDICARE

## 2023-04-25 VITALS
DIASTOLIC BLOOD PRESSURE: 89 MMHG | HEART RATE: 73 BPM | SYSTOLIC BLOOD PRESSURE: 170 MMHG | WEIGHT: 138.7 LBS | OXYGEN SATURATION: 96 % | BODY MASS INDEX: 27.09 KG/M2

## 2023-04-25 DIAGNOSIS — I10 BENIGN ESSENTIAL HYPERTENSION: Primary | ICD-10-CM

## 2023-04-25 DIAGNOSIS — G20.A1 PARKINSON DISEASE (H): ICD-10-CM

## 2023-04-25 DIAGNOSIS — R52 PAIN: ICD-10-CM

## 2023-04-25 DIAGNOSIS — E78.00 HIGH CHOLESTEROL: ICD-10-CM

## 2023-04-25 DIAGNOSIS — I10 ESSENTIAL HYPERTENSION: ICD-10-CM

## 2023-04-25 DIAGNOSIS — F41.9 ANXIETY: ICD-10-CM

## 2023-04-25 DIAGNOSIS — R25.1 TREMOR: ICD-10-CM

## 2023-04-25 PROCEDURE — 99215 OFFICE O/P EST HI 40 MIN: CPT | Performed by: INTERNAL MEDICINE

## 2023-04-25 RX ORDER — PRAVASTATIN SODIUM 20 MG
20 TABLET ORAL AT BEDTIME
Qty: 90 TABLET | Refills: 3 | Status: SHIPPED | OUTPATIENT
Start: 2023-04-25

## 2023-04-25 RX ORDER — CARBIDOPA AND LEVODOPA 25; 100 MG/1; MG/1
1.5 TABLET ORAL 3 TIMES DAILY
Qty: 405 TABLET | Refills: 1 | Status: SHIPPED | OUTPATIENT
Start: 2023-04-25 | End: 2023-06-30

## 2023-04-25 RX ORDER — AMLODIPINE BESYLATE 5 MG/1
5 TABLET ORAL DAILY
Qty: 90 TABLET | Refills: 1 | Status: SHIPPED | OUTPATIENT
Start: 2023-04-25 | End: 2023-10-12

## 2023-04-25 RX ORDER — IRBESARTAN 150 MG/1
TABLET ORAL
Qty: 135 TABLET | Refills: 3 | Status: SHIPPED | OUTPATIENT
Start: 2023-04-25 | End: 2023-08-30

## 2023-04-25 RX ORDER — LORAZEPAM 0.5 MG/1
0.5 TABLET ORAL
Qty: 30 TABLET | Refills: 0 | Status: SHIPPED | OUTPATIENT
Start: 2023-04-25

## 2023-04-25 RX ORDER — GABAPENTIN 100 MG/1
CAPSULE ORAL
Qty: 90 CAPSULE | Refills: 3 | Status: SHIPPED | OUTPATIENT
Start: 2023-04-25

## 2023-04-25 NOTE — NURSING NOTE
Catherine Mccollum is a 83 year old female that presents in clinic today for the following:     Chief Complaint   Patient presents with     Follow Up     Pt here for follow up and wants to discuss recent headaches       The patient's allergies and medications were reviewed. The patient's vitals were obtained without incident. The patient does not have any other questions for the provider.     Javid Mccarty, EMT at 10:20 AM on 4/25/2023.  Primary Care Clinic: [phone]

## 2023-04-25 NOTE — PROGRESS NOTES
HPI;    She was discharged from the hospital 3/6/2023 for hyponatremia. Overall doing well. She has some very minor head aches sometimes at night. She had unremarkable head CT imagining 3/2/2023. No neurological complaints. She is eating and drinking fine. She remains at home. She does not currently see any Neurology provider for her Parkinson's. She remains on Sinemet. No other HEENT, cardiopulmonary, abdominal, , GYN, neurological, systemic, psychiatric, lymphatic, endocrine, vascular complaints.      Past Medical History:   Diagnosis Date     Arthritis      Essential thrombocythemia (H) 2021     GERD (gastroesophageal reflux disease)      HTN (hypertension)      Osteoporosis      Parkinsonian syndrome (H)     left side tremor     Past Surgical History:   Procedure Laterality Date     ANKLE SURGERY Right 2002     BREAST SURGERY  1987    biopsy     CATARACT IOL, RT/LT        SECTION       COLONOSCOPY      normal     GYN SURGERY  1965;1967    2 ceasarean sections     HEMILAMINECTOMY, DISCECTOMY LUMBAR ONE LEVEL, COMBINED Left 2018    Procedure: COMBINED HEMILAMINECTOMY, DISCECTOMY LUMBAR ONE LEVEL;  Open Left Lumbar 5-Sacral 1 Hemilaminectomy, Medial Facetectomy, And Microdiscectomy Attention Transitional Segment Anatomy;  Surgeon: Rosemarie Young MD;  Location: [facility] OR     LUMBAR LAMINECTOMY Right 2017    Procedure:  RIGHT L4-5 AND L5-S1 LATERAL RECESS DECOMPRESSION;  Surgeon: Raudel Jackson MD;  Location: [facility];  Service:      ORTHOPEDIC SURGERY      ankle surgery for trimalleolar frx     HI SPINE SURGERY PROCEDURE UNLISTED       PE:    Vitals noted, gen, nad, cooperative, alert, neck supple nl rom, lungs with good air movement, RRR, S1, S2, no MRG, abdomen, no acute findings. Grossly normal neurological exam.     A/P:     1. Immunizations; COVID Moderna x 5 (bi-valent 2022). She has completed the Zoster vaccine series. Tdap  2/1/2014. Prevnar 13 done 12/8/2015. Pneumococcal 23 done 10/22/2014. Will do Prevnar 20  11/21/2022  2. Hematology follow up with Ms. Ellison 9/7/2022 for essential thrombocytosis on hydroxyurea. Last seen Dr. Dietz, Hematology 3/8/2022. She saw Ms. Ellison, Hematology 9/7/2022 and 4/11/2023 with Dr. Dietz. CBC 4/11/2023 Hgb 14.5 Platelets 359. Next visit with Dr. Dietz 4/9/2024  3. Reflux on omeprazole; no complaints.   4. Increased lipids on Pravastatin; Lipids 5/18/2022; TG's 209, HDL 44 and LDL 80.   5. Parkinson's disease on Sinemet. She saw Dr. Jones, 5/26/2020. Placed a referral to Dr. Doss.   6. HTN; off Chlorthalidone because of hyponatremia.  On Avapro and Amlodipine now ; Electrolytes and Creatinine  checked 4/3/2023 with Na+ 138, Creatinine 0.61  7. Gabapentin for night time pain and sleep.   8. Dermatology; she follows with Dermatology on [address] in [place].   9. See Dr. Griffin, ortho 3/1/2022 for hip pain  10. She remains on low dose ativan for sleep/anxiety.  Refilled today 4/25/2023.   11. Chronic LBP and trochanteric bursitis - PT therapy in the past. See 3/9/2023 scanned in PT note.   12. Seen Dr. Burkett, Nephrology 3/20/2023 for Hyponatremia. UA on 3/20/2023 did not suggest a UTI.   13. Slight headache that was relieved with coffee (she stopped coffee because of GI side effects). She is going to get an eye exam. If worse she will contact me for further evaluation. Head CT scan 3/2/2023 was normal. She will contact me is worse and additional laboratory testing (ESR and Crp, temporal disease?) and/or additional imaging.        40 minutes spent on the date of the encounter doing chart review, history and exam, documentation and further activities as noted above exclusive of procedures and other billable interpretations

## 2023-05-15 DIAGNOSIS — G20.C PARKINSONISM, UNSPECIFIED PARKINSONISM TYPE (H): Primary | ICD-10-CM

## 2023-05-15 PROBLEM — R39.89 SENSATION OF PRESSURE IN BLADDER AREA: Status: ACTIVE | Noted: 2023-05-15

## 2023-05-15 PROBLEM — M79.673 FOOT PAIN: Status: RESOLVED | Noted: 2018-07-09 | Resolved: 2023-05-15

## 2023-05-15 RX ORDER — LIDOCAINE HYDROCHLORIDE 20 MG/ML
SOLUTION OROPHARYNGEAL
COMMUNITY
Start: 2023-02-16 | End: 2023-06-30

## 2023-05-15 NOTE — CONFIDENTIAL NOTE
[address]    [place] [address] [phone] (H)   [phone] (M)     [email or web address]    Sascha meyer   [phone]    Scheduled per patient/ referred by Dr. Alvarez, Kar LIN MD/Parkinson disease (H) [G20]Movement Disorders/Tremor/wants to see Dr. Doss specifically, referral in epic

## 2023-05-17 NOTE — PROGRESS NOTES
[address]    [place] [id number] [phone] (H)   [phone] (M)      [email or web address]     Sascha meyer   [phone]     Scheduled per patient/ referred by Dr. Alvarez, Kar LIN MD/Parkinson disease (H) [G20]Movement Disorders/Tremor/wants to see Dr. Doss specifically, referral in epic      Medications            Acetaminophen Tylenol 500 mg        Amlodipine Norvasc 2.5 mg        Amlodipine Norvasc 5 mg        Calcium carbonate Os-Murtaza 500 mg        Carbidopa levodopa Sinemet 25/100        Cholecalciferol vitamin D3 1000 units 25 mcg        Cyanocobalamin vitamin B12 1000 mcg        Estradiol Vagifem 10 mcg        Gabapentin Neurontin 100 mg        Hydroxyurea Hydrea 500 mg        Irbesartan Avapro 150 mg        Lidocaine 2% solution        Lorazepam Ativan 0.5 mg        Nonformulary Magic mouthwash        Omeprazole Prilosec 20 mg        Pravastatin Pravachol 20 mg

## 2023-05-19 NOTE — TELEPHONE ENCOUNTER
RECORDS RECEIVED FROM: internal   REASON FOR VISIT: Parkinson disease (H) [G20]Movement Disorders/Tremor   Date of Appt: 6/30/23   NOTES (FOR ALL VISITS) STATUS DETAILS   OFFICE NOTE from referring provider Internal Dr Kar Alvarez @ [facility] Primary Care:  4/25/23   OFFICE NOTE from other specialist Internal Dr Jones @ [facility] Neurology:  5/26/20    Dr Ferguson @ [facility] Neurology:  11/21/19  5/3/19  3/5/19  5/11/19   MEDICATION LIST Internal    IMAGING  (FOR ALL VISITS)     MRI (HEAD, NECK, SPINE) Internal [facility]:  MRI Lumbar Spine 4/2/21  MRI Lumbar Spine 12/18/19   CT (HEAD, NECK, SPINE) Internal [facility]:  CT Head 3/2/23

## 2023-05-31 NOTE — PROGRESS NOTES
Diagnosis/Summary/Recommendations:    PATIENT: Catherine Mccollum  83 year old female     : 1940    THAO: 2023    MRN: [id number]    [address]    [place] [id number] [phone] (H)   [phone] (M)      [email or web address]     Sascha Mccollum son   [phone]     Scheduled per patient/ referred by Dr. Alvarez, Kar LIN MD/Parkinson disease (H) [G20]Movement Disorders/Tremor/wants to see Dr. Doss specifically, referral in epic     Assessment:  (G20) Parkinsonism, unspecified Parkinsonism type (H)  (primary encounter diagnosis)  Last seen by Dr. Jones May 2020  Family history of head tremor in mother  Patient had a 20-year history of tremor then with subsequent superimposed parkinsonism  Brain MRI  normal  Previously seen by Dr. Ferguson for essential tremor    Mother had head tremor   AJ ancestry    L[facility] in the [facility] in [facility]      Review of diagnosis    Parkinson's  Essential tremor    Left side onset   Has left side weakness - has limited left leg single standing  Diagnosis of parkinson was before  when first seen by Dr. Jones  Seen by Phyllis in the past  and was on sinemet since then -  and was told in [place] that she may have had parkinson back in    She has some problems with swallowing at times.   No clear phan off.     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   Jun is in [facility]  Josiah is in [place]  Brother is in [place]    Hallucinations/delusions   denies    Sleep   Insomnia  Goes to bed around 1030pm and sometimes has problems falling asleep till 12midnight and listens to music - light classical and this help. Does not have to urinate when wakes up but will do so anyway. Able to fall back asleep kind of  Up for the day between 630 and 7am       Bladder/Renal/Prostate/Gyn/Other  Bladder pressure  Hyponatremia  Urinary frequency  Nocturia 0/noc  Daytime goes to bathroom several times  Wears small light panty liner.   Had a urinary tract infection 3/2023 or 4/2023 and had problems getting rid of it and recurrence.     GI/Constipation/GERD   GERD  Diverticulitis  Has daily bowel movement  Has some heartburn that is aggravated by the sinemet  Uses tums prn  Taking omeprazole.   Taking sinemet twice daily  Takes hydroxyurea medication at night.     ENDO/Lipid/DM/Bone density/Thyroid  Hyperlipidemia  On pravastatin  No diabetes or thyroid issues.     Cardio/heart/Hyper or Hypotensive   Hypertension  On medication  bp is usually fine  137/85  Therapist has been checking and been good     Vision/Dry Eyes/Cataracts/Glaucoma/Macular   Wears glasses  Cataract surgery in the past  Dry eyes  Had recent eye examination and were fine     Heme/Anticoagulation/Antiplatelet/Anemia/Other  Essential thrombocythemia  Taking hydroxyuremia  359,000 most recent numbers    ENT/Resp  Glossodynia  Impacted cerumen right ear  No loss of smell or taste  Had covid February     Skin/Cancer/Seborrhea/other  No skin cancer    Musculoskeletal/Pain/Headache  Cervical spondylosis  Left ankle fracture  Lumbar spinal stenosis  Cervical spinal stenosis  Arthritis  Backache  Metatarsalgia  Back  surgery      Other:      Medications      630/7 8-9a 3pm  9-10p    Acetaminophen Tylenol 500 mg  prn    2    Amlodipine Norvasc 5 mg   1         Calcium carbonate Os-Murtaza 500 mg  1         Carbidopa levodopa Sinemet 25/100  1.5  1.5       Cholecalciferol vit D3 1000 units 25 mcg   1         Cyanocobalamin vitamin B12 1000 mcg  M/w/f          Estradiol Vagifem 10 mcg    1/wk         Gabapentin Neurontin 100 mg legs       1    Hydroxyurea Hydrea 500 mg   1     1      Irbesartan Avapro 150 mg    1.5         Lorazepam Ativan 0.5 mg        prn     Nonformulary marijuana gummy    prn    Omeprazole Prilosec 20 mg  1           Pravastatin Pravachol 20 mg       1                                                          On examination she is stooped with reduced left arm swing and bilateral resting tremor.   She has some reduced voice volume.     Plan:    evaluate and treat - fax to  [employer]  Recommend using this device.     [facility]  Pharmacy ([facility]) consultation and medication management  Please call the scheduling number I@ [phone] to set up an appointment with pharmacists Portia Powell or Sonia Mondragon.     Taking omeprazole  Has some heartburn - not nausea and so is taking sinemet 1.5 tabs only twice daily   Not sure there are m ore things to do  She is using tums as needed  Stomach better since recovered from covid.     Genetics discussion  [email or web address]  [email or web address]  LRRK    1 year return     Coding statement:   Medical Decision Making:  #  Chronic progressive medical conditions addressed  - see above --   Review and/or interpretation of unique test or documentation from a provider outside of neurology no   Independent historian provided additional details  no  Prescription drug management and review of potential side effects and/or monitoring for side effects  -- see above ---  Health impacted by  social determinants of health  no    I have reviewed the note as documented above.  This accurately captures the substance of my conversation with the patient and total time spent preparing for visit, executing visit and completing visit on the day of the visit:  45 minutes.  The portion of this total time included face to face time 39 minutes    Jose Doss MD     ______________________________________    Last visit date and details:             ______________________________________      Patient was asked about 14 Review of systems including changes in vision (dry eyes, double vision), hearing, heart, lungs, musculoskeletal, depression, anxiety, snoring, RBD, insomnia, urinary frequency, urinary urgency, constipation, swallowing problems, hematological, ID, allergies, skin problems: seborrhea, endocrinological: thyroid, diabetes, cholesterol; balance, weight changes, and other neurological problems and these were not significant at this time except for   Patient Active Problem List   Diagnosis     Essential hypertension, benign     Cervical spondylosis without myelopathy     Glossodynia     Hyperlipidemia, unspecified     Urinary frequency     Essential and other specified forms of tremor     Health Care Home     Transient insomnia     Ankle fracture, left, trimalleolar, 2002     Diverticulitis of colon     Parkinsonism (H)     Osteoarthritis of spine with radiculopathy, lumbosacral region     Left lumbar radiculopathy     Spondylolisthesis of lumbar region     Lumbosacral radiculopathy at S1     Backache     Cervical stenosis of spine     Controlled substance agreement signed     Gastroesophageal reflux disease     HTN (hypertension)     Metatarsalgia     Spinal stenosis     Spinal stenosis of lumbar region     Spinal stenosis, lumbar     Essential thrombocythemia (H)     Hyponatremia     Impacted cerumen of right ear     Sensation of pressure in bladder area          Allergies   Allergen Reactions     Fosamax  Nausea and GI Disturbance     Lisinopril Other (See Comments)     cough     Sulfa Antibiotics Hives     Past Surgical History:   Procedure Laterality Date     ANKLE SURGERY Right 2002     BREAST SURGERY  1987    biopsy     CATARACT IOL, RT/LT        SECTION       COLONOSCOPY  2013    normal     GYN SURGERY  1965;1967    2 ceasarean sections     HEMILAMINECTOMY, DISCECTOMY LUMBAR ONE LEVEL, COMBINED Left 2018    Procedure: COMBINED HEMILAMINECTOMY, DISCECTOMY LUMBAR ONE LEVEL;  Open Left Lumbar 5-Sacral 1 Hemilaminectomy, Medial Facetectomy, And Microdiscectomy Attention Transitional Segment Anatomy;  Surgeon: Rosemarie Young MD;  Location: [facility] OR     LUMBAR LAMINECTOMY Right 2017    Procedure:  RIGHT L4-5 AND L5-S1 LATERAL RECESS DECOMPRESSION;  Surgeon: Raudel Jackson MD;  Location: [facility];  Service:      ORTHOPEDIC SURGERY      ankle surgery for trimalleolar frx     SC SPINE SURGERY PROCEDURE UNLISTED       Past Medical History:   Diagnosis Date     Arthritis      Essential thrombocythemia (H) 2021     GERD (gastroesophageal reflux disease)      HTN (hypertension)      Osteoporosis      Parkinsonian syndrome (H)     left side tremor     Social History     Socioeconomic History     Marital status:      Spouse name: Not on file     Number of children: Not on file     Years of education: Not on file     Highest education level: Not on file   Occupational History     Not on file   Tobacco Use     Smoking status: Never     Smokeless tobacco: Never   Vaping Use     Vaping status: Never Used   Substance and Sexual Activity     Alcohol use: No     Drug use: No     Sexual activity: Not Currently   Other Topics Concern     Parent/sibling w/ CABG, MI or angioplasty before 65F 55M? No   Social History Narrative     Not on file     Social Determinants of Health     Financial Resource Strain: Not on file   Food Insecurity: Not on file    Transportation Needs: Not on file   Physical Activity: Not on file   Stress: Not on file   Social Connections: Not on file   Intimate Partner Violence: Not on file   Housing Stability: Not on file       Drug and lactation database from the [place] [employer] of Medicine:  http://toxnet.nlm.nih.gov/cgi-bin/sis/htmlgen?LACT      B/P: Data Unavailable, T: Data Unavailable, P: Data Unavailable, R: Data Unavailable 0 lbs 0 oz  not currently breastfeeding., There is no height or weight on file to calculate BMI.  Medications and Vitals not listed above were documented in the cart and reviewed by me.     Current Outpatient Medications   Medication Sig Dispense Refill     acetaminophen (TYLENOL) 500 MG tablet Take 1,000 mg by mouth 2 times daily as needed Muscle and joint pain       amLODIPine (NORVASC) 2.5 MG tablet Take 1 tablet (2.5 mg) by mouth daily 30 tablet 2     amLODIPine (NORVASC) 5 MG tablet Take 1 tablet (5 mg) by mouth daily 90 tablet 1     calcium carbonate (OS-LALI) 500 MG tablet Take 1 tablet by mouth daily       carbidopa-levodopa (SINEMET)  MG tablet Take 1.5 tablets by mouth 3 times daily 405 tablet 1     Cholecalciferol (VITAMIN D) 1000 UNITS capsule Take 1 capsule by mouth At Bedtime       cyanocobalamin (VITAMIN B-12) 1000 MCG tablet Take 1,000 mcg by mouth Every Mon, Wed, Fri Morning       estradiol (VAGIFEM) 10 MCG TABS vaginal tablet Place 1 tablet (10 mcg) vaginally twice a week 24 tablet 3     gabapentin (NEURONTIN) 100 MG capsule Take one to three at night as needed 90 capsule 3     hydroxyurea (HYDREA) 500 MG capsule Take 1 capsule (500 mg) by mouth daily 90 capsule 4     irbesartan (AVAPRO) 150 MG tablet TAKE 1 AND 1/2 TABLETS EVERY MORNING 135 tablet 3     lidocaine, viscous, (XYLOCAINE) 2 % solution        LORazepam (ATIVAN) 0.5 MG tablet Take 1 tablet (0.5 mg) by mouth nightly as needed for anxiety 30 tablet 0     NONFORMULARY ANTACID/DIPHEN/LIDO swish and swallow 10ml  every 6 hours prn       omeprazole (PRILOSEC) 20 MG DR capsule Take 1 capsule (20 mg) by mouth daily 90 capsule 3     pravastatin (PRAVACHOL) 20 MG tablet Take 1 tablet (20 mg) by mouth At Bedtime 90 tablet 3         Jose Doss MD

## 2023-06-14 ENCOUNTER — MYC MEDICAL ADVICE (OUTPATIENT)
Dept: INTERNAL MEDICINE | Facility: CLINIC | Age: 83
End: 2023-06-14
Payer: MEDICARE

## 2023-06-14 DIAGNOSIS — N39.0 URINARY TRACT INFECTION WITHOUT HEMATURIA, SITE UNSPECIFIED: ICD-10-CM

## 2023-06-14 DIAGNOSIS — I10 HYPERTENSION, UNSPECIFIED TYPE: ICD-10-CM

## 2023-06-15 RX ORDER — ESTRADIOL 10 UG/1
10 INSERT VAGINAL
Qty: 24 TABLET | Refills: 3 | Status: SHIPPED | OUTPATIENT
Start: 2023-06-15

## 2023-06-15 RX ORDER — AMLODIPINE BESYLATE 2.5 MG/1
2.5 TABLET ORAL DAILY
Qty: 30 TABLET | Refills: 2 | Status: SHIPPED | OUTPATIENT
Start: 2023-06-15 | End: 2023-06-30

## 2023-06-30 ENCOUNTER — OFFICE VISIT (OUTPATIENT)
Dept: NEUROLOGY | Facility: CLINIC | Age: 83
End: 2023-06-30
Attending: INTERNAL MEDICINE
Payer: MEDICARE

## 2023-06-30 ENCOUNTER — PRE VISIT (OUTPATIENT)
Dept: NEUROLOGY | Facility: CLINIC | Age: 83
End: 2023-06-30

## 2023-06-30 VITALS — OXYGEN SATURATION: 99 % | DIASTOLIC BLOOD PRESSURE: 85 MMHG | SYSTOLIC BLOOD PRESSURE: 137 MMHG | HEART RATE: 62 BPM

## 2023-06-30 DIAGNOSIS — I10 BENIGN ESSENTIAL HYPERTENSION: ICD-10-CM

## 2023-06-30 DIAGNOSIS — G20.C PARKINSONISM, UNSPECIFIED PARKINSONISM TYPE (H): Primary | ICD-10-CM

## 2023-06-30 DIAGNOSIS — F80.0 ARTICULATION DISORDER: ICD-10-CM

## 2023-06-30 DIAGNOSIS — Z82.0 FAMILY HISTORY OF TREMOR: ICD-10-CM

## 2023-06-30 DIAGNOSIS — G20.A1 PARKINSON DISEASE (H): ICD-10-CM

## 2023-06-30 DIAGNOSIS — R52 PAIN: ICD-10-CM

## 2023-06-30 DIAGNOSIS — E78.00 HIGH CHOLESTEROL: ICD-10-CM

## 2023-06-30 DIAGNOSIS — R25.1 TREMOR: ICD-10-CM

## 2023-06-30 DIAGNOSIS — R49.9 VOICE DISORDER: ICD-10-CM

## 2023-06-30 DIAGNOSIS — I10 ESSENTIAL HYPERTENSION: ICD-10-CM

## 2023-06-30 PROCEDURE — 99204 OFFICE O/P NEW MOD 45 MIN: CPT | Performed by: PSYCHIATRY & NEUROLOGY

## 2023-06-30 RX ORDER — UBIDECARENONE 100 MG
1 CAPSULE ORAL DAILY
COMMUNITY
Start: 2023-06-30

## 2023-06-30 RX ORDER — CARBIDOPA AND LEVODOPA 25; 100 MG/1; MG/1
1.5 TABLET ORAL 3 TIMES DAILY
Qty: 405 TABLET | Refills: 3 | Status: SHIPPED | OUTPATIENT
Start: 2023-06-30 | End: 2024-06-10

## 2023-06-30 RX ORDER — CALCIUM CARBONATE 500 MG/1
1 TABLET, CHEWABLE ORAL DAILY PRN
COMMUNITY
Start: 2023-06-30

## 2023-06-30 NOTE — LETTER
2023       RE: Catherine Mccollum  [address]  [place] [address]     Dear Colleague,    Thank you for referring your patient, Catherine Mccollum, to the [facility] NEUROLOGY CLINIC [place] at [facility]. Please see a copy of my visit note below.        Diagnosis/Summary/Recommendations:    PATIENT: Catherine Mccollum  83 year old female     : 1940    THAO: 2023    MRN: [id number]    [address]    [place] [address] [phone] ()   [phone] ()      [email or web address]     Sascah Mccollum son   [phone]     Scheduled per patient/ referred by Dr. Alvarez, Kar LIN MD/Parkinson disease (H) [G20]Movement Disorders/Tremor/wants to see Dr. Doss specifically, referral in epic     Assessment:  (G20) Parkinsonism, unspecified Parkinsonism type (H)  (primary encounter diagnosis)  Last seen by Dr. Jones May 2020  Family history of head tremor in mother  Patient had a 20-year history of tremor then with subsequent superimposed parkinsonism  Brain MRI  normal  Previously seen by Dr. Ferguson for essential tremor    Mother had head tremor   AJ ancestry    Lives in the [facility] in [facility]      Review of diagnosis    Parkinson's  Essential tremor    Left side onset   Has left side weakness - has limited left leg single standing  Diagnosis of parkinson was before  when first seen by Dr. Jones  Seen by Phyllis in the past  and was on sinemet since then -  and was told in [place] that she may have had parkinson back in    She has some problems with swallowing at times.   No clear phan off.     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   Jun is in [facility]  Josiah is in [employer]  Brother is in [place]    Hallucinations/delusions   denies    Sleep   Insomnia  Goes to bed around 1030pm and sometimes has problems falling asleep till 12midnight and listens to music - light classical and this help. Does not have to urinate when wakes up but will do so anyway. Able to fall back asleep kind of  Up for the day between 630 and 7am       Bladder/Renal/Prostate/Gyn/Other  Bladder pressure  Hyponatremia  Urinary frequency  Nocturia 0/noc  Daytime goes to bathroom several times  Wears small light panty liner.   Had a urinary tract infection 3/2023 or 4/2023 and had problems getting rid of it and recurrence.     GI/Constipation/GERD   GERD  Diverticulitis  Has daily bowel movement  Has some heartburn that is aggravated by the sinemet  Uses tums prn  Taking omeprazole.   Taking sinemet twice daily  Takes hydroxyurea medication at night.     ENDO/Lipid/DM/Bone density/Thyroid  Hyperlipidemia  On pravastatin  No diabetes or thyroid issues.     Cardio/heart/Hyper or Hypotensive   Hypertension  On medication  bp is usually fine  137/85  Therapist has been checking and been good     Vision/Dry Eyes/Cataracts/Glaucoma/Macular   Wears glasses  Cataract surgery in the past  Dry eyes  Had recent eye examination and were fine     Heme/Anticoagulation/Antiplatelet/Anemia/Other  Essential thrombocythemia  Taking hydroxyuremia  359,000 most recent  numbers    ENT/Resp  Glossodynia  Impacted cerumen right ear  No loss of smell or taste  Had covid February     Skin/Cancer/Seborrhea/other  No skin cancer    Musculoskeletal/Pain/Headache  Cervical spondylosis  Left ankle fracture  Lumbar spinal stenosis  Cervical spinal stenosis  Arthritis  Backache  Metatarsalgia  Back surgery      Other:      Medications      630/7 8-9a 3pm  9-10p    Acetaminophen Tylenol 500 mg  prn    2    Amlodipine Norvasc 5 mg   1         Calcium carbonate Os-Murtaza 500 mg  1         Carbidopa levodopa Sinemet 25/100  1.5  1.5       Cholecalciferol vit D3 1000 units 25 mcg   1         Cyanocobalamin vitamin B12 1000 mcg  M/w/f          Estradiol Vagifem 10 mcg    1/wk         Gabapentin Neurontin 100 mg legs       1    Hydroxyurea Hydrea 500 mg   1     1      Irbesartan Avapro 150 mg    1.5         Lorazepam Ativan 0.5 mg        prn     Nonformulary marijuana gummy    prn    Omeprazole Prilosec 20 mg  1           Pravastatin Pravachol 20 mg       1                                                          On examination she is stooped with reduced left arm swing and bilateral resting tremor.   She has some reduced voice volume.     Plan:    evaluate and treat - fax to  [facility] [employer]  Recommend using this device.     [facility]  Pharmacy ([facility]) consultation and medication management  Please call the scheduling number I@ [phone] to set up an appointment with pharmacists Portia Powell or Sonia Mondragon.     Taking omeprazole  Has some heartburn - not nausea and so is taking sinemet 1.5 tabs only twice daily   Not sure there are m ore things to do  She is using tums as needed  Stomach better since recovered from covid.     Genetics discussion  [email or web address]  [email or web address]  LRRK    1 year return     Coding statement:   Medical Decision Making:  #  Chronic progressive medical conditions  addressed  - see above --   Review and/or interpretation of unique test or documentation from a provider outside of neurology no   Independent historian provided additional details  no  Prescription drug management and review of potential side effects and/or monitoring for side effects  -- see above ---  Health impacted by social determinants of health  no    I have reviewed the note as documented above.  This accurately captures the substance of my conversation with the patient and total time spent preparing for visit, executing visit and completing visit on the day of the visit:  45 minutes.  The portion of this total time included face to face time 39 minutes    Jose Doss MD     ______________________________________    Last visit date and details:             ______________________________________      Patient was asked about 14 Review of systems including changes in vision (dry eyes, double vision), hearing, heart, lungs, musculoskeletal, depression, anxiety, snoring, RBD, insomnia, urinary frequency, urinary urgency, constipation, swallowing problems, hematological, ID, allergies, skin problems: seborrhea, endocrinological: thyroid, diabetes, cholesterol; balance, weight changes, and other neurological problems and these were not significant at this time except for   Patient Active Problem List   Diagnosis    Essential hypertension, benign    Cervical spondylosis without myelopathy    Glossodynia    Hyperlipidemia, unspecified    Urinary frequency    Essential and other specified forms of tremor    Health Care Home    Transient insomnia    Ankle fracture, left, trimalleolar, 2002    Diverticulitis of colon    Parkinsonism (H)    Osteoarthritis of spine with radiculopathy, lumbosacral region    Left lumbar radiculopathy    Spondylolisthesis of lumbar region    Lumbosacral radiculopathy at S1    Backache    Cervical stenosis of spine    Controlled substance agreement signed    Gastroesophageal reflux disease     HTN (hypertension)    Metatarsalgia    Spinal stenosis    Spinal stenosis of lumbar region    Spinal stenosis, lumbar    Essential thrombocythemia (H)    Hyponatremia    Impacted cerumen of right ear    Sensation of pressure in bladder area          Allergies   Allergen Reactions    Fosamax Nausea and GI Disturbance    Lisinopril Other (See Comments)     cough    Sulfa Antibiotics Hives     Past Surgical History:   Procedure Laterality Date    ANKLE SURGERY Right 2002    BREAST SURGERY  1987    biopsy    CATARACT IOL, RT/LT       SECTION      COLONOSCOPY      normal    GYN SURGERY  1965;1967    2 ceasarean sections    HEMILAMINECTOMY, DISCECTOMY LUMBAR ONE LEVEL, COMBINED Left 2018    Procedure: COMBINED HEMILAMINECTOMY, DISCECTOMY LUMBAR ONE LEVEL;  Open Left Lumbar 5-Sacral 1 Hemilaminectomy, Medial Facetectomy, And Microdiscectomy Attention Transitional Segment Anatomy;  Surgeon: Rosemarie Young MD;  Location: [facility] OR    LUMBAR LAMINECTOMY Right 2017    Procedure:  RIGHT L4-5 AND L5-S1 LATERAL RECESS DECOMPRESSION;  Surgeon: Raudel Jackson MD;  Location: [facility];  Service:     ORTHOPEDIC SURGERY      ankle surgery for trimalleolar frx    OH SPINE SURGERY PROCEDURE UNLISTED       Past Medical History:   Diagnosis Date    Arthritis     Essential thrombocythemia (H) 2021    GERD (gastroesophageal reflux disease)     HTN (hypertension)     Osteoporosis     Parkinsonian syndrome (H)     left side tremor     Social History     Socioeconomic History    Marital status:      Spouse name: Not on file    Number of children: Not on file    Years of education: Not on file    Highest education level: Not on file   Occupational History    Not on file   Tobacco Use    Smoking status: Never    Smokeless tobacco: Never   Vaping Use    Vaping status: Never Used   Substance and Sexual Activity    Alcohol use: No    Drug use: No    Sexual activity: Not  Currently   Other Topics Concern    Parent/sibling w/ CABG, MI or angioplasty before 65F 55M? No   Social History Narrative    Not on file     Social Determinants of Health     Financial Resource Strain: Not on file   Food Insecurity: Not on file   Transportation Needs: Not on file   Physical Activity: Not on file   Stress: Not on file   Social Connections: Not on file   Intimate Partner Violence: Not on file   Housing Stability: Not on file       Drug and lactation database from the [place] National Library of Medicine:  http://toxnet.nlm.nih.gov/cgi-bin/sis/htmlgen?LACT      B/P: Data Unavailable, T: Data Unavailable, P: Data Unavailable, R: Data Unavailable 0 lbs 0 oz  not currently breastfeeding., There is no height or weight on file to calculate BMI.  Medications and Vitals not listed above were documented in the cart and reviewed by me.     Current Outpatient Medications   Medication Sig Dispense Refill    acetaminophen (TYLENOL) 500 MG tablet Take 1,000 mg by mouth 2 times daily as needed Muscle and joint pain      amLODIPine (NORVASC) 2.5 MG tablet Take 1 tablet (2.5 mg) by mouth daily 30 tablet 2    amLODIPine (NORVASC) 5 MG tablet Take 1 tablet (5 mg) by mouth daily 90 tablet 1    calcium carbonate (OS-LALI) 500 MG tablet Take 1 tablet by mouth daily      carbidopa-levodopa (SINEMET)  MG tablet Take 1.5 tablets by mouth 3 times daily 405 tablet 1    Cholecalciferol (VITAMIN D) 1000 UNITS capsule Take 1 capsule by mouth At Bedtime      cyanocobalamin (VITAMIN B-12) 1000 MCG tablet Take 1,000 mcg by mouth Every Mon, Wed, Fri Morning      estradiol (VAGIFEM) 10 MCG TABS vaginal tablet Place 1 tablet (10 mcg) vaginally twice a week 24 tablet 3    gabapentin (NEURONTIN) 100 MG capsule Take one to three at night as needed 90 capsule 3    hydroxyurea (HYDREA) 500 MG capsule Take 1 capsule (500 mg) by mouth daily 90 capsule 4    irbesartan (AVAPRO) 150 MG tablet TAKE 1 AND 1/2 TABLETS EVERY MORNING 135  tablet 3    lidocaine, viscous, (XYLOCAINE) 2 % solution       LORazepam (ATIVAN) 0.5 MG tablet Take 1 tablet (0.5 mg) by mouth nightly as needed for anxiety 30 tablet 0    NONFORMULARY ANTACID/DIPHEN/LIDO swish and swallow 10ml every 6 hours prn      omeprazole (PRILOSEC) 20 MG DR capsule Take 1 capsule (20 mg) by mouth daily 90 capsule 3    pravastatin (PRAVACHOL) 20 MG tablet Take 1 tablet (20 mg) by mouth At Bedtime 90 tablet 3         Jose Doss MD        Again, thank you for allowing me to participate in the care of your patient.      Sincerely,    Jose Doss MD

## 2023-06-30 NOTE — PATIENT INSTRUCTIONS
Assessment:  (G20) Parkinsonism, unspecified Parkinsonism type (H)  (primary encounter diagnosis)  Last seen by Dr. Jones May 2020  Family history of head tremor in mother  Patient had a 20-year history of tremor then with subsequent superimposed parkinsonism  Brain MRI 2015 normal  Previously seen by Dr. Ferguson for essential tremor    Mother had head tremor   AJ ancestry    Lives in the [facility] in [facility]      Review of diagnosis    Parkinson's  Essential tremor    Left side onset   Has left side weakness - has limited left leg single standing  Diagnosis of parkinson was before 2020 when first seen by Dr. Jones  Seen by Phyllis in the past 2015 and was on sinemet since then -  and was told in [place] that she may have had parkinson back in 2010   She has some problems with swallowing at times.   No clear phan off.     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   Jun is in [facility]  Josiah is in [place]  Brother is in [place]    Hallucinations/delusions   denies    Sleep   Insomnia  Goes to bed around 1030pm and sometimes has problems falling asleep till 12midnight and listens to music - light classical and this help. Does not have to urinate when wakes up  but will do so anyway. Able to fall back asleep kind of  Up for the day between 630 and 7am       Bladder/Renal/Prostate/Gyn/Other  Bladder pressure  Hyponatremia  Urinary frequency  Nocturia 0/noc  Daytime goes to bathroom several times  Wears small light panty liner.   Had a urinary tract infection 3/2023 or 4/2023 and had problems getting rid of it and recurrence.     GI/Constipation/GERD   GERD  Diverticulitis  Has daily bowel movement  Has some heartburn that is aggravated by the sinemet  Uses tums prn  Taking omeprazole.   Taking sinemet twice daily  Takes hydroxyurea medication at night.     ENDO/Lipid/DM/Bone density/Thyroid  Hyperlipidemia  On pravastatin  No diabetes or thyroid issues.     Cardio/heart/Hyper or Hypotensive   Hypertension  On medication  bp is usually fine  137/85  Therapist has been checking and been good     Vision/Dry Eyes/Cataracts/Glaucoma/Macular   Wears glasses  Cataract surgery in the past  Dry eyes  Had recent eye examination and were fine     Heme/Anticoagulation/Antiplatelet/Anemia/Other  Essential thrombocythemia  Taking hydroxyuremia  359,000 most recent numbers    ENT/Resp  Glossodynia  Impacted cerumen right ear  No loss of smell or taste  Had covid February     Skin/Cancer/Seborrhea/other  No skin cancer    Musculoskeletal/Pain/Headache  Cervical spondylosis  Left ankle fracture  Lumbar spinal stenosis  Cervical spinal stenosis  Arthritis  Backache  Metatarsalgia  Back surgery      Other:      Medications      630/7 8-9a 3pm  9-10p    Acetaminophen Tylenol 500 mg  prn    2    Amlodipine Norvasc 5 mg   1         Calcium carbonate Os-Murtaza 500 mg  1         Carbidopa levodopa Sinemet 25/100  1.5  1.5       Cholecalciferol vit D3 1000 units 25 mcg   1         Cyanocobalamin vitamin B12 1000 mcg  M/w/f          Estradiol Vagifem 10 mcg    1/wk         Gabapentin Neurontin 100 mg legs       1    Hydroxyurea Hydrea 500 mg   1     1      Irbesartan Avapro 150 mg    1.5          Lorazepam Ativan 0.5 mg        prn     Nonformulary marijuana gummy    prn    Omeprazole Prilosec 20 mg  1           Pravastatin Pravachol 20 mg       1                                                          On examination she is stooped with reduced left arm swing and bilateral resting tremor.   She has some reduced voice volume.     Plan:    evaluate and treat - fax to  [facility]    [employer]  Recommend using this device.     [facility]  Pharmacy ([facility]) consultation and medication management  Please call the scheduling number I@ [phone] to set up an appointment with pharmacists Portia Powell or Sonia Mondragon.     Taking omeprazole  Has some heartburn - not nausea and so is taking sinemet 1.5 tabs only twice daily   Not sure there are m ore things to do  She is using tums as needed  Stomach better since recovered from covid.     Genetics discussion  [email or web address]  [email or web address]  LRRK    1 year return

## 2023-07-08 ENCOUNTER — HEALTH MAINTENANCE LETTER (OUTPATIENT)
Age: 83
End: 2023-07-08

## 2023-07-12 ENCOUNTER — TELEPHONE (OUTPATIENT)
Dept: NEUROLOGY | Facility: CLINIC | Age: 83
End: 2023-07-12
Payer: MEDICARE

## 2023-07-12 NOTE — TELEPHONE ENCOUNTER
Left Voicemail (1st Attempt) and Sent Mycanna (1st Attempt) for the patient to call back and schedule the following:    Appointment type: follow up  Provider: Dr. Doss  Return date: 1 year (June 30, 2024)  Specialty phone number: [phone]  Additional appointment(s) needed: N/A  Additonal Notes: LVM, and sent Simone Evans on 7/12/2023 at 10:38 AM

## 2023-07-27 NOTE — PROGRESS NOTES
HPI:    Last visit with us 2023 and additional information in that note. Overall stable. She just got back from [place], [place] visiting her son. She states she remains on her medications. Her Parkinson's is stable. No other HEENT, cardiopulmonary, abdominal, , neurological, systemic, psychiatric, lymphatic, endocrine, vascular complaints.     Past Medical History:   Diagnosis Date    Arthritis     Essential thrombocythemia (H) 2021    GERD (gastroesophageal reflux disease)     HTN (hypertension)     Osteoporosis     Parkinsonian syndrome (H)     left side tremor     Past Surgical History:   Procedure Laterality Date    ANKLE SURGERY Right 2002    BREAST SURGERY  1987    biopsy    CATARACT IOL, RT/LT Bilateral     cataract     SECTION      x 2    COLONOSCOPY  2013    normal    GYN SURGERY  1965;1967    2 ceasarean sections    HEMILAMINECTOMY, DISCECTOMY LUMBAR ONE LEVEL, COMBINED Left 2018    Procedure: COMBINED HEMILAMINECTOMY, DISCECTOMY LUMBAR ONE LEVEL;  Open Left Lumbar 5-Sacral 1 Hemilaminectomy, Medial Facetectomy, And Microdiscectomy Attention Transitional Segment Anatomy;  Surgeon: Rosemarie Young MD;  Location: [facility] OR    LUMBAR LAMINECTOMY Right 2017    Procedure:  RIGHT L4-5 AND L5-S1 LATERAL RECESS DECOMPRESSION;  Surgeon: Raudel Jackson MD;  Location: [facility];  Service:     ORTHOPEDIC SURGERY Right     ankle surgery for trimalleolar frx    MA SPINE SURGERY PROCEDURE UNLISTED       PE:    Vitals noted, my recheck, R arm small cuff 135/65,  gen, nad, cooperative, alert, neck supple nl rom, lungs with good air movement, RRR, S1, S2, no MRG, abdomen, no acute findings. Mild resting tremor R hand.     A/P:     1. Immunizations; COVID Moderna x 5  (bi-valent 2022 and 2023). She has completed the Zoster Shingrix  vaccine series. Tdap 2014. Prevnar 13 done 2015. Pneumococcal 23 done 10/22/2014. Prevnar 20  was  11/21/2022  2. Hematology follow up with Ms. Ellison 11/7/2023  for essential thrombocytosis on hydroxyurea. Last seen Dr. Dietz, Hematology 3/8/2022. She saw Ms. Ellison, Hematology 9/7/2022 and 4/11/2023 with Dr. Dietz. CBC 4/11/2023 Hgb 14.5 Platelets 359. Next visit with Dr. Dietz 4/9/2024  3. Reflux on omeprazole; no complaints.   4. Increased lipids on Pravastatin; Lipids 5/18/2022; TG's 209, HDL 44 and LDL 80.   5. Parkinson's disease on Sinemet. She saw Dr. Jones, 5/26/2020. She saw Dr. Doss 6/30/2023.   6. HTN; off Chlorthalidone because of hyponatremia (see discharge summary from 3/6/2023)  On Avapro and Amlodipine now; Electrolytes and Creatinine  checked 4/3/2023 with Na+ 138, Creatinine 0.61. Recheck manual today stable.   7. Gabapentin for night time pain and sleep.   8. Dermatology; she follows with Dermatology on [address] in [place].   9. See Dr. Griffin, ortho 3/1/2022 for hip pain  10. She remains on low dose ativan for sleep/anxiety.  Refilled 4/25/2023.   11. Chronic LBP and trochanteric bursitis - PT therapy in the past. See 3/9/2023 scanned in PT note.   12. Seen Dr. Burkett, Nephrology 3/20/2023 for Hyponatremia.         30 minutes spent on the date of the encounter doing chart review, history and exam, documentation and further activities as noted above exclusive of procedures and other billable interpretations

## 2023-07-28 ENCOUNTER — OFFICE VISIT (OUTPATIENT)
Dept: INTERNAL MEDICINE | Facility: CLINIC | Age: 83
End: 2023-07-28
Payer: MEDICARE

## 2023-07-28 ENCOUNTER — LAB (OUTPATIENT)
Dept: LAB | Facility: CLINIC | Age: 83
End: 2023-07-28
Payer: MEDICARE

## 2023-07-28 VITALS
OXYGEN SATURATION: 99 % | WEIGHT: 137.6 LBS | SYSTOLIC BLOOD PRESSURE: 171 MMHG | BODY MASS INDEX: 26.87 KG/M2 | HEART RATE: 65 BPM | DIASTOLIC BLOOD PRESSURE: 84 MMHG

## 2023-07-28 DIAGNOSIS — I10 BENIGN ESSENTIAL HYPERTENSION: Primary | ICD-10-CM

## 2023-07-28 DIAGNOSIS — D47.3 ESSENTIAL THROMBOCYTHEMIA (H): ICD-10-CM

## 2023-07-28 LAB
BASOPHILS # BLD AUTO: 0 10E3/UL (ref 0–0.2)
BASOPHILS NFR BLD AUTO: 0 %
EOSINOPHIL # BLD AUTO: 0.1 10E3/UL (ref 0–0.7)
EOSINOPHIL NFR BLD AUTO: 2 %
ERYTHROCYTE [DISTWIDTH] IN BLOOD BY AUTOMATED COUNT: 13 % (ref 10–15)
HCT VFR BLD AUTO: 43.1 % (ref 35–47)
HGB BLD-MCNC: 15 G/DL (ref 11.7–15.7)
IMM GRANULOCYTES # BLD: 0 10E3/UL
IMM GRANULOCYTES NFR BLD: 0 %
LYMPHOCYTES # BLD AUTO: 2.9 10E3/UL (ref 0.8–5.3)
LYMPHOCYTES NFR BLD AUTO: 33 %
MCH RBC QN AUTO: 33.9 PG (ref 26.5–33)
MCHC RBC AUTO-ENTMCNC: 34.8 G/DL (ref 31.5–36.5)
MCV RBC AUTO: 97 FL (ref 78–100)
MONOCYTES # BLD AUTO: 0.6 10E3/UL (ref 0–1.3)
MONOCYTES NFR BLD AUTO: 7 %
NEUTROPHILS # BLD AUTO: 5 10E3/UL (ref 1.6–8.3)
NEUTROPHILS NFR BLD AUTO: 58 %
NRBC # BLD AUTO: 0 10E3/UL
NRBC BLD AUTO-RTO: 0 /100
PLATELET # BLD AUTO: 498 10E3/UL (ref 150–450)
RBC # BLD AUTO: 4.43 10E6/UL (ref 3.8–5.2)
WBC # BLD AUTO: 8.7 10E3/UL (ref 4–11)

## 2023-07-28 PROCEDURE — 36415 COLL VENOUS BLD VENIPUNCTURE: CPT

## 2023-07-28 PROCEDURE — 99214 OFFICE O/P EST MOD 30 MIN: CPT | Performed by: INTERNAL MEDICINE

## 2023-07-28 PROCEDURE — 85025 COMPLETE CBC W/AUTO DIFF WBC: CPT

## 2023-07-28 NOTE — NURSING NOTE
Chief Complaint   Patient presents with     Lab Only     Labs drawn via  by RN.      Zhanna Jeter, RN

## 2023-07-28 NOTE — NURSING NOTE
Catherine Mccollum is a 83 year old female that presents in clinic today for the following:     Chief Complaint   Patient presents with    Follow Up     Pt here for follow up and wants to discuss recent fatigue       The patient's allergies and medications were reviewed. The patient's vitals were obtained without incident. The patient does not have any other questions for the provider.     Javid Mccarty, EMT at 9:47 AM on 7/28/2023.  Primary Care Clinic: [phone]

## 2023-08-08 ENCOUNTER — VIRTUAL VISIT (OUTPATIENT)
Dept: PHARMACY | Facility: CLINIC | Age: 83
End: 2023-08-08
Attending: PSYCHIATRY & NEUROLOGY
Payer: COMMERCIAL

## 2023-08-08 DIAGNOSIS — Z78.9 TAKES DIETARY SUPPLEMENTS: ICD-10-CM

## 2023-08-08 DIAGNOSIS — I10 ESSENTIAL HYPERTENSION, BENIGN: ICD-10-CM

## 2023-08-08 DIAGNOSIS — E78.5 HYPERLIPIDEMIA, UNSPECIFIED HYPERLIPIDEMIA TYPE: ICD-10-CM

## 2023-08-08 DIAGNOSIS — R52 PAIN: ICD-10-CM

## 2023-08-08 DIAGNOSIS — D47.3 ESSENTIAL THROMBOCYTHEMIA (H): ICD-10-CM

## 2023-08-08 DIAGNOSIS — G20.A1 PARKINSON'S DISEASE (H): Primary | ICD-10-CM

## 2023-08-08 DIAGNOSIS — N95.2 VAGINAL ATROPHY: ICD-10-CM

## 2023-08-08 DIAGNOSIS — K21.9 GASTROESOPHAGEAL REFLUX DISEASE WITHOUT ESOPHAGITIS: ICD-10-CM

## 2023-08-08 DIAGNOSIS — G47.00 INSOMNIA, UNSPECIFIED TYPE: ICD-10-CM

## 2023-08-08 PROCEDURE — 99207 PR NO CHARGE LOS: CPT | Performed by: PHARMACIST

## 2023-08-08 NOTE — PATIENT INSTRUCTIONS
"Recommendations from today's MTM visit:                                                    MTM (medication therapy management) is a service provided by a clinical pharmacist designed to help you get the most of out of your medicines.      You could try taking the carbidopa-levodopa with a small snack that would be low in protein (ie: banana, crackers) to help improve your GI upset  Ideally you should try to take carbidopa-levodopa 1 hour before or 2 hours after high-protein meals as it could lessen the effectiveness of the medication   We also discussed that you could consider trying taking carbidopa-levodopa  mg, 1 tablet 3 times/day instead of 1.5 tablets twice daily     Follow-up: yearly or as needed    It was great speaking with you today.  I value your experience and would be very thankful for your time in providing feedback in our clinic survey. In the next few days, you may receive an email or text message from [employer] with a link to a survey related to your  clinical pharmacist.\"     To schedule another MTM appointment, please call the clinic directly or you may call the MTM scheduling line at [phone] or toll-free at [phone].     My Clinical Pharmacist's contact information:                                                      Please feel free to contact me with any questions or concerns you have.     Portia Powell, Pharm.D.  Medication Therapy Management Pharmacist  [facility]    "

## 2023-08-08 NOTE — PROGRESS NOTES
Medication Therapy Management (MTM) Encounter    ASSESSMENT:                            Medication Adherence/Access: No issues identified    Parkinson's Disease:    Patient may benefit from taking carbidopa-levodopa with a small snack (low in protein) to help lessen GI side effects. If this is not helpful enough, also discussed that she could try 1 tablet of carbidopa-levodopa dosed 3 times/day instead of 1.5 tablets twice daily     Insomnia/pain:   Stable; patient seems to be using medications sparingly and at low doses without side effects     Hypertension:   Blood pressure was elevated at last clinic visit but has been at goal previously; continue to monitor     Thrombocythemia:  Stable     GERD:   Stable     Hyperlipidemia:   Stable     Vaginal atrophy:   Stable; discussed it's reasonable for her to continue with the estradiol tablet vs cream per her preference     Vitamins/Supplements:   Stable     PLAN:                            You could try taking the carbidopa-levodopa with a small snack that would be low in protein (ie: banana, crackers) to help improve your GI upset  Ideally you should try to take carbidopa-levodopa 1 hour before or 2 hours after high-protein meals as it could lessen the effectiveness of the medication   We also discussed that you could consider trying taking carbidopa-levodopa  mg, 1 tablet 3 times/day instead of 1.5 tablets twice daily     Follow-up: yearly or as needed    SUBJECTIVE/OBJECTIVE:                          Catherine Mccollum is a 83 year old female called for an initial visit. She was referred to me from Dr. Doss.      Reason for visit: medication review    Allergies/ADRs: Reviewed in chart  Past Medical History: Reviewed in chart  Tobacco: She reports that she has never smoked. She has never used smokeless tobacco.  Alcohol: not currently using    Medication Adherence/Access: Patient uses pill box(es)-- morning and evening     Parkinson's Disease:    Carbidopa-levodopa  " mg, early morning and later in the afternoon   She waits about 30 minutes after taking carbidopa-levodopa to eat breakfast. However, reports that the medication causes upset stomach after taking it. Eating seems to help. She states that she doesn't feel she needs the 3rd dose of carbidopa-levodopa most days because she is relaxing at home. She notices the tremor more when she is around people (ie: playing bridge) or if she is nervous/anxious. She only uses lorazepam very seldom if going on a trip to \"calm down.\" She has tried 1 tablet 3 times/day in the past. She is very stiff in the morning when she gets out of bed and uses a cane to ambulate but then after she takes a shower she feels a lot better and is walking well the rest of the day.    Insomnia/pain:   Tylenol 1000 mg at bedtime most nights  Gabapentin 100 mg at bedtime most nights   THC 2.5 mg at bedtime most nights  Patient states that she is able to sleep with these medications. She is not sure she needs the THC so has been cutting down on this. She has some back pain but it gets better throughout the day. Does not think these medications cause her to have side effects.     Hypertension:   Irbesartan 150 mg, 1.5 tablets daily   Amlodipine 5mg daily; has 2.5 mg tablets on hand and takes 2  Patient reports no current medication side effects.   BP Readings from Last 3 Encounters:   07/28/23 (!) 171/84   06/30/23 137/85   04/25/23 (!) 170/89     Pulse Readings from Last 3 Encounters:   07/28/23 65   06/30/23 62   04/25/23 73     Thrombocythemia:  Hydroxyurea 500 mg daily in the evening   Follows with hematology     GERD:   Omeprazole 20 mg once daily at 6:30-7 am  Tums as needed for indigestion   Patient reports \"upset stomach\", depending on what she's eating. She reports omeprazole is helpful. Had a huge salad the other day and this \"did her in.\" Otherwise feels she can manage the symptoms. She does not get nauseous, it's more of a feeling of " hreatburn.      Hyperlipidemia:   pravastatin 20 mg daily at bedtime   Patient reports no significant myalgias or other side effects.  The ASCVD Risk score (Mihai DK, et al., 2019) failed to calculate for the following reasons:    The 2019 ASCVD risk score is only valid for ages 40 to 79  Recent Labs   Lab Test 05/18/22  1031 12/18/19  1145   CHOL 166 174   HDL 44* 54   LDL 80 94   TRIG 209* 134     Vaginal atrophy:   Estradiol 10mcg tablet, about every 5 days  Patient states she used estradiol cream in the past but felt it lost effectiveness. The pharmacy asked if she would like to switch to the cream because it would be cheaper but she prefers the tablet.    Vitamins/Supplements:   Calcium daily  Vitamin D daily   Vitamin B12 3 times weekly     Today's Vitals: There were no vitals taken for this visit.  ----------------    I spent 20 minutes with this patient today. All changes were made via collaborative practice agreement with Dr. Doss. A copy of the visit note was provided to the patient's provider(s).    A summary of these recommendations was sent via [employer].    Portia Powell, Pharm.D.  Medication Therapy Management Pharmacist  [facility]    Telemedicine Visit Details  Type of service:  Telephone visit  Start Time:  9:04 AM  End Time: 9:24 AM     Medication Therapy Recommendations  Parkinsonism (H)    Current Medication: carbidopa-levodopa (SINEMET)  MG tablet   Rationale: Does not understand instructions - Adherence - Adherence   Recommendation: Provide Education   Status: Patient Agreed - Adherence/Education

## 2023-08-30 ENCOUNTER — LAB (OUTPATIENT)
Dept: LAB | Facility: CLINIC | Age: 83
End: 2023-08-30
Payer: MEDICARE

## 2023-08-30 ENCOUNTER — OFFICE VISIT (OUTPATIENT)
Dept: INTERNAL MEDICINE | Facility: CLINIC | Age: 83
End: 2023-08-30
Payer: MEDICARE

## 2023-08-30 VITALS
WEIGHT: 138.2 LBS | OXYGEN SATURATION: 97 % | DIASTOLIC BLOOD PRESSURE: 76 MMHG | HEIGHT: 60 IN | HEART RATE: 78 BPM | SYSTOLIC BLOOD PRESSURE: 160 MMHG | TEMPERATURE: 97.9 F | BODY MASS INDEX: 27.13 KG/M2

## 2023-08-30 DIAGNOSIS — R25.1 TREMOR: ICD-10-CM

## 2023-08-30 DIAGNOSIS — I10 BENIGN ESSENTIAL HYPERTENSION: Primary | ICD-10-CM

## 2023-08-30 DIAGNOSIS — R52 PAIN: ICD-10-CM

## 2023-08-30 DIAGNOSIS — F41.9 ANXIETY: ICD-10-CM

## 2023-08-30 DIAGNOSIS — K21.9 GASTROESOPHAGEAL REFLUX DISEASE, UNSPECIFIED WHETHER ESOPHAGITIS PRESENT: ICD-10-CM

## 2023-08-30 DIAGNOSIS — I10 BENIGN ESSENTIAL HYPERTENSION: ICD-10-CM

## 2023-08-30 DIAGNOSIS — G20.A1 PARKINSON DISEASE (H): ICD-10-CM

## 2023-08-30 LAB
ANION GAP SERPL CALCULATED.3IONS-SCNC: 11 MMOL/L (ref 7–15)
BUN SERPL-MCNC: 12.7 MG/DL (ref 8–23)
CALCIUM SERPL-MCNC: 10.2 MG/DL (ref 8.8–10.2)
CHLORIDE SERPL-SCNC: 100 MMOL/L (ref 98–107)
CREAT SERPL-MCNC: 0.66 MG/DL (ref 0.51–0.95)
DEPRECATED HCO3 PLAS-SCNC: 28 MMOL/L (ref 22–29)
GFR SERPL CREATININE-BSD FRML MDRD: 85 ML/MIN/1.73M2
GLUCOSE SERPL-MCNC: 122 MG/DL (ref 70–99)
POTASSIUM SERPL-SCNC: 3.5 MMOL/L (ref 3.4–5.3)
SODIUM SERPL-SCNC: 139 MMOL/L (ref 136–145)

## 2023-08-30 PROCEDURE — 80048 BASIC METABOLIC PNL TOTAL CA: CPT | Performed by: PATHOLOGY

## 2023-08-30 PROCEDURE — 36415 COLL VENOUS BLD VENIPUNCTURE: CPT | Performed by: PATHOLOGY

## 2023-08-30 PROCEDURE — 99214 OFFICE O/P EST MOD 30 MIN: CPT | Performed by: NURSE PRACTITIONER

## 2023-08-30 RX ORDER — IRBESARTAN 150 MG/1
300 TABLET ORAL DAILY
Qty: 135 TABLET | Refills: 3 | Status: SHIPPED | OUTPATIENT
Start: 2023-08-30

## 2023-08-30 NOTE — NURSING NOTE
Catherine Mccollum is a 83 year old female patient that presents today in clinic for the following:    Chief Complaint   Patient presents with    Hypertension     Loose bowels this morning, headache, renewal of prescriptions     The patient's allergies and medications were reviewed as noted. A set of vitals were recorded as noted without incident. The patient does not have any other questions for the provider.    Deyanira Spencer, EMT at 4:21 PM on 8/30/2023

## 2023-08-30 NOTE — NURSING NOTE
AHA Interval Blood Pressure    Catherine Mccollum presents in clinic today for a visit with Martha Quigley. Per the provider, the patient received the AHA Protocol for Hypertensive Patients. The patient's arm was elevated to heart level and they were instructed to uncross their legs. A triple blood pressure AHA was preformed wherein the blood pressure machine took Catherine Mccollum's blood pressure over the course of ten minutes. The following were the individual blood pressures that were observed at today's visit:    (1) 181/94 (Didn't work, crossed out)    (2) 183/79 (Didn't work, crossed out)    (3) 158/87    The average blood pressure from today's individual readings were 158/87. The results of today's visit were communicated to the ordering provider.    Deyanira Spencer, EMT at 5:16 PM on 8/30/2023

## 2023-08-30 NOTE — PROGRESS NOTES
"  Assessment & Plan     Benign essential hypertension  BPs consistently above goal on home monitor and elevated in clinic. Check BMP today; this is WNL. Will increase irbesartan to 300 mg daily.  Stay well-hydrated, use slow position changes. Notify clinic if any lightheadedness/dizziness or any other concerns. Reschedule follow-up with PCP prior to her upcoming move for BP recheck and repeat BMP.  - Basic metabolic panel  (Ca, Cl, CO2, Creat, Gluc, K, Na, BUN); Future  - irbesartan (AVAPRO) 150 MG tablet; Take 2 tablets (300 mg) by mouth daily    Gastroesophageal reflux disease, unspecified whether esophagitis present  Refill for Omeprazole provided.  - omeprazole (PRILOSEC) 20 MG DR capsule; Take 1 capsule (20 mg) by mouth daily    Parkinson disease (H)  Tremor  Continues on Sinemet.     Anxiety  She describes feeling \"tense\" about wrapping up her affairs here before upcoming move to [place]. Discussed meditation and relaxation exercises; she declines therapy referral as she does not feel she will have time to establish with a therapist prior to moving.      MDM: 2+ problem visit, prescription drug management    Return in about 6 weeks (around 10/11/2023).    KAY Mac CNP  M [facility]   Catherine is a 83 year old, presenting for the following health issues:  Hypertension (Loose bowels this morning, headache, renewal of prescriptions)    HPI     /90 when she woke up, took a medications and improved to upper-130s 1.5 hr later, then was 160s again within a the next 2 hr.  Had a headache after she ate dinner last night and was concerned.  Hasn't taken tylenol for this today.     Feels more tense, moving to [place] to be with her son at the end of October, will live in an independent senior living facility.  Has Lorazepam 0.5 mg takes more for sleep than anxiety, uses infrequently, concerned it would make her feel drowsy if took in the day.      1 episode " of diarrhea this AM.  No new foods, ate some carrot cake last night.  Otherwise feeling fine.    Review of Systems   Constitutional, HEENT, cardiovascular, pulmonary, gi and gu systems are negative, except as otherwise noted.      Objective    BP (!) 160/76   Pulse 78   Temp 97.9  F (36.6  C)   Ht 1.524 m (5')   Wt 62.7 kg (138 lb 3.2 oz)   SpO2 97%   BMI 26.99 kg/m    Body mass index is 26.99 kg/m .  Physical Exam   GENERAL: healthy, alert and no distress  NECK: no adenopathy, no asymmetry, masses, or scars and thyroid normal to palpation  RESP: lungs clear to auscultation - no rales, rhonchi or wheezes  CV: regular rate and rhythm, normal S1 S2, no S3 or S4, no murmur, click or rub, no peripheral edema and peripheral pulses strong  ABDOMEN: soft, nontender, no hepatosplenomegaly, no masses and bowel sounds normal  MS: no gross musculoskeletal defects noted, no edema  NEURO: tremors, mentation intact and speech normal  PSYCH: mentation appears normal, affect normal/bright

## 2023-10-11 NOTE — PROGRESS NOTES
HPI:    Last visit with us 2023. Overall stable. She states her BP is better controlled at home in the  range. She states that taking too much anti-hypertensive medication (chlorthalidone) she feels weak and poorly. She has some mild increased urinary urgency. No hematuria, no dysuria, no systemic sxs.   No flank pain. Otherwise, no additional HEENT, cardiopulmonary, abdominal, , GYN, neurological, systemic, psychiatric, lymphatic, endocrine, vascular complaints. She is moving to [place], [place] at the end of this month.     Past Medical History:   Diagnosis Date    Arthritis     Essential thrombocythemia (H) 2021    GERD (gastroesophageal reflux disease)     HTN (hypertension)     Osteoporosis     Parkinsonian syndrome (H)     left side tremor     Past Surgical History:   Procedure Laterality Date    ANKLE SURGERY Right 2002    BREAST SURGERY  1987    biopsy    CATARACT IOL, RT/LT Bilateral     cataract     SECTION      x 2    COLONOSCOPY      normal    GYN SURGERY  1965;1967    2 ceasarean sections    HEMILAMINECTOMY, DISCECTOMY LUMBAR ONE LEVEL, COMBINED Left 2018    Procedure: COMBINED HEMILAMINECTOMY, DISCECTOMY LUMBAR ONE LEVEL;  Open Left Lumbar 5-Sacral 1 Hemilaminectomy, Medial Facetectomy, And Microdiscectomy Attention Transitional Segment Anatomy;  Surgeon: Rosemarie Young MD;  Location: [facility] OR    LUMBAR LAMINECTOMY Right 2017    Procedure:  RIGHT L4-5 AND L5-S1 LATERAL RECESS DECOMPRESSION;  Surgeon: Raudel Jackson MD;  Location: [facility] [place];  Service:     ORTHOPEDIC SURGERY Right     ankle surgery for trimalleolar frx    OR SPINE SURGERY PROCEDURE UNLISTED       PE:    Vitals noted, gen, nad, cooperative, alert, neck supple nl rom, lungs with good air movement, RRR, S1, S2, no MRG, abdomen, no acute findings, Grossly normal neurological exam.     Results for orders placed or performed in visit on 10/12/23   CBC with  Platelets & Differential     Status: None ()    Narrative    The following orders were created for panel order CBC with Platelets & Differential.  Procedure                               Abnormality         Status                     ---------                               -----------         ------                     CBC with platelets and d...[[id number]]                                                   Please view results for these tests on the individual orders.   Results for orders placed or performed in visit on 10/12/23   UA with Microscopic reflex to Culture - lab collect     Status: Abnormal    Specimen: Urine, Midstream   Result Value Ref Range    Color Urine Straw Colorless, Straw, Light Yellow, Yellow    Appearance Urine Clear Clear    Glucose Urine Negative Negative mg/dL    Bilirubin Urine Negative Negative    Ketones Urine Negative Negative mg/dL    Specific Gravity Urine 1.004 1.003 - 1.035    Blood Urine Negative Negative    pH Urine 6.5 5.0 - 7.0    Protein Albumin Urine Negative Negative mg/dL    Urobilinogen Urine Normal Normal, 2.0 mg/dL    Nitrite Urine Negative Negative    Leukocyte Esterase Urine Negative Negative    Bacteria Urine Few (A) None Seen /HPF    Mucus Urine Present (A) None Seen /LPF    RBC Urine <1 <=2 /HPF    WBC Urine <1 <=5 /HPF    Squamous Epithelials Urine <1 <=1 /HPF    Narrative    Urine Culture not indicated     A/P:     1. Immunizations; COVID Moderna x 6  (bi-valent 9/27/2022 and 7/7/2023). She has completed the Zoster Shingrix  vaccine series. Tdap 2/1/2014. Prevnar 13 done 12/8/2015. Pneumococcal 23 done 10/22/2014. Prevnar 20  was 11/21/2022  2. Hematology follow up with Ms. Ellison  scheduled 10/16/2023  for essential thrombocytosis on hydroxyurea. Last seen Dr. Dietz, Hematology 3/8/2022. She saw MsMichel Velasquezclare, Hematology 9/7/2022 and 4/11/2023 with Dr. Dietz. CBC 7/28/2023 Hgb 15.0  Platelets 498. Next visit with Dr. Dietz 4/9/2024  3. Reflux on omeprazole; no  complaints.   4. Increased lipids on Pravastatin; Lipids 5/18/2022; TG's 209, HDL 44 and LDL 80.   5. Parkinson's disease on Sinemet. She saw Dr. Jones, 5/26/2020. She saw Dr. Doss 6/30/2023. See Ms. Powell's neurology MTM note from 8/8/2023  6. HTN; off Chlorthalidone because of hyponatremia (see discharge summary from 3/6/2023)  On Avapro and Amlodipine now; Electrolytes and Creatinine  checked 4/3/2023 with Na+ 138, Creatinine 0.61. Seen Dr. Burkett, Nephrology 3/20/2023 for Hyponatremia. Last Na+ 139 on 8/30/2023. For now no change in hypertension management.    7. Gabapentin for night time pain and sleep.   8. Dermatology; she follows with Dermatology on [address] in [place].   9. See Dr. Griffin, ortho 3/1/2022 for hip pain  10. She remains on low dose ativan for sleep/anxiety.  Refilled 4/25/2023.   11. Chronic LBP and trochanteric bursitis - PT therapy in the past. See 3/9/2023 scanned in PT note.   12. Ordered UA for urinary urgency.      30 minutes spent on the date of the encounter doing chart review, history and exam, documentation and further activities as noted above exclusive of procedures and other billable interpretations

## 2023-10-12 ENCOUNTER — OFFICE VISIT (OUTPATIENT)
Dept: INTERNAL MEDICINE | Facility: CLINIC | Age: 83
End: 2023-10-12
Payer: MEDICARE

## 2023-10-12 ENCOUNTER — LAB (OUTPATIENT)
Dept: LAB | Facility: CLINIC | Age: 83
End: 2023-10-12
Payer: MEDICARE

## 2023-10-12 ENCOUNTER — LAB (OUTPATIENT)
Dept: LAB | Facility: CLINIC | Age: 83
End: 2023-10-12
Attending: REGISTERED NURSE
Payer: MEDICARE

## 2023-10-12 VITALS
HEIGHT: 60 IN | SYSTOLIC BLOOD PRESSURE: 170 MMHG | OXYGEN SATURATION: 95 % | BODY MASS INDEX: 26.99 KG/M2 | DIASTOLIC BLOOD PRESSURE: 84 MMHG | HEART RATE: 66 BPM

## 2023-10-12 DIAGNOSIS — D47.3 ESSENTIAL THROMBOCYTHEMIA (H): ICD-10-CM

## 2023-10-12 DIAGNOSIS — Z23 NEED FOR COVID-19 VACCINE: ICD-10-CM

## 2023-10-12 DIAGNOSIS — I10 BENIGN ESSENTIAL HYPERTENSION: ICD-10-CM

## 2023-10-12 DIAGNOSIS — R35.0 URINARY FREQUENCY: ICD-10-CM

## 2023-10-12 DIAGNOSIS — R35.0 URINARY FREQUENCY: Primary | ICD-10-CM

## 2023-10-12 LAB
ALBUMIN UR-MCNC: NEGATIVE MG/DL
APPEARANCE UR: CLEAR
BACTERIA #/AREA URNS HPF: ABNORMAL /HPF
BASO+EOS+MONOS # BLD AUTO: ABNORMAL 10*3/UL
BASO+EOS+MONOS NFR BLD AUTO: ABNORMAL %
BASOPHILS # BLD AUTO: 0.1 10E3/UL (ref 0–0.2)
BASOPHILS NFR BLD AUTO: 1 %
BILIRUB UR QL STRIP: NEGATIVE
COLOR UR AUTO: ABNORMAL
EOSINOPHIL # BLD AUTO: 0.1 10E3/UL (ref 0–0.7)
EOSINOPHIL NFR BLD AUTO: 1 %
ERYTHROCYTE [DISTWIDTH] IN BLOOD BY AUTOMATED COUNT: 13.6 % (ref 10–15)
GLUCOSE UR STRIP-MCNC: NEGATIVE MG/DL
HCT VFR BLD AUTO: 42.1 % (ref 35–47)
HGB BLD-MCNC: 14.9 G/DL (ref 11.7–15.7)
HGB UR QL STRIP: NEGATIVE
IMM GRANULOCYTES # BLD: 0.1 10E3/UL
IMM GRANULOCYTES NFR BLD: 1 %
KETONES UR STRIP-MCNC: NEGATIVE MG/DL
LEUKOCYTE ESTERASE UR QL STRIP: NEGATIVE
LYMPHOCYTES # BLD AUTO: 3 10E3/UL (ref 0.8–5.3)
LYMPHOCYTES NFR BLD AUTO: 30 %
MCH RBC QN AUTO: 34 PG (ref 26.5–33)
MCHC RBC AUTO-ENTMCNC: 35.4 G/DL (ref 31.5–36.5)
MCV RBC AUTO: 96 FL (ref 78–100)
MONOCYTES # BLD AUTO: 0.7 10E3/UL (ref 0–1.3)
MONOCYTES NFR BLD AUTO: 7 %
MUCOUS THREADS #/AREA URNS LPF: PRESENT /LPF
NEUTROPHILS # BLD AUTO: 6.3 10E3/UL (ref 1.6–8.3)
NEUTROPHILS NFR BLD AUTO: 60 %
NITRATE UR QL: NEGATIVE
NRBC # BLD AUTO: 0 10E3/UL
NRBC BLD AUTO-RTO: 0 /100
PH UR STRIP: 6.5 [PH] (ref 5–7)
PLATELET # BLD AUTO: 416 10E3/UL (ref 150–450)
RBC # BLD AUTO: 4.38 10E6/UL (ref 3.8–5.2)
RBC URINE: <1 /HPF
SP GR UR STRIP: 1 (ref 1–1.03)
SQUAMOUS EPITHELIAL: <1 /HPF
UROBILINOGEN UR STRIP-MCNC: NORMAL MG/DL
WBC # BLD AUTO: 10.2 10E3/UL (ref 4–11)
WBC URINE: <1 /HPF

## 2023-10-12 PROCEDURE — 99214 OFFICE O/P EST MOD 30 MIN: CPT | Mod: 25 | Performed by: INTERNAL MEDICINE

## 2023-10-12 PROCEDURE — 36415 COLL VENOUS BLD VENIPUNCTURE: CPT

## 2023-10-12 PROCEDURE — 85025 COMPLETE CBC W/AUTO DIFF WBC: CPT

## 2023-10-12 PROCEDURE — 90480 ADMN SARSCOV2 VAC 1/ONLY CMP: CPT | Performed by: INTERNAL MEDICINE

## 2023-10-12 PROCEDURE — 91320 SARSCV2 VAC 30MCG TRS-SUC IM: CPT | Performed by: INTERNAL MEDICINE

## 2023-10-12 PROCEDURE — 81001 URINALYSIS AUTO W/SCOPE: CPT | Performed by: PATHOLOGY

## 2023-10-12 RX ORDER — AMLODIPINE BESYLATE 5 MG/1
5 TABLET ORAL DAILY
Qty: 90 TABLET | Refills: 1 | Status: SHIPPED | OUTPATIENT
Start: 2023-10-12

## 2023-10-12 NOTE — PROGRESS NOTES
Administered new covid pfizer [id number] vaccine (see Immunizations in Chart Review). Patient tolerated well.        Jamey Santos CMA at 10:15 AM on 10/12/2023

## 2023-10-12 NOTE — PROGRESS NOTES
Catherine is a 83 year old that presents in clinic today for the following:     Chief Complaint   Patient presents with    Follow Up     Amlodipine refill, sometimes pressure on bladder, increased BP medication but patient is back to where they started            10/12/2023     9:42 AM   Additional Questions   Roomed by Deyanira Spencer       Screenings from encounters over the past 10 days    No data recorded       Deyanira Spencer MA at 9:47 AM on 10/12/2023

## 2023-10-12 NOTE — NURSING NOTE
Chief Complaint   Patient presents with    Labs Only     Labs drawn via VPT by RN     Labs collected from venipuncture by RN. No further appointment(s).

## 2023-10-13 NOTE — PROGRESS NOTES
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      Recommendations:  Continue HU 500mg daily  CBC and diff every 3 months  RTC 6 months with hematology. She plans to establish care with a new provider in [place], [place]. She just received a new, 3-month supply of HU and has additional refills on her current prescription. She continues to work with her insurance on transferring prescriptions and medications.  Will request as-needed follow-up in our clinic should she return to [place] or need hematology follow-up here.    20 minutes spent on the date of the encounter doing chart review, review of test results, interpretation of tests, patient visit, and documentation, in addition to 8 minutes spent on the phone with the patient.     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  No symptoms or thrombotic events.    - Started  mg daily, increased to 1000mg daily in Dec 2020  - She refused ASA due to history of gastric ulcer.  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 She remains on hydroxyurea 500 mg daily. She denies any missed doses of medications. Takes this  consistently every evening before bed. Denies increased fatigue although notes energy level sometimes fluctuates which she attributes to her age. She occasionally experiences stomach upset but is not sure how much of this is related to her HU. She denies any swelling or pain in her upper or lower extremities aside from mild bilateral ankle swelling in the evening. Denies any respiratory changes such as cough or shortness of breath. She will be moving to [place] in 10 days to liver closer to her son.      Medications were reviewed in the EMR today and notable for HU 500mg daily.    Objective:  There is no height or weight on file to calculate BMI.     Unable to do physical exam 2/2 telephone visit. Well sounding in no distress. Normal speech and thought process. Good voice quality. No audible wheezing or cough.    Labs:  Most Recent 3 CBC's:  Recent Labs   Lab Test 10/12/23  1049 07/28/23  1039 04/11/23  1108   WBC 10.2 8.7 8.6   HGB 14.9 15.0 14.5   MCV 96 97 97    498* 359   ANEUTAUTO 6.3 5.0 5.4   ed the above labs today.    Imaging:  None

## 2023-10-16 ENCOUNTER — VIRTUAL VISIT (OUTPATIENT)
Dept: ONCOLOGY | Facility: CLINIC | Age: 83
End: 2023-10-16
Attending: REGISTERED NURSE
Payer: MEDICARE

## 2023-10-16 VITALS — WEIGHT: 135 LBS | HEIGHT: 60 IN | BODY MASS INDEX: 26.5 KG/M2

## 2023-10-16 DIAGNOSIS — D47.1 MYELOPROLIFERATIVE DISORDER (H): ICD-10-CM

## 2023-10-16 DIAGNOSIS — Z15.89 JAK2 V617F MUTATION: Primary | ICD-10-CM

## 2023-10-16 DIAGNOSIS — D47.3 ESSENTIAL THROMBOCYTHEMIA (H): ICD-10-CM

## 2023-10-16 PROCEDURE — 98966 PH1 ASSMT&MGMT NQHP 5-10: CPT | Performed by: REGISTERED NURSE

## 2023-10-16 ASSESSMENT — PAIN SCALES - GENERAL: PAINLEVEL: NO PAIN (0)

## 2023-10-16 NOTE — NURSING NOTE
Is the patient currently in the state of [place]? YES    Visit mode:TELEPHONE    If the visit is dropped, the patient can be reconnected by: TELEPHONE VISIT: Phone number:   Telephone Information:   Mobile [phone]       Will anyone else be joining the visit? NO  (If patient encounters technical issues they should call [phone]    How would you like to obtain your AVS? MyChart    Are changes needed to the allergy or medication list? Pt stated no med changes    Reason for visit: RECHMICKEY LOPEZF

## 2023-10-16 NOTE — Clinical Note
10/16/2023         RE: Catherine Mccollum  [address]  [place] [address]        Dear Colleague,    Thank you for referring your patient, Catherine Mccollum, to the [facility]. Please see a copy of my visit note below.        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      Recommendations:  I counseled the patient about my assessment of her current disease control, natural history of MPNs and risk associated with thrombosis, which is the primary rationale for treatment, and our monitoring strategy while on HU.  Continue HU 500mg daily  CBC and diff every 3 months  RTC 6 months with LUCÍA and 1 year with me.  Discussed MPN research and she is not currently interested    *** minutes spent on the date of the encounter doing { E&M time in:233571}, in addition to *** minutes spent on the phone with the patient.     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  No symptoms or thrombotic events.    - Started  mg daily, increased to 1000mg daily in Dec 2020  - She refused ASA due to history of gastric ulcer.  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  She reports no side effects from the HU other than some fatigue and occasional upset stomach.  She has had no thrombotic events.  She was briefly hospitalized after COVID for hyponatremia but is feeling much better.     Moving to [place] for 10  days. HU daily without missed doses. No stomach issues. Energy down. Ankles swollen at the end days. [place], [place].    Medications are reviewed in the EMR and notable for HU 500mg daily.    Objective:  Exam:  There is no height or weight on file to calculate BMI.     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      Recommendations:  Continue HU 500mg daily  CBC and diff every 3 months  RTC 6 months with hematology. She plans to establish care with a new provider in [place]. She just received a new, 3-month supply of HU and has additional refills on her current prescription. She continues to work with her insurance on transferring prescriptions and medications.  Will request as-needed follow-up in our clinic should she return to [place] or need hematology follow-up here.    20 minutes spent on the date of the encounter doing chart review, review of test results, interpretation of tests, patient visit, and documentation, in addition  to 8 minutes spent on the phone with the patient.     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  No symptoms or thrombotic events.    - Started  mg daily, increased to 1000mg daily in Dec 2020  - She refused ASA due to history of gastric ulcer.  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 She remains on hydroxyurea 500 mg daily. She denies any missed doses of medications. Takes this consistently every evening before bed. Denies increased fatigue although notes energy level sometimes fluctuates which she attributes to her age. She occasionally experiences stomach upset but is not sure how much of this is related to her HU. She denies any swelling or pain in her upper or lower extremities aside from mild bilateral ankle swelling in the evening. Denies any respiratory changes such as cough or shortness of breath. She will be moving to [place] in 10 days to liver closer to her son.      Medications were reviewed in the EMR today and notable for HU 500mg daily.    Objective:  There is no height or weight on file to calculate BMI.     Unable to do physical exam 2/2 telephone visit. Well sounding in no distress. Normal speech and thought process. Good voice quality. No audible wheezing or cough.    Labs:  Most Recent 3 CBC's:  Recent Labs   Lab Test 10/12/23  1049 07/28/23  1039 04/11/23  1108   WBC 10.2 8.7 8.6   HGB 14.9 15.0 14.5   MCV 96 97 97    498* 359   ANEUTAUTO 6.3 5.0 5.4   ed the above labs today.    Imaging:  None        Again, thank you for allowing me to participate in the  care of your patient.        Sincerely,        Riana Ellison, CNP

## 2023-11-28 ENCOUNTER — TELEPHONE (OUTPATIENT)
Dept: NEUROLOGY | Facility: CLINIC | Age: 83
End: 2023-11-28
Payer: MEDICARE

## 2023-11-28 NOTE — TELEPHONE ENCOUNTER
Left Voicemail (2nd Attempt) and Sent Mychart (2nd Attempt) for the patient to call back and schedule the following:    Appointment type: Return Movement Disorder  Provider: Selam  Return date: Around 6/30/2024  Specialty phone number: [phone]  Additional appointment(s) needed: N/A  Additional Notes: N/A    1st attempts made on 7/12/2023.    Bravo Smith on 11/28/2023 at 10:17 AM

## 2024-06-06 DIAGNOSIS — I10 BENIGN ESSENTIAL HYPERTENSION: ICD-10-CM

## 2024-06-06 DIAGNOSIS — G20.A1 PARKINSON DISEASE (H): ICD-10-CM

## 2024-06-06 DIAGNOSIS — E78.00 HIGH CHOLESTEROL: ICD-10-CM

## 2024-06-06 DIAGNOSIS — I10 ESSENTIAL HYPERTENSION: ICD-10-CM

## 2024-06-06 DIAGNOSIS — R52 PAIN: ICD-10-CM

## 2024-06-06 DIAGNOSIS — R25.1 TREMOR: ICD-10-CM

## 2024-06-06 NOTE — TELEPHONE ENCOUNTER
RX Authorization    Medication:     Date last refill ordered:    Quantity ordered:    # refills:    Date of last clinic visit with ordering provider:    Date of next clinic visit with ordering provider:

## 2024-06-10 ENCOUNTER — TELEPHONE (OUTPATIENT)
Dept: NEUROLOGY | Facility: CLINIC | Age: 84
End: 2024-06-10
Payer: MEDICARE

## 2024-06-10 RX ORDER — CARBIDOPA AND LEVODOPA 25; 100 MG/1; MG/1
1.5 TABLET ORAL 3 TIMES DAILY
Qty: 405 TABLET | Refills: 3 | Status: SHIPPED | OUTPATIENT
Start: 2024-06-10

## 2024-06-10 NOTE — TELEPHONE ENCOUNTER
Health Call Center    Phone Message    May a detailed message be left on voicemail: yes     Reason for Call: Medication Refill Request    Has the patient contacted the pharmacy for the refill? Yes   Name of medication being requested: carbidopa-levodopa (SINEMET)    Provider who prescribed the medication: Jose Doss MD   Pharmacy:   [facility] - [facility] [address]   Date medication is needed: 06/10/24       Action Taken: Message routed to:  [facility] Neurology    Travel Screening: Not Applicable     Date of Service:

## 2024-06-10 NOTE — TELEPHONE ENCOUNTER
RX Authorization    Medication: Carbidopa-levodopa (SINEMET)  MG tablet     Date last refill ordered: 6/30/23    Quantity ordered: 405    # refills: 3    Date of last clinic visit with ordering provider: 6/30/23    Date of next clinic visit with ordering provider: None scheduled

## 2024-06-10 NOTE — TELEPHONE ENCOUNTER
Left Voicemail (1st Attempt) and Sent Mychart (1st Attempt) for the patient to call back and schedule the following:    Appointment type: Return Movement disorder  Provider: Selam  Return date: around 6/30/2024  Specialty phone number: [phone]  Additional appointment(s) needed:   Additonal Notes: 1 year follow up      Sintia Booth on 6/10/2024 at 5:28 PM

## 2024-08-31 ENCOUNTER — HEALTH MAINTENANCE LETTER (OUTPATIENT)
Age: 84
End: 2024-08-31

## (undated) DEVICE — GLOVE PROTEXIS BLUE W/NEU-THERA 7.0  2D73EB70

## (undated) DEVICE — BONE WAX 2.5GM W31G

## (undated) DEVICE — CATH TRAY FOLEY SURESTEP 16FR W/URNE MTR STLK LATEX A303316A

## (undated) DEVICE — ESU ELEC NDL 1" COATED/INSULATED E1465

## (undated) DEVICE — SU MONOCRYL 4-0 PS-2 27" UND Y426H

## (undated) DEVICE — NDL BLUNT 17GA 1.5" 8881202330

## (undated) DEVICE — SOL NACL 0.9% IRRIG 1000ML BOTTLE 2F7124

## (undated) DEVICE — DRSG GAUZE 4X4" TRAY 6939

## (undated) DEVICE — BUR MATCHSTICK 3MM ANSPACH L-8NS-G1

## (undated) DEVICE — PACK NEURO MINOR UMMC SNE32MNMU4

## (undated) DEVICE — NDL 25GA 1.5" 305127

## (undated) DEVICE — LINEN TOWEL PACK X30 5481

## (undated) DEVICE — BRUSH SURGICAL SCRUB W/4% CHG SOL 25ML 371073

## (undated) DEVICE — PREP CHLORAPREP W/ORANGE TINT 10.5ML 260715

## (undated) DEVICE — GLOVE PROTEXIS MICRO 7.0  2D73PM70

## (undated) DEVICE — GLOVE ESTEEM POWDER FREE SMT 6.5  2D72PT65

## (undated) DEVICE — PREP CHLORAPREP CLEAR 3ML 260400

## (undated) DEVICE — DRAPE C-ARM W/STRAPS 42X72" 07-CA104

## (undated) DEVICE — SPONGE SURGIFOAM 100 1974

## (undated) DEVICE — GLOVE PROTEXIS BLUE W/NEU-THERA 7.5  2D73EB75

## (undated) DEVICE — SOL WATER IRRIG 1000ML BOTTLE 2F7114

## (undated) DEVICE — LINEN TOWEL PACK X6 WHITE 5487

## (undated) DEVICE — SPONGE COTTONOID 1/2X1/2" 20-04S

## (undated) DEVICE — PAIN PACK

## (undated) DEVICE — SUCTION MANIFOLD DORNOCH ULTRA CART UL-CL500

## (undated) DEVICE — TUBING IV EXT SET MICRO VOLUME MALE LL ADAPTER 36" 2N3345

## (undated) DEVICE — SU VICRYL 0 CT-1 CR 8X18" J740D

## (undated) DEVICE — SPONGE COTTONOID 1/2X1 1/2" 20-06S

## (undated) DEVICE — DRAPE STERI TOWEL LG 1010

## (undated) DEVICE — SU VICRYL 0 UR-6 27" J603H

## (undated) DEVICE — LINEN TOWEL PACK X5 5464

## (undated) DEVICE — SU VICRYL 2-0 CT-2 CR 8X18" J726D

## (undated) DEVICE — SU DERMABOND ADVANCED .7ML DNX12

## (undated) DEVICE — SU VICRYL 2-0 CT-2 27" J333H

## (undated) DEVICE — ADH FLOSEAL W/HUMAN THROMBIN 5ML W/APPLICATOR TIP ADS201844

## (undated) DEVICE — PREP CHLORAPREP 26ML TINTED ORANGE  260815

## (undated) DEVICE — DRAPE MICROSCOPE LEICA 54X150" AR8033650

## (undated) DEVICE — SYR 30ML LL W/O NDL 302832

## (undated) RX ORDER — PROPOFOL 10 MG/ML
INJECTION, EMULSION INTRAVENOUS
Status: DISPENSED
Start: 2018-06-25

## (undated) RX ORDER — ESMOLOL HYDROCHLORIDE 10 MG/ML
INJECTION INTRAVENOUS
Status: DISPENSED
Start: 2018-06-25

## (undated) RX ORDER — PHENYLEPHRINE HCL IN 0.9% NACL 1 MG/10 ML
SYRINGE (ML) INTRAVENOUS
Status: DISPENSED
Start: 2018-06-25

## (undated) RX ORDER — METHYLPREDNISOLONE ACETATE 80 MG/ML
INJECTION, SUSPENSION INTRA-ARTICULAR; INTRALESIONAL; INTRAMUSCULAR; SOFT TISSUE
Status: DISPENSED
Start: 2020-02-12

## (undated) RX ORDER — TRIAMCINOLONE ACETONIDE 40 MG/ML
INJECTION, SUSPENSION INTRA-ARTICULAR; INTRAMUSCULAR
Status: DISPENSED
Start: 2019-11-21

## (undated) RX ORDER — ONDANSETRON 2 MG/ML
INJECTION INTRAMUSCULAR; INTRAVENOUS
Status: DISPENSED
Start: 2018-06-25

## (undated) RX ORDER — BUPIVACAINE HYDROCHLORIDE AND EPINEPHRINE 5; 5 MG/ML; UG/ML
INJECTION, SOLUTION EPIDURAL; INTRACAUDAL; PERINEURAL
Status: DISPENSED
Start: 2018-06-25

## (undated) RX ORDER — SODIUM CHLORIDE 9 MG/ML
INJECTION, SOLUTION INTRAVENOUS
Status: DISPENSED
Start: 2018-06-25

## (undated) RX ORDER — FENTANYL CITRATE 50 UG/ML
INJECTION, SOLUTION INTRAMUSCULAR; INTRAVENOUS
Status: DISPENSED
Start: 2018-06-25

## (undated) RX ORDER — CEFAZOLIN SODIUM 2 G/100ML
INJECTION, SOLUTION INTRAVENOUS
Status: DISPENSED
Start: 2018-06-25

## (undated) RX ORDER — BACITRACIN 50000 [IU]/1
INJECTION, POWDER, FOR SOLUTION INTRAMUSCULAR
Status: DISPENSED
Start: 2018-06-25

## (undated) RX ORDER — LIDOCAINE HYDROCHLORIDE 10 MG/ML
INJECTION, SOLUTION EPIDURAL; INFILTRATION; INTRACAUDAL; PERINEURAL
Status: DISPENSED
Start: 2019-11-21

## (undated) RX ORDER — BUPIVACAINE HYDROCHLORIDE 5 MG/ML
INJECTION, SOLUTION EPIDURAL; INTRACAUDAL
Status: DISPENSED
Start: 2020-02-12

## (undated) RX ORDER — VANCOMYCIN HYDROCHLORIDE 500 MG/10ML
INJECTION, POWDER, LYOPHILIZED, FOR SOLUTION INTRAVENOUS
Status: DISPENSED
Start: 2018-06-25

## (undated) RX ORDER — LIDOCAINE HYDROCHLORIDE 10 MG/ML
INJECTION, SOLUTION EPIDURAL; INFILTRATION; INTRACAUDAL; PERINEURAL
Status: DISPENSED
Start: 2020-02-12

## (undated) RX ORDER — EPHEDRINE SULFATE 50 MG/ML
INJECTION, SOLUTION INTRAMUSCULAR; INTRAVENOUS; SUBCUTANEOUS
Status: DISPENSED
Start: 2018-06-25